# Patient Record
Sex: MALE | Race: WHITE | NOT HISPANIC OR LATINO | URBAN - METROPOLITAN AREA
[De-identification: names, ages, dates, MRNs, and addresses within clinical notes are randomized per-mention and may not be internally consistent; named-entity substitution may affect disease eponyms.]

---

## 2019-07-07 ENCOUNTER — EMERGENCY (EMERGENCY)
Facility: HOSPITAL | Age: 84
LOS: 0 days | Discharge: HOME | End: 2019-07-07
Attending: EMERGENCY MEDICINE | Admitting: EMERGENCY MEDICINE
Payer: MEDICARE

## 2019-07-07 VITALS
HEART RATE: 93 BPM | HEIGHT: 72 IN | WEIGHT: 203.93 LBS | OXYGEN SATURATION: 98 % | TEMPERATURE: 98 F | SYSTOLIC BLOOD PRESSURE: 168 MMHG | DIASTOLIC BLOOD PRESSURE: 86 MMHG | RESPIRATION RATE: 18 BRPM

## 2019-07-07 DIAGNOSIS — R33.8 OTHER RETENTION OF URINE: ICD-10-CM

## 2019-07-07 DIAGNOSIS — R33.9 RETENTION OF URINE, UNSPECIFIED: ICD-10-CM

## 2019-07-07 DIAGNOSIS — I10 ESSENTIAL (PRIMARY) HYPERTENSION: ICD-10-CM

## 2019-07-07 DIAGNOSIS — N40.1 BENIGN PROSTATIC HYPERPLASIA WITH LOWER URINARY TRACT SYMPTOMS: ICD-10-CM

## 2019-07-07 LAB
APPEARANCE UR: CLEAR — SIGNIFICANT CHANGE UP
BACTERIA # UR AUTO: ABNORMAL
BILIRUB UR-MCNC: NEGATIVE — SIGNIFICANT CHANGE UP
COLOR SPEC: YELLOW — SIGNIFICANT CHANGE UP
DIFF PNL FLD: ABNORMAL
EPI CELLS # UR: SIGNIFICANT CHANGE UP
GLUCOSE UR QL: NEGATIVE MG/DL — SIGNIFICANT CHANGE UP
KETONES UR-MCNC: NEGATIVE — SIGNIFICANT CHANGE UP
LEUKOCYTE ESTERASE UR-ACNC: NEGATIVE — SIGNIFICANT CHANGE UP
NITRITE UR-MCNC: NEGATIVE — SIGNIFICANT CHANGE UP
PH UR: 6.5 — SIGNIFICANT CHANGE UP (ref 5–8)
PROT UR-MCNC: NEGATIVE MG/DL — SIGNIFICANT CHANGE UP
RBC CASTS # UR COMP ASSIST: >50 /HPF (ref 0–4)
SP GR SPEC: 1 — LOW (ref 1.01–1.02)
UROBILINOGEN FLD QL: NEGATIVE MG/DL — SIGNIFICANT CHANGE UP
WBC UR QL: SIGNIFICANT CHANGE UP

## 2019-07-07 PROCEDURE — 99283 EMERGENCY DEPT VISIT LOW MDM: CPT

## 2019-07-07 NOTE — ED PROVIDER NOTE - CLINICAL SUMMARY MEDICAL DECISION MAKING FREE TEXT BOX
Pt with hx of urinary retention, enlarged prostate with urinary retention. Plan: Brewer. Pt with hx of urinary retention, enlarged prostate with urinary retention. Plan: Brewer to be placed by nursing.

## 2019-07-07 NOTE — ED PROVIDER NOTE - CONSTITUTIONAL, MLM
normal... Well appearing, well nourished, awake, alert, oriented to person, place, time/situation +in mild distress due to pain

## 2019-07-07 NOTE — ED PROVIDER NOTE - PROGRESS NOTE DETAILS
Nathan CARDENAS for ED attending, Dr. Collins: pt with about 1000 ml urine output. pt feeling better. Discussed with pt about removing Brewer but pt opts to leave Brewer in place and f/u with urology outpatient.

## 2019-07-07 NOTE — ED ADULT NURSE NOTE - NSIMPLEMENTINTERV_GEN_ALL_ED
Implemented All Universal Safety Interventions:  [place] to call system. Call bell, personal items and telephone within reach. Instruct patient to call for assistance. Room bathroom lighting operational. Non-slip footwear when patient is off stretcher. Physically safe environment: no spills, clutter or unnecessary equipment. Stretcher in lowest position, wheels locked, appropriate side rails in place.

## 2019-07-07 NOTE — ED PROVIDER NOTE - OBJECTIVE STATEMENT
84 y/o male with PMHx of HTN, enlarged prostate presents to the ED c/o urinary retention. Had small amount of urination this morning. Pt had a similar episode last year, had catheter placed and removed the next day, and followed up with urologist Dr. Mariano. No recent surgeries. Non smoker, occasional EtOH use. On Flomax. NKDA. Urology: García. PMD: Linker.

## 2019-07-08 LAB
CULTURE RESULTS: NO GROWTH — SIGNIFICANT CHANGE UP
SPECIMEN SOURCE: SIGNIFICANT CHANGE UP

## 2019-09-04 PROBLEM — I10 ESSENTIAL (PRIMARY) HYPERTENSION: Chronic | Status: ACTIVE | Noted: 2019-07-07

## 2019-09-04 PROBLEM — N40.0 BENIGN PROSTATIC HYPERPLASIA WITHOUT LOWER URINARY TRACT SYMPTOMS: Chronic | Status: ACTIVE | Noted: 2019-07-07

## 2019-09-16 PROBLEM — Z00.00 ENCOUNTER FOR PREVENTIVE HEALTH EXAMINATION: Status: ACTIVE | Noted: 2019-09-16

## 2019-09-17 ENCOUNTER — APPOINTMENT (OUTPATIENT)
Age: 84
End: 2019-09-17
Payer: MEDICARE

## 2019-09-17 ENCOUNTER — RESULT CHARGE (OUTPATIENT)
Age: 84
End: 2019-09-17

## 2019-09-17 VITALS
WEIGHT: 204 LBS | RESPIRATION RATE: 16 BRPM | TEMPERATURE: 98.1 F | SYSTOLIC BLOOD PRESSURE: 171 MMHG | DIASTOLIC BLOOD PRESSURE: 75 MMHG | OXYGEN SATURATION: 95 % | HEART RATE: 60 BPM | HEIGHT: 72 IN | BODY MASS INDEX: 27.63 KG/M2

## 2019-09-17 DIAGNOSIS — R35.0 FREQUENCY OF MICTURITION: ICD-10-CM

## 2019-09-17 DIAGNOSIS — Z86.79 PERSONAL HISTORY OF OTHER DISEASES OF THE CIRCULATORY SYSTEM: ICD-10-CM

## 2019-09-17 DIAGNOSIS — Z78.9 OTHER SPECIFIED HEALTH STATUS: ICD-10-CM

## 2019-09-17 LAB
BILIRUB UR QL STRIP: NORMAL
CLARITY UR: CLEAR
COLLECTION METHOD: NORMAL
GLUCOSE UR-MCNC: NORMAL
HCG UR QL: 0.2 EU/DL
HGB UR QL STRIP.AUTO: NORMAL
KETONES UR-MCNC: NORMAL
LEUKOCYTE ESTERASE UR QL STRIP: NORMAL
NITRITE UR QL STRIP: NORMAL
PH UR STRIP: 6.5
PROT UR STRIP-MCNC: NORMAL
SP GR UR STRIP: 1.01

## 2019-09-17 PROCEDURE — 99204 OFFICE O/P NEW MOD 45 MIN: CPT | Mod: 25

## 2019-09-17 PROCEDURE — 51798 US URINE CAPACITY MEASURE: CPT

## 2019-09-18 PROBLEM — Z78.9 NON-SMOKER: Status: ACTIVE | Noted: 2019-09-18

## 2019-09-18 PROBLEM — Z86.79 HISTORY OF HYPERTENSION: Status: RESOLVED | Noted: 2019-09-18 | Resolved: 2019-09-18

## 2019-09-18 NOTE — HISTORY OF PRESENT ILLNESS
[FreeTextEntry1] : 83 year old man seen 09/17/2019 with complaint of urinary frequency. This began few years ago. \par It is moderate in severity. Nothing makes the symptoms better, nothing makes sx worse. \par It is associated with nothing. Patient has hx of BPH and is currently taking Flomax 0.8 mg QD. Patient states his frequency is somewhat bothersome, reports he voids every 2-3 hrs and has nocturia x 2. Patient reports he has hx of going into acute urinary retention few times in the past requiring chowdary placement in ER. He denies any current feeling of incomplete bladder emptying and reports he has a good stream.  No hematuria, no dysuria, no urgency, no straining. No incontinence. No fevers, no chills, no nausea, no vomiting, no flank pain. No family history contributory to BPH with LUTS.\par \par

## 2019-09-18 NOTE — ASSESSMENT
[FreeTextEntry1] : 83 year old man seen 09/17/2019 with complaint of urinary frequency, most likely secondary to BPH with LUTS. PVR of 356 cc.\par \par The anatomy of the lower genitourinary tract was explained to the patient, with the urine passing through the bladder neck and prostatic urethra as it exits the body. Prostatic enlargement can constrict the lumen and the bladder outlet, causing incomplete bladder emptying and irritative symptoms such and frequency and urgency. Alpha blockers can be used to relax the bladder neck and facilitate passage of urine from the bladder. 5-alpha reductase inhibitors can be employed to shrink the prostate and thereby allow for passage of urine more easily. Anticholinergics to decrease bladder irritative symptoms were also discussed, but it was stressed that they can increase post void residual and risk urinary retention.\par \par Will obtain UA/Urine Cx to R/O UTI. Patient recommended to start Finasteride 5 mg QD. Patient wants to try the conservative approach and would like to try Finasteride. Discussed with patient if he has another episode of Acute urinary retention, he could benefit from TURP.\par \par The risks, benefits, and alternatives of Transurethral Resection of Prostate were discussed with the patient. The patient was told that a specialized cystoscope is advanced into the prostatic urethra and obstructive prostatic tissue bulging into the lumen of the urethra is resected. Bleeding is controlled with cautery. A [employer] evacuator is used irrigate the bladder and evacuate clot and TURP chips from the bladder. These prostate chips are then sent for pathologic evaluation. A chowdary catheter is left in place at the end of the procedure, and continuous bladder irrigation is performed overnight. The following morning, CBI is held and the patient is discharged with the chowdary catheter to gravity. The catheter is then removed the following week. Risks include bladder injury, urethral injury, incontinence, urinary retention, persistent bleeding which may require prolonged CBI or being taken back to the OR for cautery. All questions and concerns were answered and addressed. Pt declines work up for TURP at this time.

## 2019-09-18 NOTE — PHYSICAL EXAM
[General Appearance - Well Developed] : well developed [General Appearance - Well Nourished] : well nourished [Normal Appearance] : normal appearance [Well Groomed] : well groomed [Edema] : no peripheral edema [General Appearance - In No Acute Distress] : no acute distress [Respiration, Rhythm And Depth] : normal respiratory rhythm and effort [Exaggerated Use Of Accessory Muscles For Inspiration] : no accessory muscle use [Abdomen Soft] : soft [Abdomen Tenderness] : non-tender [Costovertebral Angle Tenderness] : no ~M costovertebral angle tenderness [Urethral Meatus] : meatus normal [Urinary Bladder Findings] : the bladder was normal on palpation [Scrotum] : the scrotum was normal [Testes Mass (___cm)] : there were no testicular masses [No Prostate Nodules] : no prostate nodules [Prostate Enlarged] : was enlarged [Prostate Size___ (Scale 0-4)] : prostate size was [unfilled] on a scale of 0-4 [Normal Station and Gait] : the gait and station were normal for the patient's age [] : no rash [No Focal Deficits] : no focal deficits [Oriented To Time, Place, And Person] : oriented to person, place, and time [Affect] : the affect was normal [Mood] : the mood was normal [Not Anxious] : not anxious [No Palpable Adenopathy] : no palpable adenopathy [Prostate Size ___ gm] : prostate size [unfilled] gm [Prostate Nodule] : did not have a nodule [Prostate Tenderness] : was not tender [Prostate Fluctuant] : was not fluctuant

## 2019-09-18 NOTE — REVIEW OF SYSTEMS
[see HPI] : see HPI [Nocturia] : nocturia [Negative] : Endocrine [Dysuria] : no dysuria [Incontinence] : no incontinence [Genital Lesion] : no genital lesions [Testicular Pain] : no testicular pain

## 2019-09-19 ENCOUNTER — OTHER (OUTPATIENT)
Age: 84
End: 2019-09-19

## 2019-09-19 LAB
APPEARANCE: CLEAR
BACTERIA: NEGATIVE
BILIRUBIN URINE: NEGATIVE
BLOOD URINE: NEGATIVE
COLOR: NORMAL
GLUCOSE QUALITATIVE U: NEGATIVE
HYALINE CASTS: 0 /LPF
KETONES URINE: NEGATIVE
LEUKOCYTE ESTERASE URINE: ABNORMAL
MICROSCOPIC-UA: NORMAL
NITRITE URINE: NEGATIVE
PH URINE: 6.5
PROTEIN URINE: NEGATIVE
RED BLOOD CELLS URINE: 0 /HPF
SPECIFIC GRAVITY URINE: 1.01
SQUAMOUS EPITHELIAL CELLS: 1 /HPF
UROBILINOGEN URINE: NORMAL
WHITE BLOOD CELLS URINE: 7 /HPF

## 2019-09-20 ENCOUNTER — RX RENEWAL (OUTPATIENT)
Age: 84
End: 2019-09-20

## 2019-09-20 DIAGNOSIS — A49.9 URINARY TRACT INFECTION, SITE NOT SPECIFIED: ICD-10-CM

## 2019-09-20 DIAGNOSIS — N39.0 URINARY TRACT INFECTION, SITE NOT SPECIFIED: ICD-10-CM

## 2019-09-20 LAB — BACTERIA UR CULT: ABNORMAL

## 2019-10-10 ENCOUNTER — INPATIENT (INPATIENT)
Facility: HOSPITAL | Age: 84
LOS: 3 days | Discharge: NURSING, REHAB OR LONG-TERM CARE | DRG: 493 | End: 2019-10-14
Attending: INTERNAL MEDICINE | Admitting: INTERNAL MEDICINE
Payer: MEDICARE

## 2019-10-10 VITALS
WEIGHT: 205.03 LBS | RESPIRATION RATE: 17 BRPM | DIASTOLIC BLOOD PRESSURE: 63 MMHG | SYSTOLIC BLOOD PRESSURE: 144 MMHG | HEIGHT: 76 IN | OXYGEN SATURATION: 98 % | HEART RATE: 80 BPM | TEMPERATURE: 99 F

## 2019-10-10 DIAGNOSIS — S82.899A OTHER FRACTURE OF UNSPECIFIED LOWER LEG, INITIAL ENCOUNTER FOR CLOSED FRACTURE: ICD-10-CM

## 2019-10-10 LAB
ANION GAP SERPL CALC-SCNC: 9 MMOL/L — SIGNIFICANT CHANGE UP (ref 5–17)
APTT BLD: 29.4 SEC — SIGNIFICANT CHANGE UP (ref 27.5–36.3)
BUN SERPL-MCNC: 25 MG/DL — HIGH (ref 7–23)
CALCIUM SERPL-MCNC: 9.1 MG/DL — SIGNIFICANT CHANGE UP (ref 8.5–10.1)
CHLORIDE SERPL-SCNC: 108 MMOL/L — SIGNIFICANT CHANGE UP (ref 96–108)
CO2 SERPL-SCNC: 28 MMOL/L — SIGNIFICANT CHANGE UP (ref 22–31)
CREAT SERPL-MCNC: 0.8 MG/DL — SIGNIFICANT CHANGE UP (ref 0.5–1.3)
GLUCOSE SERPL-MCNC: 85 MG/DL — SIGNIFICANT CHANGE UP (ref 70–99)
HCT VFR BLD CALC: 40.4 % — SIGNIFICANT CHANGE UP (ref 39–50)
HGB BLD-MCNC: 13.7 G/DL — SIGNIFICANT CHANGE UP (ref 13–17)
INR BLD: 1.04 RATIO — SIGNIFICANT CHANGE UP (ref 0.88–1.16)
MCHC RBC-ENTMCNC: 31.9 PG — SIGNIFICANT CHANGE UP (ref 27–34)
MCHC RBC-ENTMCNC: 33.9 GM/DL — SIGNIFICANT CHANGE UP (ref 32–36)
MCV RBC AUTO: 94.2 FL — SIGNIFICANT CHANGE UP (ref 80–100)
PLATELET # BLD AUTO: 242 K/UL — SIGNIFICANT CHANGE UP (ref 150–400)
POTASSIUM SERPL-MCNC: 3.3 MMOL/L — LOW (ref 3.5–5.3)
POTASSIUM SERPL-SCNC: 3.3 MMOL/L — LOW (ref 3.5–5.3)
PROTHROM AB SERPL-ACNC: 11.6 SEC — SIGNIFICANT CHANGE UP (ref 10–12.9)
RBC # BLD: 4.29 M/UL — SIGNIFICANT CHANGE UP (ref 4.2–5.8)
RBC # FLD: 12.3 % — SIGNIFICANT CHANGE UP (ref 10.3–14.5)
SODIUM SERPL-SCNC: 145 MMOL/L — SIGNIFICANT CHANGE UP (ref 135–145)
WBC # BLD: 8.68 K/UL — SIGNIFICANT CHANGE UP (ref 3.8–10.5)
WBC # FLD AUTO: 8.68 K/UL — SIGNIFICANT CHANGE UP (ref 3.8–10.5)

## 2019-10-10 PROCEDURE — 73590 X-RAY EXAM OF LOWER LEG: CPT | Mod: 26,RT

## 2019-10-10 PROCEDURE — 97530 THERAPEUTIC ACTIVITIES: CPT | Mod: GP

## 2019-10-10 PROCEDURE — 97162 PT EVAL MOD COMPLEX 30 MIN: CPT | Mod: GP

## 2019-10-10 PROCEDURE — 99221 1ST HOSP IP/OBS SF/LOW 40: CPT | Mod: 57

## 2019-10-10 PROCEDURE — 73610 X-RAY EXAM OF ANKLE: CPT | Mod: 26,RT

## 2019-10-10 PROCEDURE — 36415 COLL VENOUS BLD VENIPUNCTURE: CPT

## 2019-10-10 PROCEDURE — 93010 ELECTROCARDIOGRAM REPORT: CPT

## 2019-10-10 PROCEDURE — 71045 X-RAY EXAM CHEST 1 VIEW: CPT | Mod: 26

## 2019-10-10 PROCEDURE — 80048 BASIC METABOLIC PNL TOTAL CA: CPT

## 2019-10-10 PROCEDURE — C1713: CPT

## 2019-10-10 PROCEDURE — 85610 PROTHROMBIN TIME: CPT

## 2019-10-10 PROCEDURE — 97116 GAIT TRAINING THERAPY: CPT | Mod: GP

## 2019-10-10 PROCEDURE — 76000 FLUOROSCOPY <1 HR PHYS/QHP: CPT

## 2019-10-10 PROCEDURE — 71045 X-RAY EXAM CHEST 1 VIEW: CPT

## 2019-10-10 PROCEDURE — 85027 COMPLETE CBC AUTOMATED: CPT

## 2019-10-10 PROCEDURE — 73610 X-RAY EXAM OF ANKLE: CPT | Mod: 26,77,RT

## 2019-10-10 PROCEDURE — 70450 CT HEAD/BRAIN W/O DYE: CPT | Mod: 26

## 2019-10-10 PROCEDURE — 85730 THROMBOPLASTIN TIME PARTIAL: CPT

## 2019-10-10 RX ORDER — POTASSIUM CHLORIDE 20 MEQ
40 PACKET (EA) ORAL ONCE
Refills: 0 | Status: DISCONTINUED | OUTPATIENT
Start: 2019-10-10 | End: 2019-10-10

## 2019-10-10 RX ORDER — ONDANSETRON 8 MG/1
4 TABLET, FILM COATED ORAL EVERY 6 HOURS
Refills: 0 | Status: DISCONTINUED | OUTPATIENT
Start: 2019-10-10 | End: 2019-10-12

## 2019-10-10 RX ORDER — POTASSIUM CHLORIDE 20 MEQ
40 PACKET (EA) ORAL ONCE
Refills: 0 | Status: COMPLETED | OUTPATIENT
Start: 2019-10-10 | End: 2019-10-10

## 2019-10-10 RX ORDER — TAMSULOSIN HYDROCHLORIDE 0.4 MG/1
0.4 CAPSULE ORAL AT BEDTIME
Refills: 0 | Status: DISCONTINUED | OUTPATIENT
Start: 2019-10-10 | End: 2019-10-12

## 2019-10-10 RX ORDER — SODIUM CHLORIDE 9 MG/ML
1000 INJECTION, SOLUTION INTRAVENOUS
Refills: 0 | Status: DISCONTINUED | OUTPATIENT
Start: 2019-10-10 | End: 2019-10-12

## 2019-10-10 RX ORDER — TETANUS TOXOID, REDUCED DIPHTHERIA TOXOID AND ACELLULAR PERTUSSIS VACCINE, ADSORBED 5; 2.5; 8; 8; 2.5 [IU]/.5ML; [IU]/.5ML; UG/.5ML; UG/.5ML; UG/.5ML
0.5 SUSPENSION INTRAMUSCULAR ONCE
Refills: 0 | Status: COMPLETED | OUTPATIENT
Start: 2019-10-10 | End: 2019-10-10

## 2019-10-10 RX ORDER — OXYCODONE AND ACETAMINOPHEN 5; 325 MG/1; MG/1
1 TABLET ORAL ONCE
Refills: 0 | Status: DISCONTINUED | OUTPATIENT
Start: 2019-10-10 | End: 2019-10-10

## 2019-10-10 RX ORDER — ACETAMINOPHEN 500 MG
650 TABLET ORAL EVERY 4 HOURS
Refills: 0 | Status: DISCONTINUED | OUTPATIENT
Start: 2019-10-10 | End: 2019-10-12

## 2019-10-10 RX ORDER — FINASTERIDE 5 MG/1
5 TABLET, FILM COATED ORAL DAILY
Refills: 0 | Status: DISCONTINUED | OUTPATIENT
Start: 2019-10-10 | End: 2019-10-12

## 2019-10-10 RX ORDER — MORPHINE SULFATE 50 MG/1
2 CAPSULE, EXTENDED RELEASE ORAL EVERY 4 HOURS
Refills: 0 | Status: DISCONTINUED | OUTPATIENT
Start: 2019-10-10 | End: 2019-10-12

## 2019-10-10 RX ORDER — LOSARTAN POTASSIUM 100 MG/1
100 TABLET, FILM COATED ORAL DAILY
Refills: 0 | Status: DISCONTINUED | OUTPATIENT
Start: 2019-10-10 | End: 2019-10-12

## 2019-10-10 RX ORDER — DOCUSATE SODIUM 100 MG
100 CAPSULE ORAL THREE TIMES A DAY
Refills: 0 | Status: DISCONTINUED | OUTPATIENT
Start: 2019-10-10 | End: 2019-10-12

## 2019-10-10 RX ORDER — AMLODIPINE BESYLATE 2.5 MG/1
10 TABLET ORAL DAILY
Refills: 0 | Status: DISCONTINUED | OUTPATIENT
Start: 2019-10-10 | End: 2019-10-12

## 2019-10-10 RX ORDER — ATORVASTATIN CALCIUM 80 MG/1
10 TABLET, FILM COATED ORAL AT BEDTIME
Refills: 0 | Status: DISCONTINUED | OUTPATIENT
Start: 2019-10-10 | End: 2019-10-12

## 2019-10-10 RX ADMIN — TETANUS TOXOID, REDUCED DIPHTHERIA TOXOID AND ACELLULAR PERTUSSIS VACCINE, ADSORBED 0.5 MILLILITER(S): 5; 2.5; 8; 8; 2.5 SUSPENSION INTRAMUSCULAR at 17:29

## 2019-10-10 RX ADMIN — TAMSULOSIN HYDROCHLORIDE 0.4 MILLIGRAM(S): 0.4 CAPSULE ORAL at 22:21

## 2019-10-10 RX ADMIN — ATORVASTATIN CALCIUM 10 MILLIGRAM(S): 80 TABLET, FILM COATED ORAL at 22:21

## 2019-10-10 RX ADMIN — Medication 40 MILLIEQUIVALENT(S): at 18:37

## 2019-10-10 RX ADMIN — Medication 100 MILLIGRAM(S): at 22:21

## 2019-10-10 NOTE — H&P ADULT - NSHPPHYSICALEXAM_GEN_ALL_CORE
Vital Signs Last 24 Hrs  T(C): 37 (10 Oct 2019 14:53), Max: 37 (10 Oct 2019 14:53)  T(F): 98.6 (10 Oct 2019 14:53), Max: 98.6 (10 Oct 2019 14:53)  HR: 80 (10 Oct 2019 14:53) (80 - 80)  BP: 144/63 (10 Oct 2019 14:53) (144/63 - 144/63)  BP(mean): --  RR: 17 (10 Oct 2019 14:53) (17 - 17)  SpO2: 98% (10 Oct 2019 14:53) (98% - 98%)

## 2019-10-10 NOTE — ED ADULT NURSE NOTE - OBJECTIVE STATEMENT
Pt presents to ED with Right ankle swelling, lac on back of head, bilateral elbow abrasions.  Bleeding controlled on back of head.  Pt denies any pain at this time.  Pt tripped and fell in driveway.  Pt denies LOC, pt not taking any blood thinners at this time.  Attending at bedside.

## 2019-10-10 NOTE — ED PROVIDER NOTE - MUSCULOSKELETAL, MLM
Spine appears normal, range of motion is not limited, +swelling to right ankle TTP of medial malleolus, intact distally

## 2019-10-10 NOTE — ED ADULT NURSE NOTE - CHPI ED NUR SYMPTOMS NEG
no confusion/no nausea/no seizure/no syncope/no change in level of consciousness/no blurred vision/no weakness/no dizziness/no loss of consciousness/no vomiting

## 2019-10-10 NOTE — ED PROVIDER NOTE - CARE PLAN
Principal Discharge DX:	Broken ankle Principal Discharge DX:	Trimalleolar fracture of right ankle  Secondary Diagnosis:	Scalp laceration

## 2019-10-10 NOTE — H&P ADULT - NSHPLABSRESULTS_GEN_ALL_CORE
13.7   8.68  )-----------( 242      ( 10 Oct 2019 15:26 )             40.4     10 Oct 2019 15:26    145    |  108    |  25     ----------------------------<  85     3.3     |  28     |  0.80     Ca    9.1        10 Oct 2019 15:26    PT/INR - ( 10 Oct 2019 15:26 )   PT: 11.6 sec;   INR: 1.04 ratio       PTT - ( 10 Oct 2019 15:26 )  PTT:29.4 sec

## 2019-10-10 NOTE — ED PROVIDER NOTE - SKIN, MLM
Skin normal color for race, warm, +abrasion to right and left elbows +1 cm linear laceration scalp minimal active bleeding

## 2019-10-10 NOTE — H&P ADULT - HISTORY OF PRESENT ILLNESS
82yo/M with PMH HTN, hyperlipidemia, BPH presented s/p mechanical fall with RT ankle fracture. Patient tripped and fell, +hit his head, no LOC. Not on blood thinners. Last ate around noon. NO prior cardiac history, per wife had stress test couple of months ago which was negative

## 2019-10-10 NOTE — H&P ADULT - ASSESSMENT
#S/p mechanical fall with RT ankle fracture  Ortho eval appreciated  NWB to RLE  NPO for OR  Pain meds prn  EKG-  Labs-  Patient is medically optimized for OR    #HTN/hyperlipidemia  Stable  Continue home meds    #BPH- stable    #Dispo- inpatient admit, pt is medically optimized for OR. D/w pt and wife at bedside, ortho team #S/p mechanical fall with RT ankle fracture  Ortho eval appreciated  NWB to RLE  NPO for OR  Pain meds prn  EKG- sinus, non-specific changes  Labs- reviewed  Patient is medically optimized for OR    #HTN/hyperlipidemia  Stable  Continue home meds    #Hypokalemia- replace prior to OR    #BPH- stable    #Dispo- inpatient admit, pt is medically optimized for OR. D/w pt and wife at bedside, ortho team

## 2019-10-10 NOTE — CONSULT NOTE ADULT - SUBJECTIVE AND OBJECTIVE BOX
82 y/o male with a PMHx of enlarged prostate, HTN, HLD presents to the ED s/p mechanical trip and fall backwards off a curb and rolled his ankle and hit his head. denies LOC, Pt is c/o pain to his right ankle. unable to ambulate after injury. denies N/T RLE, no other extremity complaints. x rays right ankle show a trimal fracture/dislocation     NKDA  PMH: HTN    PE right ankle   + deformity  limited ROM due to pain   DP intact   + ROM toes   TTP   skin intact   SILT     Procedure: under adequate pain control, right ankle hematoma block administered under sterile technique approx 10cc 1% lidocaine. close reduction attempted, trilam splint applied  pt tolerated proceedure well, remained NVI 84 y/o male with a PMHx of enlarged prostate, HTN, HLD presents to the ED s/p mechanical trip and fall backwards off a curb and rolled his ankle and hit his head. denies LOC, Pt is c/o pain to his right ankle. unable to ambulate after injury. denies N/T RLE, no other extremity complaints. x rays right ankle show a trimal equivalent fracture/dislocation. last ate/drank at 12:00 today     NKDA  PMH: HTN    PE right ankle   + deformity  limited ROM due to pain   DP intact   + ROM toes   TTP   skin intact   SILT     Procedure: under adequate pain control, right ankle hematoma block administered under sterile technique approx 10cc 1% lidocaine. close reduction attempted, trilam splint applied  pt tolerated proceedure well, remained NVI

## 2019-10-10 NOTE — ED PROVIDER NOTE - OBJECTIVE STATEMENT
82 y/o male with a PMHx of enlarged prostate, HTN, HLD presents to the ED s/p mechanical trip and fall backwards off a curb and rolled his ankle and hit his head. No LOC, HA, dizziness. Denies feeling woozy. Pt is c/o pain to his right ankle on pressure. No pain elsewhere. Pt was told he had carotid narrowing but isn't on medication. Pt is not on blood thinners. Drinks two drinks a day. Nonsmoker. Last tdap unknown. PCP: Dr. Mtz

## 2019-10-10 NOTE — CONSULT NOTE ADULT - ASSESSMENT
A: 83M s/p right ankle trimal Fx/dislocation     P:  NWB LLE   elevation   maintain splint   post reduction x rays   options risks, benefits complications regarding surgical vs non surgical intervention discussed with patient. pt understands and agrees to recommended procedure ORIF right ankle   will discuss with attending on call DR Bustillos regarding admission vs discharge and follow as outpatient A: 83M s/p right ankle trimal equivalent Fx/dislocation     P:  NWB LLE   elevation   maintain splint   post reduction x rays   NPO   options risks, benefits complications regarding surgical vs non surgical intervention discussed with patient. pt understands and agrees to recommended procedure ORIF right ankle   will discuss with attending on call DR Bustillos regarding admission vs discharge and follow as outpatient   after discussion with attending will plan for medical admission and clearance for planned surgery this evening vs tomorrow   cbc, bmp EKG PT PTT T&S UA CXR   IVF  hold chemical anticoagulation   will obtain consent

## 2019-10-10 NOTE — ED ADULT NURSE NOTE - NSIMPLEMENTINTERV_GEN_ALL_ED
Implemented All Fall Risk Interventions:  [place] to call system. Call bell, personal items and telephone within reach. Instruct patient to call for assistance. Room bathroom lighting operational. Non-slip footwear when patient is off stretcher. Physically safe environment: no spills, clutter or unnecessary equipment. Stretcher in lowest position, wheels locked, appropriate side rails in place. Provide visual cue, wrist band, yellow gown, etc. Monitor gait and stability. Monitor for mental status changes and reorient to person, place, and time. Review medications for side effects contributing to fall risk. Reinforce activity limits and safety measures with patient and family.

## 2019-10-10 NOTE — ED PROVIDER NOTE - NS_ ATTENDINGSCRIBEDETAILS _ED_A_ED_FT
I, Travis Garzon MD,  performed the initial face to face bedside interview with this patient regarding history of present illness, review of symptoms and relevant past medical, social and family history.  I completed an independent physical examination.    The history, relevant review of systems, past medical and surgical history, medical decision making, and physical examination was documented by the scribe in my presence and I attest to the accuracy of the documentation.

## 2019-10-10 NOTE — ED ADULT TRIAGE NOTE - CHIEF COMPLAINT QUOTE
Pt with mechanical fall c/o right ankle pain, splinted by EMS.  Pt also with lac to back of head.  Pt denies LOC, Denies blood thinners.

## 2019-10-10 NOTE — ED PROCEDURE NOTE - CPROC ED INFORMED CONSENT1
Benefits, risks, and possible complications of procedure explained to patient/caregiver who verbalized understanding and gave verbal consent.
Ana

## 2019-10-10 NOTE — CONSULT NOTE ADULT - ATTENDING COMMENTS
Pt seen and examined. Chart reviewed. H&P reviewed.  Right ankle bimalleolar equivalent.  Surgical indications met  All RBA discussed at length.  All questions answered  Plan to proceed with ORIF  Postop recovery discussed.

## 2019-10-11 LAB
ANION GAP SERPL CALC-SCNC: 4 MMOL/L — LOW (ref 5–17)
BUN SERPL-MCNC: 23 MG/DL — SIGNIFICANT CHANGE UP (ref 7–23)
CALCIUM SERPL-MCNC: 8.5 MG/DL — SIGNIFICANT CHANGE UP (ref 8.5–10.1)
CHLORIDE SERPL-SCNC: 110 MMOL/L — HIGH (ref 96–108)
CO2 SERPL-SCNC: 29 MMOL/L — SIGNIFICANT CHANGE UP (ref 22–31)
CREAT SERPL-MCNC: 0.68 MG/DL — SIGNIFICANT CHANGE UP (ref 0.5–1.3)
GLUCOSE SERPL-MCNC: 95 MG/DL — SIGNIFICANT CHANGE UP (ref 70–99)
HCT VFR BLD CALC: 36.7 % — LOW (ref 39–50)
HGB BLD-MCNC: 12.4 G/DL — LOW (ref 13–17)
MCHC RBC-ENTMCNC: 31.6 PG — SIGNIFICANT CHANGE UP (ref 27–34)
MCHC RBC-ENTMCNC: 33.8 GM/DL — SIGNIFICANT CHANGE UP (ref 32–36)
MCV RBC AUTO: 93.6 FL — SIGNIFICANT CHANGE UP (ref 80–100)
PLATELET # BLD AUTO: 210 K/UL — SIGNIFICANT CHANGE UP (ref 150–400)
POTASSIUM SERPL-MCNC: 3.5 MMOL/L — SIGNIFICANT CHANGE UP (ref 3.5–5.3)
POTASSIUM SERPL-SCNC: 3.5 MMOL/L — SIGNIFICANT CHANGE UP (ref 3.5–5.3)
RBC # BLD: 3.92 M/UL — LOW (ref 4.2–5.8)
RBC # FLD: 12.5 % — SIGNIFICANT CHANGE UP (ref 10.3–14.5)
SODIUM SERPL-SCNC: 143 MMOL/L — SIGNIFICANT CHANGE UP (ref 135–145)
WBC # BLD: 6.33 K/UL — SIGNIFICANT CHANGE UP (ref 3.8–10.5)
WBC # FLD AUTO: 6.33 K/UL — SIGNIFICANT CHANGE UP (ref 3.8–10.5)

## 2019-10-11 RX ADMIN — AMLODIPINE BESYLATE 10 MILLIGRAM(S): 2.5 TABLET ORAL at 05:28

## 2019-10-11 RX ADMIN — Medication 100 MILLIGRAM(S): at 20:01

## 2019-10-11 RX ADMIN — SODIUM CHLORIDE 75 MILLILITER(S): 9 INJECTION, SOLUTION INTRAVENOUS at 05:28

## 2019-10-11 RX ADMIN — SODIUM CHLORIDE 75 MILLILITER(S): 9 INJECTION, SOLUTION INTRAVENOUS at 23:50

## 2019-10-11 RX ADMIN — TAMSULOSIN HYDROCHLORIDE 0.4 MILLIGRAM(S): 0.4 CAPSULE ORAL at 20:01

## 2019-10-11 RX ADMIN — Medication 100 MILLIGRAM(S): at 05:28

## 2019-10-11 RX ADMIN — FINASTERIDE 5 MILLIGRAM(S): 5 TABLET, FILM COATED ORAL at 20:01

## 2019-10-11 RX ADMIN — LOSARTAN POTASSIUM 100 MILLIGRAM(S): 100 TABLET, FILM COATED ORAL at 05:28

## 2019-10-11 NOTE — PROGRESS NOTE ADULT - SUBJECTIVE AND OBJECTIVE BOX
Patient seen and examined. Pain controlled. No acute events overnight. Leg well elevated overnight    Vital Signs Last 24 Hrs  T(C): 36.7 (10-11-19 @ 05:25), Max: 37 (10-10-19 @ 14:53)  T(F): 98 (10-11-19 @ 05:25), Max: 98.6 (10-10-19 @ 14:53)  HR: 66 (10-11-19 @ 05:25) (66 - 80)  BP: 141/52 (10-11-19 @ 05:25) (141/52 - 144/63)  BP(mean): --  RR: 16 (10-11-19 @ 05:25) (16 - 17)  SpO2: 96% (10-11-19 @ 05:25) (96% - 98%)    Physical Exam  Gen: NAD  RLE:   Splint c/d/i- windowed anterior for skin check. Able to wrinkle skin.  Wiggles toes  SILT toes and 1st webspace  cap refill brisk  Compartments soft    A/P: 83y Male w/ R ankle fx for ORIF today.    Analgesia  Hold AC  NWB RLE  Strict elevation  NPO  IVF while NPO     SBIRT screen positive- will discuss possible CIWA w/ med team

## 2019-10-11 NOTE — PROGRESS NOTE ADULT - SUBJECTIVE AND OBJECTIVE BOX
CC- s/p mechanical fall (11 Oct 2019 05:42)    HPI:  82yo/M with PMH HTN, hyperlipidemia, BPH presented s/p mechanical fall with RT ankle fracture. Patient tripped and fell, +hit his head, no LOC. Not on blood thinners. Last ate around noon. NO prior cardiac history, per wife had stress test couple of months ago which was negative (10 Oct 2019 17:43)    10/11/19- NPO for OR today    Review of system- All 10 systems reviewed and is as per HPI otherwise negative.     T(C): 37.2 (10-11-19 @ 11:44), Max: 37.2 (10-11-19 @ 11:44)  HR: 93 (10-11-19 @ 11:44) (65 - 93)  BP: 109/68 (10-11-19 @ 11:44) (109/68 - 144/63)  RR: 16 (10-11-19 @ 11:44) (16 - 17)  SpO2: 95% (10-11-19 @ 11:44) (95% - 98%)  Wt(kg): --    LABS:                        12.4   6.33  )-----------( 210      ( 11 Oct 2019 06:31 )             36.7     143  |  110<H>  |  23  ----------------------------<  95  3.5   |  29  |  0.68    Ca    8.5      11 Oct 2019 06:31    PT/INR - ( 10 Oct 2019 15:26 )   PT: 11.6 sec;   INR: 1.04 ratio      PTT - ( 10 Oct 2019 15:26 )  PTT:29.4 sec        RADIOLOGY & ADDITIONAL TESTS:      PHYSICAL EXAM:    GENERAL: NAD, well-groomed, well-developed  HEAD:  Atraumatic, Normocephalic  EYES: EOMI, PERRLA, conjunctiva and sclera clear  HEENT: Moist mucous membranes  NECK: Supple, No JVD  NERVOUS SYSTEM:  Alert & Oriented X3, Motor Strength 5/5 B/L upper and lower extremities; DTRs 2+ intact and symmetric  CHEST/LUNG: Clear to auscultation bilaterally; No rales, rhonchi, wheezing, or rubs  HEART: Regular rate and rhythm; No murmurs, rubs, or gallops  ABDOMEN: Soft, Nontender, Nondistended; Bowel sounds present  GENITOURINARY- Voiding, no palpable bladder  EXTREMITIES:  2+ Peripheral Pulses, No clubbing, cyanosis, or edema  MUSCULOSKELTAL- No muscle tenderness, Muscle tone normal, No joint tenderness, no Joint swelling, Joint range of motion-normal  SKIN-no rash, no lesion  CNS- alert, oriented X3, non focal       Daily Height in cm: 193.04 (10 Oct 2019 14:53)    Daily       acetaminophen   Tablet .. 650 milliGRAM(s) Oral every 4 hours PRN  aluminum hydroxide/magnesium hydroxide/simethicone Suspension 30 milliLiter(s) Oral every 4 hours PRN  amLODIPine   Tablet 10 milliGRAM(s) Oral daily  atorvastatin 10 milliGRAM(s) Oral at bedtime  docusate sodium 100 milliGRAM(s) Oral three times a day  finasteride 5 milliGRAM(s) Oral daily  lactated ringers. 1000 milliLiter(s) IV Continuous <Continuous>  losartan 100 milliGRAM(s) Oral daily  morphine  - Injectable 2 milliGRAM(s) IV Push every 4 hours PRN  multivitamin 1 Tablet(s) Oral daily  ondansetron Injectable 4 milliGRAM(s) IV Push every 6 hours PRN  tamsulosin 0.4 milliGRAM(s) Oral at bedtime        Assessment    Plan CC- s/p mechanical fall (11 Oct 2019 05:42)    HPI:  82yo/M with PMH HTN, hyperlipidemia, BPH presented s/p mechanical fall with RT ankle fracture. Patient tripped and fell, +hit his head, no LOC. Not on blood thinners. Last ate around noon. NO prior cardiac history, per wife had stress test couple of months ago which was negative (10 Oct 2019 17:43)    10/11/19- NPO for OR today    Review of system- All 10 systems reviewed and is as per HPI otherwise negative.     T(C): 37.2 (10-11-19 @ 11:44), Max: 37.2 (10-11-19 @ 11:44)  HR: 93 (10-11-19 @ 11:44) (65 - 93)  BP: 109/68 (10-11-19 @ 11:44) (109/68 - 144/63)  RR: 16 (10-11-19 @ 11:44) (16 - 17)  SpO2: 95% (10-11-19 @ 11:44) (95% - 98%)  Wt(kg): --    LABS:                        12.4   6.33  )-----------( 210      ( 11 Oct 2019 06:31 )             36.7     143  |  110<H>  |  23  ----------------------------<  95  3.5   |  29  |  0.68    Ca    8.5      11 Oct 2019 06:31    PT/INR - ( 10 Oct 2019 15:26 )   PT: 11.6 sec;   INR: 1.04 ratio      PTT - ( 10 Oct 2019 15:26 )  PTT:29.4 sec    RADIOLOGY & ADDITIONAL TESTS:  EXAM:  CT BRAIN                        PROCEDURE DATE:  10/10/2019    INTERPRETATION:      CT head without IV contrast        CLINICAL INFORMATION:  Fall   Intracranial hemorrhage.    TECHNIQUE: Contiguous axial 5 mm sections were obtained through the head.   Sagittal and coronal 2-D reformatted images were also obtained.   This   scan was performed using automatic exposure control (radiation dose   reduction software) to obtain a diagnostic image quality scan with   patient dose as low as reasonably achievable.     FINDINGS:   No previous examinations are available for review.    The brain demonstrates minimal anterior periventricular white matter   ischemia.   No acute cerebral cortical infarct is seen.  No intracranial   hemorrhage is found.  No mass effect is found in the brain.      The ventricles, sulci and basal cisterns show mild atrophy.    The orbits are unremarkable.  The paranasal sinuses are clear.  The nasal   cavity appears intact.  The nasopharynx is symmetric. The central skull   base, petrous temporal bones and calvarium remain intact.      IMPRESSION:   minimal anterior periventricular white matter ischemia.   Mild atrophy.    EXAM:  XR CHEST 1 VIEW                        EXAM:  XR ANKLE POST RED 2 VIEWS RT                        PROCEDURE DATE:  10/10/2019    INTERPRETATION:  Clinical information: Postreduction. Preop.    AP chest radiograph  AP, lateral, oblique views of the right ankle    COMPARISON: Same day radiographs of the ankle performed at 1524 hours,   chest x-ray March 02, 2010    FINDINGS: Chest: The lungs are clear. The cardiac and mediastinal   contours are unremarkable. There is no acute bony abnormality.    Right ankle: There has been reduction of the previously seen tibiotalar   dislocation with anatomic alignment of the distal tibia and talus. There   is markedly improved alignment of the distal fibular fracture.    IMPRESSION:     Chest: Clear lungs  Right ankle: Successful reduction of tibiotalar dislocation and markedly   improved alignment of distal fibular fracture.    PHYSICAL EXAM:  GENERAL: NAD, well-groomed, well-developed  HEAD:  Atraumatic, Normocephalic  EYES: EOMI, PERRLA, conjunctiva and sclera clear  HEENT: Moist mucous membranes  NECK: Supple, No JVD  NERVOUS SYSTEM:  Alert & Oriented X3, Motor Strength 5/5 B/L upper and lower extremities; DTRs 2+ intact and symmetric  CHEST/LUNG: Clear to auscultation bilaterally; No rales, rhonchi, wheezing, or rubs  HEART: Regular rate and rhythm; No murmurs, rubs, or gallops  ABDOMEN: Soft, Nontender, Nondistended; Bowel sounds present  GENITOURINARY- Voiding, no palpable bladder  EXTREMITIES:  2+ Peripheral Pulses, No clubbing, cyanosis, or edema  MUSCULOSKELTAL- RT ankle in splint  SKIN-no rash, no lesion  CNS- alert, oriented X3, non focal     Daily Height in cm: 193.04 (10 Oct 2019 14:53)      MEDICATIONS  (STANDING):  amLODIPine   Tablet 10 milliGRAM(s) Oral daily  atorvastatin 10 milliGRAM(s) Oral at bedtime  docusate sodium 100 milliGRAM(s) Oral three times a day  finasteride 5 milliGRAM(s) Oral daily  lactated ringers. 1000 milliLiter(s) (75 mL/Hr) IV Continuous <Continuous>  losartan 100 milliGRAM(s) Oral daily  multivitamin 1 Tablet(s) Oral daily  tamsulosin 0.4 milliGRAM(s) Oral at bedtime    MEDICATIONS  (PRN):  acetaminophen   Tablet .. 650 milliGRAM(s) Oral every 4 hours PRN Mild Pain (1 - 3)  aluminum hydroxide/magnesium hydroxide/simethicone Suspension 30 milliLiter(s) Oral every 4 hours PRN Dyspepsia  morphine  - Injectable 2 milliGRAM(s) IV Push every 4 hours PRN Severe Pain (7 - 10)  ondansetron Injectable 4 milliGRAM(s) IV Push every 6 hours PRN Nausea    Assessment/Plan  #S/p mechanical fall with RT ankle fracture  Ortho eval appreciated  NWB to RLE  NPO for OR  Pain meds prn  EKG- sinus, non-specific changes  Patient is medically optimized for OR    #HTN/hyperlipidemia  Stable  Continue home meds    #Hypokalemia- replaced    #BPH- stable    #Dispo- for OR today, pt is medically optimized for OR. D/w pt and ortho team

## 2019-10-11 NOTE — PROGRESS NOTE ADULT - ASSESSMENT
#S/p mechanical fall with RT ankle fracture  Ortho eval appreciated  NWB to RLE  NPO for OR  Pain meds prn  EKG- sinus, non-specific changes  Labs- reviewed  Patient is medically optimized for OR    #HTN/hyperlipidemia  Stable  Continue home meds    #Hypokalemia- replace prior to OR    #BPH- stable    #Dispo- inpatient admit, pt is medically optimized for OR. D/w pt and wife at bedside, ortho team

## 2019-10-11 NOTE — CHART NOTE - NSCHARTNOTEFT_GEN_A_CORE
Dx: R Ankle Trimal Fx     Sx: ORIF    Surgeons:  Dr. Bustillos    Med Cleared: yes    H&P : Done     ALL: Allergies    No Known Allergies    Intolerances      Vital Signs Last 24 Hrs  T(C): 37.1 (11 Oct 2019 23:10), Max: 37.2 (11 Oct 2019 11:44)  T(F): 98.7 (11 Oct 2019 23:10), Max: 99 (11 Oct 2019 11:44)  HR: 68 (11 Oct 2019 23:10) (65 - 93)  BP: 140/54 (11 Oct 2019 23:10) (109/68 - 141/52)  BP(mean): --  RR: 16 (11 Oct 2019 23:10) (16 - 16)  SpO2: 96% (11 Oct 2019 23:10) (95% - 97%)    LABS:                        12.4   6.33  )-----------( 210      ( 11 Oct 2019 06:31 )             36.7     11 Oct 2019 06:31    143    |  110    |  23     ----------------------------<  95     3.5     |  29     |  0.68     Ca    8.5        11 Oct 2019 06:31      PT/INR - ( 10 Oct 2019 15:26 )   PT: 11.6 sec;   INR: 1.04 ratio         PTT - ( 10 Oct 2019 15:26 )  PTT:29.4 sec    Type and Screen:     pending    UA:  done    EKG: in chart     CXR: no Pacemaker      83y M s/p MF w/ R ankle Fx, To OR tomorrow  for R Ankle ORIF w/ Dr. Bustillos    - Pain Control  - (NWB RLE in splint, ICE and ELEVATE  - DVT PPx: hold all chemical PPx at midnight  - NPO except for meds  - IVF while NPO  - Medical Clearance per Dr. De La Paz   - Plan for OR 10/12    Pt discussed w/ Dr. Bustillos who agrees with the plan.

## 2019-10-12 LAB
ANION GAP SERPL CALC-SCNC: 7 MMOL/L — SIGNIFICANT CHANGE UP (ref 5–17)
ANION GAP SERPL CALC-SCNC: 8 MMOL/L — SIGNIFICANT CHANGE UP (ref 5–17)
APTT BLD: 30.4 SEC — SIGNIFICANT CHANGE UP (ref 27.5–36.3)
BUN SERPL-MCNC: 23 MG/DL — SIGNIFICANT CHANGE UP (ref 7–23)
BUN SERPL-MCNC: 25 MG/DL — HIGH (ref 7–23)
CALCIUM SERPL-MCNC: 8.2 MG/DL — LOW (ref 8.5–10.1)
CALCIUM SERPL-MCNC: 8.7 MG/DL — SIGNIFICANT CHANGE UP (ref 8.5–10.1)
CHLORIDE SERPL-SCNC: 111 MMOL/L — HIGH (ref 96–108)
CHLORIDE SERPL-SCNC: 111 MMOL/L — HIGH (ref 96–108)
CO2 SERPL-SCNC: 26 MMOL/L — SIGNIFICANT CHANGE UP (ref 22–31)
CO2 SERPL-SCNC: 27 MMOL/L — SIGNIFICANT CHANGE UP (ref 22–31)
CREAT SERPL-MCNC: 0.68 MG/DL — SIGNIFICANT CHANGE UP (ref 0.5–1.3)
CREAT SERPL-MCNC: 0.76 MG/DL — SIGNIFICANT CHANGE UP (ref 0.5–1.3)
GLUCOSE SERPL-MCNC: 104 MG/DL — HIGH (ref 70–99)
GLUCOSE SERPL-MCNC: 130 MG/DL — HIGH (ref 70–99)
HCT VFR BLD CALC: 36.1 % — LOW (ref 39–50)
HCT VFR BLD CALC: 38.9 % — LOW (ref 39–50)
HGB BLD-MCNC: 12 G/DL — LOW (ref 13–17)
HGB BLD-MCNC: 12.9 G/DL — LOW (ref 13–17)
INR BLD: 1.14 RATIO — SIGNIFICANT CHANGE UP (ref 0.88–1.16)
MCHC RBC-ENTMCNC: 31.3 PG — SIGNIFICANT CHANGE UP (ref 27–34)
MCHC RBC-ENTMCNC: 31.7 PG — SIGNIFICANT CHANGE UP (ref 27–34)
MCHC RBC-ENTMCNC: 33.2 GM/DL — SIGNIFICANT CHANGE UP (ref 32–36)
MCHC RBC-ENTMCNC: 33.2 GM/DL — SIGNIFICANT CHANGE UP (ref 32–36)
MCV RBC AUTO: 94.4 FL — SIGNIFICANT CHANGE UP (ref 80–100)
MCV RBC AUTO: 95.3 FL — SIGNIFICANT CHANGE UP (ref 80–100)
PLATELET # BLD AUTO: 171 K/UL — SIGNIFICANT CHANGE UP (ref 150–400)
PLATELET # BLD AUTO: 183 K/UL — SIGNIFICANT CHANGE UP (ref 150–400)
POTASSIUM SERPL-MCNC: 3.3 MMOL/L — LOW (ref 3.5–5.3)
POTASSIUM SERPL-MCNC: 3.5 MMOL/L — SIGNIFICANT CHANGE UP (ref 3.5–5.3)
POTASSIUM SERPL-SCNC: 3.3 MMOL/L — LOW (ref 3.5–5.3)
POTASSIUM SERPL-SCNC: 3.5 MMOL/L — SIGNIFICANT CHANGE UP (ref 3.5–5.3)
PROTHROM AB SERPL-ACNC: 12.7 SEC — SIGNIFICANT CHANGE UP (ref 10–12.9)
RBC # BLD: 3.79 M/UL — LOW (ref 4.2–5.8)
RBC # BLD: 4.12 M/UL — LOW (ref 4.2–5.8)
RBC # FLD: 12.4 % — SIGNIFICANT CHANGE UP (ref 10.3–14.5)
RBC # FLD: 12.4 % — SIGNIFICANT CHANGE UP (ref 10.3–14.5)
SODIUM SERPL-SCNC: 144 MMOL/L — SIGNIFICANT CHANGE UP (ref 135–145)
SODIUM SERPL-SCNC: 146 MMOL/L — HIGH (ref 135–145)
WBC # BLD: 6.96 K/UL — SIGNIFICANT CHANGE UP (ref 3.8–10.5)
WBC # BLD: 7.71 K/UL — SIGNIFICANT CHANGE UP (ref 3.8–10.5)
WBC # FLD AUTO: 6.96 K/UL — SIGNIFICANT CHANGE UP (ref 3.8–10.5)
WBC # FLD AUTO: 7.71 K/UL — SIGNIFICANT CHANGE UP (ref 3.8–10.5)

## 2019-10-12 PROCEDURE — 76000 FLUOROSCOPY <1 HR PHYS/QHP: CPT | Mod: 26

## 2019-10-12 PROCEDURE — 27792 TREATMENT OF ANKLE FRACTURE: CPT | Mod: RT

## 2019-10-12 PROCEDURE — 27829 TREAT LOWER LEG JOINT: CPT | Mod: RT

## 2019-10-12 PROCEDURE — 99223 1ST HOSP IP/OBS HIGH 75: CPT

## 2019-10-12 RX ORDER — ACETAMINOPHEN 500 MG
650 TABLET ORAL EVERY 8 HOURS
Refills: 0 | Status: DISCONTINUED | OUTPATIENT
Start: 2019-10-12 | End: 2019-10-14

## 2019-10-12 RX ORDER — FINASTERIDE 5 MG/1
5 TABLET, FILM COATED ORAL DAILY
Refills: 0 | Status: DISCONTINUED | OUTPATIENT
Start: 2019-10-12 | End: 2019-10-14

## 2019-10-12 RX ORDER — AMLODIPINE BESYLATE 2.5 MG/1
10 TABLET ORAL DAILY
Refills: 0 | Status: DISCONTINUED | OUTPATIENT
Start: 2019-10-12 | End: 2019-10-14

## 2019-10-12 RX ORDER — LANOLIN ALCOHOL/MO/W.PET/CERES
3 CREAM (GRAM) TOPICAL AT BEDTIME
Refills: 0 | Status: DISCONTINUED | OUTPATIENT
Start: 2019-10-12 | End: 2019-10-14

## 2019-10-12 RX ORDER — TAMSULOSIN HYDROCHLORIDE 0.4 MG/1
0.4 CAPSULE ORAL AT BEDTIME
Refills: 0 | Status: DISCONTINUED | OUTPATIENT
Start: 2019-10-12 | End: 2019-10-14

## 2019-10-12 RX ORDER — OXYCODONE HYDROCHLORIDE 5 MG/1
5 TABLET ORAL EVERY 4 HOURS
Refills: 0 | Status: DISCONTINUED | OUTPATIENT
Start: 2019-10-12 | End: 2019-10-14

## 2019-10-12 RX ORDER — LOSARTAN POTASSIUM 100 MG/1
100 TABLET, FILM COATED ORAL DAILY
Refills: 0 | Status: DISCONTINUED | OUTPATIENT
Start: 2019-10-12 | End: 2019-10-14

## 2019-10-12 RX ORDER — ONDANSETRON 8 MG/1
4 TABLET, FILM COATED ORAL EVERY 6 HOURS
Refills: 0 | Status: DISCONTINUED | OUTPATIENT
Start: 2019-10-12 | End: 2019-10-14

## 2019-10-12 RX ORDER — OXYCODONE HYDROCHLORIDE 5 MG/1
10 TABLET ORAL EVERY 4 HOURS
Refills: 0 | Status: DISCONTINUED | OUTPATIENT
Start: 2019-10-12 | End: 2019-10-14

## 2019-10-12 RX ORDER — ATORVASTATIN CALCIUM 80 MG/1
10 TABLET, FILM COATED ORAL AT BEDTIME
Refills: 0 | Status: DISCONTINUED | OUTPATIENT
Start: 2019-10-12 | End: 2019-10-12

## 2019-10-12 RX ORDER — ENOXAPARIN SODIUM 100 MG/ML
40 INJECTION SUBCUTANEOUS DAILY
Refills: 0 | Status: DISCONTINUED | OUTPATIENT
Start: 2019-10-12 | End: 2019-10-14

## 2019-10-12 RX ORDER — FENTANYL CITRATE 50 UG/ML
50 INJECTION INTRAVENOUS
Refills: 0 | Status: DISCONTINUED | OUTPATIENT
Start: 2019-10-12 | End: 2019-10-12

## 2019-10-12 RX ORDER — SENNA PLUS 8.6 MG/1
2 TABLET ORAL AT BEDTIME
Refills: 0 | Status: DISCONTINUED | OUTPATIENT
Start: 2019-10-12 | End: 2019-10-14

## 2019-10-12 RX ORDER — POTASSIUM CHLORIDE 20 MEQ
40 PACKET (EA) ORAL EVERY 4 HOURS
Refills: 0 | Status: DISCONTINUED | OUTPATIENT
Start: 2019-10-12 | End: 2019-10-12

## 2019-10-12 RX ORDER — CEFAZOLIN SODIUM 1 G
2000 VIAL (EA) INJECTION EVERY 8 HOURS
Refills: 0 | Status: COMPLETED | OUTPATIENT
Start: 2019-10-12 | End: 2019-10-12

## 2019-10-12 RX ORDER — SODIUM CHLORIDE 9 MG/ML
1000 INJECTION INTRAMUSCULAR; INTRAVENOUS; SUBCUTANEOUS
Refills: 0 | Status: DISCONTINUED | OUTPATIENT
Start: 2019-10-12 | End: 2019-10-14

## 2019-10-12 RX ORDER — DOCUSATE SODIUM 100 MG
100 CAPSULE ORAL THREE TIMES A DAY
Refills: 0 | Status: DISCONTINUED | OUTPATIENT
Start: 2019-10-12 | End: 2019-10-14

## 2019-10-12 RX ORDER — SODIUM CHLORIDE 9 MG/ML
1000 INJECTION, SOLUTION INTRAVENOUS
Refills: 0 | Status: DISCONTINUED | OUTPATIENT
Start: 2019-10-12 | End: 2019-10-12

## 2019-10-12 RX ORDER — TRAMADOL HYDROCHLORIDE 50 MG/1
50 TABLET ORAL EVERY 6 HOURS
Refills: 0 | Status: DISCONTINUED | OUTPATIENT
Start: 2019-10-12 | End: 2019-10-14

## 2019-10-12 RX ORDER — ONDANSETRON 8 MG/1
4 TABLET, FILM COATED ORAL ONCE
Refills: 0 | Status: DISCONTINUED | OUTPATIENT
Start: 2019-10-12 | End: 2019-10-12

## 2019-10-12 RX ADMIN — Medication 100 MILLIGRAM(S): at 15:50

## 2019-10-12 RX ADMIN — FINASTERIDE 5 MILLIGRAM(S): 5 TABLET, FILM COATED ORAL at 13:05

## 2019-10-12 RX ADMIN — Medication 100 MILLIGRAM(S): at 21:11

## 2019-10-12 RX ADMIN — Medication 100 MILLIGRAM(S): at 13:06

## 2019-10-12 RX ADMIN — Medication 650 MILLIGRAM(S): at 13:05

## 2019-10-12 RX ADMIN — Medication 650 MILLIGRAM(S): at 21:13

## 2019-10-12 RX ADMIN — Medication 40 MILLIEQUIVALENT(S): at 07:31

## 2019-10-12 RX ADMIN — TAMSULOSIN HYDROCHLORIDE 0.4 MILLIGRAM(S): 0.4 CAPSULE ORAL at 21:11

## 2019-10-12 RX ADMIN — AMLODIPINE BESYLATE 10 MILLIGRAM(S): 2.5 TABLET ORAL at 06:32

## 2019-10-12 RX ADMIN — ENOXAPARIN SODIUM 40 MILLIGRAM(S): 100 INJECTION SUBCUTANEOUS at 21:12

## 2019-10-12 RX ADMIN — LOSARTAN POTASSIUM 100 MILLIGRAM(S): 100 TABLET, FILM COATED ORAL at 06:32

## 2019-10-12 RX ADMIN — SENNA PLUS 2 TABLET(S): 8.6 TABLET ORAL at 21:12

## 2019-10-12 RX ADMIN — SODIUM CHLORIDE 75 MILLILITER(S): 9 INJECTION, SOLUTION INTRAVENOUS at 10:43

## 2019-10-12 RX ADMIN — Medication 100 MILLIGRAM(S): at 23:42

## 2019-10-12 RX ADMIN — SODIUM CHLORIDE 75 MILLILITER(S): 9 INJECTION INTRAMUSCULAR; INTRAVENOUS; SUBCUTANEOUS at 15:53

## 2019-10-12 NOTE — CONSULT NOTE ADULT - ASSESSMENT
A 83 years old man with PMH HTN/HLD, BPH presented s/p mechanical fall with RT ankle fracture. We discussed VTE ppx with lovenox. Wife at side and both agree for therapy since he is non weight bearing. He is with VTE risk factors     PLAN:  -Start tonight lovenox 40 mg SQ daily for VTE ppx until weight bearing  -Monitor CBC/BMP  -Encourage early mobilization and maintain b/l venarthur    Thank you for consult, will continue to follow.

## 2019-10-12 NOTE — PROGRESS NOTE ADULT - SUBJECTIVE AND OBJECTIVE BOX
Patient seen and examined at bedside this am. Pain controlled. No acute events overnight. Leg well elevated overnight and with ICE. Denies numbness/tingling of the RLE. No other complaints at this time.         Vital Signs Last 24 Hrs  T(C): 36.7 (12 Oct 2019 05:51), Max: 37.2 (11 Oct 2019 11:44)  T(F): 98.1 (12 Oct 2019 05:51), Max: 99 (11 Oct 2019 11:44)  HR: 83 (12 Oct 2019 05:51) (65 - 93)  BP: 158/60 (12 Oct 2019 05:51) (109/68 - 158/60)  BP(mean): --  RR: 16 (12 Oct 2019 05:51) (16 - 16)  SpO2: 96% (12 Oct 2019 05:51) (95% - 98%)    Physical Exam  Gen: NAD  RLE:   Splint c/d/i- windowed anterior for skin check. Able to wrinkle skin anteriorly and anterolaterally   Wiggles toes  SILT toes and 1st webspace  cap refill brisk to all toes  No pain with passive stretch of all toes   Compartments soft and compressible    A/P: 83y Male w/ R ankle fx for ORIF 10/12 today    Analgesia  Hold AC  NWB RLE  Strict elevation  NPO except medications  IVF while NPO   SBIRT screen positive  Medically cleared  Will d/w attending and advise if plan changes

## 2019-10-12 NOTE — PHYSICAL THERAPY INITIAL EVALUATION ADULT - PATIENT/FAMILY/SIGNIFICANT OTHER GOALS STATEMENT, PT EVAL
The pt and pt's wife hope for a safe discharge to [facility] once medically stable.

## 2019-10-12 NOTE — PROGRESS NOTE ADULT - SUBJECTIVE AND OBJECTIVE BOX
CC- s/p mechanical fall (11 Oct 2019 05:42)    HPI:  82yo/M with PMH HTN, hyperlipidemia, BPH presented s/p mechanical fall with RT ankle fracture. Patient tripped and fell, +hit his head, no LOC. Not on blood thinners. Last ate around noon. NO prior cardiac history, per wife had stress test couple of months ago which was negative (10 Oct 2019 17:43)    10/11/19- NPO for OR today  10/12/19- seen postop    Review of system- All 10 systems reviewed and is as per HPI otherwise negative.     Vital Signs Last 24 Hrs  T(C): 36.4 (12 Oct 2019 11:18), Max: 37.2 (11 Oct 2019 16:40)  T(F): 97.5 (12 Oct 2019 11:18), Max: 98.9 (11 Oct 2019 16:40)  HR: 70 (12 Oct 2019 11:18) (67 - 83)  BP: 136/51 (12 Oct 2019 11:18) (107/52 - 158/60)  BP(mean): 70 (12 Oct 2019 11:18) (70 - 70)  RR: 17 (12 Oct 2019 11:18) (16 - 22)  SpO2: 96% (12 Oct 2019 11:18) (93% - 98%)    LABS:                        12.0   6.96  )-----------( 171      ( 12 Oct 2019 10:32 )             36.1     12 Oct 2019 10:32    144    |  111    |  23     ----------------------------<  130    3.5     |  26     |  0.76     Ca    8.2        12 Oct 2019 10:32    PT/INR - ( 12 Oct 2019 06:11 )   PT: 12.7 sec;   INR: 1.14 ratio       PTT - ( 12 Oct 2019 06:11 )  PTT:30.4 sec    RADIOLOGY & ADDITIONAL TESTS:  EXAM:  CT BRAIN                        PROCEDURE DATE:  10/10/2019    INTERPRETATION:      CT head without IV contrast        CLINICAL INFORMATION:  Fall   Intracranial hemorrhage.    TECHNIQUE: Contiguous axial 5 mm sections were obtained through the head.   Sagittal and coronal 2-D reformatted images were also obtained.   This   scan was performed using automatic exposure control (radiation dose   reduction software) to obtain a diagnostic image quality scan with   patient dose as low as reasonably achievable.     FINDINGS:   No previous examinations are available for review.    The brain demonstrates minimal anterior periventricular white matter   ischemia.   No acute cerebral cortical infarct is seen.  No intracranial   hemorrhage is found.  No mass effect is found in the brain.      The ventricles, sulci and basal cisterns show mild atrophy.    The orbits are unremarkable.  The paranasal sinuses are clear.  The nasal   cavity appears intact.  The nasopharynx is symmetric. The central skull   base, petrous temporal bones and calvarium remain intact.      IMPRESSION:   minimal anterior periventricular white matter ischemia.   Mild atrophy.    EXAM:  XR CHEST 1 VIEW                        EXAM:  XR ANKLE POST RED 2 VIEWS RT                        PROCEDURE DATE:  10/10/2019    INTERPRETATION:  Clinical information: Postreduction. Preop.    AP chest radiograph  AP, lateral, oblique views of the right ankle    COMPARISON: Same day radiographs of the ankle performed at 1524 hours,   chest x-ray March 02, 2010    FINDINGS: Chest: The lungs are clear. The cardiac and mediastinal   contours are unremarkable. There is no acute bony abnormality.    Right ankle: There has been reduction of the previously seen tibiotalar   dislocation with anatomic alignment of the distal tibia and talus. There   is markedly improved alignment of the distal fibular fracture.    IMPRESSION:     Chest: Clear lungs  Right ankle: Successful reduction of tibiotalar dislocation and markedly   improved alignment of distal fibular fracture.    PHYSICAL EXAM:  GENERAL: NAD, well-groomed, well-developed  HEAD:  Atraumatic, Normocephalic  EYES: EOMI, PERRLA, conjunctiva and sclera clear  HEENT: Moist mucous membranes  NECK: Supple, No JVD  NERVOUS SYSTEM:  Alert & Oriented X3, Motor Strength 5/5 B/L upper and lower extremities; DTRs 2+ intact and symmetric  CHEST/LUNG: Clear to auscultation bilaterally; No rales, rhonchi, wheezing, or rubs  HEART: Regular rate and rhythm; No murmurs, rubs, or gallops  ABDOMEN: Soft, Nontender, Nondistended; Bowel sounds present  GENITOURINARY- Voiding, no palpable bladder  EXTREMITIES:  2+ Peripheral Pulses, No clubbing, cyanosis, or edema  MUSCULOSKELTAL- RT ankle in splint  SKIN-no rash, no lesion  CNS- alert, oriented X3, non focal     Daily Height in cm: 193.04 (10 Oct 2019 14:53)      MEDICATIONS  (STANDING):  acetaminophen   Tablet .. 650 milliGRAM(s) Oral every 8 hours  amLODIPine   Tablet 10 milliGRAM(s) Oral daily  atorvastatin 10 milliGRAM(s) Oral at bedtime  ceFAZolin   IVPB 2000 milliGRAM(s) IV Intermittent every 8 hours  docusate sodium 100 milliGRAM(s) Oral three times a day  enoxaparin Injectable 40 milliGRAM(s) SubCutaneous daily  finasteride 5 milliGRAM(s) Oral daily  losartan 100 milliGRAM(s) Oral daily  senna 2 Tablet(s) Oral at bedtime  sodium chloride 0.9%. 1000 milliLiter(s) (75 mL/Hr) IV Continuous <Continuous>  tamsulosin 0.4 milliGRAM(s) Oral at bedtime    MEDICATIONS  (PRN):  melatonin 3 milliGRAM(s) Oral at bedtime PRN Insomnia  ondansetron Injectable 4 milliGRAM(s) IV Push every 6 hours PRN Nausea and/or Vomiting  oxyCODONE    IR 5 milliGRAM(s) Oral every 4 hours PRN Moderate Pain (4 - 6)  oxyCODONE    IR 10 milliGRAM(s) Oral every 4 hours PRN Severe Pain (7 - 10)  traMADol 50 milliGRAM(s) Oral every 6 hours PRN Mild Pain (1 - 3)    Assessment/Plan  #S/p mechanical fall with RT ankle fracture s/p ORIF  Ortho eval appreciated  NWB to RLE  PT eval  Pain meds prn  Keep RLE elevated, ice packs  AC by Lovenox  Incentive spirometry  Bowel regimen    #HTN/hyperlipidemia  Stable  Continue home meds    #Hypokalemia- replaced    #BPH- stable    #Dispo- PT eval/ NWB to RLE. D/w pt and ortho team

## 2019-10-12 NOTE — PHYSICAL THERAPY INITIAL EVALUATION ADULT - GENERAL OBSERVATIONS, REHAB EVAL
The pt was pleasant and cooperative, received on 2N with spouse present bedside, the bed was placed in a piked position with foot of bed elevated, right LE splinted and elevated on multiple pillows.

## 2019-10-12 NOTE — PHYSICAL THERAPY INITIAL EVALUATION ADULT - MODALITIES TREATMENT COMMENTS
The pt demonstrated good overall activity tolerance, responding well to therex review, transfer and ambulation tx. The pt was returned to supine and adjusted for comfort in bed at end of tx, Right LE elevated, RN aware. CB, tray and phone in place. The pt was in NAD at end of tx.

## 2019-10-12 NOTE — PHYSICAL THERAPY INITIAL EVALUATION ADULT - PERTINENT HX OF CURRENT PROBLEM, REHAB EVAL
82yo/M with PMH HTN, hyperlipidemia, BPH presented s/p mechanical fall with RT ankle fracture. Patient tripped and fell, +hit his head, no LOC. Not on blood thinners.

## 2019-10-12 NOTE — PHYSICAL THERAPY INITIAL EVALUATION ADULT - PLANNED THERAPY INTERVENTIONS, PT EVAL
gait training/bed mobility training/neuromuscular re-education/postural re-education/transfer training

## 2019-10-12 NOTE — CONSULT NOTE ADULT - SUBJECTIVE AND OBJECTIVE BOX
HPI: This is a 83 years old man with PMH HTN/HLD, BPH presented s/p mechanical fall with RT ankle fracture. Patient tripped and fell, +hit his head, no LOC. Not on blood thinners. Last ate around noon. NO prior cardiac history, per wife had stress test couple of months ago which was negative (10 Oct 2019 17:43)    10/12/19: Pt found to have right ankle fx underwent ORIF today with Dr. Bustillos. He denies prior hx of VTE, no family hx of blood clotting issues. We discussed VTE ppx with lovenox. Wife at side and both agree for therapy since he is non weight bearing.     Patient is a 83y old  Male who presents with a chief complaint of s/p mechanical fall (12 Oct 2019 12:57)    Consulted by Dr. Bustillos for VTE prophylaxis, risk stratification, and anticoagulation management.    PAST MEDICAL & SURGICAL HISTORY:  HLD (hyperlipidemia)  Enlarged prostate  HTN (hypertension)  No significant past surgical history    BMI: 25    CrCL: 112.07    CAPRINI SCORE  AGE RELATED RISK FACTORS                                                       MOBILITY RELATED FACTORS  [ ] Age 41-60 years                                            (1 Point)                  [ ] Bed rest                                                        (1 Point)  [ ] Age: 61-74 years                                           (2 Points)                [ ] Plaster cast                                                   (2 Points)  [X ] Age= 75 years                                              (3 Points)                 [ ] Bed bound for more than 72 hours                   (2 Points)    DISEASE RELATED RISK FACTORS                                               GENDER SPECIFIC FACTORS  [ ] Edema in the lower extremities                       (1 Point)           [ ] Pregnancy                                                            (1 Point)  [ ] Varicose veins                                               (1 Point)                  [ ] Post-partum < 6 weeks                                      (1 Point)             [ ] BMI > 25 Kg/m2                                            (1 Point)                  [ ] Hormonal therapy or oral contraception       (1 Point)                 [ ] Sepsis (in the previous month)                        (1 Point)             [ ] History of pregnancy complications                (1Point)  [ ] Pneumonia or serious lung disease                                             [ ] Unexplained or recurrent  (=3), premature                                 (In the previous month)                               (1 Point)                birth with toxemia or growth-restricted infant (1 Point)  [ ] Abnormal pulmonary function test            (1 Point)                                   SURGERY RELATED RISK FACTORS  [ ] Acute myocardial infarction                       (1 Point)                  [ ]  Section                                         (1 Point)  [ ] Congestive heart failure (in the previous month) (1 Point)   [ ] Minor surgery   lasting <45 minutes       (1 Point)   [ ] Inflammatory bowel disease                             (1 Point)          [ ] Arthroscopic surgery                                  (2 Points)  [ ] Central venous access                                    (2 Points)            [ ] General surgery lasting >45 minutes      (2 Points)       [ ] Stroke (in the previous month)                  (5 Points)            [ ] Elective major lower extremity arthroplasty (5 Points)                                   [  ] Malignancy (present or past include skin melanoma                                          but exclude  basal skin cell)    (2 points)                                      TRAUMA RELATED RISK FACTORS                HEMATOLOGY RELATED FACTORS                                  [ X] Fracture of the hip, pelvis, or leg                       (5 Points)  [ ] Prior episodes of VTE                                     (3 Points)          [ ] Acute spinal cord injury (in the previous month)  (5 Points)  [ ] Positive family history for VTE                         (3 Points)       [ ] Paralysis (less than 1 month)                          (5 Points)  [ ] Prothrombin 43669 A                                      (3 Points)         [ ] Multiple Trauma (within 1month)                 (5Points)                                                                                                                                                                [ ] Factor V Leiden                                          (3 Points)                                OTHER RISK FACTORS                          [ ] Lupus anticoagulants                                     (3 Points)                       [ ] BMI > 40                          (1 Point)                                                         [ ] Anticardiolipin antibodies                                (3 Points)                   [ ] Smoking                              (1Point)                                                [ ] High homocysteine in the blood                      (3 Points)                [  ] Diabetes requiring insulin (1point)                         [ ] Other congenital or acquired thrombophilia       (3 Points)          [  ] Chemotherapy                   (1 Point)  [ ] Heparin induced thrombocytopenia                  (3 Points)             [  ] Blood Transfusion                (1 point)                                                                                                         Total Score [8   ]                                                                                                                   IMPROVE Bleeding Risk Score: 2.5    Falls Risk:   High (X  )  Mod (  )  Low (  )    EBL: 25 ml      FAMILY HISTORY:  No pertinent family history in first degree relatives  Denies any personal or familial history of clotting or bleeding disorders.    Allergies  Intolerances    REVIEW OF SYSTEMS    (  )Fever	     (  )Constipation	(  )SOB			(  )Headache	(  )Dysuria  (  )Chills	     (  )Melena	(  )Dyspnea present on exertion    (  )Dizziness                    (  )Polyuria  (  )Nausea	     (  )Hematochezia	(  )Cough		                    (  )Syncope   	(  )Hematuria  (  )Vomiting    (  )Chest Pain	(  )Wheezing		(  )Weakness  (  )Diarrhea     (  )Palpitations	(  )Anorexia		( X )Myalgia    All other review of systems negative: Yes    PHYSICAL EXAM:    Vital Signs Last 24 Hrs  T(C): 36.4 (12 Oct 2019 11:18), Max: 37.2 (11 Oct 2019 16:40)  T(F): 97.5 (12 Oct 2019 11:18), Max: 98.9 (11 Oct 2019 16:40)  HR: 70 (12 Oct 2019 11:18) (67 - 83)  BP: 136/51 (12 Oct 2019 11:18) (107/52 - 158/60)  BP(mean): 70 (12 Oct 2019 11:18) (70 - 70)  RR: 17 (12 Oct 2019 11:18) (16 - 22)  SpO2: 96% (12 Oct 2019 11:18) (93% - 98%)    Constitutional: Appears Well    Neurological: A& O x 3    Skin: Warm    Respiratory and Thorax: normal effort; Breath sounds: normal; No rales/wheezing/rhonchi  	  Cardiovascular: S1, S2, regular, NMBR	    Gastrointestinal: BS + x 4Q, nontender	    Genitourinary:  Bladder nondistended, nontender    Musculoskeletal:   General Right:   no muscle/joint tenderness,   normal tone, no joint swelling,   ROM: limited	    General Left:   no muscle/joint tenderness,   normal tone, no joint swelling,   ROM: full    Right ankle: foot warm, wiggles toes, toe flexion-extension strong.     Lower extrems:   Right: no calf tenderness              negative lavon's sign               + pedal pulses    Left:   no calf tenderness              negative lavon's sign               + pedal pulses    MEDICATIONS  (STANDING):  acetaminophen   Tablet .. 650 milliGRAM(s) Oral every 8 hours  amLODIPine   Tablet 10 milliGRAM(s) Oral daily  ceFAZolin   IVPB 2000 milliGRAM(s) IV Intermittent every 8 hours  docusate sodium 100 milliGRAM(s) Oral three times a day  enoxaparin Injectable 40 milliGRAM(s) SubCutaneous daily  finasteride 5 milliGRAM(s) Oral daily  losartan 100 milliGRAM(s) Oral daily  senna 2 Tablet(s) Oral at bedtime  sodium chloride 0.9%. 1000 milliLiter(s) IV Continuous <Continuous>  tamsulosin 0.4 milliGRAM(s) Oral at bedtime    LAB:                        12.0   6.96  )-----------( 171      ( 12 Oct 2019 10:32 )             36.1     10-12    144  |  111<H>  |  23  ----------------------------<  130<H>  3.5   |  26  |  0.76    Ca    8.2<L>      12 Oct 2019 10:32    PT/INR - ( 12 Oct 2019 06:11 )   PT: 12.7 sec;   INR: 1.14 ratio    PTT - ( 12 Oct 2019 06:11 )  PTT:30.4 sec      RADIOLOGY, EKG & ADDITIONAL TESTS: Reviewed.   QRS axis to [] ° and NSR at a rate of [] BPM. There was no atrial enlargement. There was no ventricular hypertrophy. There were no ST-T changes and all intervals were normal.    EXAM:  XR TIB FIB AP LAT 2 VIEWS RT                        EXAM:  XR ANKLE COMP MIN 3 VIEWS RT                          PROCEDURE DATE:  10/10/2019      IMPRESSION: fracture dislocation at the ankle with lateral subluxation of the talus with respect to the tibial plafond. There is an oblique fracture of the lateral malleolus with impaction and lateral angulation. There is a minimally displaced vertical fracture of the posterior tibial tubercle      DVT Prophylaxis:  LMWH                   ( X )  Heparin SQ           (  )  Coumadin             (  )  Xarelto                  (  )  Eliquis                   (  )  Venodynes           (X  )  Ambulation          ( X )  UFH                       (  )  Contraindicated  (  )  ASA                       (   ) HPI: This is a 83 years old man with PMH HTN/HLD, BPH presented s/p mechanical fall with RT ankle fracture. Patient tripped and fell, +hit his head, no LOC. Not on blood thinners. Last ate around noon. NO prior cardiac history, per wife had stress test couple of months ago which was negative (10 Oct 2019 17:43)    10/12/19: Pt found to have right ankle fx underwent ORIF today with Dr. Bustillos. He denies prior hx of VTE, no family hx of blood clotting issues. We discussed VTE ppx with lovenox. Wife at side and both agree for therapy since he is non weight bearing.     Patient is a 83y old  Male who presents with a chief complaint of s/p mechanical fall (12 Oct 2019 12:57)    Consulted by Dr. Bustillos for VTE prophylaxis, risk stratification, and anticoagulation management.    PAST MEDICAL & SURGICAL HISTORY:  HLD (hyperlipidemia)  Enlarged prostate  HTN (hypertension)  No significant past surgical history    BMI: 25    CrCL: 112.07    CAPRINI SCORE  AGE RELATED RISK FACTORS                                                       MOBILITY RELATED FACTORS  [ ] Age 41-60 years                                            (1 Point)                  [ ] Bed rest                                                        (1 Point)  [ ] Age: 61-74 years                                           (2 Points)                [ ] Plaster cast                                                   (2 Points)  [X ] Age= 75 years                                              (3 Points)                 [ ] Bed bound for more than 72 hours                   (2 Points)    DISEASE RELATED RISK FACTORS                                               GENDER SPECIFIC FACTORS  [ ] Edema in the lower extremities                       (1 Point)           [ ] Pregnancy                                                            (1 Point)  [ ] Varicose veins                                               (1 Point)                  [ ] Post-partum < 6 weeks                                      (1 Point)             [ ] BMI > 25 Kg/m2                                            (1 Point)                  [ ] Hormonal therapy or oral contraception       (1 Point)                 [ ] Sepsis (in the previous month)                        (1 Point)             [ ] History of pregnancy complications                (1Point)  [ ] Pneumonia or serious lung disease                                             [ ] Unexplained or recurrent  (=3), premature                                 (In the previous month)                               (1 Point)                birth with toxemia or growth-restricted infant (1 Point)  [ ] Abnormal pulmonary function test            (1 Point)                                   SURGERY RELATED RISK FACTORS  [ ] Acute myocardial infarction                       (1 Point)                  [ ]  Section                                         (1 Point)  [ ] Congestive heart failure (in the previous month) (1 Point)   [ ] Minor surgery   lasting <45 minutes       (1 Point)   [ ] Inflammatory bowel disease                             (1 Point)          [ ] Arthroscopic surgery                                  (2 Points)  [ ] Central venous access                                    (2 Points)            [ ] General surgery lasting >45 minutes      (2 Points)       [ ] Stroke (in the previous month)                  (5 Points)            [ ] Elective major lower extremity arthroplasty (5 Points)                                   [  ] Malignancy (present or past include skin melanoma                                          but exclude  basal skin cell)    (2 points)                                      TRAUMA RELATED RISK FACTORS                HEMATOLOGY RELATED FACTORS                                  [ X] Fracture of the hip, pelvis, or leg                       (5 Points)  [ ] Prior episodes of VTE                                     (3 Points)          [ ] Acute spinal cord injury (in the previous month)  (5 Points)  [ ] Positive family history for VTE                         (3 Points)       [ ] Paralysis (less than 1 month)                          (5 Points)  [ ] Prothrombin 65823 A                                      (3 Points)         [ ] Multiple Trauma (within 1month)                 (5Points)                                                                                                                                                                [ ] Factor V Leiden                                          (3 Points)                                OTHER RISK FACTORS                          [ ] Lupus anticoagulants                                     (3 Points)                       [ ] BMI > 40                          (1 Point)                                                         [ ] Anticardiolipin antibodies                                (3 Points)                   [ ] Smoking                              (1Point)                                                [ ] High homocysteine in the blood                      (3 Points)                [  ] Diabetes requiring insulin (1point)                         [ ] Other congenital or acquired thrombophilia       (3 Points)          [  ] Chemotherapy                   (1 Point)  [ ] Heparin induced thrombocytopenia                  (3 Points)             [  ] Blood Transfusion                (1 point)                                                                                                         Total Score [8   ]                                                                                                                   IMPROVE Bleeding Risk Score: 2.5    Falls Risk:   High (X  )  Mod (  )  Low (  )    EBL: 25 ml    Induction Time: 8:20  Procedure Start: 8:42  Procedure End: 10:01      FAMILY HISTORY:  No pertinent family history in first degree relatives  Denies any personal or familial history of clotting or bleeding disorders.    Allergies  Intolerances    REVIEW OF SYSTEMS    (  )Fever	     (  )Constipation	(  )SOB			(  )Headache	(  )Dysuria  (  )Chills	     (  )Melena	(  )Dyspnea present on exertion    (  )Dizziness                    (  )Polyuria  (  )Nausea	     (  )Hematochezia	(  )Cough		                    (  )Syncope   	(  )Hematuria  (  )Vomiting    (  )Chest Pain	(  )Wheezing		(  )Weakness  (  )Diarrhea     (  )Palpitations	(  )Anorexia		( X )Myalgia    All other review of systems negative: Yes    PHYSICAL EXAM:    Vital Signs Last 24 Hrs  T(C): 36.4 (12 Oct 2019 11:18), Max: 37.2 (11 Oct 2019 16:40)  T(F): 97.5 (12 Oct 2019 11:18), Max: 98.9 (11 Oct 2019 16:40)  HR: 70 (12 Oct 2019 11:18) (67 - 83)  BP: 136/51 (12 Oct 2019 11:18) (107/52 - 158/60)  BP(mean): 70 (12 Oct 2019 11:18) (70 - 70)  RR: 17 (12 Oct 2019 11:18) (16 - 22)  SpO2: 96% (12 Oct 2019 11:18) (93% - 98%)    Constitutional: Appears Well    Neurological: A& O x 3    Skin: Warm    Respiratory and Thorax: normal effort; Breath sounds: normal; No rales/wheezing/rhonchi  	  Cardiovascular: S1, S2, regular, NMBR	    Gastrointestinal: BS + x 4Q, nontender	    Genitourinary:  Bladder nondistended, nontender    Musculoskeletal:   General Right:   no muscle/joint tenderness,   normal tone, no joint swelling,   ROM: limited	    General Left:   no muscle/joint tenderness,   normal tone, no joint swelling,   ROM: full    Right ankle: foot warm, wiggles toes, toe flexion-extension strong.     Lower extrems:   Right: no calf tenderness              negative lavon's sign               + pedal pulses    Left:   no calf tenderness              negative lavon's sign               + pedal pulses    MEDICATIONS  (STANDING):  acetaminophen   Tablet .. 650 milliGRAM(s) Oral every 8 hours  amLODIPine   Tablet 10 milliGRAM(s) Oral daily  ceFAZolin   IVPB 2000 milliGRAM(s) IV Intermittent every 8 hours  docusate sodium 100 milliGRAM(s) Oral three times a day  enoxaparin Injectable 40 milliGRAM(s) SubCutaneous daily  finasteride 5 milliGRAM(s) Oral daily  losartan 100 milliGRAM(s) Oral daily  senna 2 Tablet(s) Oral at bedtime  sodium chloride 0.9%. 1000 milliLiter(s) IV Continuous <Continuous>  tamsulosin 0.4 milliGRAM(s) Oral at bedtime    LAB:                        12.0   6.96  )-----------( 171      ( 12 Oct 2019 10:32 )             36.1     10-12    144  |  111<H>  |  23  ----------------------------<  130<H>  3.5   |  26  |  0.76    Ca    8.2<L>      12 Oct 2019 10:32    PT/INR - ( 12 Oct 2019 06:11 )   PT: 12.7 sec;   INR: 1.14 ratio    PTT - ( 12 Oct 2019 06:11 )  PTT:30.4 sec      RADIOLOGY, EKG & ADDITIONAL TESTS: Reviewed.   QRS axis to [] ° and NSR at a rate of [] BPM. There was no atrial enlargement. There was no ventricular hypertrophy. There were no ST-T changes and all intervals were normal.    EXAM:  XR TIB FIB AP LAT 2 VIEWS RT                        EXAM:  XR ANKLE COMP MIN 3 VIEWS RT                          PROCEDURE DATE:  10/10/2019      IMPRESSION: fracture dislocation at the ankle with lateral subluxation of the talus with respect to the tibial plafond. There is an oblique fracture of the lateral malleolus with impaction and lateral angulation. There is a minimally displaced vertical fracture of the posterior tibial tubercle      DVT Prophylaxis:  LMWH                   ( X )  Heparin SQ           (  )  Coumadin             (  )  Xarelto                  (  )  Eliquis                   (  )  Venodynes           (X  )  Ambulation          ( X )  UFH                       (  )  Contraindicated  (  )  ASA                       (   )

## 2019-10-12 NOTE — PHYSICAL THERAPY INITIAL EVALUATION ADULT - ADDITIONAL COMMENTS
The pt reports that he lives at home with a single step to enter from the back entrance as well as 1 step between the kitchen and living room. The pt has basement steps but does not need to negotiate them at home.

## 2019-10-12 NOTE — PHYSICAL THERAPY INITIAL EVALUATION ADULT - MANUAL MUSCLE TESTING RESULTS, REHAB EVAL
Right LE grossly 3-/5, left LE grossly 3/5, left UE grossly 3+/5, right UE grossly 3+/5/grossly assessed due to

## 2019-10-13 ENCOUNTER — TRANSCRIPTION ENCOUNTER (OUTPATIENT)
Age: 84
End: 2019-10-13

## 2019-10-13 LAB
ANION GAP SERPL CALC-SCNC: 4 MMOL/L — LOW (ref 5–17)
BUN SERPL-MCNC: 22 MG/DL — SIGNIFICANT CHANGE UP (ref 7–23)
CALCIUM SERPL-MCNC: 8.2 MG/DL — LOW (ref 8.5–10.1)
CHLORIDE SERPL-SCNC: 112 MMOL/L — HIGH (ref 96–108)
CO2 SERPL-SCNC: 28 MMOL/L — SIGNIFICANT CHANGE UP (ref 22–31)
CREAT SERPL-MCNC: 0.66 MG/DL — SIGNIFICANT CHANGE UP (ref 0.5–1.3)
GLUCOSE SERPL-MCNC: 113 MG/DL — HIGH (ref 70–99)
HCT VFR BLD CALC: 32.7 % — LOW (ref 39–50)
HGB BLD-MCNC: 10.7 G/DL — LOW (ref 13–17)
MCHC RBC-ENTMCNC: 31.6 PG — SIGNIFICANT CHANGE UP (ref 27–34)
MCHC RBC-ENTMCNC: 32.7 GM/DL — SIGNIFICANT CHANGE UP (ref 32–36)
MCV RBC AUTO: 96.5 FL — SIGNIFICANT CHANGE UP (ref 80–100)
PLATELET # BLD AUTO: 167 K/UL — SIGNIFICANT CHANGE UP (ref 150–400)
POTASSIUM SERPL-MCNC: 3.7 MMOL/L — SIGNIFICANT CHANGE UP (ref 3.5–5.3)
POTASSIUM SERPL-SCNC: 3.7 MMOL/L — SIGNIFICANT CHANGE UP (ref 3.5–5.3)
RBC # BLD: 3.39 M/UL — LOW (ref 4.2–5.8)
RBC # FLD: 12.7 % — SIGNIFICANT CHANGE UP (ref 10.3–14.5)
SODIUM SERPL-SCNC: 144 MMOL/L — SIGNIFICANT CHANGE UP (ref 135–145)
WBC # BLD: 8.23 K/UL — SIGNIFICANT CHANGE UP (ref 3.8–10.5)
WBC # FLD AUTO: 8.23 K/UL — SIGNIFICANT CHANGE UP (ref 3.8–10.5)

## 2019-10-13 PROCEDURE — 99233 SBSQ HOSP IP/OBS HIGH 50: CPT

## 2019-10-13 RX ADMIN — Medication 650 MILLIGRAM(S): at 14:21

## 2019-10-13 RX ADMIN — Medication 650 MILLIGRAM(S): at 05:17

## 2019-10-13 RX ADMIN — AMLODIPINE BESYLATE 10 MILLIGRAM(S): 2.5 TABLET ORAL at 05:17

## 2019-10-13 RX ADMIN — SENNA PLUS 2 TABLET(S): 8.6 TABLET ORAL at 21:11

## 2019-10-13 RX ADMIN — TAMSULOSIN HYDROCHLORIDE 0.4 MILLIGRAM(S): 0.4 CAPSULE ORAL at 21:10

## 2019-10-13 RX ADMIN — Medication 650 MILLIGRAM(S): at 21:11

## 2019-10-13 RX ADMIN — ENOXAPARIN SODIUM 40 MILLIGRAM(S): 100 INJECTION SUBCUTANEOUS at 11:31

## 2019-10-13 RX ADMIN — SODIUM CHLORIDE 75 MILLILITER(S): 9 INJECTION INTRAMUSCULAR; INTRAVENOUS; SUBCUTANEOUS at 05:56

## 2019-10-13 RX ADMIN — Medication 100 MILLIGRAM(S): at 05:16

## 2019-10-13 RX ADMIN — FINASTERIDE 5 MILLIGRAM(S): 5 TABLET, FILM COATED ORAL at 11:31

## 2019-10-13 RX ADMIN — LOSARTAN POTASSIUM 100 MILLIGRAM(S): 100 TABLET, FILM COATED ORAL at 05:16

## 2019-10-13 RX ADMIN — Medication 100 MILLIGRAM(S): at 21:10

## 2019-10-13 RX ADMIN — Medication 100 MILLIGRAM(S): at 14:21

## 2019-10-13 NOTE — DISCHARGE NOTE PROVIDER - NSDCCPCAREPLAN_GEN_ALL_CORE_FT
PRINCIPAL DISCHARGE DIAGNOSIS  Diagnosis: Trimalleolar fracture of right ankle  Assessment and Plan of Treatment: PRINCIPAL DISCHARGE DIAGNOSIS  Diagnosis: Trimalleolar fracture of right ankle  Assessment and Plan of Treatment: to make appointment for DR Bustillos for next Monday 10/21/19

## 2019-10-13 NOTE — PROGRESS NOTE ADULT - ASSESSMENT
84 yo M s/p Right Ankle ORIF POD 1    - Analgesia, Pain currently well controlled. Elevation and intermittent ice will help with swelling if needed  - DVT ppx, Appreciate AC management, Lovenox  - NWB RLE in Trilam Splint, walker/crutches   - PT  - DC planning, Likely [facility]

## 2019-10-13 NOTE — PROGRESS NOTE ADULT - SUBJECTIVE AND OBJECTIVE BOX
HPI: This is a 83 years old man with PMH HTN/HLD, BPH presented s/p mechanical fall with RT ankle fracture. Patient tripped and fell, +hit his head, no LOC. Not on blood thinners. Last ate around noon. NO prior cardiac history, per wife had stress test couple of months ago which was negative (10 Oct 2019 17:43)    10/12/19: Pt found to have right ankle fx underwent ORIF today with Dr. Bustillos. He denies prior hx of VTE, no family hx of blood clotting issues. We discussed VTE ppx with lovenox. Wife at side and both agree for therapy since he is non weight bearing.   10/13/19: Pt seen at bedside, no complaints with lovenox.     Patient is a 83y old  Male who presents with a chief complaint of s/p mechanical fall (12 Oct 2019 12:57)    Consulted by Dr. Bustillos for VTE prophylaxis, risk stratification, and anticoagulation management.    PAST MEDICAL & SURGICAL HISTORY:  HLD (hyperlipidemia)  Enlarged prostate  HTN (hypertension)  No significant past surgical history    BMI: 25    CrCL: 112.07    CAPRINI SCORE  AGE RELATED RISK FACTORS                                                       MOBILITY RELATED FACTORS  [ ] Age 41-60 years                                            (1 Point)                  [ ] Bed rest                                                        (1 Point)  [ ] Age: 61-74 years                                           (2 Points)                [ ] Plaster cast                                                   (2 Points)  [X ] Age= 75 years                                              (3 Points)                 [ ] Bed bound for more than 72 hours                   (2 Points)    DISEASE RELATED RISK FACTORS                                               GENDER SPECIFIC FACTORS  [ ] Edema in the lower extremities                       (1 Point)           [ ] Pregnancy                                                            (1 Point)  [ ] Varicose veins                                               (1 Point)                  [ ] Post-partum < 6 weeks                                      (1 Point)             [ ] BMI > 25 Kg/m2                                            (1 Point)                  [ ] Hormonal therapy or oral contraception       (1 Point)                 [ ] Sepsis (in the previous month)                        (1 Point)             [ ] History of pregnancy complications                (1Point)  [ ] Pneumonia or serious lung disease                                             [ ] Unexplained or recurrent  (=3), premature                                 (In the previous month)                               (1 Point)                birth with toxemia or growth-restricted infant (1 Point)  [ ] Abnormal pulmonary function test            (1 Point)                                   SURGERY RELATED RISK FACTORS  [ ] Acute myocardial infarction                       (1 Point)                  [ ]  Section                                         (1 Point)  [ ] Congestive heart failure (in the previous month) (1 Point)   [ ] Minor surgery   lasting <45 minutes       (1 Point)   [ ] Inflammatory bowel disease                             (1 Point)          [ ] Arthroscopic surgery                                  (2 Points)  [ ] Central venous access                                    (2 Points)            [ ] General surgery lasting >45 minutes      (2 Points)       [ ] Stroke (in the previous month)                  (5 Points)            [ ] Elective major lower extremity arthroplasty (5 Points)                                   [  ] Malignancy (present or past include skin melanoma                                          but exclude  basal skin cell)    (2 points)                                      TRAUMA RELATED RISK FACTORS                HEMATOLOGY RELATED FACTORS                                  [ X] Fracture of the hip, pelvis, or leg                       (5 Points)  [ ] Prior episodes of VTE                                     (3 Points)          [ ] Acute spinal cord injury (in the previous month)  (5 Points)  [ ] Positive family history for VTE                         (3 Points)       [ ] Paralysis (less than 1 month)                          (5 Points)  [ ] Prothrombin 88996 A                                      (3 Points)         [ ] Multiple Trauma (within 1month)                 (5Points)                                                                                                                                                                [ ] Factor V Leiden                                          (3 Points)                                OTHER RISK FACTORS                          [ ] Lupus anticoagulants                                     (3 Points)                       [ ] BMI > 40                          (1 Point)                                                         [ ] Anticardiolipin antibodies                                (3 Points)                   [ ] Smoking                              (1Point)                                                [ ] High homocysteine in the blood                      (3 Points)                [  ] Diabetes requiring insulin (1point)                         [ ] Other congenital or acquired thrombophilia       (3 Points)          [  ] Chemotherapy                   (1 Point)  [ ] Heparin induced thrombocytopenia                  (3 Points)             [  ] Blood Transfusion                (1 point)                                                                                                         Total Score [8   ]                                                                                                                   IMPROVE Bleeding Risk Score: 2.5    Falls Risk:   High (X  )  Mod (  )  Low (  )    EBL: 25 ml    Induction Time: 8:20  Procedure Start: 8:42  Procedure End: 10:01      FAMILY HISTORY:  No pertinent family history in first degree relatives  Denies any personal or familial history of clotting or bleeding disorders.    Allergies  Intolerances    REVIEW OF SYSTEMS    (  )Fever	     (  )Constipation	(  )SOB			(  )Headache	(  )Dysuria  (  )Chills	     (  )Melena	(  )Dyspnea present on exertion    (  )Dizziness                    (  )Polyuria  (  )Nausea	     (  )Hematochezia	(  )Cough		                    (  )Syncope   	(  )Hematuria  (  )Vomiting    (  )Chest Pain	(  )Wheezing		(  )Weakness  (  )Diarrhea     (  )Palpitations	(  )Anorexia		( X )Myalgia    All other review of systems negative: Yes    PHYSICAL EXAM:    Vital Signs Last 24 Hrs  T(C): 36.7 (10-13-19 @ 05:31), Max: 37.1 (10-12-19 @ 10:17)  T(F): 98 (10-13-19 @ 05:31), Max: 98.7 (10-12-19 @ 10:17)  HR: 68 (10-13-19 @ 05:31) (62 - 82)  BP: 139/46 (10-13-19 @ 05:31) (107/52 - 144/58)  BP(mean): 70 (10-12-19 @ 11:18) (70 - 70)  RR: 17 (10-13-19 @ 05:31) (16 - 22)  SpO2: 93% (10-13-19 @ 05:31) (93% - 97%)    Constitutional: Appears Well    Neurological: A& O x 3    Skin: Warm    Respiratory and Thorax: normal effort; Breath sounds: normal; No rales/wheezing/rhonchi  	  Cardiovascular: S1, S2, regular, NMBR	    Gastrointestinal: BS + x 4Q, nontender	    Genitourinary:  Bladder nondistended, nontender    Musculoskeletal:   General Right:   no muscle/joint tenderness,   normal tone, no joint swelling,   ROM: limited	    General Left:   no muscle/joint tenderness,   normal tone, no joint swelling,   ROM: full    Right ankle: foot warm, wiggles toes, toe flexion-extension strong.     Lower extrems:   Right: no calf tenderness              negative lavon's sign               + pedal pulses    Left:   no calf tenderness              negative lavon's sign               + pedal pulses    MEDICATIONS  (STANDING):  acetaminophen   Tablet .. 650 milliGRAM(s) Oral every 8 hours  amLODIPine   Tablet 10 milliGRAM(s) Oral daily  docusate sodium 100 milliGRAM(s) Oral three times a day  enoxaparin Injectable 40 milliGRAM(s) SubCutaneous daily  finasteride 5 milliGRAM(s) Oral daily  losartan 100 milliGRAM(s) Oral daily  senna 2 Tablet(s) Oral at bedtime  sodium chloride 0.9%. 1000 milliLiter(s) (75 mL/Hr) IV Continuous <Continuous>  tamsulosin 0.4 milliGRAM(s) Oral at bedtime    LAB:                          10.7   8.23  )-----------( 167      ( 13 Oct 2019 06:37 )             32.7       10-13    144  |  112<H>  |  22  ----------------------------<  113<H>  3.7   |  28  |  0.66    Ca    8.2<L>      13 Oct 2019 06:37                        12.0   6.96  )-----------( 171      ( 12 Oct 2019 10:32 )             36.1     10-12    144  |  111<H>  |  23  ----------------------------<  130<H>  3.5   |  26  |  0.76    Ca    8.2<L>      12 Oct 2019 10:32    PT/INR - ( 12 Oct 2019 06:11 )   PT: 12.7 sec;   INR: 1.14 ratio    PTT - ( 12 Oct 2019 06:11 )  PTT:30.4 sec      RADIOLOGY, EKG & ADDITIONAL TESTS: Reviewed.   QRS axis to [] ° and NSR at a rate of [] BPM. There was no atrial enlargement. There was no ventricular hypertrophy. There were no ST-T changes and all intervals were normal.    EXAM:  XR TIB FIB AP LAT 2 VIEWS RT                        EXAM:  XR ANKLE COMP MIN 3 VIEWS RT                          PROCEDURE DATE:  10/10/2019      IMPRESSION: fracture dislocation at the ankle with lateral subluxation of the talus with respect to the tibial plafond. There is an oblique fracture of the lateral malleolus with impaction and lateral angulation. There is a minimally displaced vertical fracture of the posterior tibial tubercle      DVT Prophylaxis:  LMWH                   ( X )  Heparin SQ           (  )  Coumadin             (  )  Xarelto                  (  )  Eliquis                   (  )  Venodynes           (X  )  Ambulation          ( X )  UFH                       (  )  Contraindicated  (  )  ASA                       (   )

## 2019-10-13 NOTE — DISCHARGE NOTE PROVIDER - NSDCFUADDINST_GEN_ALL_CORE_FT
Ankle Fracture DC Instructions:    1.	Analgesia PRN pain  2.	Non-Weight Bearing Affected Lower Extremity, with assistive device/crutches vs. rolling walker  3.	DVT/PE Prophylaxis per AC team, follow their recommendations  4.     Out of Bed Daily, try not to sit around.  5.	Follow up with Orthopedic Surgeon Dr. Bustillos this week or after discharge from rehab. Call Office For Appointment. Repeat x-rays in office.  6.	Elevate the extremity as much as possible  7.	Keep Splint Clean and dry. Do not get it wet. Do not walk or put any body weight on splint because it will break.

## 2019-10-13 NOTE — PROGRESS NOTE ADULT - SUBJECTIVE AND OBJECTIVE BOX
CC- s/p mechanical fall (11 Oct 2019 05:42)    HPI:  82yo/M with PMH HTN, hyperlipidemia, BPH presented s/p mechanical fall with RT ankle fracture. Patient tripped and fell, +hit his head, no LOC. Not on blood thinners. Last ate around noon. NO prior cardiac history, per wife had stress test couple of months ago which was negative (10 Oct 2019 17:43)    10/11/19- NPO for OR today  10/12/19- seen postop  10/13/19- no acute complaints    Review of system- All 10 systems reviewed and is as per HPI otherwise negative.     Vital Signs Last 24 Hrs  T(C): 36.5 (13 Oct 2019 10:50), Max: 37 (12 Oct 2019 17:06)  T(F): 97.7 (13 Oct 2019 10:50), Max: 98.6 (12 Oct 2019 17:06)  HR: 71 (13 Oct 2019 10:50) (62 - 71)  BP: 115/38 (13 Oct 2019 10:50) (115/38 - 139/46)  BP(mean): --  RR: 16 (13 Oct 2019 10:50) (16 - 17)  SpO2: 95% (13 Oct 2019 10:50) (93% - 96%)    LABS:                                10.7   8.23  )-----------( 167      ( 13 Oct 2019 06:37 )             32.7     13 Oct 2019 06:37    144    |  112    |  22     ----------------------------<  113    3.7     |  28     |  0.66     Ca    8.2        13 Oct 2019 06:37    PT/INR - ( 12 Oct 2019 06:11 )   PT: 12.7 sec;   INR: 1.14 ratio      PTT - ( 12 Oct 2019 06:11 )  PTT:30.4 sec    RADIOLOGY & ADDITIONAL TESTS:  EXAM:  CT BRAIN                        PROCEDURE DATE:  10/10/2019    INTERPRETATION:      CT head without IV contrast        CLINICAL INFORMATION:  Fall   Intracranial hemorrhage.    TECHNIQUE: Contiguous axial 5 mm sections were obtained through the head.   Sagittal and coronal 2-D reformatted images were also obtained.   This   scan was performed using automatic exposure control (radiation dose   reduction software) to obtain a diagnostic image quality scan with   patient dose as low as reasonably achievable.     FINDINGS:   No previous examinations are available for review.    The brain demonstrates minimal anterior periventricular white matter   ischemia.   No acute cerebral cortical infarct is seen.  No intracranial   hemorrhage is found.  No mass effect is found in the brain.      The ventricles, sulci and basal cisterns show mild atrophy.    The orbits are unremarkable.  The paranasal sinuses are clear.  The nasal   cavity appears intact.  The nasopharynx is symmetric. The central skull   base, petrous temporal bones and calvarium remain intact.      IMPRESSION:   minimal anterior periventricular white matter ischemia.   Mild atrophy.    EXAM:  XR CHEST 1 VIEW                        EXAM:  XR ANKLE POST RED 2 VIEWS RT                        PROCEDURE DATE:  10/10/2019    INTERPRETATION:  Clinical information: Postreduction. Preop.    AP chest radiograph  AP, lateral, oblique views of the right ankle    COMPARISON: Same day radiographs of the ankle performed at 1524 hours,   chest x-ray March 02, 2010    FINDINGS: Chest: The lungs are clear. The cardiac and mediastinal   contours are unremarkable. There is no acute bony abnormality.    Right ankle: There has been reduction of the previously seen tibiotalar   dislocation with anatomic alignment of the distal tibia and talus. There   is markedly improved alignment of the distal fibular fracture.    IMPRESSION:     Chest: Clear lungs  Right ankle: Successful reduction of tibiotalar dislocation and markedly   improved alignment of distal fibular fracture.    PHYSICAL EXAM:  GENERAL: NAD, well-groomed, well-developed  HEAD:  Atraumatic, Normocephalic  EYES: EOMI, PERRLA, conjunctiva and sclera clear  HEENT: Moist mucous membranes  NECK: Supple, No JVD  NERVOUS SYSTEM:  Alert & Oriented X3, Motor Strength 5/5 B/L upper and lower extremities; DTRs 2+ intact and symmetric  CHEST/LUNG: Clear to auscultation bilaterally; No rales, rhonchi, wheezing, or rubs  HEART: Regular rate and rhythm; No murmurs, rubs, or gallops  ABDOMEN: Soft, Nontender, Nondistended; Bowel sounds present  GENITOURINARY- Voiding, no palpable bladder  EXTREMITIES:  2+ Peripheral Pulses, No clubbing, cyanosis, or edema  MUSCULOSKELTAL- RT ankle in splint  SKIN-no rash, no lesion  CNS- alert, oriented X3, non focal     Daily Height in cm: 193.04 (10 Oct 2019 14:53)      MEDICATIONS  (STANDING):  acetaminophen   Tablet .. 650 milliGRAM(s) Oral every 8 hours  amLODIPine   Tablet 10 milliGRAM(s) Oral daily  atorvastatin 10 milliGRAM(s) Oral at bedtime  ceFAZolin   IVPB 2000 milliGRAM(s) IV Intermittent every 8 hours  docusate sodium 100 milliGRAM(s) Oral three times a day  enoxaparin Injectable 40 milliGRAM(s) SubCutaneous daily  finasteride 5 milliGRAM(s) Oral daily  losartan 100 milliGRAM(s) Oral daily  senna 2 Tablet(s) Oral at bedtime  sodium chloride 0.9%. 1000 milliLiter(s) (75 mL/Hr) IV Continuous <Continuous>  tamsulosin 0.4 milliGRAM(s) Oral at bedtime    MEDICATIONS  (PRN):  melatonin 3 milliGRAM(s) Oral at bedtime PRN Insomnia  ondansetron Injectable 4 milliGRAM(s) IV Push every 6 hours PRN Nausea and/or Vomiting  oxyCODONE    IR 5 milliGRAM(s) Oral every 4 hours PRN Moderate Pain (4 - 6)  oxyCODONE    IR 10 milliGRAM(s) Oral every 4 hours PRN Severe Pain (7 - 10)  traMADol 50 milliGRAM(s) Oral every 6 hours PRN Mild Pain (1 - 3)    Assessment/Plan  #S/p mechanical fall with RT ankle fracture s/p ORIF  Ortho eval appreciated  NWB to RLE  PT eval  Pain meds prn  Keep RLE elevated, ice packs  AC by Lovenox  Incentive spirometry  Bowel regimen    #HTN/hyperlipidemia  Stable  Continue home meds    #Hypokalemia- replaced    #BPH- stable    #Dispo- [facility] vs home tomorrow. D/w pt and ortho team

## 2019-10-13 NOTE — PROGRESS NOTE ADULT - ASSESSMENT
A 83 years old man with PMH HTN/HLD, BPH presented s/p mechanical fall with RT ankle fracture. We discussed VTE ppx with lovenox. Wife at side and both agree for therapy since he is non weight bearing. He is with VTE risk factors     PLAN:  -C/w lovenox 40 mg SQ daily for VTE ppx until weight bearing  -Monitor CBC/BMP  -Encourage early mobilization and maintain b/l venodynes    Will continue to follow.

## 2019-10-13 NOTE — DISCHARGE NOTE PROVIDER - HOSPITAL COURSE
Patient presented s/p mechanical fall with RT ankle fracture. Underwent RT ankle ORIF. Medically stable for [facility] today

## 2019-10-13 NOTE — PROGRESS NOTE ADULT - SUBJECTIVE AND OBJECTIVE BOX
Pt S/E at bedside, no acute events overnight, pain controlled.  Patient had surgery yesterday and is doing well  Right leg is elevated on pillows    Vital Signs Last 24 Hrs  T(C): 36.7 (13 Oct 2019 05:31), Max: 37.1 (12 Oct 2019 10:17)  T(F): 98 (13 Oct 2019 05:31), Max: 98.7 (12 Oct 2019 10:17)  HR: 68 (13 Oct 2019 05:31) (62 - 82)  BP: 139/46 (13 Oct 2019 05:31) (107/52 - 144/58)  BP(mean): 70 (12 Oct 2019 11:18) (70 - 70)  RR: 17 (13 Oct 2019 05:31) (16 - 22)  SpO2: 93% (13 Oct 2019 05:31) (93% - 97%)    Gen: NAD, AAOx3  RLE:  Trilam Splint clean dry intact  +EHL/FHL  Brisk Capillary refill distally in toes  SILT Distally

## 2019-10-13 NOTE — DISCHARGE NOTE PROVIDER - CARE PROVIDER_API CALL
Daniel Bustillos (DO)  Orthopaedic Surgery  [address] [place] [address]  Phone: [phone]  Fax: [phone]  Follow Up Time:

## 2019-10-14 ENCOUNTER — TRANSCRIPTION ENCOUNTER (OUTPATIENT)
Age: 84
End: 2019-10-14

## 2019-10-14 VITALS
SYSTOLIC BLOOD PRESSURE: 163 MMHG | HEART RATE: 62 BPM | RESPIRATION RATE: 16 BRPM | OXYGEN SATURATION: 95 % | TEMPERATURE: 98 F | DIASTOLIC BLOOD PRESSURE: 54 MMHG

## 2019-10-14 PROBLEM — E78.5 HYPERLIPIDEMIA, UNSPECIFIED: Chronic | Status: ACTIVE | Noted: 2019-10-10

## 2019-10-14 PROCEDURE — 99232 SBSQ HOSP IP/OBS MODERATE 35: CPT

## 2019-10-14 RX ORDER — ACETAMINOPHEN 500 MG
2 TABLET ORAL
Qty: 0 | Refills: 0 | DISCHARGE
Start: 2019-10-14

## 2019-10-14 RX ORDER — SENNA PLUS 8.6 MG/1
2 TABLET ORAL
Qty: 0 | Refills: 0 | DISCHARGE
Start: 2019-10-14

## 2019-10-14 RX ORDER — TRAMADOL HYDROCHLORIDE 50 MG/1
1 TABLET ORAL
Qty: 0 | Refills: 0 | DISCHARGE
Start: 2019-10-14

## 2019-10-14 RX ORDER — LANOLIN ALCOHOL/MO/W.PET/CERES
1 CREAM (GRAM) TOPICAL
Qty: 0 | Refills: 0 | DISCHARGE
Start: 2019-10-14

## 2019-10-14 RX ORDER — DOCUSATE SODIUM 100 MG
1 CAPSULE ORAL
Qty: 0 | Refills: 0 | DISCHARGE
Start: 2019-10-14

## 2019-10-14 RX ADMIN — Medication 650 MILLIGRAM(S): at 15:45

## 2019-10-14 RX ADMIN — ENOXAPARIN SODIUM 40 MILLIGRAM(S): 100 INJECTION SUBCUTANEOUS at 12:05

## 2019-10-14 RX ADMIN — Medication 100 MILLIGRAM(S): at 05:47

## 2019-10-14 RX ADMIN — AMLODIPINE BESYLATE 10 MILLIGRAM(S): 2.5 TABLET ORAL at 05:47

## 2019-10-14 RX ADMIN — LOSARTAN POTASSIUM 100 MILLIGRAM(S): 100 TABLET, FILM COATED ORAL at 05:47

## 2019-10-14 RX ADMIN — Medication 100 MILLIGRAM(S): at 15:44

## 2019-10-14 RX ADMIN — FINASTERIDE 5 MILLIGRAM(S): 5 TABLET, FILM COATED ORAL at 12:05

## 2019-10-14 RX ADMIN — Medication 650 MILLIGRAM(S): at 05:47

## 2019-10-14 NOTE — PROGRESS NOTE ADULT - SUBJECTIVE AND OBJECTIVE BOX
HPI: This is a 83 years old man with PMH HTN/HLD, BPH presented s/p mechanical fall with RT ankle fracture. Patient tripped and fell, +hit his head, no LOC. Not on blood thinners. Last ate around noon. NO prior cardiac history, per wife had stress test couple of months ago which was negative (10 Oct 2019 17:43)    10/12/19: Pt found to have right ankle fx underwent ORIF today with Dr. Bustillos. He denies prior hx of VTE, no family hx of blood clotting issues. We discussed VTE ppx with lovenox. Wife at side and both agree for therapy since he is non weight bearing.   10/13/19: Pt seen at bedside, no complaints with lovenox.   10/14: seen OOB to chair, discussed lovenox, no concerns, awaiting rehab    Patient is a 83y old  Male who presents with a chief complaint of s/p mechanical fall (12 Oct 2019 12:57)    Consulted by Dr. Bustillos for VTE prophylaxis, risk stratification, and anticoagulation management.    PAST MEDICAL & SURGICAL HISTORY:  HLD (hyperlipidemia)  Enlarged prostate  HTN (hypertension)  No significant past surgical history    BMI: 25    CrCL: 112.07    CAPRINI SCORE  AGE RELATED RISK FACTORS                                                       MOBILITY RELATED FACTORS  [ ] Age 41-60 years                                            (1 Point)                  [ ] Bed rest                                                        (1 Point)  [ ] Age: 61-74 years                                           (2 Points)                [ ] Plaster cast                                                   (2 Points)  [X ] Age= 75 years                                              (3 Points)                 [ ] Bed bound for more than 72 hours                   (2 Points)    DISEASE RELATED RISK FACTORS                                               GENDER SPECIFIC FACTORS  [ ] Edema in the lower extremities                       (1 Point)           [ ] Pregnancy                                                            (1 Point)  [ ] Varicose veins                                               (1 Point)                  [ ] Post-partum < 6 weeks                                      (1 Point)             [ ] BMI > 25 Kg/m2                                            (1 Point)                  [ ] Hormonal therapy or oral contraception       (1 Point)                 [ ] Sepsis (in the previous month)                        (1 Point)             [ ] History of pregnancy complications                (1Point)  [ ] Pneumonia or serious lung disease                                             [ ] Unexplained or recurrent  (=3), premature                                 (In the previous month)                               (1 Point)                birth with toxemia or growth-restricted infant (1 Point)  [ ] Abnormal pulmonary function test            (1 Point)                                   SURGERY RELATED RISK FACTORS  [ ] Acute myocardial infarction                       (1 Point)                  [ ]  Section                                         (1 Point)  [ ] Congestive heart failure (in the previous month) (1 Point)   [ ] Minor surgery   lasting <45 minutes       (1 Point)   [ ] Inflammatory bowel disease                             (1 Point)          [ ] Arthroscopic surgery                                  (2 Points)  [ ] Central venous access                                    (2 Points)            [ ] General surgery lasting >45 minutes      (2 Points)       [ ] Stroke (in the previous month)                  (5 Points)            [ ] Elective major lower extremity arthroplasty (5 Points)                                   [  ] Malignancy (present or past include skin melanoma                                          but exclude  basal skin cell)    (2 points)                                      TRAUMA RELATED RISK FACTORS                HEMATOLOGY RELATED FACTORS                                  [ X] Fracture of the hip, pelvis, or leg                       (5 Points)  [ ] Prior episodes of VTE                                     (3 Points)          [ ] Acute spinal cord injury (in the previous month)  (5 Points)  [ ] Positive family history for VTE                         (3 Points)       [ ] Paralysis (less than 1 month)                          (5 Points)  [ ] Prothrombin 76323 A                                      (3 Points)         [ ] Multiple Trauma (within 1month)                 (5Points)                                                                                                                                                                [ ] Factor V Leiden                                          (3 Points)                                OTHER RISK FACTORS                          [ ] Lupus anticoagulants                                     (3 Points)                       [ ] BMI > 40                          (1 Point)                                                         [ ] Anticardiolipin antibodies                                (3 Points)                   [ ] Smoking                              (1Point)                                                [ ] High homocysteine in the blood                      (3 Points)                [  ] Diabetes requiring insulin (1point)                         [ ] Other congenital or acquired thrombophilia       (3 Points)          [  ] Chemotherapy                   (1 Point)  [ ] Heparin induced thrombocytopenia                  (3 Points)             [  ] Blood Transfusion                (1 point)                                                                                                         Total Score [8   ]                                                                                                                   IMPROVE Bleeding Risk Score: 2.5    Falls Risk:   High (X  )  Mod (  )  Low (  )    EBL: 25 ml    Induction Time: 8:20  Procedure Start: 8:42  Procedure End: 10:01      FAMILY HISTORY:  No pertinent family history in first degree relatives  Denies any personal or familial history of clotting or bleeding disorders.    Allergies  Intolerances    REVIEW OF SYSTEMS    (  )Fever	     (  )Constipation	(  )SOB			(  )Headache	(  )Dysuria  (  )Chills	     (  )Melena	(  )Dyspnea present on exertion    (  )Dizziness                    (  )Polyuria  (  )Nausea	     (  )Hematochezia	(  )Cough		                    (  )Syncope   	(  )Hematuria  (  )Vomiting    (  )Chest Pain	(  )Wheezing		(  )Weakness  (  )Diarrhea     (  )Palpitations	(  )Anorexia		( X )Myalgia    All other review of systems negative: Yes    PHYSICAL EXAM:      Vital Signs Last 24 Hrs  T(C): 36.8 (10-14-19 @ 11:40), Max: 36.8 (10-13-19 @ 17:04)  T(F): 98.3 (10-14-19 @ 11:40), Max: 98.3 (10-13-19 @ 17:04)  HR: 73 (10-14-19 @ 11:40) (58 - 73)  BP: 103/59 (10-14-19 @ 11:40) (103/59 - 155/62)  BP(mean): 69 (10-14-19 @ 11:40) (69 - 69)  RR: 18 (10-14-19 @ 11:40) (16 - 18)  SpO2: 96% (10-14-19 @ 11:40) (94% - 97%)          Constitutional: Appears Well    Neurological: A& O x 3    Skin: Warm    Respiratory and Thorax: normal effort; Breath sounds: normal; No rales/wheezing/rhonchi  	  Cardiovascular: S1, S2, regular, NMBR	    Gastrointestinal: BS + x 4Q, nontender	    Genitourinary:  Bladder nondistended, nontender    Musculoskeletal:   General Right:   no muscle/joint tenderness,   normal tone, no joint swelling,   ROM: limited	    General Left:   no muscle/joint tenderness,   normal tone, no joint swelling,   ROM: full    Right ankle: foot warm, wiggles toes, toe flexion-extension strong.     Lower extrems:   Right: no calf tenderness              negative lavon's sign               + pedal pulses    Left:   no calf tenderness              negative lavon's sign               + pedal pulses  LAB:                              10.7   8.23  )-----------( 167      ( 13 Oct 2019 06:37 )             32.7       10-13    144  |  112<H>  |  22  ----------------------------<  113<H>  3.7   |  28  |  0.66    Ca    8.2<L>      13 Oct 2019 06:37                                        10.7   8.23  )-----------( 167      ( 13 Oct 2019 06:37 )             32.7       10-13    144  |  112<H>  |  22  ----------------------------<  113<H>  3.7   |  28  |  0.66    Ca    8.2<L>      13 Oct 2019 06:37                        12.0   6.96  )-----------( 171      ( 12 Oct 2019 10:32 )             36.1     1012    144  |  111<H>  |  23  ----------------------------<  130<H>  3.5   |  26  |  0.76    Ca    8.2<L>      12 Oct 2019 10:32    PT/INR - ( 12 Oct 2019 06:11 )   PT: 12.7 sec;   INR: 1.14 ratio    PTT - ( 12 Oct 2019 06:11 )  PTT:30.4 sec      RADIOLOGY, EKG & ADDITIONAL TESTS: Reviewed.   QRS axis to [] ° and NSR at a rate of [] BPM. There was no atrial enlargement. There was no ventricular hypertrophy. There were no ST-T changes and all intervals were normal.    EXAM:  XR TIB FIB AP LAT 2 VIEWS RT                        EXAM:  XR ANKLE COMP MIN 3 VIEWS RT                          PROCEDURE DATE:  10/10/2019      IMPRESSION: fracture dislocation at the ankle with lateral subluxation of the talus with respect to the tibial plafond. There is an oblique fracture of the lateral malleolus with impaction and lateral angulation. There is a minimally displaced vertical fracture of the posterior tibial tubercle      DVT Prophylaxis:  LMWH                   ( X )  Heparin SQ           (  )  Coumadin             (  )  Xarelto                  (  )  Eliquis                   (  )  Venodynes           (X  )  Ambulation          ( X )  UFH                       (  )  Contraindicated  (  )  ASA                       (   )

## 2019-10-14 NOTE — PROGRESS NOTE ADULT - SUBJECTIVE AND OBJECTIVE BOX
Pt S/E at bedside, no acute events overnight, pain controlled. Right leg is elevated on pillows. No acute complaints.    Vital Signs Last 24 Hrs  T(C): 36.7 (14 Oct 2019 05:00), Max: 36.8 (13 Oct 2019 17:04)  T(F): 98.1 (14 Oct 2019 05:00), Max: 98.3 (13 Oct 2019 17:04)  HR: 63 (14 Oct 2019 05:00) (58 - 71)  BP: 155/62 (14 Oct 2019 05:00) (115/38 - 155/62)  BP(mean): --  RR: 16 (14 Oct 2019 05:00) (16 - 16)  SpO2: 97% (14 Oct 2019 05:00) (94% - 97%)    Gen: NAD, AAOx3  RLE:  Trilam Splint clean dry intact  +EHL/FHL  Brisk Capillary refill distally in toes  SILT Distally

## 2019-10-14 NOTE — PROGRESS NOTE ADULT - SUBJECTIVE AND OBJECTIVE BOX
CC- s/p mechanical fall (11 Oct 2019 05:42)    HPI:  84yo/M with PMH HTN, hyperlipidemia, BPH presented s/p mechanical fall with RT ankle fracture. Patient tripped and fell, +hit his head, no LOC. Not on blood thinners. Last ate around noon. NO prior cardiac history, per wife had stress test couple of months ago which was negative (10 Oct 2019 17:43)    10/11/19- NPO for OR today  10/12/19- seen postop  10/13/19- no acute complaints  10/14/19- doing good    Review of system- All 10 systems reviewed and is as per HPI otherwise negative.     Vital Signs Last 24 Hrs  T(C): 36.8 (14 Oct 2019 11:40), Max: 36.8 (13 Oct 2019 17:04)  T(F): 98.3 (14 Oct 2019 11:40), Max: 98.3 (13 Oct 2019 17:04)  HR: 73 (14 Oct 2019 11:40) (58 - 73)  BP: 103/59 (14 Oct 2019 11:40) (103/59 - 155/62)  BP(mean): 69 (14 Oct 2019 11:40) (69 - 69)  RR: 18 (14 Oct 2019 11:40) (16 - 18)  SpO2: 96% (14 Oct 2019 11:40) (94% - 97%)    LABS:                                        10.7   8.23  )-----------( 167      ( 13 Oct 2019 06:37 )             32.7     13 Oct 2019 06:37    144    |  112    |  22     ----------------------------<  113    3.7     |  28     |  0.66     Ca    8.2        13 Oct 2019 06:37    RADIOLOGY & ADDITIONAL TESTS:  EXAM:  CT BRAIN                        PROCEDURE DATE:  10/10/2019    INTERPRETATION:      CT head without IV contrast        CLINICAL INFORMATION:  Fall   Intracranial hemorrhage.    TECHNIQUE: Contiguous axial 5 mm sections were obtained through the head.   Sagittal and coronal 2-D reformatted images were also obtained.   This   scan was performed using automatic exposure control (radiation dose   reduction software) to obtain a diagnostic image quality scan with   patient dose as low as reasonably achievable.     FINDINGS:   No previous examinations are available for review.    The brain demonstrates minimal anterior periventricular white matter   ischemia.   No acute cerebral cortical infarct is seen.  No intracranial   hemorrhage is found.  No mass effect is found in the brain.      The ventricles, sulci and basal cisterns show mild atrophy.    The orbits are unremarkable.  The paranasal sinuses are clear.  The nasal   cavity appears intact.  The nasopharynx is symmetric. The central skull   base, petrous temporal bones and calvarium remain intact.      IMPRESSION:   minimal anterior periventricular white matter ischemia.   Mild atrophy.    EXAM:  XR CHEST 1 VIEW                        EXAM:  XR ANKLE POST RED 2 VIEWS RT                        PROCEDURE DATE:  10/10/2019    INTERPRETATION:  Clinical information: Postreduction. Preop.    AP chest radiograph  AP, lateral, oblique views of the right ankle    COMPARISON: Same day radiographs of the ankle performed at 1524 hours,   chest x-ray March 02, 2010    FINDINGS: Chest: The lungs are clear. The cardiac and mediastinal   contours are unremarkable. There is no acute bony abnormality.    Right ankle: There has been reduction of the previously seen tibiotalar   dislocation with anatomic alignment of the distal tibia and talus. There   is markedly improved alignment of the distal fibular fracture.    IMPRESSION:     Chest: Clear lungs  Right ankle: Successful reduction of tibiotalar dislocation and markedly   improved alignment of distal fibular fracture.    PHYSICAL EXAM:  GENERAL: NAD, well-groomed, well-developed  HEAD:  Atraumatic, Normocephalic  EYES: EOMI, PERRLA, conjunctiva and sclera clear  HEENT: Moist mucous membranes  NECK: Supple, No JVD  NERVOUS SYSTEM:  Alert & Oriented X3, Motor Strength 5/5 B/L upper and lower extremities; DTRs 2+ intact and symmetric  CHEST/LUNG: Clear to auscultation bilaterally; No rales, rhonchi, wheezing, or rubs  HEART: Regular rate and rhythm; No murmurs, rubs, or gallops  ABDOMEN: Soft, Nontender, Nondistended; Bowel sounds present  GENITOURINARY- Voiding, no palpable bladder  EXTREMITIES:  2+ Peripheral Pulses, No clubbing, cyanosis, or edema  MUSCULOSKELTAL- RT ankle in splint  SKIN-no rash, no lesion  CNS- alert, oriented X3, non focal     Daily Height in cm: 193.04 (10 Oct 2019 14:53)      MEDICATIONS  (STANDING):  acetaminophen   Tablet .. 650 milliGRAM(s) Oral every 8 hours  amLODIPine   Tablet 10 milliGRAM(s) Oral daily  atorvastatin 10 milliGRAM(s) Oral at bedtime  ceFAZolin   IVPB 2000 milliGRAM(s) IV Intermittent every 8 hours  docusate sodium 100 milliGRAM(s) Oral three times a day  enoxaparin Injectable 40 milliGRAM(s) SubCutaneous daily  finasteride 5 milliGRAM(s) Oral daily  losartan 100 milliGRAM(s) Oral daily  senna 2 Tablet(s) Oral at bedtime  sodium chloride 0.9%. 1000 milliLiter(s) (75 mL/Hr) IV Continuous <Continuous>  tamsulosin 0.4 milliGRAM(s) Oral at bedtime    MEDICATIONS  (PRN):  melatonin 3 milliGRAM(s) Oral at bedtime PRN Insomnia  ondansetron Injectable 4 milliGRAM(s) IV Push every 6 hours PRN Nausea and/or Vomiting  oxyCODONE    IR 5 milliGRAM(s) Oral every 4 hours PRN Moderate Pain (4 - 6)  oxyCODONE    IR 10 milliGRAM(s) Oral every 4 hours PRN Severe Pain (7 - 10)  traMADol 50 milliGRAM(s) Oral every 6 hours PRN Mild Pain (1 - 3)    Assessment/Plan  #S/p mechanical fall with RT ankle fracture s/p ORIF  #Mild anemia acute blood loss from fracture  Ortho eval appreciated  NWB to RLE  PT eval- to [facility]  Pain meds prn  Keep RLE elevated, ice packs  AC by Lovenox  Incentive spirometry  Bowel regimen    #HTN/hyperlipidemia  Stable  Continue home meds    #Hypokalemia- replaced    #BPH- stable    #Dispo- [facility] today. DC time 40 mins. D/w pt, wife at bedside and ortho team

## 2019-10-14 NOTE — DISCHARGE NOTE NURSING/CASE MANAGEMENT/SOCIAL WORK - PATIENT PORTAL LINK FT
You can access the FollowMyHealth Patient Portal offered by [facility] by registering at the following website: [email or web address]. By joining [facility]’s FollowMyHealth portal, you will also be able to view your health information using other applications (apps) compatible with our system.

## 2019-10-14 NOTE — PROGRESS NOTE ADULT - ASSESSMENT
84 yo M s/p Right Ankle ORIF POD 3:    - Analgesia, Pain currently well controlled. Elevation and intermittent ice will help with swelling if needed  - DVT ppx, Appreciate AC management, Lovenox  - NWB RLE in Trilam Splint, walker/crutches   - PT  - DC planning, Likely [facility] today 10/14

## 2019-10-17 ENCOUNTER — INBOUND DOCUMENT (OUTPATIENT)
Age: 84
End: 2019-10-17

## 2019-10-21 ENCOUNTER — APPOINTMENT (OUTPATIENT)
Dept: ORTHOPEDIC SURGERY | Facility: CLINIC | Age: 84
End: 2019-10-21
Payer: MEDICARE

## 2019-10-21 DIAGNOSIS — S93.439S: ICD-10-CM

## 2019-10-21 DIAGNOSIS — E87.6 HYPOKALEMIA: ICD-10-CM

## 2019-10-21 DIAGNOSIS — S01.01XA LACERATION WITHOUT FOREIGN BODY OF SCALP, INITIAL ENCOUNTER: ICD-10-CM

## 2019-10-21 DIAGNOSIS — N40.0 BENIGN PROSTATIC HYPERPLASIA WITHOUT LOWER URINARY TRACT SYMPTOMS: ICD-10-CM

## 2019-10-21 DIAGNOSIS — I10 ESSENTIAL (PRIMARY) HYPERTENSION: ICD-10-CM

## 2019-10-21 DIAGNOSIS — E78.5 HYPERLIPIDEMIA, UNSPECIFIED: ICD-10-CM
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
## 2019-10-21 DIAGNOSIS — D62 ACUTE POSTHEMORRHAGIC ANEMIA: ICD-10-CM

## 2019-10-21 DIAGNOSIS — S82.851A DISPLACED TRIMALLEOLAR FRACTURE OF RIGHT LOWER LEG, INITIAL ENCOUNTER FOR CLOSED FRACTURE: ICD-10-CM
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
## 2019-10-21 PROCEDURE — 99024 POSTOP FOLLOW-UP VISIT: CPT

## 2019-10-21 PROCEDURE — 73610 X-RAY EXAM OF ANKLE: CPT | Mod: RT

## 2019-10-21 PROCEDURE — 97760 ORTHOTIC MGMT&TRAING 1ST ENC: CPT | Mod: 58

## 2019-10-21 NOTE — ADDENDUM
[FreeTextEntry1] : I, Brad Alex, acted solely as a scribe for Dr. Daniel Bustillos on this date 10/21/2019 .\par All medical record entries made by the Scribe were at my, Dr. Daniel Bustillos, direction and personally dictated by me on 10/21/2019 . I have reviewed the chart and agree that the record accurately reflects my personal performance of the history, physical exam, assessment and plan. I have also personally directed, reviewed, and agreed with the chart.\par \par \par

## 2019-10-21 NOTE — HISTORY OF PRESENT ILLNESS
[___ Days Post Op] : post op day #[unfilled] [Clean/Dry/Intact] : clean, dry and intact [Healed] : healed [Erythema] : erythematous [Neuro Intact] : an unremarkable neurological exam [Vascular Intact] : ~T peripheral vascular exam normal [Doing Well] : is doing well [Negative Chase's] : maneuvers demonstrated a negative Chase's sign [No Sign of Infection] : is showing no signs of infection [Excellent Pain Control] : has excellent pain control [Chills] : no chills [Nausea] : no nausea [Fever] : no fever [Vomiting] : no vomiting [Swelling] : not swollen [Discharge] : absent of discharge [Dehiscence] : not dehisced [de-identified] : General: Alert and oriented x3. In no acute distress. Pleasant in nature with a normal affect. No apparent respiratory distress.\par The wound is intact. no evidence of any diastases or dehiscence. No surrounding erythema noted. No fluctuance. The area is warm and well perfused. The patient is able to wiggle their toes. Neurovascularly intact. [de-identified] : 83 year year old male present in the office 9 days post op from ORIF, right distal fibula, ORIF, right distal tibia-fibula syndesmosis. The patient's pain is noted to be a 0/10. The patient presents ambulating in a wheelchair. The patient has been attending physical therapy for this issue. He is currently taking NSAIDs and Tylenol. He is also on Lovenox. No other complaints at this time.  [de-identified] : s/p ORIF, right distal fibula, ORIF, right distal tibia-fibula syndesmosis.\par DOS 10/12/19 [de-identified] : Patient is a 83 year year old male present in the office today 9 days s/p ORIF, right distal fibula, ORIF, right distal tibia-fibula syndesmosis. I advised the patient to keep the ankle elevated above the level of the heart. I recommend that the patient utilize a CAM boot. The patient was provided with a CAM boot in the office today. The patient should remain non weight bearing. I also recommend that the patient utilize Keflex. A prescription was provided today. I would like to see the patient back in the office in 10-14 days to reassess their condition. I have addressed all the patient's concerns surrounding the pathology of their condition. The patient understood and verbally agreed to the treatment plan. All of their questions were answered and they were satisfied with the visit. The patient should call the office if they have any questions or experience worsening symptoms. [de-identified] : 3V of the right ankle were ordered obtained and reviewed by me today, 10/21/2019 , revealed: hardware in good position\par

## 2019-10-31 ENCOUNTER — APPOINTMENT (OUTPATIENT)
Dept: ORTHOPEDIC SURGERY | Facility: CLINIC | Age: 84
End: 2019-10-31

## 2019-11-04 ENCOUNTER — APPOINTMENT (OUTPATIENT)
Dept: ORTHOPEDIC SURGERY | Facility: CLINIC | Age: 84
End: 2019-11-04
Payer: MEDICARE

## 2019-11-04 PROCEDURE — 73610 X-RAY EXAM OF ANKLE: CPT | Mod: RT

## 2019-11-04 PROCEDURE — 99024 POSTOP FOLLOW-UP VISIT: CPT

## 2019-11-04 NOTE — HISTORY OF PRESENT ILLNESS
[___ Weeks Post Op] : [unfilled] weeks post op [Clean/Dry/Intact] : clean, dry and intact [Healed] : healed [Neuro Intact] : an unremarkable neurological exam [Vascular Intact] : ~T peripheral vascular exam normal [Negative Chase's] : maneuvers demonstrated a negative Chase's sign [Doing Well] : is doing well [Excellent Pain Control] : has excellent pain control [No Sign of Infection] : is showing no signs of infection [Sutures Removed] : sutures were removed [Chills] : no chills [Fever] : no fever [Nausea] : no nausea [Vomiting] : no vomiting [Erythema] : not erythematous [Discharge] : absent of discharge [Swelling] : not swollen [Dehiscence] : not dehisced [de-identified] : s/p ORIF, right distal fibula, ORIF, right distal tibia-fibula syndesmosis.\par DOS 10/12/19.  [de-identified] : 83 year year old male present in the office 3 weeks post op from ORIF, right distal fibula, ORIF, right distal tibia-fibula syndesmosis. The patient's pain is noted to be a 0/10. The pain is noted to be the same, and the swelling is noted to be improved compared to the previous visit. The patient presents ambulating in a wheelchair.  He is not currently taking any pain medication. No other complaints at this time.  [de-identified] : General: Alert and oriented x3. In no acute distress. Pleasant in nature with a normal affect. No apparent respiratory distress.\par The wound is intact. no evidence of any diastases or dehiscence. No surrounding erythema noted. No fluctuance. The area is warm and well perfused. The patient is able to wiggle their toes. Neurovascularly intact. [de-identified] : 3V of the right ankle were ordered obtained and reviewed by me today, 11/04/2019 , revealed: hardware in good position [de-identified] : Patient is a 83 year year old male present in the office today 3 weeks s/p ORIF, right distal fibula, ORIF, right distal tibia-fibula syndesmosis. The wound was cleaned in the office today, and the patient was placed in a CAM boot. I recommend DVT prophylaxis. I would like to see the patient back in the office in 3 weeks to reassess their condition. I have addressed all the patient's concerns surrounding the pathology of their condition. The patient understood and verbally agreed to the treatment plan. All of their questions were answered and they were satisfied with the visit. The patient should call the office if they have any questions or experience worsening symptoms.

## 2019-11-04 NOTE — ADDENDUM
[FreeTextEntry1] : I, Brad Alex, acted solely as a scribe for Dr. Daniel Bustillos on this date 11/04/2019 .\par All medical record entries made by the Scribe were at my, Dr. Daniel Bustillos, direction and personally dictated by me on 11/04/2019 . I have reviewed the chart and agree that the record accurately reflects my personal performance of the history, physical exam, assessment and plan. I have also personally directed, reviewed, and agreed with the chart.\par \par \par

## 2019-11-26 ENCOUNTER — APPOINTMENT (OUTPATIENT)
Dept: ORTHOPEDIC SURGERY | Facility: CLINIC | Age: 84
End: 2019-11-26
Payer: MEDICARE

## 2019-11-26 VITALS
HEART RATE: 77 BPM | WEIGHT: 204 LBS | DIASTOLIC BLOOD PRESSURE: 67 MMHG | BODY MASS INDEX: 27.63 KG/M2 | SYSTOLIC BLOOD PRESSURE: 157 MMHG | HEIGHT: 72 IN

## 2019-11-26 PROCEDURE — 99024 POSTOP FOLLOW-UP VISIT: CPT

## 2019-11-26 PROCEDURE — 73610 X-RAY EXAM OF ANKLE: CPT | Mod: RT

## 2019-11-26 NOTE — HISTORY OF PRESENT ILLNESS
[___ Weeks Post Op] : [unfilled] weeks post op [Clean/Dry/Intact] : clean, dry and intact [Healed] : healed [Neuro Intact] : an unremarkable neurological exam [Vascular Intact] : ~T peripheral vascular exam normal [Negative Chase's] : maneuvers demonstrated a negative Chase's sign [Doing Well] : is doing well [Excellent Pain Control] : has excellent pain control [No Sign of Infection] : is showing no signs of infection [Sutures Removed] : sutures were removed [Fever] : no fever [Chills] : no chills [Nausea] : no nausea [Vomiting] : no vomiting [Discharge] : absent of discharge [Swelling] : not swollen [Erythema] : not erythematous [Dehiscence] : not dehisced [de-identified] : s/p ORIF, right distal fibula, ORIF, right distal tibia-fibula syndesmosis. DOS: 10/12/19\par DOS 10/12/19.  [de-identified] : 83 year year old male present in the office 6 weeks post op from ORIF, right distal fibula, ORIF, right distal tibia-fibula syndesmosis. The patient's pain is noted to be a 0/10. The pain is noted to be the same, and the swelling is noted to be improved compared to the previous visit. The patient presents ambulating in a wheelchair. Pt experienced epistaxis in the office today. Pt has seen his PCP for this issue and notes that he should continue on Lovenox.  He is not currently taking any pain medication. No other complaints at this time.  [de-identified] : 3V of the right ankle were ordered obtained and reviewed by me today, 11/26/2019 , revealed: hardware in good position [de-identified] : General: Alert and oriented x3. In no acute distress. Pleasant in nature with a normal affect. No apparent respiratory distress.\par The wound is intact. no evidence of any diastases or dehiscence. No surrounding erythema noted. No fluctuance. The area is warm and well perfused. The patient is able to wiggle their toes. Neurovascularly intact. [de-identified] : Patient is a 83 year year old male present in the office today 3 weeks s/p ORIF, right distal fibula, ORIF, right distal tibia-fibula syndesmosis. I recommend the patient undergo a course of physical therapy for the right ankle 2-3 times a week for a total of 6-8 weeks. A prescription was given for the physical therapy today. Begin to ween out of CAM boot into ASO brace. He may follow up in 2 months for repeat clinical evaluation. I have addressed all the patient's concerns surrounding the pathology of their condition. The patient understood and verbally agreed to the treatment plan. All of their questions were answered and they were satisfied with the visit. The patient should call the office if they have any questions or experience worsening symptoms.

## 2019-11-26 NOTE — ADDENDUM
[FreeTextEntry1] : I, Farrah Conroy, acted solely as a scribe for Dr. Daniel Bustillos on this date 11/26/2019 .\par All medical record entries made by the Scribe were at my, Dr. Daniel Bustillos, direction and personally dictated by me on 11/26/2019 . I have reviewed the chart and agree that the record accurately reflects my personal performance of the history, physical exam, assessment and plan. I have also personally directed, reviewed, and agreed with the chart.\par \par

## 2019-12-03 ENCOUNTER — APPOINTMENT (OUTPATIENT)
Dept: DERMATOLOGY | Facility: CLINIC | Age: 84
End: 2019-12-03
Payer: MEDICARE

## 2019-12-03 DIAGNOSIS — D48.9 NEOPLASM OF UNCERTAIN BEHAVIOR, UNSPECIFIED: ICD-10-CM

## 2019-12-03 DIAGNOSIS — R21 RASH AND OTHER NONSPECIFIC SKIN ERUPTION: ICD-10-CM

## 2019-12-03 PROCEDURE — 99203 OFFICE O/P NEW LOW 30 MIN: CPT | Mod: 25

## 2019-12-03 PROCEDURE — 11102 TANGNTL BX SKIN SINGLE LES: CPT

## 2019-12-03 NOTE — HISTORY OF PRESENT ILLNESS
[de-identified] : 83 year old male with rash on scalp. worsened over last 3 weeks. started as small scab but enlarged. has been using T-gel and OTC HC with limited benefit.  [FreeTextEntry1] : rash on scalp

## 2019-12-03 NOTE — ASSESSMENT
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 SCC\par  \par Shave biopsy performed today over above location, risks and benefits discussed including incomplete removal, not enough tissue for diagnosis scarring and infection, informed consent obtained, pictures taken,  cleaned with alcohol and anesthetized with 1%lido+epi, 0.3 cc total, hemostasis obtained with monsels, vaseline and bandaid placed, tolerated well, wound care reviewed, specimen sent to pathology.\par \par

## 2019-12-03 NOTE — PHYSICAL EXAM
[FreeTextEntry3] : AAOx3, pleasant, NAD, no visual lymphadenopathy\par hair, scalp, face, nose, eyelids, ears, lips, oropharynx, neck, chest, abdomen, back, right arm, left arm, nails, and hands examined with all normal findings,\par pertinent findings include:\par \par large yellow crusted plaque across mid scalp

## 2019-12-09 LAB — CORE LAB BIOPSY: NORMAL

## 2020-01-01 ENCOUNTER — EMERGENCY (EMERGENCY)
Facility: HOSPITAL | Age: 85
LOS: 0 days | Discharge: HOME | End: 2020-01-01
Attending: EMERGENCY MEDICINE
Payer: MEDICARE

## 2020-01-01 VITALS — WEIGHT: 195.99 LBS | HEIGHT: 72 IN

## 2020-01-01 VITALS
RESPIRATION RATE: 16 BRPM | SYSTOLIC BLOOD PRESSURE: 127 MMHG | HEART RATE: 68 BPM | OXYGEN SATURATION: 100 % | DIASTOLIC BLOOD PRESSURE: 61 MMHG | TEMPERATURE: 98 F
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
## 2020-01-01 DIAGNOSIS — Z79.899 OTHER LONG TERM (CURRENT) DRUG THERAPY: ICD-10-CM

## 2020-01-01 DIAGNOSIS — N40.0 BENIGN PROSTATIC HYPERPLASIA WITHOUT LOWER URINARY TRACT SYMPTOMS: ICD-10-CM

## 2020-01-01 DIAGNOSIS — M25.571 PAIN IN RIGHT ANKLE AND JOINTS OF RIGHT FOOT: ICD-10-CM

## 2020-01-01 DIAGNOSIS — L03.115 CELLULITIS OF RIGHT LOWER LIMB: ICD-10-CM

## 2020-01-01 DIAGNOSIS — E78.5 HYPERLIPIDEMIA, UNSPECIFIED: ICD-10-CM

## 2020-01-01 DIAGNOSIS — S82.891S OTHER FRACTURE OF RIGHT LOWER LEG, SEQUELA: ICD-10-CM

## 2020-01-01 DIAGNOSIS — I10 ESSENTIAL (PRIMARY) HYPERTENSION: ICD-10-CM

## 2020-01-01 DIAGNOSIS — M79.89 OTHER SPECIFIED SOFT TISSUE DISORDERS: ICD-10-CM

## 2020-01-01 LAB
ADD ON TEST-SPECIMEN IN LAB: SIGNIFICANT CHANGE UP
ALBUMIN SERPL ELPH-MCNC: 2.6 G/DL — LOW (ref 3.3–5)
ALP SERPL-CCNC: 208 U/L — HIGH (ref 40–120)
ALT FLD-CCNC: 91 U/L — HIGH (ref 12–78)
ANION GAP SERPL CALC-SCNC: 9 MMOL/L — SIGNIFICANT CHANGE UP (ref 5–17)
APTT BLD: 29.3 SEC — SIGNIFICANT CHANGE UP (ref 27.5–36.3)
AST SERPL-CCNC: 37 U/L — SIGNIFICANT CHANGE UP (ref 15–37)
BASOPHILS # BLD AUTO: 0.03 K/UL — SIGNIFICANT CHANGE UP (ref 0–0.2)
BASOPHILS NFR BLD AUTO: 0.4 % — SIGNIFICANT CHANGE UP (ref 0–2)
BILIRUB SERPL-MCNC: 0.5 MG/DL — SIGNIFICANT CHANGE UP (ref 0.2–1.2)
BUN SERPL-MCNC: 29 MG/DL — HIGH (ref 7–23)
CALCIUM SERPL-MCNC: 8.7 MG/DL — SIGNIFICANT CHANGE UP (ref 8.5–10.1)
CHLORIDE SERPL-SCNC: 109 MMOL/L — HIGH (ref 96–108)
CO2 SERPL-SCNC: 24 MMOL/L — SIGNIFICANT CHANGE UP (ref 22–31)
CREAT SERPL-MCNC: 0.82 MG/DL — SIGNIFICANT CHANGE UP (ref 0.5–1.3)
EOSINOPHIL # BLD AUTO: 0.23 K/UL — SIGNIFICANT CHANGE UP (ref 0–0.5)
EOSINOPHIL NFR BLD AUTO: 2.9 % — SIGNIFICANT CHANGE UP (ref 0–6)
GLUCOSE SERPL-MCNC: 144 MG/DL — HIGH (ref 70–99)
HCT VFR BLD CALC: 34.1 % — LOW (ref 39–50)
HGB BLD-MCNC: 11.5 G/DL — LOW (ref 13–17)
IMM GRANULOCYTES NFR BLD AUTO: 0.6 % — SIGNIFICANT CHANGE UP (ref 0–1.5)
INR BLD: 1.14 RATIO — SIGNIFICANT CHANGE UP (ref 0.88–1.16)
LACTATE SERPL-SCNC: 1.4 MMOL/L — SIGNIFICANT CHANGE UP (ref 0.7–2)
LYMPHOCYTES # BLD AUTO: 1.12 K/UL — SIGNIFICANT CHANGE UP (ref 1–3.3)
LYMPHOCYTES # BLD AUTO: 14.2 % — SIGNIFICANT CHANGE UP (ref 13–44)
MCHC RBC-ENTMCNC: 31.6 PG — SIGNIFICANT CHANGE UP (ref 27–34)
MCHC RBC-ENTMCNC: 33.7 GM/DL — SIGNIFICANT CHANGE UP (ref 32–36)
MCV RBC AUTO: 93.7 FL — SIGNIFICANT CHANGE UP (ref 80–100)
MONOCYTES # BLD AUTO: 0.84 K/UL — SIGNIFICANT CHANGE UP (ref 0–0.9)
MONOCYTES NFR BLD AUTO: 10.6 % — SIGNIFICANT CHANGE UP (ref 2–14)
NEUTROPHILS # BLD AUTO: 5.64 K/UL — SIGNIFICANT CHANGE UP (ref 1.8–7.4)
NEUTROPHILS NFR BLD AUTO: 71.3 % — SIGNIFICANT CHANGE UP (ref 43–77)
PLATELET # BLD AUTO: 229 K/UL — SIGNIFICANT CHANGE UP (ref 150–400)
POTASSIUM SERPL-MCNC: 3.4 MMOL/L — LOW (ref 3.5–5.3)
POTASSIUM SERPL-SCNC: 3.4 MMOL/L — LOW (ref 3.5–5.3)
PROT SERPL-MCNC: 6.6 GM/DL — SIGNIFICANT CHANGE UP (ref 6–8.3)
PROTHROM AB SERPL-ACNC: 12.7 SEC — SIGNIFICANT CHANGE UP (ref 10–12.9)
RBC # BLD: 3.64 M/UL — LOW (ref 4.2–5.8)
RBC # FLD: 12.8 % — SIGNIFICANT CHANGE UP (ref 10.3–14.5)
SODIUM SERPL-SCNC: 142 MMOL/L — SIGNIFICANT CHANGE UP (ref 135–145)
WBC # BLD: 7.91 K/UL — SIGNIFICANT CHANGE UP (ref 3.8–10.5)
WBC # FLD AUTO: 7.91 K/UL — SIGNIFICANT CHANGE UP (ref 3.8–10.5)

## 2020-01-01 PROCEDURE — 80053 COMPREHEN METABOLIC PANEL: CPT

## 2020-01-01 PROCEDURE — 85652 RBC SED RATE AUTOMATED: CPT

## 2020-01-01 PROCEDURE — 99282 EMERGENCY DEPT VISIT SF MDM: CPT

## 2020-01-01 PROCEDURE — 93971 EXTREMITY STUDY: CPT | Mod: 26,RT

## 2020-01-01 PROCEDURE — 99284 EMERGENCY DEPT VISIT MOD MDM: CPT

## 2020-01-01 PROCEDURE — 86140 C-REACTIVE PROTEIN: CPT

## 2020-01-01 PROCEDURE — 93971 EXTREMITY STUDY: CPT | Mod: RT

## 2020-01-01 PROCEDURE — 73610 X-RAY EXAM OF ANKLE: CPT | Mod: 26,RT

## 2020-01-01 PROCEDURE — 73610 X-RAY EXAM OF ANKLE: CPT | Mod: RT

## 2020-01-01 PROCEDURE — 85610 PROTHROMBIN TIME: CPT

## 2020-01-01 PROCEDURE — 85730 THROMBOPLASTIN TIME PARTIAL: CPT

## 2020-01-01 PROCEDURE — 87040 BLOOD CULTURE FOR BACTERIA: CPT

## 2020-01-01 PROCEDURE — 83605 ASSAY OF LACTIC ACID: CPT

## 2020-01-01 PROCEDURE — 85025 COMPLETE CBC W/AUTO DIFF WBC: CPT

## 2020-01-01 PROCEDURE — 99284 EMERGENCY DEPT VISIT MOD MDM: CPT | Mod: 25

## 2020-01-01 PROCEDURE — 36415 COLL VENOUS BLD VENIPUNCTURE: CPT

## 2020-01-01 RX ORDER — CEPHALEXIN 500 MG
1 CAPSULE ORAL
Qty: 21 | Refills: 0
Start: 2020-01-01 | End: 2020-01-15

## 2020-01-01 RX ORDER — AZTREONAM 2 G
1 VIAL (EA) INJECTION
Qty: 14 | Refills: 0
Start: 2020-01-01 | End: 2020-01-07

## 2020-01-01 RX ORDER — CEPHALEXIN 500 MG
500 CAPSULE ORAL ONCE
Refills: 0 | Status: COMPLETED | OUTPATIENT
Start: 2020-01-01 | End: 2020-01-01

## 2020-01-01 RX ADMIN — Medication 500 MILLIGRAM(S): at 18:54

## 2020-01-01 RX ADMIN — Medication 1 TABLET(S): at 18:54

## 2020-01-01 NOTE — ED ADULT NURSE NOTE - NSIMPLEMENTINTERV_GEN_ALL_ED
Implemented All Fall Risk Interventions:  [place] to call system. Call bell, personal items and telephone within reach. Instruct patient to call for assistance. Room bathroom lighting operational. Non-slip footwear when patient is off stretcher. Physically safe environment: no spills, clutter or unnecessary equipment. Stretcher in lowest position, wheels locked, appropriate side rails in place. Provide visual cue, wrist band, yellow gown, etc. Monitor gait and stability. Monitor for mental status changes and reorient to person, place, and time. Review medications for side effects contributing to fall risk. Reinforce activity limits and safety measures with patient and family.

## 2020-01-01 NOTE — CONSULT NOTE ADULT - ASSESSMENT
!!!!!! Incomplete Consult Note, More Information to Follow  !!!!!! 84 M with RLE cellulitis s/p R ankle ORIF on  October 2019    - ESR/CRP ordered, FU  - WBAT RLE   - Antibiotic therapy  - Analgesia as needed  - Patient should follow up with Dr. Bustillos in 1 week, call office to  make an appointment  - No further orthopedic intervention at this time  - Patient ortho stable for DC  - Discussed with Dr. Bustillos who agrees with above

## 2020-01-01 NOTE — ED ADULT NURSE NOTE - OBJECTIVE STATEMENT
Pt is a 84y male, A &o x 4, VSS, presents to ED w/ right ankle swelling, redness since Sunday,  pt states he had a fractured right ankle in October which required surgery. Pt able to ambulate, denies fevers, chills, SOB or chest pain. + pulse, motor & sensation. No recent trauma., no bleeding or deformity.

## 2020-01-01 NOTE — ED STATDOCS - PATIENT PORTAL LINK FT
You can access the FollowMyHealth Patient Portal offered by [facility] by registering at the following website: [email or web address]. By joining [employer]’s FollowMyHealth portal, you will also be able to view your health information using other applications (apps) compatible with our system.

## 2020-01-01 NOTE — CONSULT NOTE ADULT - SUBJECTIVE AND OBJECTIVE BOX
Patient of Dr. Bustillos, with R ankle fracture s/p R ankle ORIF in October 2019. Patient had episode of RLE cellulitis in rehab after discharge requiring oral antigrhas been doing well since surgery in October able to ambulate without issue. Patient of Dr. Bustillos, with R ankle fracture s/p R ankle ORIF in October 2019. Patient had episode of RLE cellulitis in rehab after discharge requiring oral antibiotics, completing course. He has otherwise been doing well since surgery in October and able to ambulate without issue. Patient started to develop noticeable swelling and redness in his RLE on Sunday. Patient describes some uncomfortable pain in his ankle due to swelling. Denies any numbness or tingling. Patient denies any fevers. He is currently not on any DVT prophylaxis.    PAST MEDICAL & SURGICAL HISTORY:  HLD (hyperlipidemia)  Enlarged prostate  HTN (hypertension)  No significant past surgical history    Vital Signs Last 24 Hrs  T(C): 36.4 (01 Jan 2020 16:45), Max: 36.4 (01 Jan 2020 16:45)  T(F): 97.6 (01 Jan 2020 16:45), Max: 97.6 (01 Jan 2020 16:45)  HR: 70 (01 Jan 2020 16:45) (70 - 70)  BP: 126/54 (01 Jan 2020 16:45) (126/54 - 126/54)  BP(mean): 74 (01 Jan 2020 16:45) (74 - 74)  RR: 18 (01 Jan 2020 16:45) (18 - 18)  SpO2: 98% (01 Jan 2020 16:45) (98% - 98%)\    LABS:                        11.5   7.91  )-----------( 229      ( 01 Jan 2020 17:33 )             34.1     01 Jan 2020 17:33    142    |  109    |  29     ----------------------------<  144    3.4     |  24     |  0.82     Ca    8.7        01 Jan 2020 17:33    TPro  6.6    /  Alb  2.6    /  TBili  0.5    /  DBili  x      /  AST  37     /  ALT  91     /  AlkPhos  208    01 Jan 2020 17:33    PT/INR - ( 01 Jan 2020 17:33 )   PT: 12.7 sec;   INR: 1.14 ratio           PTT - ( 01 Jan 2020 17:33 )  PTT:29.3 sec      Doppler Right Lower Extremity: No acute DVT    XR R Ankle:   No acute fractures /dislocations   Hardware intact, alignment maintained with signs of interval fracture history    Physical Exam:  Gen: NAD  RLE:  Skin: cellulitic appearing erythema involving ankle and distal tibia with small 1cm granulated wound, non expressible  Compartments soft compressible  3+ Pitting Edema to proximal tib  NTTP over tibia, medial/lateral mal  Nonpainful AROM/PROM  + EHL/FHL/TA/GSC  DP 2+  SILT L2-S1

## 2020-01-01 NOTE — ED STATDOCS - PROGRESS NOTE DETAILS
85 yo male with a PMH of htn, hld, recent R ankle surgery with Dr. Bustillos on 10/10/19 presents with redness, swelling x since Sunday. Pt had previous episodes and was placed on abx which seemed to resolve. Denies fever Ortho came to see pt. Ok to be d/c home with oral abx and will f/u with Dr. Bustillos next week. -Al Patel PA-C

## 2020-01-01 NOTE — ED ADULT TRIAGE NOTE - CHIEF COMPLAINT QUOTE
Pt reports that he had ankle fx repair with Dr. Bustillos in October.  Pt reports fairly good healing with increasing edema over past few days.  MD told pt to go to ED to r/o infection vs. DVT.

## 2020-01-01 NOTE — ED STATDOCS - CLINICAL SUMMARY MEDICAL DECISION MAKING FREE TEXT BOX
83 yo male presents with cellulitis of the RLE after reent surgery with Dr. Bustillos on 10/10/10. Pt afebrile. Will do sono to r/o dvt, XR to eval surgical site, labs,  and ortho consult. -Al Patel PA-C

## 2020-01-01 NOTE — ED STATDOCS - SKIN, MLM
+RLE edema pitting 1+, erythema, warmth, lateral aspect small superficial ulcer, nondraining. +very superficial skin breakdown.

## 2020-01-01 NOTE — ED STATDOCS - NSFOLLOWUPINSTRUCTIONS_ED_ALL_ED_FT
Cellulitis    WHAT YOU NEED TO KNOW:    Cellulitis is a skin infection caused by bacteria. Cellulitis may go away on its own or you may need treatment. Your healthcare provider may draw a [place] around the outside edges of your cellulitis. If your cellulitis spreads, your healthcare provider will see it outside of the [place]. Cellulitis          DISCHARGE INSTRUCTIONS:    Call 911 if:     You have sudden trouble breathing or chest pain.        Return to the emergency department if:     Your wound gets larger and more painful.       You feel a crackling under your skin when you touch it.      You have purple dots or bumps on your skin, or you see bleeding under your skin.      You have new swelling and pain in your legs.      The red, warm, swollen area gets larger.      You see red streaks coming from the infected area.    Contact your healthcare provider if:     You have a fever.      Your fever or pain does not go away or gets worse.      The area does not get smaller after 2 days of antibiotics.      Your skin is flaking or peeling off.      You have questions or concerns about your condition or care.    Medicines:     Antibiotics help treat the bacterial infection.       NSAIDs, such as ibuprofen, help decrease swelling, pain, and fever. NSAIDs can cause stomach bleeding or kidney problems in certain people. If you take blood thinner medicine, always ask if NSAIDs are safe for you. Always read the medicine label and follow directions. Do not give these medicines to children under 6 months of age without direction from your child's healthcare provider.      Acetaminophen decreases pain and fever. It is available without a doctor's order. Ask how much to take and how often to take it. Follow directions. Read the labels of all other medicines you are using to see if they also contain acetaminophen, or ask your doctor or pharmacist. Acetaminophen can cause liver damage if not taken correctly. Do not use more than 4 grams (4,000 milligrams) total of acetaminophen in one day.       Take your medicine as directed. Contact your healthcare provider if you think your medicine is not helping or if you have side effects. Tell him or her if you are allergic to any medicine. Keep a list of the medicines, vitamins, and herbs you take. Include the amounts, and when and why you take them. Bring the list or the pill bottles to follow-up visits. Carry your medicine list with you in case of an emergency.    Self-care:     Elevate the area above the level of your heart as often as you can. This will help decrease swelling and pain. Prop the area on pillows or blankets to keep it elevated comfortably.       Clean the area daily until the wound scabs over. Gently wash the area with soap and water. Pat dry. Use dressings as directed.       Place cool or warm, wet cloths on the area as directed. Use clean cloths and clean water. Leave it on the area until the cloth is room temperature. Pat the area dry with a clean, dry cloth. The cloths may help decrease pain.     Prevent cellulitis:     Do not scratch bug bites or areas of injury. You increase your risk for cellulitis by scratching these areas.       Do not share personal items, such as towels, clothing, and razors.       Clean exercise equipment with germ-killing  before and after you use it.      Wash your hands often. Use soap and water. Wash your hands after you use the bathroom, change a child's diapers, or sneeze. Wash your hands before you prepare or eat food. Use lotion to prevent dry, cracked skin. Handwashing           Wear pressure stockings as directed. You may be told to wear the stockings if you have peripheral edema. The stockings improve blood flow and decrease swelling.      Treat athlete’s foot. This can help prevent the spread of a bacterial skin infection.    Follow up with your healthcare provider within 3 days, or as directed: Your healthcare provider will check if your cellulitis is getting better. You may need different medicine. Write down your questions so you remember to ask them during your visits.

## 2020-01-01 NOTE — ED STATDOCS - OBJECTIVE STATEMENT
85 y/o male with a PMHx of HTN, HLD presents to the ED c/o right lower leg pain, swelling and redness x3 days. Denies drainage, fever, chills. Pt had surgery to right ankle in October by Dr. Bustillos. Denies recent trauma, lacerations, abrasions to leg. Not currently on anticoagulants, was on Lovenox until early December. Wife notes that pt sometimes wears a right ankle brace. Was on abx for suspected cellulitis in late October.

## 2020-01-02 NOTE — ED POST DISCHARGE NOTE - REASON FOR FOLLOW-UP
Other Elevated CRP. Pt was seen by ortho, dx with cellulitis, and has good f/u with ortho. Dr. Bustillos to f/u with pt. -Al Patel PA-C

## 2020-01-06 LAB
CULTURE RESULTS: SIGNIFICANT CHANGE UP
CULTURE RESULTS: SIGNIFICANT CHANGE UP
SPECIMEN SOURCE: SIGNIFICANT CHANGE UP
SPECIMEN SOURCE: SIGNIFICANT CHANGE UP

## 2020-01-07 ENCOUNTER — EMERGENCY (EMERGENCY)
Facility: HOSPITAL | Age: 85
LOS: 0 days | Discharge: HOME | End: 2020-01-07
Attending: EMERGENCY MEDICINE
Payer: MEDICARE

## 2020-01-07 VITALS
TEMPERATURE: 98 F | RESPIRATION RATE: 16 BRPM | HEART RATE: 75 BPM | SYSTOLIC BLOOD PRESSURE: 135 MMHG | OXYGEN SATURATION: 95 % | DIASTOLIC BLOOD PRESSURE: 56 MMHG

## 2020-01-07 VITALS
OXYGEN SATURATION: 95 % | HEIGHT: 72 IN | HEART RATE: 101 BPM | WEIGHT: 195.11 LBS | DIASTOLIC BLOOD PRESSURE: 51 MMHG | RESPIRATION RATE: 18 BRPM | TEMPERATURE: 98 F | SYSTOLIC BLOOD PRESSURE: 143 MMHG

## 2020-01-07 DIAGNOSIS — Z79.01 LONG TERM (CURRENT) USE OF ANTICOAGULANTS: ICD-10-CM

## 2020-01-07 DIAGNOSIS — N40.1 BENIGN PROSTATIC HYPERPLASIA WITH LOWER URINARY TRACT SYMPTOMS: ICD-10-CM

## 2020-01-07 DIAGNOSIS — I10 ESSENTIAL (PRIMARY) HYPERTENSION: ICD-10-CM

## 2020-01-07 DIAGNOSIS — E78.5 HYPERLIPIDEMIA, UNSPECIFIED: ICD-10-CM

## 2020-01-07 DIAGNOSIS — R33.8 OTHER RETENTION OF URINE: ICD-10-CM

## 2020-01-07 DIAGNOSIS — Z46.6 ENCOUNTER FOR FITTING AND ADJUSTMENT OF URINARY DEVICE: ICD-10-CM

## 2020-01-07 DIAGNOSIS — Z86.19 PERSONAL HISTORY OF OTHER INFECTIOUS AND PARASITIC DISEASES: ICD-10-CM

## 2020-01-07 DIAGNOSIS — Z79.2 LONG TERM (CURRENT) USE OF ANTIBIOTICS: ICD-10-CM

## 2020-01-07 DIAGNOSIS — R10.30 LOWER ABDOMINAL PAIN, UNSPECIFIED: ICD-10-CM

## 2020-01-07 LAB
APPEARANCE UR: CLEAR — SIGNIFICANT CHANGE UP
BILIRUB UR-MCNC: NEGATIVE — SIGNIFICANT CHANGE UP
COLOR SPEC: YELLOW — SIGNIFICANT CHANGE UP
DIFF PNL FLD: ABNORMAL
GLUCOSE UR QL: NEGATIVE MG/DL — SIGNIFICANT CHANGE UP
KETONES UR-MCNC: NEGATIVE — SIGNIFICANT CHANGE UP
LEUKOCYTE ESTERASE UR-ACNC: ABNORMAL
NITRITE UR-MCNC: NEGATIVE — SIGNIFICANT CHANGE UP
PH UR: 5 — SIGNIFICANT CHANGE UP (ref 5–8)
PROT UR-MCNC: 30 MG/DL
SP GR SPEC: 1.01 — SIGNIFICANT CHANGE UP (ref 1.01–1.02)
UROBILINOGEN FLD QL: NEGATIVE MG/DL — SIGNIFICANT CHANGE UP

## 2020-01-07 PROCEDURE — 81001 URINALYSIS AUTO W/SCOPE: CPT

## 2020-01-07 PROCEDURE — 99283 EMERGENCY DEPT VISIT LOW MDM: CPT

## 2020-01-07 PROCEDURE — 87086 URINE CULTURE/COLONY COUNT: CPT

## 2020-01-07 PROCEDURE — 51702 INSERT TEMP BLADDER CATH: CPT

## 2020-01-07 PROCEDURE — 99284 EMERGENCY DEPT VISIT MOD MDM: CPT

## 2020-01-07 NOTE — ED ADULT NURSE NOTE - CHIEF COMPLAINT QUOTE
urinary retention, history of enlarged prostate. complains of lower abd pain worsening throughout night, only able to go small amounts at a time.

## 2020-01-07 NOTE — ED PROVIDER NOTE - PROGRESS NOTE DETAILS
Attending Collins, chowdary placed by nurse and ~1100cc of urine out. Attending Collins, pt is feeling better, currently on abx for cellulitis.  pt requesting to be d.c

## 2020-01-07 NOTE — ED ADULT NURSE NOTE - OBJECTIVE STATEMENT
c/o urinary retention for past 2 days, pt states he has history of enlarged prostate, has had urinary retention before.

## 2020-01-07 NOTE — ED PROVIDER NOTE - NSFOLLOWUPINSTRUCTIONS_ED_ALL_ED_FT
please call and follow up with Dr. Walter in 1-3 days    Urinary Retention in Men    WHAT YOU NEED TO KNOW:    What is urinary retention? Urinary retention is a condition that develops when your bladder does not empty completely when you urinate.         What causes urinary retention?     An enlarged prostate      Blockages, such as a stone, growth, or narrowing of your urethra      A weak bladder muscle      Nerve damage from diabetes, stroke, or spinal cord injury      Bladder diverticula, which are pockets of urine that form in your bladder and do not empty      Certain medicines, such as narcotics, antihistamines, or antidepressants    What are the signs and symptoms of urinary retention?     Frequent urination, or the urge to urinate right after you finish      An urge to urinate, but your urine does not come out or dribbles out slowly and weakly      Frequent urine leaks that happen during the day or while you sleep      Pain or pressure when you urinate      Pain or stiffness in your abdomen, lower back, hips, or upper thighs      Blood in your urine    How is urinary retention diagnosed? Your healthcare provider will ask about your health history and the medicines you take. He will press or tap on your lower abdomen. You may need any of the following tests:     A digital rectal exam is when healthcare providers carefully feel the size of your prostate.      A post void residual test will show how much urine is left in your bladder after you urinate. You will be asked to urinate and then healthcare providers will use a small ultrasound machine to check how much urine is left in your bladder.      Blood or urine tests may show infection or prostate specific antigen (PSA) levels. PSA may be elevated in prostate cancer.      An ultrasound uses sound waves to show pictures on a monitor. An ultrasound may be done to show bladder stones, infection, or other problems.      A CT scan, or CAT scan, is a type of x-ray that is taken of your prostate, kidneys, and bladder. The pictures may show what is causing your urinary retention. You may be given a dye before the pictures are taken to help healthcare providers see the pictures better. Tell the healthcare provider if you have ever had an allergic reaction to contrast dye.    How is urinary retention treated?     A Brewer catheter is a tube put into your bladder to drain urine into a bag. Keep the bag below your waist. This will prevent urine from flowing back into your bladder and causing an infection or other problems. Also, keep the tube free of kinks so the urine will drain properly. Do not pull on the catheter. This can cause pain and bleeding, and may cause the catheter to come out.       Medicines can help decrease the size of your prostate, fight infection, and help you urinate more easily.      Surgery may be needed to treat the condition that is causing your urinary retention.     When should I contact my healthcare provider?     You have a fever.      You have pain when you urinate.      You have blood in your urine.      You have problems with your catheter.      You have questions or concerns about your condition or care.    When should I seek immediate care or call 911?     You have severe abdominal pain.      You are breathing faster than usual.      Your heartbeat is faster than usual.      Your face, hands, feet, or ankles are swollen.     CARE AGREEMENT:    You have the right to help plan your care. Learn about your health condition and how it may be treated. Discuss treatment options with your healthcare providers to decide what care you want to receive. You always have the right to refuse treatment.

## 2020-01-07 NOTE — ED PROVIDER NOTE - PATIENT PORTAL LINK FT
You can access the FollowMyHealth Patient Portal offered by [facility] by registering at the following website: [email or web address]. By joining [employer]’s FollowMyHealth portal, you will also be able to view your health information using other applications (apps) compatible with our system.

## 2020-01-07 NOTE — ED PROVIDER NOTE - CARE PROVIDER_API CALL
Brandon Walter)  Urology  [address], [address] [place] [address]  Phone: [phone]  Fax: [phone]  Follow Up Time:

## 2020-01-07 NOTE — ED PROVIDER NOTE - OBJECTIVE STATEMENT
85 yo pt presents to ED for difficulty urinating.  pt here with wife.  pt has been only able to dribble small amount of urine over the last 2 days.  tonight pt with more lower abd pain.  + urgency, but not able to go.  No fall, no fever, no travel, nothing makes better or worse.  PETE = Dr. tran.

## 2020-01-07 NOTE — ED ADULT TRIAGE NOTE - CHIEF COMPLAINT QUOTE
urinary retention, history of enlarged prostate. lower abd pain urinary retention, history of enlarged prostate. complains of lower abd pain worsening throughout night, only able to go small amounts at a time.

## 2020-01-08 LAB
CULTURE RESULTS: NO GROWTH — SIGNIFICANT CHANGE UP
SPECIMEN SOURCE: SIGNIFICANT CHANGE UP

## 2020-01-09 ENCOUNTER — APPOINTMENT (OUTPATIENT)
Dept: ORTHOPEDIC SURGERY | Facility: CLINIC | Age: 85
End: 2020-01-09
Payer: MEDICARE

## 2020-01-09 PROCEDURE — 73610 X-RAY EXAM OF ANKLE: CPT | Mod: RT

## 2020-01-09 PROCEDURE — 99024 POSTOP FOLLOW-UP VISIT: CPT

## 2020-01-09 NOTE — HISTORY OF PRESENT ILLNESS
[___ Weeks Post Op] : [unfilled] weeks post op [Clean/Dry/Intact] : clean, dry and intact [Healed] : healed [Neuro Intact] : an unremarkable neurological exam [Vascular Intact] : ~T peripheral vascular exam normal [Negative Chase's] : maneuvers demonstrated a negative Chase's sign [Excellent Pain Control] : has excellent pain control [Doing Well] : is doing well [No Sign of Infection] : is showing no signs of infection [Erythema] : erythematous [Swelling] : swollen [Chills] : no chills [Fever] : no fever [Nausea] : no nausea [Vomiting] : no vomiting [Discharge] : absent of discharge [Dehiscence] : not dehisced [de-identified] : s/p ORIF, right distal fibula, ORIF, right distal tibia-fibula syndesmosis. DOS: 10/12/19 [de-identified] : General: Alert and oriented x3. In no acute distress. Pleasant in nature with a normal affect. No apparent respiratory distress.\par The wound is intact. no evidence of any diastases or dehiscence. +surrounding blanching erythema noted around small wound. No fluctuance. The area is warm and well perfused. The patient is able to wiggle their toes. Neurovascularly intact.  [de-identified] : 84 year old male present in the office 12 weeks post op from ORIF, right distal fibula, ORIF, right distal tibia-fibula syndesmosis. The patient's pain is noted to be a 0/10. The pain and swelling are noted to be the same compared to the previous visit. He reports that he finished his last dose of antibiotics. The patient presents ambulating with a cane. He is not currently taking any pain medication. No other complaints at this time.  [de-identified] : 3V of the right ankle were ordered obtained and reviewed by me today, 01/09/2020 , revealed: Healing noted, hardware in good position [de-identified] : Patient is a 84 year year old male present in the office today 12 weeks s/p ORIF, right distal fibula, ORIF, right distal tibia-fibula syndesmosis. I recommend that the patient take Keflex antibiotics 3x per day as instructed. A prescription was provided in the office today. The wound was cleaned in the office today and was provided with betadine sticks. I would like to see the patient back in the office in 10-14 days to reassess their condition. I have addressed all the patient's concerns surrounding the pathology of their condition. The patient understood and verbally agreed to the treatment plan. All of their questions were answered and they were satisfied with the visit. The patient should call the office if they have any questions or experience worsening symptoms.

## 2020-01-09 NOTE — ADDENDUM
[FreeTextEntry1] : I, Brad Johnson, acted solely as a scribe for Dr. Daniel Bustillos on this date 01/09/2020 .\par All medical record entries made by the Scribe were at my, Dr. Daniel Bustillos, direction and personally dictated by me on 01/09/2020 . I have reviewed the chart and agree that the record accurately reflects my personal performance of the history, physical exam, assessment and plan. I have also personally directed, reviewed, and agreed with the chart.

## 2020-01-10 ENCOUNTER — INPATIENT (INPATIENT)
Facility: HOSPITAL | Age: 85
LOS: 5 days | Discharge: HOME HEALTH | DRG: 560 | End: 2020-01-16
Attending: INTERNAL MEDICINE | Admitting: INTERNAL MEDICINE
Payer: MEDICARE

## 2020-01-10 VITALS — WEIGHT: 188.05 LBS | HEIGHT: 72 IN
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
## 2020-01-10 DIAGNOSIS — L03.115 CELLULITIS OF RIGHT LOWER LIMB: ICD-10-CM

## 2020-01-10 LAB
ALBUMIN SERPL ELPH-MCNC: 3.2 G/DL — LOW (ref 3.3–5)
ALP SERPL-CCNC: 127 U/L — HIGH (ref 40–120)
ALT FLD-CCNC: 38 U/L — SIGNIFICANT CHANGE UP (ref 12–78)
ANION GAP SERPL CALC-SCNC: 5 MMOL/L — SIGNIFICANT CHANGE UP (ref 5–17)
APTT BLD: 33.1 SEC — SIGNIFICANT CHANGE UP (ref 27.5–36.3)
AST SERPL-CCNC: 23 U/L — SIGNIFICANT CHANGE UP (ref 15–37)
BASOPHILS # BLD AUTO: 0.07 K/UL — SIGNIFICANT CHANGE UP (ref 0–0.2)
BASOPHILS NFR BLD AUTO: 0.9 % — SIGNIFICANT CHANGE UP (ref 0–2)
BILIRUB SERPL-MCNC: 0.3 MG/DL — SIGNIFICANT CHANGE UP (ref 0.2–1.2)
BUN SERPL-MCNC: 28 MG/DL — HIGH (ref 7–23)
CALCIUM SERPL-MCNC: 9 MG/DL — SIGNIFICANT CHANGE UP (ref 8.5–10.1)
CHLORIDE SERPL-SCNC: 107 MMOL/L — SIGNIFICANT CHANGE UP (ref 96–108)
CO2 SERPL-SCNC: 28 MMOL/L — SIGNIFICANT CHANGE UP (ref 22–31)
CREAT SERPL-MCNC: 0.95 MG/DL — SIGNIFICANT CHANGE UP (ref 0.5–1.3)
EOSINOPHIL # BLD AUTO: 0.39 K/UL — SIGNIFICANT CHANGE UP (ref 0–0.5)
EOSINOPHIL NFR BLD AUTO: 5 % — SIGNIFICANT CHANGE UP (ref 0–6)
ERYTHROCYTE [SEDIMENTATION RATE] IN BLOOD: 66 MM/HR — HIGH (ref 0–20)
GLUCOSE SERPL-MCNC: 112 MG/DL — HIGH (ref 70–99)
HCT VFR BLD CALC: 36 % — LOW (ref 39–50)
HGB BLD-MCNC: 11.8 G/DL — LOW (ref 13–17)
IMM GRANULOCYTES NFR BLD AUTO: 1.7 % — HIGH (ref 0–1.5)
INR BLD: 1.15 RATIO — SIGNIFICANT CHANGE UP (ref 0.88–1.16)
LACTATE SERPL-SCNC: 1.4 MMOL/L — SIGNIFICANT CHANGE UP (ref 0.7–2)
LYMPHOCYTES # BLD AUTO: 1.83 K/UL — SIGNIFICANT CHANGE UP (ref 1–3.3)
LYMPHOCYTES # BLD AUTO: 23.3 % — SIGNIFICANT CHANGE UP (ref 13–44)
MCHC RBC-ENTMCNC: 30.9 PG — SIGNIFICANT CHANGE UP (ref 27–34)
MCHC RBC-ENTMCNC: 32.8 GM/DL — SIGNIFICANT CHANGE UP (ref 32–36)
MCV RBC AUTO: 94.2 FL — SIGNIFICANT CHANGE UP (ref 80–100)
MONOCYTES # BLD AUTO: 0.68 K/UL — SIGNIFICANT CHANGE UP (ref 0–0.9)
MONOCYTES NFR BLD AUTO: 8.7 % — SIGNIFICANT CHANGE UP (ref 2–14)
NEUTROPHILS # BLD AUTO: 4.74 K/UL — SIGNIFICANT CHANGE UP (ref 1.8–7.4)
NEUTROPHILS NFR BLD AUTO: 60.4 % — SIGNIFICANT CHANGE UP (ref 43–77)
PLATELET # BLD AUTO: 433 K/UL — HIGH (ref 150–400)
POTASSIUM SERPL-MCNC: 4 MMOL/L — SIGNIFICANT CHANGE UP (ref 3.5–5.3)
POTASSIUM SERPL-SCNC: 4 MMOL/L — SIGNIFICANT CHANGE UP (ref 3.5–5.3)
PROT SERPL-MCNC: 7.2 GM/DL — SIGNIFICANT CHANGE UP (ref 6–8.3)
PROTHROM AB SERPL-ACNC: 12.8 SEC — SIGNIFICANT CHANGE UP (ref 10–12.9)
RBC # BLD: 3.82 M/UL — LOW (ref 4.2–5.8)
RBC # FLD: 12.4 % — SIGNIFICANT CHANGE UP (ref 10.3–14.5)
SODIUM SERPL-SCNC: 140 MMOL/L — SIGNIFICANT CHANGE UP (ref 135–145)
WBC # BLD: 7.84 K/UL — SIGNIFICANT CHANGE UP (ref 3.8–10.5)
WBC # FLD AUTO: 7.84 K/UL — SIGNIFICANT CHANGE UP (ref 3.8–10.5)

## 2020-01-10 PROCEDURE — 93005 ELECTROCARDIOGRAM TRACING: CPT

## 2020-01-10 PROCEDURE — 73600 X-RAY EXAM OF ANKLE: CPT | Mod: 26,RT

## 2020-01-10 PROCEDURE — 93971 EXTREMITY STUDY: CPT | Mod: 26,RT

## 2020-01-10 PROCEDURE — 80048 BASIC METABOLIC PNL TOTAL CA: CPT

## 2020-01-10 PROCEDURE — 86900 BLOOD TYPING SEROLOGIC ABO: CPT

## 2020-01-10 PROCEDURE — 85730 THROMBOPLASTIN TIME PARTIAL: CPT

## 2020-01-10 PROCEDURE — 36415 COLL VENOUS BLD VENIPUNCTURE: CPT

## 2020-01-10 PROCEDURE — 84100 ASSAY OF PHOSPHORUS: CPT

## 2020-01-10 PROCEDURE — 86850 RBC ANTIBODY SCREEN: CPT

## 2020-01-10 PROCEDURE — 85610 PROTHROMBIN TIME: CPT

## 2020-01-10 PROCEDURE — 86901 BLOOD TYPING SEROLOGIC RH(D): CPT

## 2020-01-10 PROCEDURE — 93971 EXTREMITY STUDY: CPT | Mod: RT

## 2020-01-10 PROCEDURE — 80202 ASSAY OF VANCOMYCIN: CPT

## 2020-01-10 PROCEDURE — 99024 POSTOP FOLLOW-UP VISIT: CPT

## 2020-01-10 PROCEDURE — 83735 ASSAY OF MAGNESIUM: CPT

## 2020-01-10 PROCEDURE — 73700 CT LOWER EXTREMITY W/O DYE: CPT | Mod: 26,RT

## 2020-01-10 PROCEDURE — 76376 3D RENDER W/INTRP POSTPROCES: CPT | Mod: 26

## 2020-01-10 PROCEDURE — 85025 COMPLETE CBC W/AUTO DIFF WBC: CPT

## 2020-01-10 PROCEDURE — 71045 X-RAY EXAM CHEST 1 VIEW: CPT

## 2020-01-10 RX ORDER — CEFTRIAXONE 500 MG/1
1000 INJECTION, POWDER, FOR SOLUTION INTRAMUSCULAR; INTRAVENOUS ONCE
Refills: 0 | Status: COMPLETED | OUTPATIENT
Start: 2020-01-10 | End: 2020-01-10

## 2020-01-10 RX ORDER — ENOXAPARIN SODIUM 100 MG/ML
40 INJECTION SUBCUTANEOUS
Qty: 0 | Refills: 0 | DISCHARGE

## 2020-01-10 RX ORDER — VANCOMYCIN HCL 1 G
1250 VIAL (EA) INTRAVENOUS ONCE
Refills: 0 | Status: COMPLETED | OUTPATIENT
Start: 2020-01-10 | End: 2020-01-10

## 2020-01-10 RX ORDER — TAMSULOSIN HYDROCHLORIDE 0.4 MG/1
1 CAPSULE ORAL
Qty: 0 | Refills: 0 | DISCHARGE

## 2020-01-10 RX ORDER — ACETAMINOPHEN 500 MG
650 TABLET ORAL EVERY 6 HOURS
Refills: 0 | Status: DISCONTINUED | OUTPATIENT
Start: 2020-01-10 | End: 2020-01-16

## 2020-01-10 RX ORDER — VANCOMYCIN HCL 1 G
1000 VIAL (EA) INTRAVENOUS ONCE
Refills: 0 | Status: DISCONTINUED | OUTPATIENT
Start: 2020-01-10 | End: 2020-01-10

## 2020-01-10 RX ADMIN — Medication 166.67 MILLIGRAM(S): at 22:54

## 2020-01-10 RX ADMIN — CEFTRIAXONE 1000 MILLIGRAM(S): 500 INJECTION, POWDER, FOR SOLUTION INTRAMUSCULAR; INTRAVENOUS at 22:50

## 2020-01-10 NOTE — H&P ADULT - NSHPSOCIALHISTORY_GEN_ALL_CORE
Former smoker with occasional smoking history during time in the .  Drinks a beer 2-3 times per week. No drug use.  Lives with his wife at home.

## 2020-01-10 NOTE — ED ADULT TRIAGE NOTE - CHIEF COMPLAINT QUOTE
Sent in by MD Bustillos, recently had right ankle surgery. Now complaining od swelling redness and pain to right ankle.

## 2020-01-10 NOTE — CONSULT NOTE ADULT - ASSESSMENT
83 yo male with RLE cellulitis/Wound complications s/p Ankle ORIF 10/19    - Admit to medicine for IV abx  - ID consult  - Will need to monitor wound to confirm plate will not be exposed  - FU ESR/CRP  - FU Blood cultures  - WBAT RLE  - Pain control  - Wound care c/s, possible plastic consult, will d/w Dr Bustillos in AM  - Will d/w Dr Bustillos regarding possible surgical EMILY, I&D, no planned orthopedic sx at this time  - Will keep NPO after midnight in case OR is recommended for Tomorrow.   - Please document medical clearance for possibility of OR

## 2020-01-10 NOTE — H&P ADULT - NSHPREVIEWOFSYSTEMS_GEN_ALL_CORE
Gen: negative for fevers, chills, weight loss, weight gain, malaise, fatigue  Eyes: no blurred vision or lacrimation  ENT: no tinnitus, vertigo, or decreased hearing  Resp: no wheezing, dyspnea, pleuritic chest pain, hemoptysis, or orthopnea  CV: no chest pain, dyspnea on exertion, or palpitations  GI: no nausea, vomiting, abdominal pain, diarrhea, constipation, melena, or hematochezia  : + urinary retention. No dysuria, hematuria, discharge, or incontinence  MSK: + arthralgias, joint swelling  Neuro: no focal deficits, confusion, weakness, dizziness, tremors, or seizures  Skin: + rash, lesions, edema

## 2020-01-10 NOTE — CONSULT NOTE ADULT - ATTENDING COMMENTS
Pt seen and examined. Wife at bedside.  RBA of continued antibiotics discussed.  Possible metal allergy?  CT reviewed showing signs of union. May need hardware removed if wound deteriorates.  Patient and wife understood.  Abx via ID appreciated.

## 2020-01-10 NOTE — H&P ADULT - NSHPLABSRESULTS_GEN_ALL_CORE
Labs personally reviewed and interpreted. Notable for no leukocytosis (WBC 7.84) without left shift. Hb stable at 11.8, plts 433. INR 1.15. ESR elevated at 66 but decreased from 90 one week prior. Electrolytes wnl. HCO3 28. BUN/creatinine elevated at 28/0.95 (baseline creatinine 0.65). Alk phos slightly elevated at 127 but improved from 200 one week prior. AST and ALT normal. Lactate 1.4.    Right ankle XR personally reviewed and interpreted. Notable for no obvious osseous abnormalities, no obvious soft tissue swelling.  CT right ankle pending formal review.    EKG pending. Labs personally reviewed and interpreted. Notable for no leukocytosis (WBC 7.84) without left shift. Hb stable at 11.8, plts 433. INR 1.15. ESR elevated at 66 but decreased from 90 one week prior. Electrolytes wnl. HCO3 28. BUN/creatinine elevated at 28/0.95 (baseline creatinine 0.65). Alk phos slightly elevated at 127 but improved from 200 one week prior. AST and ALT normal. Lactate 1.4.    Right ankle XR personally reviewed and interpreted. Notable for no obvious osseous abnormalities, no obvious soft tissue swelling.  CT right ankle pending formal review.    EKG pending for AM. Labs personally reviewed and interpreted. Notable for no leukocytosis (WBC 7.84) without left shift. Hb stable at 11.8, plts 433. INR 1.15. ESR elevated at 66 but decreased from 90 one week prior. Electrolytes wnl. HCO3 28. BUN/creatinine elevated at 28/0.95 (baseline creatinine 0.65). Alk phos slightly elevated at 127 but improved from 200 one week prior. AST and ALT normal. Lactate 1.4.    Right ankle XR personally reviewed and interpreted. Notable for no obvious osseous abnormalities, no obvious soft tissue swelling.  CT right ankle pending formal review.    EKG personally reviewed and interpreted. Sinus arrythmia with normal axis. Rate 55, , QTc 436. Q waves in lead III (unchanged from previous) and slight Q waves in lead aVF. 1/2 mm intermittent ST depressions in lead II and III that are unchanged from previous. No ST elevations.

## 2020-01-10 NOTE — ED STATDOCS - ENMT, MLM
Nasal mucosa clear.  Mouth with normal mucosa  Throat has no vesicles, no oropharyngeal exudates and uvula is midline.
Coronary artery disease involving native coronary artery of native heart, angina presence unspecified    HTN (hypertension)  non compliant with meds  RHETT treated with BiPAP    Pure hypercholesterolemia    Uncomplicated asthma, unspecified asthma severity

## 2020-01-10 NOTE — H&P ADULT - HISTORY OF PRESENT ILLNESS
83 yo M with a PMH HTN, HLD, and BPH who presents with RLE pain and swelling. He was seen in the ED on 1/1/20 for RLE cellulitis. He received 7 days of Bactrim and has taken 10/15 days of Keflex, without improvement.    Of note, he had a right leg ORIF on 10/12/19.    In the ED, he was given Vancomycin 1250 mg IV x1 and Ceftriaxone 1g IV x1. 85 yo M with a PMH HTN, HLD, and BPH who presents with RLE pain and swelling. He was seen in the ED on 1/1/20 for RLE cellulitis. He thinks the cellulitis started a couple of weeks ago, when he noted pain, swelling, and some flakiness. He received 7 days of Bactrim and has taken 10 out of 15 days of Keflex. He thinks it improved initially before during the last 3-4 days, his swelling and pain have worsened. He is able to walk, although much more gingerly. He has had skin flakiness peeling off the front side, and he noted it was more red. He spoke to his orthopedic surgeon, who recommended he come for IV antibiotics. He denies fevers, chills, nausea, vomiting, CP, SOB, or diarrhea. He has a catheter (was in the ED for urinary retention several days ago).  Of note, he had a right leg ORIF on 10/12/19.    In the ED, he was given Vancomycin 1250 mg IV x1 and Ceftriaxone 1g IV x1. 83 yo M with a PMH HTN, HLD, and BPH who presents with RLE pain and swelling. He was seen in the ED on 1/1/20 for RLE cellulitis. He thinks the cellulitis started a couple of weeks ago, when he noted pain, swelling, and some flakiness. He received 7 days of Bactrim and has taken 10 out of 15 days of Keflex. He thinks it improved initially before during the last 3-4 days, his swelling and pain have worsened. He is able to walk, although much more gingerly. He has had skin flakiness peeling off the front side, and he noted it was more red. He spoke to his orthopedic surgeon, who recommended he come for IV antibiotics. He denies fevers, chills, nausea, vomiting, CP, SOB, or diarrhea. He has a catheter (was in the ED for urinary retention several days ago).  Of note, he had a right leg ORIF on 10/12/19.    In the ED, he was given Vancomycin 1250 mg IV x1 and Ceftriaxone 1g IV x1. He was seen by orthopedic surgery.

## 2020-01-10 NOTE — ED ADULT NURSE NOTE - OBJECTIVE STATEMENT
· HPI Objective Statement: 83 y/o male with a PMHx of HTN, HLD presents to the ED c/o right lower leg pain, swelling and redness. +fatigue. Denies  fever, chills. Pt was seen in 1/1/2020 diagnosed with cellulitis, was taking abx but states it is not improving. Sent in by Dr. Bustillos for admission. Pt had surgery to right ankle in October by Dr. Bustillos. Denies recent trauma, lacerations, abrasions to leg. Not currently on anticoagulants, was on Lovenox until early December.

## 2020-01-10 NOTE — ED STATDOCS - PROGRESS NOTE DETAILS
83 y/o male with a PMHx of HTN, HLD presents to the ED c/o right lower leg pain, swelling and redness. +fatigue. Denies  fever, chills. Pt was seen in 1/1/2020 diagnosed with cellulitis, was taking abx but states it is not improving. Sent in by Dr. Bustillos for admission. Pt had surgery to right ankle in October by Dr. Bustillos. Denies recent trauma, lacerations, abrasions to leg. Not currently on anticoagulants, was on Lovenox until early December. PE: R lower Ext: +RLE edema and erythema. superficial skin breakdown lateral. No discharge. (+) dorsal pedis pulse plan: consult ortho, ivfs, labs, and reeval poss admission. -Fabiana Chun PA-C spoke to hospitalist will admit for antibotics and further workup. -Fabiana Chun PA-C

## 2020-01-10 NOTE — CONSULT NOTE ADULT - SUBJECTIVE AND OBJECTIVE BOX
83 yo male s/p Right ankle ORIF with Dr Bustillos on 10/19/19 presents to  ED c/o right ankle pain/swelling and cellulitis.  Patient has had persistent cellulitis and clearance since the surgery.  Pt was seen in office by Dr Bustillos 2 days ago where pt was c/o LE edema, redness and pain along the ankle, but wound was improving from previous visits, told to come to ER if worsened.  Pt states today that when he stood his leg was red up to knee, had pain in heel/ankle area so decided to come to ER.  Pt has been ambulating, states pain is acutely worsening when he walks, pain is located in heel/ankle area, denies any fevers/chills, denies any SOB, denies any recent trauma or falls.     PAST MEDICAL & SURGICAL HISTORY:  HLD (hyperlipidemia)  Enlarged prostate  HTN (hypertension)      Vital Signs Last 24 Hrs  T(C): 36.8 (10 Jerry 2020 21:04), Max: 36.8 (10 Jerry 2020 21:04)  T(F): 98.3 (10 Jerry 2020 21:04), Max: 98.3 (10 Jerry 2020 21:04)  HR: 73 (10 Jerry 2020 21:04) (73 - 73)  BP: 148/57 (10 Jerry 2020 21:04) (148/57 - 148/57)  BP(mean): --  RR: 17 (10 Jerry 2020 21:04) (17 - 17)  SpO2: 97% (10 Jerry 2020 21:04) (97% - 97%)    PE: Gen: NAD, resting comfortably in chair  RLE: Dry/scabbed skin, to small 5mm areas of granulation tissue, no expressible fluid.  minimal to no erythema around LE, some if any erythema around ankle, pitting edema up to calf and down to foot  TTP around distal lateral fibula and medial mal, mainly bony tenderness  no palpable fluid collection appreciated  no pain with ROM of ankle  +EHL/FHL/TA/GSC  SILT globally  no calf TTP  compartments soft  +2 DP pulse with brisk cap refill

## 2020-01-10 NOTE — ED STATDOCS - OBJECTIVE STATEMENT
83 y/o male with a PMHx of HTN, HLD presents to the ED c/o right lower leg pain, swelling and redness. +fatigue. Denies  fever, chills. Pt was seen in 1/1/2020 diagnosed with cellulitis, was taking abx but states it is not improving. Sent in by Dr. Bustillos for admission. Pt had surgery to right ankle in October by Dr. Bustillos. Denies recent trauma, lacerations, abrasions to leg. Not currently on anticoagulants, was on Lovenox until early December.

## 2020-01-10 NOTE — H&P ADULT - ASSESSMENT
83 yo M with a PMH HTN, HLD, and BPH who presents with RLE pain and swelling after recent right ankle surgery, found to have a RLE cellulitis. 83 yo M with a PMH HTN, HLD, and BPH who presents with RLE pain and swelling after recent right ankle surgery, found to have a RLE cellulitis.    1) Cellulitis of right lower extremity  - Post-surgical  - Failed outpatient PO antibiotics  - No obvious evidence of purulence  - Given Vanc/Cefepime in ED, would continue with both  -   - F/u blood cultures    2) Acute Kidney Injury  - Likely prerenal due to dehydration with infection  - Giving IVFs overnight  - Recheck creatinine in AM  - Patient not urinating, has urinary catheter, would keep for now in light of possible OR tomorrow but will eventually need trial of void 83 yo M with a PMH HTN, HLD, and BPH who presents with RLE pain and swelling after recent right ankle surgery, found to have a RLE cellulitis.    1) Cellulitis of right lower extremity  - Post-surgical  - Failed outpatient PO antibiotics  - No obvious evidence of purulence  - Given Vanc/Cefepime in ED, would continue with both  - Patient's ESR is elevated at 66, but decreased from 90, and in addition does not meet sepsis criteria, is able to walk, and no obvious abnormalities on XR, so less concern for osteomyelitis. However, patient is post-surgical, and will f/u CT (performed without contrast) first to determine further course of action  - ID consult  - F/u blood cultures  - F/u ortho to determine if need to repair surgical hardware, NPO after midnight    2) Acute Kidney Injury  - Likely prerenal due to dehydration with infection  - Giving IVFs overnight  - Recheck creatinine in AM  - Patient not urinating, has urinary catheter, would keep for now in light of possible OR tomorrow but will eventually need trial of void    3) BPH  - C/w Finasteride and Flomax    4) HTN  - C/w Amlodipine 10 mg QD  - Can continue with Losartan 100 mg QD, as expecting NIVIA/renal function to improve with IVFs  - Avoid diuretics while giving IVFs    5) HLD  - Pravastatin not on formulary and patient has intolerance to Atorvastatin, will have to get home Pravastatin if in the hospital for an extended period    6) Prophylactic measure  - DVT PPX: IMPROVE score of 1, low risk, but will give IPCs  - Diet: NPO in case of possible procedure  - Dispo: pending ortho and infectious workup

## 2020-01-10 NOTE — H&P ADULT - NSHPPHYSICALEXAM_GEN_ALL_CORE
Vital Signs Last 24 Hrs  T(C): 36.8 (10 Jerry 2020 21:54), Max: 36.8 (10 Jerry 2020 21:04)  T(F): 98.2 (10 Jerry 2020 21:54), Max: 98.3 (10 Jerry 2020 21:04)  HR: 68 (10 Jerry 2020 21:54) (68 - 73)  BP: 139/69 (10 Jerry 2020 21:54) (139/69 - 148/57)  BP(mean): --  RR: 17 (10 Jerry 2020 21:54) (17 - 17)  SpO2: 96% (10 Jerry 2020 21:54) (96% - 97%)    GENERAL: No acute distress  HEENT: PERRL, EOMI, MM dry, no oropharyngeal lesions  NECK: supple, no stiffness, no JVD, no thyromegaly  PULM: respirations non-labored, clear to auscultation bilaterally, no rales, rhonchi, or wheezes  CV: regular rate and rhythm, II/VI LUSB systolic murmur. No gallops or rubs  GI: abdomen soft, nontender, nondistended, no masses felt, normal bowel sounds  MSK: strength 5/5 bilateral upper/lower extremities. Full ROM of ankles. Swelling and erythema with mild warmth at right ankle and about 1/3 up the leg. Minimal anterolateral TTP.  LYMPH: no anterior cervical, posterior cervical, supraclavicular, or inguinal lymphadenopathy  NEURO: A&Ox3, no tremors, sensation intact  SKIN: Large desquamating lesion in the right anterolateral leg just above the ankle, with 1+ pitting edema and mild erythema 1/3 up the leg

## 2020-01-11 DIAGNOSIS — Z98.890 OTHER SPECIFIED POSTPROCEDURAL STATES: Chronic | ICD-10-CM

## 2020-01-11 LAB
ANION GAP SERPL CALC-SCNC: 5 MMOL/L — SIGNIFICANT CHANGE UP (ref 5–17)
APTT BLD: 32 SEC — SIGNIFICANT CHANGE UP (ref 27.5–36.3)
BASOPHILS # BLD AUTO: 0.08 K/UL — SIGNIFICANT CHANGE UP (ref 0–0.2)
BASOPHILS NFR BLD AUTO: 1.2 % — SIGNIFICANT CHANGE UP (ref 0–2)
BUN SERPL-MCNC: 23 MG/DL — SIGNIFICANT CHANGE UP (ref 7–23)
CALCIUM SERPL-MCNC: 8.6 MG/DL — SIGNIFICANT CHANGE UP (ref 8.5–10.1)
CHLORIDE SERPL-SCNC: 109 MMOL/L — HIGH (ref 96–108)
CO2 SERPL-SCNC: 27 MMOL/L — SIGNIFICANT CHANGE UP (ref 22–31)
CREAT SERPL-MCNC: 0.68 MG/DL — SIGNIFICANT CHANGE UP (ref 0.5–1.3)
CRP SERPL-MCNC: 2.51 MG/DL — HIGH (ref 0–0.4)
EOSINOPHIL # BLD AUTO: 0.33 K/UL — SIGNIFICANT CHANGE UP (ref 0–0.5)
EOSINOPHIL NFR BLD AUTO: 5 % — SIGNIFICANT CHANGE UP (ref 0–6)
GLUCOSE SERPL-MCNC: 104 MG/DL — HIGH (ref 70–99)
HCT VFR BLD CALC: 31.9 % — LOW (ref 39–50)
HGB BLD-MCNC: 10.7 G/DL — LOW (ref 13–17)
IMM GRANULOCYTES NFR BLD AUTO: 1.4 % — SIGNIFICANT CHANGE UP (ref 0–1.5)
INR BLD: 1.22 RATIO — HIGH (ref 0.88–1.16)
LYMPHOCYTES # BLD AUTO: 1.7 K/UL — SIGNIFICANT CHANGE UP (ref 1–3.3)
LYMPHOCYTES # BLD AUTO: 25.6 % — SIGNIFICANT CHANGE UP (ref 13–44)
MAGNESIUM SERPL-MCNC: 1.8 MG/DL — SIGNIFICANT CHANGE UP (ref 1.6–2.6)
MCHC RBC-ENTMCNC: 31.3 PG — SIGNIFICANT CHANGE UP (ref 27–34)
MCHC RBC-ENTMCNC: 33.5 GM/DL — SIGNIFICANT CHANGE UP (ref 32–36)
MCV RBC AUTO: 93.3 FL — SIGNIFICANT CHANGE UP (ref 80–100)
MONOCYTES # BLD AUTO: 0.69 K/UL — SIGNIFICANT CHANGE UP (ref 0–0.9)
MONOCYTES NFR BLD AUTO: 10.4 % — SIGNIFICANT CHANGE UP (ref 2–14)
NEUTROPHILS # BLD AUTO: 3.76 K/UL — SIGNIFICANT CHANGE UP (ref 1.8–7.4)
NEUTROPHILS NFR BLD AUTO: 56.4 % — SIGNIFICANT CHANGE UP (ref 43–77)
PHOSPHATE SERPL-MCNC: 2.8 MG/DL — SIGNIFICANT CHANGE UP (ref 2.5–4.5)
PLATELET # BLD AUTO: 375 K/UL — SIGNIFICANT CHANGE UP (ref 150–400)
POTASSIUM SERPL-MCNC: 3.4 MMOL/L — LOW (ref 3.5–5.3)
POTASSIUM SERPL-SCNC: 3.4 MMOL/L — LOW (ref 3.5–5.3)
PROTHROM AB SERPL-ACNC: 13.6 SEC — HIGH (ref 10–12.9)
RBC # BLD: 3.42 M/UL — LOW (ref 4.2–5.8)
RBC # FLD: 12.4 % — SIGNIFICANT CHANGE UP (ref 10.3–14.5)
SODIUM SERPL-SCNC: 141 MMOL/L — SIGNIFICANT CHANGE UP (ref 135–145)
WBC # BLD: 6.65 K/UL — SIGNIFICANT CHANGE UP (ref 3.8–10.5)
WBC # FLD AUTO: 6.65 K/UL — SIGNIFICANT CHANGE UP (ref 3.8–10.5)

## 2020-01-11 PROCEDURE — 93010 ELECTROCARDIOGRAM REPORT: CPT

## 2020-01-11 RX ORDER — LOSARTAN POTASSIUM 100 MG/1
100 TABLET, FILM COATED ORAL DAILY
Refills: 0 | Status: DISCONTINUED | OUTPATIENT
Start: 2020-01-11 | End: 2020-01-16

## 2020-01-11 RX ORDER — POTASSIUM CHLORIDE 20 MEQ
40 PACKET (EA) ORAL ONCE
Refills: 0 | Status: COMPLETED | OUTPATIENT
Start: 2020-01-11 | End: 2020-01-11

## 2020-01-11 RX ORDER — TAMSULOSIN HYDROCHLORIDE 0.4 MG/1
0.4 CAPSULE ORAL
Refills: 0 | Status: DISCONTINUED | OUTPATIENT
Start: 2020-01-11 | End: 2020-01-16

## 2020-01-11 RX ORDER — AMLODIPINE BESYLATE 2.5 MG/1
10 TABLET ORAL DAILY
Refills: 0 | Status: DISCONTINUED | OUTPATIENT
Start: 2020-01-11 | End: 2020-01-16

## 2020-01-11 RX ORDER — VANCOMYCIN HCL 1 G
750 VIAL (EA) INTRAVENOUS EVERY 12 HOURS
Refills: 0 | Status: DISCONTINUED | OUTPATIENT
Start: 2020-01-11 | End: 2020-01-16

## 2020-01-11 RX ORDER — VANCOMYCIN HCL 1 G
1000 VIAL (EA) INTRAVENOUS EVERY 12 HOURS
Refills: 0 | Status: DISCONTINUED | OUTPATIENT
Start: 2020-01-11 | End: 2020-01-11

## 2020-01-11 RX ORDER — CEFTRIAXONE 500 MG/1
2000 INJECTION, POWDER, FOR SOLUTION INTRAMUSCULAR; INTRAVENOUS EVERY 24 HOURS
Refills: 0 | Status: DISCONTINUED | OUTPATIENT
Start: 2020-01-11 | End: 2020-01-16

## 2020-01-11 RX ORDER — FINASTERIDE 5 MG/1
5 TABLET, FILM COATED ORAL DAILY
Refills: 0 | Status: DISCONTINUED | OUTPATIENT
Start: 2020-01-11 | End: 2020-01-16

## 2020-01-11 RX ORDER — CEFTRIAXONE 500 MG/1
1000 INJECTION, POWDER, FOR SOLUTION INTRAMUSCULAR; INTRAVENOUS EVERY 24 HOURS
Refills: 0 | Status: DISCONTINUED | OUTPATIENT
Start: 2020-01-11 | End: 2020-01-11

## 2020-01-11 RX ORDER — SODIUM CHLORIDE 9 MG/ML
1000 INJECTION, SOLUTION INTRAVENOUS
Refills: 0 | Status: DISCONTINUED | OUTPATIENT
Start: 2020-01-11 | End: 2020-01-16

## 2020-01-11 RX ORDER — CHOLECALCIFEROL (VITAMIN D3) 125 MCG
2000 CAPSULE ORAL DAILY
Refills: 0 | Status: DISCONTINUED | OUTPATIENT
Start: 2020-01-11 | End: 2020-01-16

## 2020-01-11 RX ADMIN — TAMSULOSIN HYDROCHLORIDE 0.4 MILLIGRAM(S): 0.4 CAPSULE ORAL at 17:06

## 2020-01-11 RX ADMIN — TAMSULOSIN HYDROCHLORIDE 0.4 MILLIGRAM(S): 0.4 CAPSULE ORAL at 06:26

## 2020-01-11 RX ADMIN — SODIUM CHLORIDE 75 MILLILITER(S): 9 INJECTION, SOLUTION INTRAVENOUS at 17:01

## 2020-01-11 RX ADMIN — CEFTRIAXONE 2000 MILLIGRAM(S): 500 INJECTION, POWDER, FOR SOLUTION INTRAMUSCULAR; INTRAVENOUS at 17:51

## 2020-01-11 RX ADMIN — AMLODIPINE BESYLATE 10 MILLIGRAM(S): 2.5 TABLET ORAL at 06:26

## 2020-01-11 RX ADMIN — FINASTERIDE 5 MILLIGRAM(S): 5 TABLET, FILM COATED ORAL at 11:11

## 2020-01-11 RX ADMIN — LOSARTAN POTASSIUM 100 MILLIGRAM(S): 100 TABLET, FILM COATED ORAL at 06:26

## 2020-01-11 RX ADMIN — Medication 2000 UNIT(S): at 11:11

## 2020-01-11 RX ADMIN — Medication 250 MILLIGRAM(S): at 23:17

## 2020-01-11 RX ADMIN — Medication 40 MILLIEQUIVALENT(S): at 11:11

## 2020-01-11 RX ADMIN — Medication 250 MILLIGRAM(S): at 11:11

## 2020-01-11 RX ADMIN — SODIUM CHLORIDE 75 MILLILITER(S): 9 INJECTION, SOLUTION INTRAVENOUS at 02:46

## 2020-01-11 NOTE — PROGRESS NOTE ADULT - SUBJECTIVE AND OBJECTIVE BOX
History of Present Illness: 	  85 yo M with a PMH HTN, HLD, and BPH who presents with RLE pain and swelling. He was seen in the ED on 1/1/20 for RLE cellulitis. He thinks the cellulitis started a couple of weeks ago, when he noted pain, swelling, and some flakiness. He received 7 days of Bactrim and has taken 10 out of 15 days of Keflex. He thinks it improved initially before during the last 3-4 days, his swelling and pain have worsened. He is able to walk, although much more gingerly. He has had skin flakiness peeling off the front side, and he noted it was more red. He spoke to his orthopedic surgeon, who recommended he come for IV antibiotics. He denies fevers, chills, nausea, vomiting, CP, SOB, or diarrhea. He has a catheter (was in the ED for urinary retention several days ago).  Of note, he had a right leg ORIF on 10/12/19.    In the ED, he was given Vancomycin 1250 mg IV x1 and Ceftriaxone 1g IV x1. He was seen by orthopedic surgery.            REVIEW OF SYSTEMS:    CONSTITUTIONAL: No weakness, No fevers or chills  ENT: No ear ache, No sorethroat  NECK: No pain, No stiffness  RESPIRATORY: No cough, No wheezing, No hemoptysis; No dyspnea  CARDIOVASCULAR: No chest pain, No palpitations  GASTROINTESTINAL: No abd pain, No nausea, No vomiting, No hematemesis, No diarrhea or constipation. No melena, No hematochezia.  GENITOURINARY: No dysuria, No  hematuria  NEUROLOGICAL: No diplopia, No paresthesia, No motor dysfunction  MUSCULOSKELETAL: No arthralgia, No myalgia  SKIN: No rashes, or lesions   PSYCH: no anxiety, no suicidal ideation    All other review of systems is negative unless indicated above    Vital Signs Last 24 Hrs  T(C): 36.4 (11 Jan 2020 11:33), Max: 36.9 (11 Jan 2020 02:21)  T(F): 97.6 (11 Jan 2020 11:33), Max: 98.4 (11 Jan 2020 02:21)  HR: 59 (11 Jan 2020 11:33) (59 - 73)  BP: 129/53 (11 Jan 2020 11:33) (128/50 - 148/57)  BP(mean): --  RR: 17 (11 Jan 2020 11:33) (17 - 17)  SpO2: 100% (11 Jan 2020 11:33) (96% - 100%)    PHYSICAL EXAM:    GENERAL: NAD, Well nourished  HEENT:  NC/AT, EOMI, PERRLA, No scleral icterus, Moist mucous membranes  NECK: Supple, No JVD  CNS:  Alert & Oriented X3, Motor Strength 5/5 B/L upper and lower extremities; DTRs 2+ intact   LUNG: Normal Breath sounds, Clear to auscultation bilaterally, No rales, No rhonchi, No wheezing  HEART: RRR; No murmurs, No rubs  ABDOMEN: +BS, ST/ND/NT  GENITOURINARY: Voiding, Bladder not distended  EXTREMITIES:  2+ Peripheral Pulses, No clubbing, No cyanosis, No tibial edema  MUSCULOSKELTAL: Joints normal ROM, No TTP, No effusion  VAGINAL: deferred  SKIN: Rt leg erythema present, mild edema, flaky skin  RECTAL: deferred, not indicated  BREAST: deferred                          10.7   6.65  )-----------( 375      ( 11 Jan 2020 06:21 )             31.9     01-11    141  |  109<H>  |  23  ----------------------------<  104<H>  3.4<L>   |  27  |  0.68    Ca    8.6      11 Jan 2020 06:21  Phos  2.8     01-11  Mg     1.8     01-11    TPro  7.2  /  Alb  3.2<L>  /  TBili  0.3  /  DBili  x   /  AST  23  /  ALT  38  /  AlkPhos  127<H>  01-10    Vancomycin levels:   Cultures:     MEDICATIONS  (STANDING):  amLODIPine   Tablet 10 milliGRAM(s) Oral daily  cefTRIAXone Injectable. 2000 milliGRAM(s) IV Push every 24 hours  cholecalciferol 2000 Unit(s) Oral daily  finasteride 5 milliGRAM(s) Oral daily  lactated ringers. 1000 milliLiter(s) (75 mL/Hr) IV Continuous <Continuous>  losartan 100 milliGRAM(s) Oral daily  tamsulosin 0.4 milliGRAM(s) Oral two times a day  vancomycin  IVPB 750 milliGRAM(s) IV Intermittent every 12 hours    MEDICATIONS  (PRN):  acetaminophen   Tablet .. 650 milliGRAM(s) Oral every 6 hours PRN Mild Pain (1 - 3)      all labs reviewed  all imaging reviewed    1. RLE cellulitis:  failed oral abx  s/p ankle ORIF  Ortho eval underway  Ceftriaxone/vancomycin IV    2. Urinary retention:  cw Brewer

## 2020-01-11 NOTE — CONSULT NOTE ADULT - ASSESSMENT
85 y/o M with h/o HTN, HLD, and BPH was admitted on 1/10 for RLE pain and swelling. He thinks the cellulitis started a couple of weeks ago, when he noted pain, swelling, and some flakiness. He received 7 days of Bactrim and has taken 10 out of 15 days of Keflex. He thinks it improved initially before during the last 3-4 days, then his swelling and pain have worsened. He is able to walk, although much more gingerly. He has had skin flakiness peeling off the front side, and he noted it was more red. He spoke to his orthopedic surgeon, who recommended he come for IV antibiotics. He denies fevers, chills. In the ED, he was given Vancomycin 1250 mg IV x1 and Ceftriaxone 1g IV x1.    1. RLE cellulitis.  -obtain BC x 2  -start vancomycin 760 mg IV q12h and ceftriaxone 1 gm IV qd  -reason for abx use and side effects reviewed with patient; monitor BMP and vancomycin trough levels   -elevate leg  -old chart reviewed to assess prior cultures  -monitor temps  -f/u CBC  -supportive care  2. Other issues:   -care per medicine 85 y/o M with h/o HTN, HLD, and BPH, recent right ankle fracture (Oct 10, 2019) s/p ORIF was admitted on 1/10 for RLE pain and swelling. He thinks the cellulitis started a couple of weeks ago, when he noted pain, swelling, and some flakiness. He received 7 days of Bactrim and has taken 10 out of 15 days of Keflex. He thinks it improved initially before during the last 3-4 days, then his swelling and pain have worsened. He is able to walk, although much more gingerly. He has had skin flakiness peeling off the front side, and he noted it was more red. He spoke to his orthopedic surgeon, who recommended he come for IV antibiotics. He denies fevers, chills. In the ED, he was given Vancomycin 1250 mg IV x1 and Ceftriaxone 1g IV x1.    1. RLE cellulitis. Concerned about possible underlying acute OM and/or ankle hardware infection. s/p reecnt ankle fracture s/p ORIF. BPH with chronic chowdary.  -obtain BC x 2  -start vancomycin 760 mg IV q12h and ceftriaxone 1 gm IV qd  -reason for abx use and side effects reviewed with patient; monitor BMP and vancomycin trough levels   -elevate leg  -old chart reviewed to assess prior cultures  -monitor temps  -f/u CBC  -supportive care  2. Other issues:   -care per medicine

## 2020-01-11 NOTE — CONSULT NOTE ADULT - SUBJECTIVE AND OBJECTIVE BOX
Patient is a 84y old  Male who presents with a chief complaint of RLE cellulitis    HPI:  83 y/o M with h/o HTN, HLD, and BPH was admitted on 1/10 for RLE pain and swelling. He thinks the cellulitis started a couple of weeks ago, when he noted pain, swelling, and some flakiness. He received 7 days of Bactrim and has taken 10 out of 15 days of Keflex. He thinks it improved initially before during the last 3-4 days, then his swelling and pain have worsened. He is able to walk, although much more gingerly. He has had skin flakiness peeling off the front side, and he noted it was more red. He spoke to his orthopedic surgeon, who recommended he come for IV antibiotics. He denies fevers, chills. In the ED, he was given Vancomycin 1250 mg IV x1 and Ceftriaxone 1g IV x1.    PMH: as above  PSH: as above  Meds: per reconciliation sheet, noted below  MEDICATIONS  (STANDING):  amLODIPine   Tablet 10 milliGRAM(s) Oral daily  cefTRIAXone Injectable. 1000 milliGRAM(s) IV Push every 24 hours  cholecalciferol 2000 Unit(s) Oral daily  finasteride 5 milliGRAM(s) Oral daily  lactated ringers. 1000 milliLiter(s) (75 mL/Hr) IV Continuous <Continuous>  losartan 100 milliGRAM(s) Oral daily  tamsulosin 0.4 milliGRAM(s) Oral two times a day  vancomycin  IVPB 1000 milliGRAM(s) IV Intermittent every 12 hours    MEDICATIONS  (PRN):  acetaminophen   Tablet .. 650 milliGRAM(s) Oral every 6 hours PRN Mild Pain (1 - 3)    Allergies    No Known Allergies    Intolerances    Lipitor (Joint Pain)    Social: no smoking, no alcohol, no illegal drugs; no recent travel, no exposure to TB  FAMILY HISTORY:  No pertinent family history in first degree relatives    no history of premature cardiovascular disease in first degree relatives    ROS: the patient denies fever, no chills, no HA, no seizures, no dizziness, no sore throat, no nasal congestion, no blurry vision, no CP, no palpitations, no SOB, no cough, no abdominal pain, no diarrhea, no N/V, no dysuria, has right leg pain, no claudication, has right lower leg rash, no joint aches, no rectal pain or bleeding, no night sweats  All other systems reviewed and are negative    Vital Signs Last 24 Hrs  T(C): 36.4 (2020 11:33), Max: 36.9 (2020 02:21)  T(F): 97.6 (:), Max: 98.4 (2020 02:21)  HR: 59 (:) (59 - 73)  BP: 129/53 (:) (128/50 - 148/57)  BP(mean): --  RR: 17 (:) (17 - 17)  SpO2: 100% (:) (96% - 100%)  Daily Height in cm: 182.88 (10 Jerry 2020 20:11)    Daily Weight in k.4 (2020 02:21)    PE:    Constitutional: frail looking  HEENT: NC/AT, EOMI, PERRLA, conjunctivae clear; ears and nose atraumatic; pharynx benign  Neck: supple; thyroid not palpable  Back: no tenderness  Respiratory: respiratory effort normal; clear to auscultation  Cardiovascular: S1S2 regular, no murmurs  Abdomen: soft, not tender, not distended, positive BS; no liver or spleen organomegaly  Genitourinary: no suprapubic tenderness  Lymphatic: no LN palpable  Musculoskeletal: no muscle tenderness, no joint swelling or tenderness  Extremities: no pedal edema  RLE erythema, edema, warmth and tenderness  Neurological/ Psychiatric: AxOx3, judgement and insight normal; moving all extremities  Skin: no rashes; no palpable lesions    Labs: all available labs reviewed                        10.7   6.65  )-----------( 375      ( 2020 06:21 )             31.9     01-11    141  |  109<H>  |  23  ----------------------------<  104<H>  3.4<L>   |  27  |  0.68    Ca    8.6      2020 06:21  Phos  2.8     -11  Mg     1.8     -11    TPro  7.2  /  Alb  3.2<L>  /  TBili  0.3  /  DBili  x   /  AST  23  /  ALT  38  /  AlkPhos  127<H>  01-10     LIVER FUNCTIONS - ( 10 Jerry 2020 21:47 )  Alb: 3.2 g/dL / Pro: 7.2 gm/dL / ALK PHOS: 127 U/L / ALT: 38 U/L / AST: 23 U/L / GGT: x             Radiology: all available radiological tests reviewed    < from: CT Ankle No Cont, Right (01.10.20 @ 23:42) >  Surgical hardware with healing distal fibular fracture.  Diffuse subcutaneous edema without focal fluid collection.    < end of copied text >      Advanced directives addressed: full resuscitation Patient is a 84y old  Male who presents with a chief complaint of RLE cellulitis    HPI:  83 y/o M with h/o HTN, HLD, and BPH, recent right ankle fracture (Oct 10, 2019) s/p ORIF was admitted on 1/10 for RLE pain and swelling. He thinks the cellulitis started a couple of weeks ago, when he noted pain, swelling, and some flakiness. He received 7 days of Bactrim and has taken 10 out of 15 days of Keflex. He thinks it improved initially before during the last 3-4 days, then his swelling and pain have worsened. He is able to walk, although much more gingerly. He has had skin flakiness peeling off the front side, and he noted it was more red. He spoke to his orthopedic surgeon, who recommended he come for IV antibiotics. He denies fevers, chills. In the ED, he was given Vancomycin 1250 mg IV x1 and Ceftriaxone 1g IV x1.    PMH: as above  PSH: as above  Meds: per reconciliation sheet, noted below  MEDICATIONS  (STANDING):  amLODIPine   Tablet 10 milliGRAM(s) Oral daily  cefTRIAXone Injectable. 1000 milliGRAM(s) IV Push every 24 hours  cholecalciferol 2000 Unit(s) Oral daily  finasteride 5 milliGRAM(s) Oral daily  lactated ringers. 1000 milliLiter(s) (75 mL/Hr) IV Continuous <Continuous>  losartan 100 milliGRAM(s) Oral daily  tamsulosin 0.4 milliGRAM(s) Oral two times a day  vancomycin  IVPB 1000 milliGRAM(s) IV Intermittent every 12 hours    MEDICATIONS  (PRN):  acetaminophen   Tablet .. 650 milliGRAM(s) Oral every 6 hours PRN Mild Pain (1 - 3)    Allergies    No Known Allergies    Intolerances    Lipitor (Joint Pain)    Social: no smoking, no alcohol, no illegal drugs; no recent travel, no exposure to TB  FAMILY HISTORY:  No pertinent family history in first degree relatives    no history of premature cardiovascular disease in first degree relatives    ROS: the patient denies fever, no chills, no HA, no seizures, no dizziness, no sore throat, no nasal congestion, no blurry vision, no CP, no palpitations, no SOB, no cough, no abdominal pain, no diarrhea, no N/V, no dysuria, has right leg pain, no claudication, has right lower leg rash, no joint aches, no rectal pain or bleeding, no night sweats  All other systems reviewed and are negative    Vital Signs Last 24 Hrs  T(C): 36.4 (2020 11:33), Max: 36.9 (2020 02:21)  T(F): 97.6 (:33), Max: 98.4 (2020 02:21)  HR: 59 (:) (59 - 73)  BP: 129/53 (:) (128/50 - 148/57)  BP(mean): --  RR: 17 (:) (17 - 17)  SpO2: 100% () (96% - 100%)  Daily Height in cm: 182.88 (10 Jerry 2020 20:11)    Daily Weight in k.4 (2020 02:21)    PE:    Constitutional: frail looking  HEENT: NC/AT, EOMI, PERRLA, conjunctivae clear; ears and nose atraumatic; pharynx benign  Neck: supple; thyroid not palpable  Back: no tenderness  Respiratory: respiratory effort normal; clear to auscultation  Cardiovascular: S1S2 regular, no murmurs  Abdomen: soft, not tender, not distended, positive BS; no liver or spleen organomegaly  Genitourinary: no suprapubic tenderness  Lymphatic: no LN palpable  Musculoskeletal: no muscle tenderness, no joint swelling or tenderness  Extremities: no pedal edema  RLE erythema, edema, warmth and tenderness (right ankle and lower calf; no drainage  Neurological/ Psychiatric: AxOx3, judgement and insight normal; moving all extremities  Skin: no rashes; no palpable lesions    Labs: all available labs reviewed                        10.7   6.65  )-----------( 375      ( 2020 06:21 )             31.9     -11    141  |  109<H>  |  23  ----------------------------<  104<H>  3.4<L>   |  27  |  0.68    Ca    8.6      2020 06:21  Phos  2.8       Mg     1.8         TPro  7.2  /  Alb  3.2<L>  /  TBili  0.3  /  DBili  x   /  AST  23  /  ALT  38  /  AlkPhos  127<H>  01-10     LIVER FUNCTIONS - ( 10 Jerry 2020 21:47 )  Alb: 3.2 g/dL / Pro: 7.2 gm/dL / ALK PHOS: 127 U/L / ALT: 38 U/L / AST: 23 U/L / GGT: x             Radiology: all available radiological tests reviewed    < from: CT Ankle No Cont, Right (01.10.20 @ 23:42) >  Surgical hardware with healing distal fibular fracture.  Diffuse subcutaneous edema without focal fluid collection.    < end of copied text >      Advanced directives addressed: full resuscitation

## 2020-01-12 RX ORDER — SOD,AMMONIUM,POTASSIUM LACTATE
1 CREAM (GRAM) TOPICAL
Refills: 0 | Status: DISCONTINUED | OUTPATIENT
Start: 2020-01-12 | End: 2020-01-16

## 2020-01-12 RX ADMIN — SODIUM CHLORIDE 75 MILLILITER(S): 9 INJECTION, SOLUTION INTRAVENOUS at 06:21

## 2020-01-12 RX ADMIN — Medication 250 MILLIGRAM(S): at 13:08

## 2020-01-12 RX ADMIN — TAMSULOSIN HYDROCHLORIDE 0.4 MILLIGRAM(S): 0.4 CAPSULE ORAL at 17:39

## 2020-01-12 RX ADMIN — Medication 2000 UNIT(S): at 13:10

## 2020-01-12 RX ADMIN — TAMSULOSIN HYDROCHLORIDE 0.4 MILLIGRAM(S): 0.4 CAPSULE ORAL at 06:21

## 2020-01-12 RX ADMIN — AMLODIPINE BESYLATE 10 MILLIGRAM(S): 2.5 TABLET ORAL at 06:21

## 2020-01-12 RX ADMIN — FINASTERIDE 5 MILLIGRAM(S): 5 TABLET, FILM COATED ORAL at 13:09

## 2020-01-12 RX ADMIN — LOSARTAN POTASSIUM 100 MILLIGRAM(S): 100 TABLET, FILM COATED ORAL at 06:21

## 2020-01-12 RX ADMIN — CEFTRIAXONE 2000 MILLIGRAM(S): 500 INJECTION, POWDER, FOR SOLUTION INTRAMUSCULAR; INTRAVENOUS at 17:39

## 2020-01-12 RX ADMIN — Medication 1 APPLICATION(S): at 20:32

## 2020-01-12 RX ADMIN — Medication 250 MILLIGRAM(S): at 23:37

## 2020-01-12 NOTE — PROGRESS NOTE ADULT - SUBJECTIVE AND OBJECTIVE BOX
History of Present Illness: 	  85 yo M with a PMH HTN, HLD, and BPH who presents with RLE pain and swelling. He was seen in the ED on 1/1/20 for RLE cellulitis. He thinks the cellulitis started a couple of weeks ago, when he noted pain, swelling, and some flakiness. He received 7 days of Bactrim and has taken 10 out of 15 days of Keflex. He thinks it improved initially before during the last 3-4 days, his swelling and pain have worsened. He is able to walk, although much more gingerly. He has had skin flakiness peeling off the front side, and he noted it was more red. He spoke to his orthopedic surgeon, who recommended he come for IV antibiotics. He denies fevers, chills, nausea, vomiting, CP, SOB, or diarrhea. He has a catheter (was in the ED for urinary retention several days ago).  Of note, he had a right leg ORIF on 10/12/19.    In the ED, he was given Vancomycin 1250 mg IV x1 and Ceftriaxone 1g IV x1. He was seen by orthopedic surgery.    1.12: no distress      REVIEW OF SYSTEMS:    CONSTITUTIONAL: No weakness, No fevers or chills  ENT: No ear ache, No sorethroat  NECK: No pain, No stiffness  RESPIRATORY: No cough, No wheezing, No hemoptysis; No dyspnea  CARDIOVASCULAR: No chest pain, No palpitations  GASTROINTESTINAL: No abd pain, No nausea, No vomiting, No hematemesis, No diarrhea or constipation. No melena, No hematochezia.  GENITOURINARY: No dysuria, No  hematuria  NEUROLOGICAL: No diplopia, No paresthesia, No motor dysfunction  MUSCULOSKELETAL: No arthralgia, No myalgia  SKIN: No rashes, or lesions   PSYCH: no anxiety, no suicidal ideation    All other review of systems is negative unless indicated above    Vital Signs Last 24 Hrs  T(C): 36.5 (12 Jan 2020 11:29), Max: 36.8 (12 Jan 2020 06:09)  T(F): 97.7 (12 Jan 2020 11:29), Max: 98.2 (12 Jan 2020 06:09)  HR: 63 (12 Jan 2020 11:29) (63 - 72)  BP: 122/59 (12 Jan 2020 11:29) (120/50 - 146/66)  BP(mean): --  RR: 17 (12 Jan 2020 11:29) (17 - 18)  SpO2: 99% (12 Jan 2020 11:29) (98% - 99%)    PHYSICAL EXAM:    GENERAL: NAD, Well nourished  HEENT:  NC/AT, EOMI, PERRLA, No scleral icterus, Moist mucous membranes  NECK: Supple, No JVD  CNS:  Alert & Oriented X3, Motor Strength 5/5 B/L upper and lower extremities; DTRs 2+ intact   LUNG: Normal Breath sounds, Clear to auscultation bilaterally, No rales, No rhonchi, No wheezing  HEART: RRR; No murmurs, No rubs  ABDOMEN: +BS, ST/ND/NT  GENITOURINARY: Voiding, Bladder not distended  EXTREMITIES:  2+ Peripheral Pulses, No clubbing, No cyanosis, No tibial edema  MUSCULOSKELTAL: Joints normal ROM, No TTP, No effusion  VAGINAL: deferred  SKIN: Rt leg erythema present, mild edema, flaky skin  RECTAL: deferred, not indicated  BREAST: deferred                          10.7   6.65  )-----------( 375      ( 11 Jan 2020 06:21 )             31.9     01-11    141  |  109<H>  |  23  ----------------------------<  104<H>  3.4<L>   |  27  |  0.68    Ca    8.6      11 Jan 2020 06:21  Phos  2.8     01-11  Mg     1.8     01-11    TPro  7.2  /  Alb  3.2<L>  /  TBili  0.3  /  DBili  x   /  AST  23  /  ALT  38  /  AlkPhos  127<H>  01-10    Vancomycin levels:   Cultures:     MEDICATIONS  (STANDING):  amLODIPine   Tablet 10 milliGRAM(s) Oral daily  cefTRIAXone Injectable. 2000 milliGRAM(s) IV Push every 24 hours  cholecalciferol 2000 Unit(s) Oral daily  finasteride 5 milliGRAM(s) Oral daily  lactated ringers. 1000 milliLiter(s) (75 mL/Hr) IV Continuous <Continuous>  losartan 100 milliGRAM(s) Oral daily  tamsulosin 0.4 milliGRAM(s) Oral two times a day  vancomycin  IVPB 750 milliGRAM(s) IV Intermittent every 12 hours    MEDICATIONS  (PRN):  acetaminophen   Tablet .. 650 milliGRAM(s) Oral every 6 hours PRN Mild Pain (1 - 3)      all labs reviewed  all imaging reviewed    1. RLE cellulitis:  failed oral abx  s/p ankle ORIF  Ortho eval underway  Ceftriaxone/vancomycin IV day#3  elevate leg  moisturise leg    2. Urinary retention:  cw Brewer

## 2020-01-12 NOTE — PROGRESS NOTE ADULT - SUBJECTIVE AND OBJECTIVE BOX
Pt S/E at bedside, no acute events overnight, pain controlled, RLE pain improving    Vital Signs Last 24 Hrs  T(C): 36.8 (12 Jan 2020 06:09), Max: 36.8 (12 Jan 2020 06:09)  T(F): 98.2 (12 Jan 2020 06:09), Max: 98.2 (12 Jan 2020 06:09)  HR: 72 (12 Jan 2020 06:09) (59 - 72)  BP: 146/66 (12 Jan 2020 06:09) (120/50 - 146/66)  BP(mean): --  RR: 18 (12 Jan 2020 06:09) (17 - 18)  SpO2: 98% (12 Jan 2020 06:09) (98% - 100%)    Gen: NAD    Right Lower Extremity:  Skin intact, erythema improving, punctate healing granulation tissue  +EHL/FHL/TA/GS  SILT L3-S1  +DP/PT Pulses  Compartments soft  No calf TTP B/L

## 2020-01-12 NOTE — PROGRESS NOTE ADULT - SUBJECTIVE AND OBJECTIVE BOX
Date of service: 01-12-20 @ 13:46    Lying in bed in NAD  Weak looking  Right lower leg redness and pain imroving    ROS: no fever or chills; denies dizziness, no HA, no SOB or cough, no abdominal pain, no diarrhea or constipation; no dysuria    MEDICATIONS  (STANDING):  amLODIPine   Tablet 10 milliGRAM(s) Oral daily  cefTRIAXone Injectable. 2000 milliGRAM(s) IV Push every 24 hours  cholecalciferol 2000 Unit(s) Oral daily  finasteride 5 milliGRAM(s) Oral daily  lactated ringers. 1000 milliLiter(s) (75 mL/Hr) IV Continuous <Continuous>  losartan 100 milliGRAM(s) Oral daily  tamsulosin 0.4 milliGRAM(s) Oral two times a day  vancomycin  IVPB 750 milliGRAM(s) IV Intermittent every 12 hours      Vital Signs Last 24 Hrs  T(C): 36.5 (12 Jan 2020 11:29), Max: 36.8 (12 Jan 2020 06:09)  T(F): 97.7 (12 Jan 2020 11:29), Max: 98.2 (12 Jan 2020 06:09)  HR: 63 (12 Jan 2020 11:29) (63 - 72)  BP: 122/59 (12 Jan 2020 11:29) (120/50 - 146/66)  BP(mean): --  RR: 17 (12 Jan 2020 11:29) (17 - 18)  SpO2: 99% (12 Jan 2020 11:29) (98% - 99%)    Physical Exam:      Constitutional: frail looking  HEENT: NC/AT, EOMI, PERRLA, conjunctivae clear  Neck: supple; thyroid not palpable  Back: no tenderness  Respiratory: respiratory effort normal; clear to auscultation  Cardiovascular: S1S2 regular, no murmurs  Abdomen: soft, not tender, not distended, positive BS  Genitourinary: no suprapubic tenderness  Lymphatic: no LN palpable  Musculoskeletal: no muscle tenderness, no joint swelling or tenderness  Extremities: no pedal edema  RLE erythema, edema, warmth and tenderness (right ankle and lower calf; no drainage  Neurological/ Psychiatric: AxOx3, moving all extremities  Skin: no rashes; no palpable lesions    Labs: all available labs reviewed                        10.7   6.65  )-----------( 375      ( 11 Jan 2020 06:21 )             31.9     01-11    141  |  109<H>  |  23  ----------------------------<  104<H>  3.4<L>   |  27  |  0.68    Ca    8.6      11 Jan 2020 06:21  Phos  2.8     01-11  Mg     1.8     01-11    TPro  7.2  /  Alb  3.2<L>  /  TBili  0.3  /  DBili  x   /  AST  23  /  ALT  38  /  AlkPhos  127<H>  01-10     LIVER FUNCTIONS - ( 10 Jerry 2020 21:47 )  Alb: 3.2 g/dL / Pro: 7.2 gm/dL / ALK PHOS: 127 U/L / ALT: 38 U/L / AST: 23 U/L / GGT: x             Radiology: all available radiological tests reviewed    < from: CT Ankle No Cont, Right (01.10.20 @ 23:42) >  Surgical hardware with healing distal fibular fracture.  Diffuse subcutaneous edema without focal fluid collection.    < end of copied text >      Advanced directives addressed: full resuscitation

## 2020-01-13 DIAGNOSIS — Z98.890 OTHER SPECIFIED POSTPROCEDURAL STATES: Chronic | ICD-10-CM

## 2020-01-13 LAB — VANCOMYCIN TROUGH SERPL-MCNC: 12.8 UG/ML — SIGNIFICANT CHANGE UP (ref 10–20)

## 2020-01-13 RX ADMIN — FINASTERIDE 5 MILLIGRAM(S): 5 TABLET, FILM COATED ORAL at 11:13

## 2020-01-13 RX ADMIN — LOSARTAN POTASSIUM 100 MILLIGRAM(S): 100 TABLET, FILM COATED ORAL at 05:07

## 2020-01-13 RX ADMIN — TAMSULOSIN HYDROCHLORIDE 0.4 MILLIGRAM(S): 0.4 CAPSULE ORAL at 17:16

## 2020-01-13 RX ADMIN — Medication 250 MILLIGRAM(S): at 23:37

## 2020-01-13 RX ADMIN — CEFTRIAXONE 2000 MILLIGRAM(S): 500 INJECTION, POWDER, FOR SOLUTION INTRAMUSCULAR; INTRAVENOUS at 17:17

## 2020-01-13 RX ADMIN — TAMSULOSIN HYDROCHLORIDE 0.4 MILLIGRAM(S): 0.4 CAPSULE ORAL at 05:07

## 2020-01-13 RX ADMIN — Medication 1 APPLICATION(S): at 05:09

## 2020-01-13 RX ADMIN — Medication 250 MILLIGRAM(S): at 11:14

## 2020-01-13 RX ADMIN — Medication 1 APPLICATION(S): at 17:16

## 2020-01-13 RX ADMIN — Medication 2000 UNIT(S): at 11:13

## 2020-01-13 RX ADMIN — AMLODIPINE BESYLATE 10 MILLIGRAM(S): 2.5 TABLET ORAL at 05:07

## 2020-01-13 NOTE — PROGRESS NOTE ADULT - SUBJECTIVE AND OBJECTIVE BOX
History of Present Illness: 	  83 yo M with a PMH HTN, HLD, and BPH who presents with RLE pain and swelling. He was seen in the ED on 1/1/20 for RLE cellulitis. He thinks the cellulitis started a couple of weeks ago, when he noted pain, swelling, and some flakiness. He received 7 days of Bactrim and has taken 10 out of 15 days of Keflex. He thinks it improved initially before during the last 3-4 days, his swelling and pain have worsened. He is able to walk, although much more gingerly. He has had skin flakiness peeling off the front side, and he noted it was more red. He spoke to his orthopedic surgeon, who recommended he come for IV antibiotics. He denies fevers, chills, nausea, vomiting, CP, SOB, or diarrhea. He has a catheter (was in the ED for urinary retention several days ago).  Of note, he had a right leg ORIF on 10/12/19.    In the ED, he was given Vancomycin 1250 mg IV x1 and Ceftriaxone 1g IV x1. He was seen by orthopedic surgery.    1.12: no distress  1.13: no cp, no sob, feels well      REVIEW OF SYSTEMS:    CONSTITUTIONAL: No weakness, No fevers or chills  ENT: No ear ache, No sorethroat  NECK: No pain, No stiffness  RESPIRATORY: No cough, No wheezing, No hemoptysis; No dyspnea  CARDIOVASCULAR: No chest pain, No palpitations  GASTROINTESTINAL: No abd pain, No nausea, No vomiting, No hematemesis, No diarrhea or constipation. No melena, No hematochezia.  GENITOURINARY: No dysuria, No  hematuria  NEUROLOGICAL: No diplopia, No paresthesia, No motor dysfunction  MUSCULOSKELETAL: No arthralgia, No myalgia  SKIN: No rashes, or lesions   PSYCH: no anxiety, no suicidal ideation    All other review of systems is negative unless indicated above    Vital Signs Last 24 Hrs  T(C): 36.5 (12 Jan 2020 11:29), Max: 36.8 (12 Jan 2020 06:09)  T(F): 97.7 (12 Jan 2020 11:29), Max: 98.2 (12 Jan 2020 06:09)  HR: 63 (12 Jan 2020 11:29) (63 - 72)  BP: 122/59 (12 Jan 2020 11:29) (120/50 - 146/66)  RR: 17 (12 Jan 2020 11:29) (17 - 18)  SpO2: 99% (12 Jan 2020 11:29) (98% - 99%)    PHYSICAL EXAM:    GENERAL: NAD, Well nourished  HEENT:  NC/AT, EOMI, PERRLA, No scleral icterus, Moist mucous membranes  NECK: Supple, No JVD  CNS:  Alert & Oriented X3, Motor Strength 5/5 B/L upper and lower extremities; DTRs 2+ intact   LUNG: Normal Breath sounds, Clear to auscultation bilaterally, No rales, No rhonchi, No wheezing  HEART: RRR; No murmurs, No rubs  ABDOMEN: +BS, ST/ND/NT  GENITOURINARY: Voiding, Bladder not distended  EXTREMITIES:  2+ Peripheral Pulses, No clubbing, No cyanosis, No tibial edema  MUSCULOSKELTAL: Joints normal ROM, No TTP, No effusion  VAGINAL: deferred  SKIN: Rt leg erythema present, mild edema, flaky skin  RECTAL: deferred, not indicated  BREAST: deferred                          10.7   6.65  )-----------( 375      ( 11 Jan 2020 06:21 )             31.9     01-11    141  |  109<H>  |  23  ----------------------------<  104<H>  3.4<L>   |  27  |  0.68    Ca    8.6      11 Jan 2020 06:21  Phos  2.8     01-11  Mg     1.8     01-11    TPro  7.2  /  Alb  3.2<L>  /  TBili  0.3  /  DBili  x   /  AST  23  /  ALT  38  /  AlkPhos  127<H>  01-10      MEDICATIONS  (STANDING):  amLODIPine   Tablet 10 milliGRAM(s) Oral daily  ammonium lactate 12% Lotion 1 Application(s) Topical two times a day  cefTRIAXone Injectable. 2000 milliGRAM(s) IV Push every 24 hours  cholecalciferol 2000 Unit(s) Oral daily  finasteride 5 milliGRAM(s) Oral daily  lactated ringers. 1000 milliLiter(s) (75 mL/Hr) IV Continuous <Continuous>  losartan 100 milliGRAM(s) Oral daily  tamsulosin 0.4 milliGRAM(s) Oral two times a day  vancomycin  IVPB 750 milliGRAM(s) IV Intermittent every 12 hours      all labs reviewed  all imaging reviewed    1. RLE cellulitis:  failed oral abx  s/p ankle ORIF  Ortho eval underway  Ceftriaxone/vancomycin IV day#4  elevate leg  moisturise leg    2. Urinary retention:  cw Brewer , Proscar, Flomax  will d/w urology voiding trial

## 2020-01-13 NOTE — PROGRESS NOTE ADULT - SUBJECTIVE AND OBJECTIVE BOX
Date of service: 01-13-20 @ 16:11    Lying in bed in NAD  Weak looking  Right LE redness is down  Lower leg is still swollen    ROS: no fever or chills; denies dizziness, no HA, no SOB or cough, no abdominal pain, no diarrhea or constipation; no dysuria    MEDICATIONS  (STANDING):  amLODIPine   Tablet 10 milliGRAM(s) Oral daily  ammonium lactate 12% Lotion 1 Application(s) Topical two times a day  cefTRIAXone Injectable. 2000 milliGRAM(s) IV Push every 24 hours  cholecalciferol 2000 Unit(s) Oral daily  finasteride 5 milliGRAM(s) Oral daily  lactated ringers. 1000 milliLiter(s) (75 mL/Hr) IV Continuous <Continuous>  losartan 100 milliGRAM(s) Oral daily  tamsulosin 0.4 milliGRAM(s) Oral two times a day  vancomycin  IVPB 750 milliGRAM(s) IV Intermittent every 12 hours      Vital Signs Last 24 Hrs  T(C): 36.5 (13 Jan 2020 11:29), Max: 37 (12 Jan 2020 21:52)  T(F): 97.7 (13 Jan 2020 11:29), Max: 98.6 (12 Jan 2020 21:52)  HR: 62 (13 Jan 2020 11:29) (62 - 67)  BP: 141/52 (13 Jan 2020 11:29) (124/45 - 141/52)  BP(mean): --  RR: 17 (13 Jan 2020 11:29) (17 - 17)  SpO2: 99% (13 Jan 2020 11:29) (97% - 99%)    Physical Exam:      Constitutional: frail looking  HEENT: NC/AT, EOMI, PERRLA, conjunctivae clear  Neck: supple; thyroid not palpable  Back: no tenderness  Respiratory: respiratory effort normal; clear to auscultation  Cardiovascular: S1S2 regular, no murmurs  Abdomen: soft, not tender, not distended, positive BS  Genitourinary: no suprapubic tenderness  Lymphatic: no LN palpable  Musculoskeletal: no muscle tenderness, no joint swelling or tenderness  Extremities: no pedal edema  RLE erythema, edema, warmth and tenderness on right ankle and lower calf; no drainage - improving  Neurological/ Psychiatric: AxOx3, moving all extremities  Skin: no rashes; no palpable lesions    Labs: reviewed    Vancomycin Level, Trough: 12.8 ug/mL (01-13 @ 05:46)                          10.7   6.65  )-----------( 375      ( 11 Jan 2020 06:21 )             31.9     01-11    141  |  109<H>  |  23  ----------------------------<  104<H>  3.4<L>   |  27  |  0.68    Ca    8.6      11 Jan 2020 06:21  Phos  2.8     01-11  Mg     1.8     01-11    TPro  7.2  /  Alb  3.2<L>  /  TBili  0.3  /  DBili  x   /  AST  23  /  ALT  38  /  AlkPhos  127<H>  01-10     LIVER FUNCTIONS - ( 10 Jerry 2020 21:47 )  Alb: 3.2 g/dL / Pro: 7.2 gm/dL / ALK PHOS: 127 U/L / ALT: 38 U/L / AST: 23 U/L / GGT: x             Culture - Blood (collected 10 Jerry 2020 21:47)  Source: .Blood Blood-Peripheral  Preliminary Report (12 Jan 2020 10:01):    No growth to date.    Culture - Blood (collected 10 Jerry 2020 21:47)  Source: .Blood Blood-Peripheral  Preliminary Report (12 Jan 2020 10:01):    No growth to date.    Culture - Urine (collected 07 Jan 2020 03:39)  Source: .Urine None  Final Report (08 Jan 2020 08:27):    No growth    Radiology: all available radiological tests reviewed    < from: CT Ankle No Cont, Right (01.10.20 @ 23:42) >  Surgical hardware with healing distal fibular fracture.  Diffuse subcutaneous edema without focal fluid collection.    < end of copied text >      Advanced directives addressed: full resuscitation

## 2020-01-14 ENCOUNTER — APPOINTMENT (OUTPATIENT)
Dept: UROLOGY | Facility: CLINIC | Age: 85
End: 2020-01-14

## 2020-01-14 RX ADMIN — TAMSULOSIN HYDROCHLORIDE 0.4 MILLIGRAM(S): 0.4 CAPSULE ORAL at 19:02

## 2020-01-14 RX ADMIN — FINASTERIDE 5 MILLIGRAM(S): 5 TABLET, FILM COATED ORAL at 13:04

## 2020-01-14 RX ADMIN — AMLODIPINE BESYLATE 10 MILLIGRAM(S): 2.5 TABLET ORAL at 06:45

## 2020-01-14 RX ADMIN — Medication 2000 UNIT(S): at 13:01

## 2020-01-14 RX ADMIN — LOSARTAN POTASSIUM 100 MILLIGRAM(S): 100 TABLET, FILM COATED ORAL at 06:45

## 2020-01-14 RX ADMIN — CEFTRIAXONE 2000 MILLIGRAM(S): 500 INJECTION, POWDER, FOR SOLUTION INTRAMUSCULAR; INTRAVENOUS at 19:02

## 2020-01-14 RX ADMIN — TAMSULOSIN HYDROCHLORIDE 0.4 MILLIGRAM(S): 0.4 CAPSULE ORAL at 06:45

## 2020-01-14 RX ADMIN — Medication 1 APPLICATION(S): at 06:45

## 2020-01-14 RX ADMIN — Medication 1 APPLICATION(S): at 19:01

## 2020-01-14 RX ADMIN — Medication 250 MILLIGRAM(S): at 21:53

## 2020-01-14 RX ADMIN — Medication 250 MILLIGRAM(S): at 11:09

## 2020-01-14 NOTE — PROGRESS NOTE ADULT - SUBJECTIVE AND OBJECTIVE BOX
Date of service: 01-14-20 @ 12:45    Lying in bed in NAD  Left leg pain and redness is improving    ROS: no fever or chills; denies dizziness, no HA, no SOB or cough, no abdominal pain, no diarrhea or constipation; no dysuria,    MEDICATIONS  (STANDING):  amLODIPine   Tablet 10 milliGRAM(s) Oral daily  ammonium lactate 12% Lotion 1 Application(s) Topical two times a day  cefTRIAXone Injectable. 2000 milliGRAM(s) IV Push every 24 hours  cholecalciferol 2000 Unit(s) Oral daily  finasteride 5 milliGRAM(s) Oral daily  lactated ringers. 1000 milliLiter(s) (75 mL/Hr) IV Continuous <Continuous>  losartan 100 milliGRAM(s) Oral daily  tamsulosin 0.4 milliGRAM(s) Oral two times a day  vancomycin  IVPB 750 milliGRAM(s) IV Intermittent every 12 hours      Vital Signs Last 24 Hrs  T(C): 36.8 (14 Jan 2020 11:37), Max: 36.9 (14 Jan 2020 06:43)  T(F): 98.3 (14 Jan 2020 11:37), Max: 98.4 (14 Jan 2020 06:43)  HR: 74 (14 Jan 2020 11:37) (72 - 79)  BP: 148/54 (14 Jan 2020 11:37) (132/57 - 148/54)  BP(mean): --  RR: 17 (14 Jan 2020 11:37) (17 - 17)  SpO2: 96% (14 Jan 2020 11:37) (96% - 98%)    Physical Exam:      Constitutional: frail looking  HEENT: NC/AT, EOMI, PERRLA, conjunctivae clear  Neck: supple; thyroid not palpable  Back: no tenderness  Respiratory: respiratory effort normal; clear to auscultation  Cardiovascular: S1S2 regular, no murmurs  Abdomen: soft, not tender, not distended, positive BS  Genitourinary: no suprapubic tenderness  Lymphatic: no LN palpable  Musculoskeletal: no muscle tenderness, no joint swelling or tenderness  Extremities: no pedal edema  RLE erythema, edema, warmth and tenderness on right ankle and lower calf; no drainage - improving slowly  Neurological/ Psychiatric: AxOx3, moving all extremities  Skin: no rashes; no palpable lesions    Labs: reviewed    Vancomycin Level, Trough: 12.8 ug/mL (01-13 @ 05:46)                          10.7   6.65  )-----------( 375      ( 11 Jan 2020 06:21 )             31.9     01-11    141  |  109<H>  |  23  ----------------------------<  104<H>  3.4<L>   |  27  |  0.68    Ca    8.6      11 Jan 2020 06:21  Phos  2.8     01-11  Mg     1.8     01-11    TPro  7.2  /  Alb  3.2<L>  /  TBili  0.3  /  DBili  x   /  AST  23  /  ALT  38  /  AlkPhos  127<H>  01-10     LIVER FUNCTIONS - ( 10 Jerry 2020 21:47 )  Alb: 3.2 g/dL / Pro: 7.2 gm/dL / ALK PHOS: 127 U/L / ALT: 38 U/L / AST: 23 U/L / GGT: x             Culture - Blood (collected 10 Jerry 2020 21:47)  Source: .Blood Blood-Peripheral  Preliminary Report (12 Jan 2020 10:01):    No growth to date.    Culture - Blood (collected 10 Jerry 2020 21:47)  Source: .Blood Blood-Peripheral  Preliminary Report (12 Jan 2020 10:01):    No growth to date.    Culture - Urine (collected 07 Jan 2020 03:39)  Source: .Urine None  Final Report (08 Jan 2020 08:27):    No growth    Radiology: all available radiological tests reviewed    < from: CT Ankle No Cont, Right (01.10.20 @ 23:42) >  Surgical hardware with healing distal fibular fracture.  Diffuse subcutaneous edema without focal fluid collection.    < end of copied text >      Advanced directives addressed: full resuscitation Date of service: 01-14-20 @ 12:45    Lying in bed in NAD  Left leg pain and redness is improving  Has left leg tenderness  Patient is concerned and anxious about his leg    ROS: no fever or chills; denies dizziness, no HA, no SOB or cough, no abdominal pain, no diarrhea or constipation; no dysuria,    MEDICATIONS  (STANDING):  amLODIPine   Tablet 10 milliGRAM(s) Oral daily  ammonium lactate 12% Lotion 1 Application(s) Topical two times a day  cefTRIAXone Injectable. 2000 milliGRAM(s) IV Push every 24 hours  cholecalciferol 2000 Unit(s) Oral daily  finasteride 5 milliGRAM(s) Oral daily  lactated ringers. 1000 milliLiter(s) (75 mL/Hr) IV Continuous <Continuous>  losartan 100 milliGRAM(s) Oral daily  tamsulosin 0.4 milliGRAM(s) Oral two times a day  vancomycin  IVPB 750 milliGRAM(s) IV Intermittent every 12 hours      Vital Signs Last 24 Hrs  T(C): 36.8 (14 Jan 2020 11:37), Max: 36.9 (14 Jan 2020 06:43)  T(F): 98.3 (14 Jan 2020 11:37), Max: 98.4 (14 Jan 2020 06:43)  HR: 74 (14 Jan 2020 11:37) (72 - 79)  BP: 148/54 (14 Jan 2020 11:37) (132/57 - 148/54)  BP(mean): --  RR: 17 (14 Jan 2020 11:37) (17 - 17)  SpO2: 96% (14 Jan 2020 11:37) (96% - 98%)    Physical Exam:      Constitutional: frail looking  HEENT: NC/AT, EOMI, PERRLA, conjunctivae clear  Neck: supple; thyroid not palpable  Back: no tenderness  Respiratory: respiratory effort normal; clear to auscultation  Cardiovascular: S1S2 regular, no murmurs  Abdomen: soft, not tender, not distended, positive BS  Genitourinary: no suprapubic tenderness  Lymphatic: no LN palpable  Musculoskeletal: no muscle tenderness, no joint swelling or tenderness  Extremities: no pedal edema  RLE erythema, edema, warmth and tenderness on right ankle and lower calf; no drainage - improving slowly  Neurological/ Psychiatric: AxOx3, moving all extremities  Skin: no rashes; no palpable lesions    Labs: reviewed    Vancomycin Level, Trough: 12.8 ug/mL (01-13 @ 05:46)                          10.7   6.65  )-----------( 375      ( 11 Jan 2020 06:21 )             31.9     01-11    141  |  109<H>  |  23  ----------------------------<  104<H>  3.4<L>   |  27  |  0.68    Ca    8.6      11 Jan 2020 06:21  Phos  2.8     01-11  Mg     1.8     01-11    TPro  7.2  /  Alb  3.2<L>  /  TBili  0.3  /  DBili  x   /  AST  23  /  ALT  38  /  AlkPhos  127<H>  01-10     LIVER FUNCTIONS - ( 10 Jerry 2020 21:47 )  Alb: 3.2 g/dL / Pro: 7.2 gm/dL / ALK PHOS: 127 U/L / ALT: 38 U/L / AST: 23 U/L / GGT: x             Culture - Blood (collected 10 Jerry 2020 21:47)  Source: .Blood Blood-Peripheral  Preliminary Report (12 Jan 2020 10:01):    No growth to date.    Culture - Blood (collected 10 Jerry 2020 21:47)  Source: .Blood Blood-Peripheral  Preliminary Report (12 Jan 2020 10:01):    No growth to date.    Culture - Urine (collected 07 Jan 2020 03:39)  Source: .Urine None  Final Report (08 Jan 2020 08:27):    No growth    Radiology: all available radiological tests reviewed    < from: CT Ankle No Cont, Right (01.10.20 @ 23:42) >  Surgical hardware with healing distal fibular fracture.  Diffuse subcutaneous edema without focal fluid collection.    < end of copied text >      Advanced directives addressed: full resuscitation

## 2020-01-14 NOTE — PROGRESS NOTE ADULT - SUBJECTIVE AND OBJECTIVE BOX
History of Present Illness: 	  83 yo M with a PMH HTN, HLD, and BPH who presents with RLE pain and swelling. He was seen in the ED on 1/1/20 for RLE cellulitis. He thinks the cellulitis started a couple of weeks ago, when he noted pain, swelling, and some flakiness. He received 7 days of Bactrim and has taken 10 out of 15 days of Keflex. He thinks it improved initially before during the last 3-4 days, his swelling and pain have worsened. He is able to walk, although much more gingerly. He has had skin flakiness peeling off the front side, and he noted it was more red. He spoke to his orthopedic surgeon, who recommended he come for IV antibiotics. He denies fevers, chills, nausea, vomiting, CP, SOB, or diarrhea. He has a catheter (was in the ED for urinary retention several days ago).  Of note, he had a right leg ORIF on 10/12/19.    In the ED, he was given Vancomycin 1250 mg IV x1 and Ceftriaxone 1g IV x1. He was seen by orthopedic surgery.    1.12: no distress  1.13: no cp, no sob, feels well  1.14: no distress, able to void after Brewer removed      REVIEW OF SYSTEMS:    CONSTITUTIONAL: No weakness, No fevers or chills  ENT: No ear ache, No sorethroat  NECK: No pain, No stiffness  RESPIRATORY: No cough, No wheezing, No hemoptysis; No dyspnea  CARDIOVASCULAR: No chest pain, No palpitations  GASTROINTESTINAL: No abd pain, No nausea, No vomiting, No hematemesis, No diarrhea or constipation. No melena, No hematochezia.  GENITOURINARY: No dysuria, No  hematuria  NEUROLOGICAL: No diplopia, No paresthesia, No motor dysfunction  MUSCULOSKELETAL: No arthralgia, No myalgia  SKIN: No rashes, or lesions   PSYCH: no anxiety, no suicidal ideation    All other review of systems is negative unless indicated above    Vital Signs Last 24 Hrs  T(C): 36.5 (12 Jan 2020 11:29), Max: 36.8 (12 Jan 2020 06:09)  T(F): 97.7 (12 Jan 2020 11:29), Max: 98.2 (12 Jan 2020 06:09)  HR: 63 (12 Jan 2020 11:29) (63 - 72)  BP: 122/59 (12 Jan 2020 11:29) (120/50 - 146/66)  RR: 17 (12 Jan 2020 11:29) (17 - 18)  SpO2: 99% (12 Jan 2020 11:29) (98% - 99%)    PHYSICAL EXAM:    GENERAL: NAD, Well nourished  HEENT:  NC/AT, EOMI, PERRLA, No scleral icterus, Moist mucous membranes  NECK: Supple, No JVD  CNS:  Alert & Oriented X3, Motor Strength 5/5 B/L upper and lower extremities; DTRs 2+ intact   LUNG: Normal Breath sounds, Clear to auscultation bilaterally, No rales, No rhonchi, No wheezing  HEART: RRR; No murmurs, No rubs  ABDOMEN: +BS, ST/ND/NT  GENITOURINARY: Voiding, Bladder not distended  EXTREMITIES:  2+ Peripheral Pulses, No clubbing, No cyanosis, No tibial edema  MUSCULOSKELTAL: Joints normal ROM, No TTP, No effusion  VAGINAL: deferred  SKIN: Rt leg erythema present, mild edema, flaky skin  RECTAL: deferred, not indicated  BREAST: deferred                          10.7   6.65  )-----------( 375      ( 11 Jan 2020 06:21 )             31.9     01-11    141  |  109<H>  |  23  ----------------------------<  104<H>  3.4<L>   |  27  |  0.68    Ca    8.6      11 Jan 2020 06:21  Phos  2.8     01-11  Mg     1.8     01-11    TPro  7.2  /  Alb  3.2<L>  /  TBili  0.3  /  DBili  x   /  AST  23  /  ALT  38  /  AlkPhos  127<H>  01-10      MEDICATIONS  (STANDING):  amLODIPine   Tablet 10 milliGRAM(s) Oral daily  ammonium lactate 12% Lotion 1 Application(s) Topical two times a day  cefTRIAXone Injectable. 2000 milliGRAM(s) IV Push every 24 hours  cholecalciferol 2000 Unit(s) Oral daily  finasteride 5 milliGRAM(s) Oral daily  lactated ringers. 1000 milliLiter(s) (75 mL/Hr) IV Continuous <Continuous>  losartan 100 milliGRAM(s) Oral daily  tamsulosin 0.4 milliGRAM(s) Oral two times a day  vancomycin  IVPB 750 milliGRAM(s) IV Intermittent every 12 hours      all labs reviewed  all imaging reviewed    1. RLE cellulitis:  failed oral abx multiple courses   s/p ankle ORIF  Ortho eval noted  Ceftriaxone/vancomycin IV day#5  will place PICC and d/c home with another 5weeks of IV abx due to concern of hardware infection  moisturise leg    2. Urinary retention:  passed TOV  cw  Proscar, Flomax    Plan to dc home with IV Abx, PICC placement

## 2020-01-14 NOTE — CONSULT NOTE ADULT - SUBJECTIVE AND OBJECTIVE BOX
HPI:  85 yo M with a PMH HTN, HLD, and BPH who presents with RLE pain and swelling. He was seen in the ED on 1/1/20 for RLE cellulitis. He thinks the cellulitis started a couple of weeks ago, when he noted pain, swelling, and some flakiness. He received 7 days of Bactrim and has taken 10 out of 15 days of Keflex. He thinks it improved initially before during the last 3-4 days, his swelling and pain have worsened. He is able to walk, although much more gingerly. He has had skin flakiness peeling off the front side, and he noted it was more red. He spoke to his orthopedic surgeon, who recommended he come for IV antibiotics. He denies fevers, chills, nausea, vomiting, CP, SOB, or diarrhea. He has a catheter (was in the ED for urinary retention several days ago).  Of note, he had a right leg ORIF on 10/12/19.    In the ED, he was given Vancomycin 1250 mg IV x1 and Ceftriaxone 1g IV x1. He was seen by orthopedic surgery. (10 Jerry 2020 23:59)    Urology consulted for patient with high PVR/ urinary retention. Patient is currently getting a trial of void since this morning. Patient seen and examined at bedside. Patient states that he saw Dr. Brandon Walter once in the office and was found to have a baseline PVR of 326 mL. Patient states prior to the admission he was voiding without difficulties and has been taking flomax with improvement in his urinary stream. Patient has no current complaints, he denies any fevers, chills, dysuria, urinary frequency, urgency, abdominal/ flank pain.    PAST MEDICAL & SURGICAL HISTORY:  HLD (hyperlipidemia)  Enlarged prostate  HTN (hypertension)  History of ankle surgery  Status post ORIF of fracture of ankle: Right, 10/2019      REVIEW OF SYSTEMS    All other review of systems neg, except as noted in HPI	    MEDICATIONS  (STANDING):  amLODIPine   Tablet 10 milliGRAM(s) Oral daily  ammonium lactate 12% Lotion 1 Application(s) Topical two times a day  cefTRIAXone Injectable. 2000 milliGRAM(s) IV Push every 24 hours  cholecalciferol 2000 Unit(s) Oral daily  finasteride 5 milliGRAM(s) Oral daily  lactated ringers. 1000 milliLiter(s) (75 mL/Hr) IV Continuous <Continuous>  losartan 100 milliGRAM(s) Oral daily  tamsulosin 0.4 milliGRAM(s) Oral two times a day  vancomycin  IVPB 750 milliGRAM(s) IV Intermittent every 12 hours    MEDICATIONS  (PRN):  acetaminophen   Tablet .. 650 milliGRAM(s) Oral every 6 hours PRN Mild Pain (1 - 3)      Allergies    No Known Allergies    Intolerances    Lipitor (Joint Pain)      SOCIAL HISTORY:    FAMILY HISTORY:  No pertinent family history in first degree relatives      PHYSICAL EXAM:    General: No distress, No anxiety  VITALS  T(C): 36.8 (01-14-20 @ 11:37), Max: 36.9 (01-14-20 @ 06:43)  HR: 74 (01-14-20 @ 11:37) (72 - 79)  BP: 148/54 (01-14-20 @ 11:37) (132/57 - 148/54)  RR: 17 (01-14-20 @ 11:37) (17 - 17)  SpO2: 96% (01-14-20 @ 11:37) (96% - 98%)            Skin     : No jaundice  HEENT: No icterus , EOM full , No epistaxis  Lung    :  No resp distress  Abdo:   : Soft, Non tender, No guarding, No distension   Back    : No CVAT  Extremity: No calf tenderness, RLE with some erythema   Genitalia Male: No Brewer, currently attempting trial of void  Neuro   : A&Ox3      LABS:    Urine Microscopic-Add On (NC) (01.07.20 @ 03:39)    Red Blood Cell - Urine: >50 /HPF    White Blood Cell - Urine: 3-5    Bacteria: Moderate    Epithelial Cells: Occasional    Culture - Urine (01.07.20 @ 03:39)    Specimen Source: .Urine None    Culture Results:   No growth

## 2020-01-14 NOTE — CONSULT NOTE ADULT - ASSESSMENT
85 yo M with a PMH HTN, HLD, and BPH who presents with RLE pain and swelling admitted for RLE cellulitis. Urology consulted for pt who had acute urinary retention and is getting a trial of void today. Patient with high baseline PVR of 326 mL.   Recommend  - Continue Flomax and Finasteride  - Trial of void today, check PVR, if PVR >400 mL after voiding re insert the chowdary catheter and discharge with chowdary to leg bag  - Follow up outpatient with Dr. Walter    Case discussed with Dr. Walter

## 2020-01-15 ENCOUNTER — TRANSCRIPTION ENCOUNTER (OUTPATIENT)
Age: 85
End: 2020-01-15

## 2020-01-15 PROCEDURE — 93971 EXTREMITY STUDY: CPT | Mod: 26,RT

## 2020-01-15 PROCEDURE — 71045 X-RAY EXAM CHEST 1 VIEW: CPT | Mod: 26

## 2020-01-15 RX ORDER — ENOXAPARIN SODIUM 100 MG/ML
40 INJECTION SUBCUTANEOUS EVERY 24 HOURS
Refills: 0 | Status: DISCONTINUED | OUTPATIENT
Start: 2020-01-15 | End: 2020-01-16

## 2020-01-15 RX ADMIN — Medication 1 APPLICATION(S): at 18:22

## 2020-01-15 RX ADMIN — Medication 250 MILLIGRAM(S): at 12:46

## 2020-01-15 RX ADMIN — Medication 2000 UNIT(S): at 12:51

## 2020-01-15 RX ADMIN — CEFTRIAXONE 2000 MILLIGRAM(S): 500 INJECTION, POWDER, FOR SOLUTION INTRAMUSCULAR; INTRAVENOUS at 18:18

## 2020-01-15 RX ADMIN — Medication 1 APPLICATION(S): at 05:26

## 2020-01-15 RX ADMIN — TAMSULOSIN HYDROCHLORIDE 0.4 MILLIGRAM(S): 0.4 CAPSULE ORAL at 05:24

## 2020-01-15 RX ADMIN — ENOXAPARIN SODIUM 40 MILLIGRAM(S): 100 INJECTION SUBCUTANEOUS at 23:26

## 2020-01-15 RX ADMIN — AMLODIPINE BESYLATE 10 MILLIGRAM(S): 2.5 TABLET ORAL at 05:24

## 2020-01-15 RX ADMIN — FINASTERIDE 5 MILLIGRAM(S): 5 TABLET, FILM COATED ORAL at 12:47

## 2020-01-15 RX ADMIN — LOSARTAN POTASSIUM 100 MILLIGRAM(S): 100 TABLET, FILM COATED ORAL at 05:24

## 2020-01-15 RX ADMIN — TAMSULOSIN HYDROCHLORIDE 0.4 MILLIGRAM(S): 0.4 CAPSULE ORAL at 18:18

## 2020-01-15 RX ADMIN — Medication 250 MILLIGRAM(S): at 23:26

## 2020-01-15 NOTE — PROGRESS NOTE ADULT - SUBJECTIVE AND OBJECTIVE BOX
CC/reason for follow up: R ankle cellulitis/ hardware infection    S: c/o bump and redness on R forearm. R ankle redness is better    ROS: no chest pain, no dyspnea    T(C): 36.6 (01-15-20 @ 21:06), Max: 36.8 (01-14-20 @ 22:46)  HR: 63 (01-15-20 @ 21:06) (58 - 68)  BP: 137/56 (01-15-20 @ 21:06) (121/52 - 146/51)  RR: 17 (01-15-20 @ 21:06) (16 - 17)  SpO2: 97% (01-15-20 @ 21:06) (97% - 98%)    Gen - Pleasant, cooperative, in no acute distress  HEENT- PERRL, moist mucus membranes, OP clear  CV - RRR, No m/r/g, +pulses, mild LLE edema  Lungs - Good effort, Clear to auscultation bilaterally  Abdomen - Soft, nontender, nondistended, +BS, No rebound/rigidity/guarding  Ext - No cyanosis/clubbing.   Skin - erythema and induration w/ firm lump on R antecubital fossa, No jaundice  Psych- Alert & oriented x 3  Neuro- fluent speech, no facial droop, EOMI. moves all ext    MEDICATIONS  (STANDING):  amLODIPine   Tablet 10 milliGRAM(s) Oral daily  ammonium lactate 12% Lotion 1 Application(s) Topical two times a day  cefTRIAXone Injectable. 2000 milliGRAM(s) IV Push every 24 hours  cholecalciferol 2000 Unit(s) Oral daily  enoxaparin Injectable 40 milliGRAM(s) SubCutaneous every 24 hours  finasteride 5 milliGRAM(s) Oral daily  lactated ringers. 1000 milliLiter(s) (75 mL/Hr) IV Continuous <Continuous>  losartan 100 milliGRAM(s) Oral daily  tamsulosin 0.4 milliGRAM(s) Oral two times a day  vancomycin  IVPB 750 milliGRAM(s) IV Intermittent every 12 hours    MEDICATIONS  (PRN):  acetaminophen   Tablet .. 650 milliGRAM(s) Oral every 6 hours PRN Mild Pain (1 - 3)      Diagnostic studies: personally reviewed  LABS: All Labs Reviewed:      Blood Culture:   RADIOLOGY/EKG:    < from: CT Ankle No Cont, Right (01.10.20 @ 23:42) >  Impression:    Surgical hardware with healing distal fibular fracture.    Diffuse subcutaneous edema without focal fluid collection.    < end of copied text >

## 2020-01-15 NOTE — DISCHARGE NOTE NURSING/CASE MANAGEMENT/SOCIAL WORK - PATIENT PORTAL LINK FT
You can access the FollowMyHealth Patient Portal offered by [facility] by registering at the following website: [email or web address]. By joining "[employer]’s FollowMyHealth portal, you will also be able to view your health information using other applications (apps) compatible with our system.

## 2020-01-15 NOTE — DISCHARGE NOTE NURSING/CASE MANAGEMENT/SOCIAL WORK - NSSCNAMETXT_GEN_ALL_CORE
[facility] ([facility])  [phone] [facility] ([facility])  [phone]

## 2020-01-15 NOTE — PROGRESS NOTE ADULT - SUBJECTIVE AND OBJECTIVE BOX
Patient seen and examined at bedside, resting comfortably. Denies fevers/chills, headache/dizziness/lightheadedness, chest pain, dyspnea, numbness/tingling. Pain well controlled. No complaints at this time.      Vital Signs Last 24 Hrs  T(C): 36.6 (15 Jerry 2020 05:25), Max: 36.9 (14 Jan 2020 06:43)  T(F): 97.8 (15 Jerry 2020 05:25), Max: 98.4 (14 Jan 2020 06:43)  HR: 68 (15 Jerry 2020 05:25) (60 - 74)  BP: 134/69 (15 Jerry 2020 05:25) (134/69 - 148/54)  BP(mean): --  RR: 17 (15 Jerry 2020 05:25) (16 - 17)  SpO2: 98% (15 Jerry 2020 05:25) (96% - 98%)    PHYSICAL EXAM  Gen: NAD    Right Lower Extremity:  Skin intact, erythema improving, punctate healing granulation tissue  +EHL/FHL/TA/GS  SILT L3-S1  +DP/PT Pulses  Compartments soft  No calf TTP B/L

## 2020-01-15 NOTE — ADVANCED PRACTICE NURSE CONSULT - RECOMMEDATIONS
1) Encourage patient to elevate right lower extremity while in bed  2) Continue Lac-hydrin to RLE dry skin BID

## 2020-01-15 NOTE — PROGRESS NOTE ADULT - SUBJECTIVE AND OBJECTIVE BOX
Date of service: 01-15-20 @ 14:07    Lying in bed in NAD  Weak looking  Noted with right forearm mild erythema and edema ?phlebitis    ROS: no fever or chills; denies dizziness, no HA, no SOB or cough, no abdominal pain, no diarrhea or constipation; no dysuria, no legs pain, no rashes    MEDICATIONS  (STANDING):  amLODIPine   Tablet 10 milliGRAM(s) Oral daily  ammonium lactate 12% Lotion 1 Application(s) Topical two times a day  cefTRIAXone Injectable. 2000 milliGRAM(s) IV Push every 24 hours  cholecalciferol 2000 Unit(s) Oral daily  finasteride 5 milliGRAM(s) Oral daily  lactated ringers. 1000 milliLiter(s) (75 mL/Hr) IV Continuous <Continuous>  losartan 100 milliGRAM(s) Oral daily  tamsulosin 0.4 milliGRAM(s) Oral two times a day  vancomycin  IVPB 750 milliGRAM(s) IV Intermittent every 12 hours      Vital Signs Last 24 Hrs  T(C): 36.4 (15 Jerry 2020 12:03), Max: 36.8 (14 Jan 2020 22:46)  T(F): 97.6 (15 Jerry 2020 12:03), Max: 98.2 (14 Jan 2020 22:46)  HR: 58 (15 Jerry 2020 12:03) (58 - 68)  BP: 121/52 (15 Jerry 2020 12:03) (121/52 - 146/51)  BP(mean): --  RR: 17 (15 Jerry 2020 12:03) (16 - 17)  SpO2: 98% (15 Jerry 2020 12:03) (97% - 98%)    Physical Exam:      Constitutional: frail looking  HEENT: NC/AT, EOMI, PERRLA, conjunctivae clear  Neck: supple; thyroid not palpable  Back: no tenderness  Respiratory: respiratory effort normal; clear to auscultation  Cardiovascular: S1S2 regular, no murmurs  Abdomen: soft, not tender, not distended, positive BS  Genitourinary: no suprapubic tenderness  Lymphatic: no LN palpable  Musculoskeletal: no muscle tenderness, no joint swelling or tenderness  Extremities: no pedal edema  RLE erythema, edema, warmth and tenderness on right ankle and lower calf; no drainage - improving slowly  Neurological/ Psychiatric: AxOx3, moving all extremities  Skin: no rashes; no palpable lesions    Labs: reviewed    Vancomycin Level, Trough: 12.8 ug/mL (01-13 @ 05:46)                          10.7   6.65  )-----------( 375      ( 11 Jan 2020 06:21 )             31.9     01-11    141  |  109<H>  |  23  ----------------------------<  104<H>  3.4<L>   |  27  |  0.68    Ca    8.6      11 Jan 2020 06:21  Phos  2.8     01-11  Mg     1.8     01-11    TPro  7.2  /  Alb  3.2<L>  /  TBili  0.3  /  DBili  x   /  AST  23  /  ALT  38  /  AlkPhos  127<H>  01-10     LIVER FUNCTIONS - ( 10 Jerry 2020 21:47 )  Alb: 3.2 g/dL / Pro: 7.2 gm/dL / ALK PHOS: 127 U/L / ALT: 38 U/L / AST: 23 U/L / GGT: x             Culture - Blood (collected 10 Jerry 2020 21:47)  Source: .Blood Blood-Peripheral  Preliminary Report (12 Jan 2020 10:01):    No growth to date.    Culture - Blood (collected 10 Jerry 2020 21:47)  Source: .Blood Blood-Peripheral  Preliminary Report (12 Jan 2020 10:01):    No growth to date.    Culture - Urine (collected 07 Jan 2020 03:39)  Source: .Urine None  Final Report (08 Jan 2020 08:27):    No growth    Radiology: all available radiological tests reviewed    < from: CT Ankle No Cont, Right (01.10.20 @ 23:42) >  Surgical hardware with healing distal fibular fracture.  Diffuse subcutaneous edema without focal fluid collection.    < end of copied text >      Advanced directives addressed: full resuscitation

## 2020-01-16 ENCOUNTER — TRANSCRIPTION ENCOUNTER (OUTPATIENT)
Age: 85
End: 2020-01-16

## 2020-01-16 VITALS
OXYGEN SATURATION: 98 % | HEART RATE: 66 BPM | TEMPERATURE: 98 F | DIASTOLIC BLOOD PRESSURE: 77 MMHG | SYSTOLIC BLOOD PRESSURE: 142 MMHG | RESPIRATION RATE: 16 BRPM

## 2020-01-16 LAB
ANION GAP SERPL CALC-SCNC: 4 MMOL/L — LOW (ref 5–17)
BUN SERPL-MCNC: 18 MG/DL — SIGNIFICANT CHANGE UP (ref 7–23)
CALCIUM SERPL-MCNC: 8.7 MG/DL — SIGNIFICANT CHANGE UP (ref 8.5–10.1)
CHLORIDE SERPL-SCNC: 109 MMOL/L — HIGH (ref 96–108)
CO2 SERPL-SCNC: 26 MMOL/L — SIGNIFICANT CHANGE UP (ref 22–31)
CREAT SERPL-MCNC: 0.65 MG/DL — SIGNIFICANT CHANGE UP (ref 0.5–1.3)
CULTURE RESULTS: SIGNIFICANT CHANGE UP
CULTURE RESULTS: SIGNIFICANT CHANGE UP
GLUCOSE SERPL-MCNC: 96 MG/DL — SIGNIFICANT CHANGE UP (ref 70–99)
POTASSIUM SERPL-MCNC: 3.6 MMOL/L — SIGNIFICANT CHANGE UP (ref 3.5–5.3)
POTASSIUM SERPL-SCNC: 3.6 MMOL/L — SIGNIFICANT CHANGE UP (ref 3.5–5.3)
SODIUM SERPL-SCNC: 139 MMOL/L — SIGNIFICANT CHANGE UP (ref 135–145)
SPECIMEN SOURCE: SIGNIFICANT CHANGE UP
SPECIMEN SOURCE: SIGNIFICANT CHANGE UP

## 2020-01-16 PROCEDURE — 71045 X-RAY EXAM CHEST 1 VIEW: CPT | Mod: 26

## 2020-01-16 RX ORDER — VANCOMYCIN HCL 1 G
750 VIAL (EA) INTRAVENOUS
Qty: 0 | Refills: 0 | DISCHARGE
Start: 2020-01-16

## 2020-01-16 RX ADMIN — Medication 250 MILLIGRAM(S): at 11:11

## 2020-01-16 RX ADMIN — LOSARTAN POTASSIUM 100 MILLIGRAM(S): 100 TABLET, FILM COATED ORAL at 05:36

## 2020-01-16 RX ADMIN — TAMSULOSIN HYDROCHLORIDE 0.4 MILLIGRAM(S): 0.4 CAPSULE ORAL at 05:36

## 2020-01-16 RX ADMIN — Medication 1 APPLICATION(S): at 05:36

## 2020-01-16 RX ADMIN — AMLODIPINE BESYLATE 10 MILLIGRAM(S): 2.5 TABLET ORAL at 05:36

## 2020-01-16 RX ADMIN — Medication 2000 UNIT(S): at 15:30

## 2020-01-16 RX ADMIN — FINASTERIDE 5 MILLIGRAM(S): 5 TABLET, FILM COATED ORAL at 11:10

## 2020-01-16 RX ADMIN — CEFTRIAXONE 2000 MILLIGRAM(S): 500 INJECTION, POWDER, FOR SOLUTION INTRAMUSCULAR; INTRAVENOUS at 15:30

## 2020-01-16 NOTE — DISCHARGE NOTE PROVIDER - NSDCHHNEEDSERVICE_GEN_ALL_CORE
Rehabilitation services/Other, specify.../Medication teaching and assessment/Observation and assessment/Central venous access care/Teaching and training/Wound care and assessment

## 2020-01-16 NOTE — PROGRESS NOTE ADULT - REASON FOR ADMISSION
RLE cellulitis

## 2020-01-16 NOTE — DISCHARGE NOTE PROVIDER - CARE PROVIDERS DIRECT ADDRESSES
[email or web address]

## 2020-01-16 NOTE — DISCHARGE NOTE PROVIDER - NSDCFUADDINST_GEN_ALL_CORE_FT
PCP- follow up in 1 week. Bring these papers with you to that appointment so your PCP can request records from your hospital stay.    weekly blood test while on IV antibiotics and sent results for Dr. Mcqueen

## 2020-01-16 NOTE — DISCHARGE NOTE PROVIDER - NSDCFUSCHEDAPPT_GEN_ALL_CORE_FT
JOSE APONTE ; 01/28/2020 ; [facility] JOSE APONTE ; 01/28/2020 ; [facility] [address] [facility]

## 2020-01-16 NOTE — DISCHARGE NOTE PROVIDER - CARE PROVIDER_API CALL
Daniel Bustillos (DO)  Orthopaedic Surgery  [address] [place] [address]  Phone: [phone]  Fax: [phone]  Follow Up Time: 1 week    Mikey Mcqueen)  Infectious Disease; Internal Medicine  [address] [address]  Phone: [phone]  Fax: [phone]  Follow Up Time: 2 weeks    Ac Mtz)  Internal Medicine  [address] [facility] [address]  [place] [address]  Phone: [phone]  Fax: [phone]  Follow Up Time: 1 week Daniel Bustillos (DO)  Orthopaedic Surgery  [address] [place] [address]  Phone: [phone]  Fax: [phone]  Follow Up Time: 1 week    Mikey Mcqueen)  Infectious Disease; Internal Medicine  [address] [facility] [address]  Phone: [phone]  Fax: [phone]  Follow Up Time: 2 weeks    Ac Mtz)  Internal Medicine  [address] [facility], [address]  [place] [address]  Phone: [phone]  Fax: [phone]  Follow Up Time: 1 week    Brandon Walter)  Urology  [address] [facility] [address]nd [place] [address]  Phone: [phone]  Fax: [phone]  Follow Up Time:

## 2020-01-16 NOTE — DISCHARGE NOTE PROVIDER - PROVIDER TOKENS
PROVIDER:[TOKEN:[[id number]],FOLLOWUP:[1 week]],PROVIDER:[TOKEN:[[id number]],FOLLOWUP:[2 weeks]],PROVIDER:[TOKEN:[[id number]],FOLLOWUP:[1 week]] PROVIDER:[TOKEN:[[id number]],FOLLOWUP:[1 week]],PROVIDER:[TOKEN:[[id number]],FOLLOWUP:[2 weeks]],PROVIDER:[TOKEN:[[id number]],FOLLOWUP:[1 week]],PROVIDER:[TOKEN:[[id number]]]

## 2020-01-16 NOTE — DISCHARGE NOTE PROVIDER - NSDCCPCAREPLAN_GEN_ALL_CORE_FT
PRINCIPAL DISCHARGE DIAGNOSIS  Diagnosis: Cellulitis of right lower extremity  Assessment and Plan of Treatment: PRINCIPAL DISCHARGE DIAGNOSIS  Diagnosis: Cellulitis of right lower extremity  Assessment and Plan of Treatment: 6 weeks of antibiotic treatment. suspected osteomyelitis or hardware infection. please follow up with orthopedics and ID closely.      SECONDARY DISCHARGE DIAGNOSES  Diagnosis: Urinary retention  Assessment and Plan of Treatment: urology follow up    Diagnosis: Superficial vein thrombosis  Assessment and Plan of Treatment: warm compress

## 2020-01-16 NOTE — DISCHARGE NOTE PROVIDER - HOSPITAL COURSE
Assessment and Plan:    85 yo M with a PMH HTN, HLD, and BPH who presents with RLE pain and swelling        #RLE cellulitis, suspected osteomyelitis and Hardware infection    failed oral abx multiple courses     s/p ankle ORIF    Ortho eval noted, nonsurgical mgmt per ortho. cleared for d/c per ortho    Ceftriaxone/vancomycin IV. plan for 6 weeks of IV antibx as rec by ID    PICC was placed today. see below    moisturise leg        #RUE superficial thrombosis    warm compress, elevate RUE        #Urinary retention:    passed TOV    cw  Proscar, Flomax        T(C): 36.5 (01-16-20 @ 05:10), Max: 36.6 (01-15-20 @ 21:06)    HR: 62 (01-16-20 @ 05:10) (58 - 63)    BP: 149/81 (01-16-20 @ 05:10) (121/52 - 149/81)    RR: 16 (01-16-20 @ 05:10) (16 - 17)    SpO2: 96% (01-16-20 @ 05:10) (96% - 98%)        Gen - Pleasant, cooperative, in no acute distress    HEENT- PERRL, moist mucus membranes, OP clear    CV - RRR, No m/r/g, +pulses, mild LLE edema    Lungs - Good effort, Clear to auscultation bilaterally    Abdomen - Soft, nontender, nondistended, +BS, No rebound/rigidity/guarding    Ext - No cyanosis/clubbing.     Skin - erythema and induration w/ firm lump on R antecubital fossa, No jaundice    Psych- Alert & oriented x 3    Neuro- fluent speech, no facial droop, EOMI. moves all ext        Dispo: discharge to home w/ home care in stable condition        Final diagnosis, treatment plan, and follow-up recommendations were discussed and explained to the patient. The patient was given an opportunity to ask questions concerning the diagnosis and treatment plan. The patient acknowledged understanding of the diagnosis, treatment, and follow-up recommendations. The patient was advised to seek urgent care upon discharge if worsening symptoms develop prior to scheduled follow-up. Time spent on discharge included time with the patient, and also coordinating discharge care as outlined below.        Total time spent: 50 min

## 2020-01-16 NOTE — DISCHARGE NOTE PROVIDER - NSDCMRMEDTOKEN_GEN_ALL_CORE_FT
amLODIPine 10 mg oral tablet: 1 tab(s) orally once a day  Bactrim  mg-160 mg oral tablet: 1 tab(s) orally every 12 hours   ***Course Completed***  cholecalciferol 2000 intl units (50 mcg) oral tablet: 1 tab(s) orally once a day  CoQ10 300 mg oral capsule: 1 cap(s) orally once a day  finasteride 5 mg oral tablet: 1 tab(s) orally once a day  Fish Oil 1200 mg oral capsule: 1 cap(s) orally once a day  Flomax 0.4 mg oral capsule: 1 cap(s) orally 2 times a day  glucosamine 500 mg oral capsule: 1 cap(s) orally once a day  hydroCHLOROthiazide 25 mg oral tablet: 1 tab(s) orally once a day  Keflex 500 mg oral capsule: 1 cap(s) orally every 8 hours   ****Course Not Completed***  losartan 100 mg oral tablet: 1 tab(s) orally once a day  pravastatin 20 mg oral tablet: 1 tab(s) orally once a day amLODIPine 10 mg oral tablet: 1 tab(s) orally once a day  cefTRIAXone 2 g intravenous injection: 2 gram(s) intravenous every 24 hours for 6 weeks  cholecalciferol 2000 intl units (50 mcg) oral tablet: 1 tab(s) orally once a day  CoQ10 300 mg oral capsule: 1 cap(s) orally once a day  finasteride 5 mg oral tablet: 1 tab(s) orally once a day  Fish Oil 1200 mg oral capsule: 1 cap(s) orally once a day  Flomax 0.4 mg oral capsule: 1 cap(s) orally 2 times a day  glucosamine 500 mg oral capsule: 1 cap(s) orally once a day  losartan 100 mg oral tablet: 1 tab(s) orally once a day  pravastatin 20 mg oral tablet: 1 tab(s) orally once a day  vancomycin 750 mg intravenous injection: 750 milligram(s) intravenous every 12 hours for 6 weeks

## 2020-01-16 NOTE — ADVANCED PRACTICE NURSE CONSULT - ASSESSMENT
Patient is alert and oriented. PICC insertion along with risks, benefits, possible complications and infection prevention explained to pt who verbalized understanding. All questions addressed and written signed consent placed on chart. Time out along with hand washing performed by all care givers. Right arm cleansed with CHG. Patient draped with sterile sheet in usual fashion. Lidocaine 1%, 3ml given intradermally to right arm marked site. Using strict sterile technique, under ultra sound guidance, placed Navilyst Xcela PICC with PASV technology (lot # [id number]) single lumen 4F, cut to 47cm into right basilic vein. Brisk blood return and flushed with 30ml NS. Minimal blood loss and pt tolerated procedure well. All sharps accounted for. Dressing and end cap in place. Xray done and report given to district RN.
Alert and oriented, chart reviewed prior to insertion, consent on chart, spoke with attending regarding placing picc in the LUE, risk and benefits explained and p/s he knows because the team went over this yesterday, and agreed to the procedure, pt on stretcher in semi-fowlers position, Navilyst Picc with PASV placed in the LUE in the basilic vein with good blood return and flushes well with 20mL/NS, vein access using the sonosite US, A 4Fr SL cath with length 46cm placed, minimal blood loss, procedure done using MST w/sterile technique full barrier precautions. Pt. toerated well. Anticipating home today. Pt. resting quietly on stretcher with no complaints, no distress observed. [place] accounts for, LOT [id number]. Report given to district RN.
This is an 84 year old male that was admitted to the hospital on 1/10/2020 for right ankle cellulitis  PMH- HTN, HLD, and BPH, recent right ankle fracture (Oct 10, 2019) s/p ORIF was admitted on 1/10 for RLE pain and swelling. He thinks the cellulitis started a couple of weeks ago, when he noted pain, swelling, and some flakiness. He received 7 days of Bactrim and has taken 10 out of 15 days of Keflex. He thinks it improved initially before during the last 3-4 days, then his swelling and pain have worsened.     Requested to assess right ankle. Upon assessment RLE noted to be edematous with erythema. Positive pedal pulses. Dry flaky skin noted. No open wound or drainage noted. Patient has already been ordered lac-hydrin for his dry skin. Patient remains resting in bed.

## 2020-01-16 NOTE — PROGRESS NOTE ADULT - SUBJECTIVE AND OBJECTIVE BOX
Date of service: 01-16-20 @ 15:34    Lying in bed in NAD  Weak looking  No fever or chills  Events noted  Right arm PIC line removed due to superficial phlebitis  New left arm PICC in place    ROS: no fever or chills; denies dizziness, no HA, no SOB or cough, no abdominal pain, no diarrhea or constipation; no dysuria, no legs pain    MEDICATIONS  (STANDING):  amLODIPine   Tablet 10 milliGRAM(s) Oral daily  ammonium lactate 12% Lotion 1 Application(s) Topical two times a day  cefTRIAXone Injectable. 2000 milliGRAM(s) IV Push every 24 hours  cholecalciferol 2000 Unit(s) Oral daily  enoxaparin Injectable 40 milliGRAM(s) SubCutaneous every 24 hours  finasteride 5 milliGRAM(s) Oral daily  lactated ringers. 1000 milliLiter(s) (75 mL/Hr) IV Continuous <Continuous>  losartan 100 milliGRAM(s) Oral daily  tamsulosin 0.4 milliGRAM(s) Oral two times a day  vancomycin  IVPB 750 milliGRAM(s) IV Intermittent every 12 hours      Vital Signs Last 24 Hrs  T(C): 36.6 (16 Jan 2020 10:43), Max: 36.6 (15 Jerry 2020 21:06)  T(F): 97.9 (16 Jan 2020 10:43), Max: 97.9 (15 Jerry 2020 21:06)  HR: 66 (16 Jan 2020 10:43) (62 - 66)  BP: 142/77 (16 Jan 2020 10:43) (137/56 - 149/81)  BP(mean): --  RR: 16 (16 Jan 2020 10:43) (16 - 17)  SpO2: 98% (16 Jan 2020 10:43) (96% - 98%)    Physical Exam:      Constitutional: frail looking  HEENT: NC/AT, EOMI, PERRLA, conjunctivae clear  Neck: supple; thyroid not palpable  Back: no tenderness  Respiratory: respiratory effort normal; clear to auscultation  Cardiovascular: S1S2 regular, no murmurs  Abdomen: soft, not tender, not distended, positive BS  Genitourinary: no suprapubic tenderness  Lymphatic: no LN palpable  Musculoskeletal: no muscle tenderness, no joint swelling or tenderness  Extremities: no pedal edema  RLE erythema, edema, warmth and tenderness on right ankle and lower calf; no drainage - improving slowly  Left arm PICC in place  Right forearm mild phlebitis  Neurological/ Psychiatric: AxOx3, moving all extremities  Skin: no rashes; no palpable lesions    Labs: reviewed    Vancomycin Level, Trough: 12.8 ug/mL (01-13 @ 05:46)                          10.7   6.65  )-----------( 375      ( 11 Jan 2020 06:21 )             31.9     01-11    141  |  109<H>  |  23  ----------------------------<  104<H>  3.4<L>   |  27  |  0.68    Ca    8.6      11 Jan 2020 06:21  Phos  2.8     01-11  Mg     1.8     01-11    TPro  7.2  /  Alb  3.2<L>  /  TBili  0.3  /  DBili  x   /  AST  23  /  ALT  38  /  AlkPhos  127<H>  01-10     LIVER FUNCTIONS - ( 10 Jerry 2020 21:47 )  Alb: 3.2 g/dL / Pro: 7.2 gm/dL / ALK PHOS: 127 U/L / ALT: 38 U/L / AST: 23 U/L / GGT: x             Culture - Blood (collected 10 Jerry 2020 21:47)  Source: .Blood Blood-Peripheral  Preliminary Report (12 Jan 2020 10:01):    No growth to date.    Culture - Blood (collected 10 Jerry 2020 21:47)  Source: .Blood Blood-Peripheral  Preliminary Report (12 Jan 2020 10:01):    No growth to date.    Culture - Urine (collected 07 Jan 2020 03:39)  Source: .Urine None  Final Report (08 Jan 2020 08:27):    No growth    Radiology: all available radiological tests reviewed    < from: CT Ankle No Cont, Right (01.10.20 @ 23:42) >  Surgical hardware with healing distal fibular fracture.  Diffuse subcutaneous edema without focal fluid collection.    < end of copied text >      Advanced directives addressed: full resuscitation

## 2020-01-21 RX ORDER — CEFTRIAXONE 500 MG/1
2 INJECTION, POWDER, FOR SOLUTION INTRAMUSCULAR; INTRAVENOUS
Qty: 0 | Refills: 0 | DISCHARGE
Start: 2020-01-21

## 2020-01-23 DIAGNOSIS — E78.5 HYPERLIPIDEMIA, UNSPECIFIED: ICD-10-CM

## 2020-01-23 DIAGNOSIS — I80.8 PHLEBITIS AND THROMBOPHLEBITIS OF OTHER SITES: ICD-10-CM

## 2020-01-23 DIAGNOSIS — E86.0 DEHYDRATION: ICD-10-CM

## 2020-01-23 DIAGNOSIS — R33.8 OTHER RETENTION OF URINE: ICD-10-CM

## 2020-01-23 DIAGNOSIS — M86.171 OTHER ACUTE OSTEOMYELITIS, RIGHT ANKLE AND FOOT: ICD-10-CM

## 2020-01-23 DIAGNOSIS — T84.629A INFECTION AND INFLAMMATORY REACTION DUE TO INTERNAL FIXATION DEVICE OF UNSPECIFIED BONE OF LEG, INITIAL ENCOUNTER: ICD-10-CM

## 2020-01-23 DIAGNOSIS — N40.1 BENIGN PROSTATIC HYPERPLASIA WITH LOWER URINARY TRACT SYMPTOMS: ICD-10-CM

## 2020-01-23 DIAGNOSIS — L03.115 CELLULITIS OF RIGHT LOWER LIMB: ICD-10-CM

## 2020-01-23 DIAGNOSIS — I10 ESSENTIAL (PRIMARY) HYPERTENSION: ICD-10-CM

## 2020-01-29 ENCOUNTER — INPATIENT (INPATIENT)
Facility: HOSPITAL | Age: 85
LOS: 12 days | Discharge: NURSING, REHAB OR LONG-TERM CARE | DRG: 871 | End: 2020-02-11
Attending: INTERNAL MEDICINE | Admitting: INTERNAL MEDICINE
Payer: MEDICARE

## 2020-01-29 VITALS
HEIGHT: 71 IN | TEMPERATURE: 100 F | WEIGHT: 179.9 LBS | RESPIRATION RATE: 18 BRPM | HEART RATE: 95 BPM | OXYGEN SATURATION: 92 %

## 2020-01-29 DIAGNOSIS — R94.31 ABNORMAL ELECTROCARDIOGRAM [ECG] [EKG]: ICD-10-CM

## 2020-01-29 DIAGNOSIS — Z98.890 OTHER SPECIFIED POSTPROCEDURAL STATES: Chronic | ICD-10-CM

## 2020-01-29 LAB
ALBUMIN SERPL ELPH-MCNC: 3 G/DL — LOW (ref 3.3–5)
ALP SERPL-CCNC: 98 U/L — SIGNIFICANT CHANGE UP (ref 40–120)
ALT FLD-CCNC: 79 U/L — HIGH (ref 12–78)
ANION GAP SERPL CALC-SCNC: 7 MMOL/L — SIGNIFICANT CHANGE UP (ref 5–17)
APPEARANCE UR: ABNORMAL
APTT BLD: 29.1 SEC — SIGNIFICANT CHANGE UP (ref 27.5–36.3)
AST SERPL-CCNC: 103 U/L — HIGH (ref 15–37)
BASOPHILS # BLD AUTO: 0 K/UL — SIGNIFICANT CHANGE UP (ref 0–0.2)
BASOPHILS NFR BLD AUTO: 0 % — SIGNIFICANT CHANGE UP (ref 0–2)
BILIRUB SERPL-MCNC: 0.8 MG/DL — SIGNIFICANT CHANGE UP (ref 0.2–1.2)
BILIRUB UR-MCNC: NEGATIVE — SIGNIFICANT CHANGE UP
BUN SERPL-MCNC: 26 MG/DL — HIGH (ref 7–23)
CALCIUM SERPL-MCNC: 8.1 MG/DL — LOW (ref 8.5–10.1)
CHLORIDE SERPL-SCNC: 107 MMOL/L — SIGNIFICANT CHANGE UP (ref 96–108)
CO2 SERPL-SCNC: 23 MMOL/L — SIGNIFICANT CHANGE UP (ref 22–31)
COLOR SPEC: YELLOW — SIGNIFICANT CHANGE UP
CREAT SERPL-MCNC: 0.9 MG/DL — SIGNIFICANT CHANGE UP (ref 0.5–1.3)
DIFF PNL FLD: ABNORMAL
EOSINOPHIL # BLD AUTO: 0.29 K/UL — SIGNIFICANT CHANGE UP (ref 0–0.5)
EOSINOPHIL NFR BLD AUTO: 5 % — SIGNIFICANT CHANGE UP (ref 0–6)
GLUCOSE SERPL-MCNC: 111 MG/DL — HIGH (ref 70–99)
GLUCOSE UR QL: NEGATIVE MG/DL — SIGNIFICANT CHANGE UP
HCT VFR BLD CALC: 32 % — LOW (ref 39–50)
HGB BLD-MCNC: 10.8 G/DL — LOW (ref 13–17)
INR BLD: 1.33 RATIO — HIGH (ref 0.88–1.16)
KETONES UR-MCNC: ABNORMAL
LACTATE SERPL-SCNC: 1.3 MMOL/L — SIGNIFICANT CHANGE UP (ref 0.7–2)
LEUKOCYTE ESTERASE UR-ACNC: ABNORMAL
LYMPHOCYTES # BLD AUTO: 0.29 K/UL — LOW (ref 1–3.3)
LYMPHOCYTES # BLD AUTO: 5 % — LOW (ref 13–44)
MCHC RBC-ENTMCNC: 30.5 PG — SIGNIFICANT CHANGE UP (ref 27–34)
MCHC RBC-ENTMCNC: 33.8 GM/DL — SIGNIFICANT CHANGE UP (ref 32–36)
MCV RBC AUTO: 90.4 FL — SIGNIFICANT CHANGE UP (ref 80–100)
MONOCYTES # BLD AUTO: 0.41 K/UL — SIGNIFICANT CHANGE UP (ref 0–0.9)
MONOCYTES NFR BLD AUTO: 7 % — SIGNIFICANT CHANGE UP (ref 2–14)
NEUTROPHILS # BLD AUTO: 4.52 K/UL — SIGNIFICANT CHANGE UP (ref 1.8–7.4)
NEUTROPHILS NFR BLD AUTO: 78 % — HIGH (ref 43–77)
NITRITE UR-MCNC: NEGATIVE — SIGNIFICANT CHANGE UP
NRBC # BLD: SIGNIFICANT CHANGE UP /100 WBCS (ref 0–0)
PH UR: 5 — SIGNIFICANT CHANGE UP (ref 5–8)
PLATELET # BLD AUTO: 120 K/UL — LOW (ref 150–400)
POTASSIUM SERPL-MCNC: 3.1 MMOL/L — LOW (ref 3.5–5.3)
POTASSIUM SERPL-SCNC: 3.1 MMOL/L — LOW (ref 3.5–5.3)
PROT SERPL-MCNC: 6.2 GM/DL — SIGNIFICANT CHANGE UP (ref 6–8.3)
PROT UR-MCNC: 30 MG/DL
PROTHROM AB SERPL-ACNC: 14.9 SEC — HIGH (ref 10–12.9)
RBC # BLD: 3.54 M/UL — LOW (ref 4.2–5.8)
RBC # FLD: 13.2 % — SIGNIFICANT CHANGE UP (ref 10.3–14.5)
SODIUM SERPL-SCNC: 137 MMOL/L — SIGNIFICANT CHANGE UP (ref 135–145)
SP GR SPEC: 1.02 — SIGNIFICANT CHANGE UP (ref 1.01–1.02)
UROBILINOGEN FLD QL: NEGATIVE MG/DL — SIGNIFICANT CHANGE UP
WBC # BLD: 5.79 K/UL — SIGNIFICANT CHANGE UP (ref 3.8–10.5)
WBC # FLD AUTO: 5.79 K/UL — SIGNIFICANT CHANGE UP (ref 3.8–10.5)

## 2020-01-29 PROCEDURE — 93970 EXTREMITY STUDY: CPT

## 2020-01-29 PROCEDURE — 80074 ACUTE HEPATITIS PANEL: CPT

## 2020-01-29 PROCEDURE — C1889: CPT

## 2020-01-29 PROCEDURE — 83880 ASSAY OF NATRIURETIC PEPTIDE: CPT

## 2020-01-29 PROCEDURE — 80048 BASIC METABOLIC PNL TOTAL CA: CPT

## 2020-01-29 PROCEDURE — 71045 X-RAY EXAM CHEST 1 VIEW: CPT | Mod: 26

## 2020-01-29 PROCEDURE — 97110 THERAPEUTIC EXERCISES: CPT | Mod: GP

## 2020-01-29 PROCEDURE — 83735 ASSAY OF MAGNESIUM: CPT

## 2020-01-29 PROCEDURE — C1894: CPT

## 2020-01-29 PROCEDURE — 87086 URINE CULTURE/COLONY COUNT: CPT

## 2020-01-29 PROCEDURE — C1887: CPT

## 2020-01-29 PROCEDURE — 80202 ASSAY OF VANCOMYCIN: CPT

## 2020-01-29 PROCEDURE — 84484 ASSAY OF TROPONIN QUANT: CPT

## 2020-01-29 PROCEDURE — C1769: CPT

## 2020-01-29 PROCEDURE — 92526 ORAL FUNCTION THERAPY: CPT | Mod: GN

## 2020-01-29 PROCEDURE — 93005 ELECTROCARDIOGRAM TRACING: CPT

## 2020-01-29 PROCEDURE — 80053 COMPREHEN METABOLIC PANEL: CPT

## 2020-01-29 PROCEDURE — 84100 ASSAY OF PHOSPHORUS: CPT

## 2020-01-29 PROCEDURE — 93456 R HRT CORONARY ARTERY ANGIO: CPT

## 2020-01-29 PROCEDURE — 87486 CHLMYD PNEUM DNA AMP PROBE: CPT

## 2020-01-29 PROCEDURE — 71260 CT THORAX DX C+: CPT

## 2020-01-29 PROCEDURE — 93010 ELECTROCARDIOGRAM REPORT: CPT

## 2020-01-29 PROCEDURE — 97162 PT EVAL MOD COMPLEX 30 MIN: CPT | Mod: GP

## 2020-01-29 PROCEDURE — 76705 ECHO EXAM OF ABDOMEN: CPT

## 2020-01-29 PROCEDURE — 85027 COMPLETE CBC AUTOMATED: CPT

## 2020-01-29 PROCEDURE — 94660 CPAP INITIATION&MGMT: CPT

## 2020-01-29 PROCEDURE — 83605 ASSAY OF LACTIC ACID: CPT

## 2020-01-29 PROCEDURE — 97116 GAIT TRAINING THERAPY: CPT | Mod: GP

## 2020-01-29 PROCEDURE — 97530 THERAPEUTIC ACTIVITIES: CPT | Mod: GP

## 2020-01-29 PROCEDURE — 87581 M.PNEUMON DNA AMP PROBE: CPT

## 2020-01-29 PROCEDURE — 87040 BLOOD CULTURE FOR BACTERIA: CPT

## 2020-01-29 PROCEDURE — 36415 COLL VENOUS BLD VENIPUNCTURE: CPT

## 2020-01-29 PROCEDURE — 74177 CT ABD & PELVIS W/CONTRAST: CPT

## 2020-01-29 PROCEDURE — 36600 WITHDRAWAL OF ARTERIAL BLOOD: CPT

## 2020-01-29 PROCEDURE — 87633 RESP VIRUS 12-25 TARGETS: CPT

## 2020-01-29 PROCEDURE — 93306 TTE W/DOPPLER COMPLETE: CPT

## 2020-01-29 PROCEDURE — 84145 PROCALCITONIN (PCT): CPT

## 2020-01-29 PROCEDURE — 82553 CREATINE MB FRACTION: CPT

## 2020-01-29 PROCEDURE — 87798 DETECT AGENT NOS DNA AMP: CPT

## 2020-01-29 PROCEDURE — 92610 EVALUATE SWALLOWING FUNCTION: CPT | Mod: GN

## 2020-01-29 PROCEDURE — 82550 ASSAY OF CK (CPK): CPT

## 2020-01-29 PROCEDURE — 81001 URINALYSIS AUTO W/SCOPE: CPT

## 2020-01-29 PROCEDURE — 71045 X-RAY EXAM CHEST 1 VIEW: CPT

## 2020-01-29 PROCEDURE — 80076 HEPATIC FUNCTION PANEL: CPT

## 2020-01-29 PROCEDURE — 82803 BLOOD GASES ANY COMBINATION: CPT

## 2020-01-29 RX ORDER — CEFEPIME 1 G/1
1000 INJECTION, POWDER, FOR SOLUTION INTRAMUSCULAR; INTRAVENOUS EVERY 8 HOURS
Refills: 0 | Status: DISCONTINUED | OUTPATIENT
Start: 2020-01-29 | End: 2020-01-29

## 2020-01-29 RX ORDER — ASPIRIN/CALCIUM CARB/MAGNESIUM 324 MG
325 TABLET ORAL DAILY
Refills: 0 | Status: DISCONTINUED | OUTPATIENT
Start: 2020-01-29 | End: 2020-01-30

## 2020-01-29 RX ORDER — ACETAMINOPHEN 500 MG
650 TABLET ORAL ONCE
Refills: 0 | Status: DISCONTINUED | OUTPATIENT
Start: 2020-01-29 | End: 2020-01-29

## 2020-01-29 RX ORDER — CEFEPIME 1 G/1
1000 INJECTION, POWDER, FOR SOLUTION INTRAMUSCULAR; INTRAVENOUS ONCE
Refills: 0 | Status: COMPLETED | OUTPATIENT
Start: 2020-01-29 | End: 2020-01-29

## 2020-01-29 RX ORDER — VANCOMYCIN HCL 1 G
1000 VIAL (EA) INTRAVENOUS ONCE
Refills: 0 | Status: COMPLETED | OUTPATIENT
Start: 2020-01-29 | End: 2020-01-29

## 2020-01-29 RX ORDER — SODIUM CHLORIDE 9 MG/ML
2500 INJECTION, SOLUTION INTRAVENOUS ONCE
Refills: 0 | Status: COMPLETED | OUTPATIENT
Start: 2020-01-29 | End: 2020-01-29

## 2020-01-29 RX ORDER — IBUPROFEN 200 MG
600 TABLET ORAL ONCE
Refills: 0 | Status: COMPLETED | OUTPATIENT
Start: 2020-01-29 | End: 2020-01-29

## 2020-01-29 RX ADMIN — Medication 600 MILLIGRAM(S): at 20:07

## 2020-01-29 RX ADMIN — Medication 600 MILLIGRAM(S): at 21:07

## 2020-01-29 RX ADMIN — Medication 325 MILLIGRAM(S): at 21:17

## 2020-01-29 RX ADMIN — Medication 250 MILLIGRAM(S): at 20:56

## 2020-01-29 RX ADMIN — SODIUM CHLORIDE 2500 MILLILITER(S): 9 INJECTION, SOLUTION INTRAVENOUS at 20:08

## 2020-01-29 RX ADMIN — Medication 1000 MILLIGRAM(S): at 22:12

## 2020-01-29 RX ADMIN — CEFEPIME 1000 MILLIGRAM(S): 1 INJECTION, POWDER, FOR SOLUTION INTRAMUSCULAR; INTRAVENOUS at 22:21

## 2020-01-29 NOTE — H&P ADULT - HISTORY OF PRESENT ILLNESS
83 yo M with a PMH HTN, HLD, and BPH who presents with fever.    In the ED, he was given aspirin 325 mg PO x1, Vancomycin 1g IV x1, Cefepime 1g IV x1, Ibuprofen 600 mg PO x1, and LR 2.5L x1. 83 yo M with a PMH HTN, HLD, and BPH who presents with leg pains. He states for the past week, he has had sharp severe leg pains. They occur every day, often lasting for hours at a time, and are not related to exertion. He states that when the pains come it makes it difficult to walk, although when it is not there, he can walk without difficulty. The pains are from his ankle up to his upper thighs (he cannot tell from where it starts and stops). Of note, he was recently admitted (2 weeks ago) for cellulitis and had a PICC line placed, to be on antibiotics (Vanc/Ceftriaxone) for 6 weeks.  He denies nausea, vomiting, fevers, chills, cough, chest pain, SOB, abdominal pain, diarrhea, dysuria, hematuria, dizziness, lightheadedness, or rash. He states the swelling in his legs from his cellulitis has improved.    Of note, per ER documentation, he presented due to fevers, and reportedly had a Tmax of 104 at home. The patient denies this.    Attempted to call patient's wife multiple times overnight and left message without response.  Patient does not know who his cardiologist is.    In the ED, he was given aspirin 325 mg PO x1, Vancomycin 1g IV x1, Cefepime 1g IV x1, Ibuprofen 600 mg PO x1, and LR 2.5L x1. 83 yo M with a PMH HTN, HLD, and BPH who presents with leg pains. He states for the past week, he has had sharp severe leg pains. They occur every day, often lasting for hours at a time, and are not related to exertion. He states that when the pains come it makes it difficult to walk, although when it is not there, he can walk without difficulty. The pains are from his ankle up to his upper thighs (he cannot tell from where it starts and stops). Of note, he was recently admitted (2 weeks ago) for cellulitis with concern for OM, and had a PICC line placed, to be on antibiotics (Vanc/Ceftriaxone) for 6 weeks.  He denies nausea, vomiting, fevers, chills, cough, chest pain, SOB, abdominal pain, diarrhea, dysuria, hematuria, dizziness, lightheadedness, or rash. He states the swelling in his legs from his cellulitis has improved.    Of note, per ER documentation, he presented due to fevers, and reportedly had a Tmax of 104 at home. The patient denies this.    Attempted to call patient's wife multiple times overnight and left message without response.  Patient does not know who his cardiologist is.    In the ED, he was given aspirin 325 mg PO x1, Vancomycin 1g IV x1, Cefepime 1g IV x1, Ibuprofen 600 mg PO x1, and LR 2.5L x1.

## 2020-01-29 NOTE — ED ADULT NURSE NOTE - CHIEF COMPLAINT QUOTE
pt presents to ED with complaints of right leg cellulitis. pt states he has been on out pt ceftriaxone via PICC in [facility] for treatment. pt states cellulitis improved at first, but has not improved in a few weeks. pt had oral temp of 104.0 at home and took tylenol at approximately 1845. primary RN aware of inability to get BP in triage.

## 2020-01-29 NOTE — H&P ADULT - NSHPREVIEWOFSYSTEMS_GEN_ALL_CORE
Gen: negative for fevers, chills, weight loss, or weight gain  Eyes: no blurred vision or lacrimation  ENT: no tinnitus, vertigo, or decreased hearing  Resp: no wheezing, dyspnea, pleuritic chest pain, hemoptysis, or orthopnea  CV: no chest pain, dyspnea on exertion, or palpitations  GI: no nausea, vomiting, abdominal pain, diarrhea, constipation, melena, or hematochezia  : no dysuria, hematuria, discharge, or incontinence  MSK: + myalgias. No arthralgias or joint swelling  Neuro: + weakness. No focal deficits, confusion, dizziness, tremors, or seizures  Skin: no rash, lesions, or edema

## 2020-01-29 NOTE — PHARMACOTHERAPY INTERVENTION NOTE - COMMENTS
Med history completed, verified with patient and patient's wife at beside, and drfirst. Per pt, for his cellulitis, he previously took cephalexin and Bactrim but stopped due to previous hospitalization and did not finish.

## 2020-01-29 NOTE — H&P ADULT - ASSESSMENT
83 yo M with a PMH HTN, HLD, and BPH who presents with fever, meets SIRS criteria. 83 yo M with a PMH HTN, HLD, and BPH who presents with leg pains and reported fever, meets SIRS criteria.    1) Fever 83 yo M with a PMH HTN, HLD, and BPH who presents with leg pains and reported fever, meets SIRS criteria.    1) (Rule out) endocarditis  - Patient has a cardiac murmur, reported fevers, and elevated troponins after having a PICC line placed  - Patient does note has a history of cardiac valvular disease, but is not sure who his cardiologist is, and family (wife) not answering phone  - No complaints of cardiac or pulmonary symptoms  - Spoke to Dr. Walter (cardiology) who noted STAT TTE would not be performed until AM. Will therefore treat as endocarditis until proven otherwise  - Will need PICC line removed in AM  - Monitor on telemetry    2) Elevated troponins  - Troponin 0.293 on admission  - Unclear etiology, given patient has lack of cardiac or pulmonary symptoms. may be demand from sepsis but need to r/o ACS  - Of note, patient does have new EKG changes (biphasic V1-2), repeat EKG without changes, discussed with cardiology (Jose Angel), who noted to continue monitoring troponins as patient currently is symptom free  - Saturating 94% on room air    3) Sepsis  -     3) Leg pains  - Patient has a history of   - Check dopplers to r/o DVTs 85 yo M with a PMH HTN, HLD, and BPH who presents with leg pains and reported fever, meets SIRS criteria.    1) (Rule out) endocarditis  - Patient has a cardiac murmur, reported fevers, and elevated troponins after having a PICC line placed. Patient also is being treated for presumed osteomyelitis  - Patient does note has a history of cardiac valvular disease, but is not sure who his cardiologist is, and unable to obtain collateral from family at this time  - No complaints of cardiac or pulmonary symptoms but does have borderline hypoxia (94% on room air but per RN will decrease to 86-88% at night)  - Spoke to Dr. Walter (cardiology) who noted STAT TTE would not be performed until AM. Will therefore treat as endocarditis until proven otherwise  - Given Vancomycin/Cefepime in ED, will continue  - Will also give one dose IV Fluconazole loading dose 800 mg  - ID consult  - F/u blood cx  - Will likely need PICC line removed in AM  - Monitor on telemetry    2) Elevated troponins  - Troponin 0.293 on admission  - Unclear etiology, given patient has lack of cardiac or pulmonary symptoms. may be demand from sepsis but need to r/o ACS  - Of note, patient does have new EKG changes (biphasic V1-2), repeat EKG without changes, discussed with cardiology (Jose Angel), who noted to continue monitoring troponins as patient currently is symptom free  - Saturating 94% on room air, but can decrease to 86-88%  - Given aspirin 325 mg, continue with 81 mg QD  - Continue to trend troponins, monitor on telemetry, monitor for signs/sxs  - Cardiology consult    3) Sepsis  - Possibly due to line infection. Low concern for PNA, no evidence of UTI, no obvious concern for cellulitis. Ddx also includes recurrent osteomyelitis  - patient does meet sepsis criteria with reported Tmax of 104 PO at home, HR 95, and RR 21  - Given 2.5L IVFs  - Lactate normal  - F/u blood and urine cx  - C/w Cefepime/Vancomycin  - One dose Fluconazole 800 mg IV  - ID consult    3) Leg pains  - Patient has a history of RLE cellulitis, but the patient notes new bilateral leg pains  - No obvious cellulitis seen  - Check dopplers to r/o DVTs  - Check CKs and potentially CK-MBs  - Hold Pravastatin  - Pain control    4) Acute Kidney Injury  - Admission creatinine 0.90 (baseline 0.65), which meets > 30% increase criteria for NIVIA  - Likely prerenal from sepsis  - Patient urinating well  - S/p IVFs  - Repeat BMP in AM    5) Thrombocytopenia  - Patient's admission platelet count is 120, from 375 two weeks prior, a marked drop  - 4T score is 3, low risk for HIT, and no evidence of thrombosis  - May be antibiotic (?cephalosporin) induced, although difficult to say at this point  - Continue to monitor CBC daily for now, monitor for signs of thrombosis or bleeding    6) BPH  - C/w Flomax 0.4 mg BID and Finasteride 5 mg QD    6) HTN  - Hold all anti-hypertensives (Amlodipine and Losartan) in light of sepsis    7) HLD  - Hold pravastatin in light of myalgias    8) Prophylactic measure  - DVT PPX: IMPROVE score of 1, but will give SQH Q12H  - Diet: DASH  - Dispo: pending improvement in sxs, f/u cx 85 yo M with a PMH HTN, HLD, and BPH who presents with leg pains and reported fever, meets SIRS criteria.    1) (Rule out) endocarditis  - Patient has a cardiac murmur, reported fevers, and elevated troponins after having a PICC line placed. Patient also is being treated for presumed osteomyelitis  - Patient does note has a history of cardiac valvular disease, but is not sure who his cardiologist is, and unable to obtain collateral from family at this time  - No complaints of cardiac or pulmonary symptoms but does have borderline hypoxia (94% on room air but per RN will decrease to 86-88% at night)  - Spoke to Dr. Walter (cardiology) who noted STAT TTE would not be performed until AM. Will therefore treat as endocarditis until proven otherwise  - Given Vancomycin/Cefepime in ED, will continue  - Will also give one dose IV Fluconazole loading dose 800 mg  - ID consult  - F/u blood cx  - Will likely need PICC line removed in AM  - Monitor on telemetry    2) Elevated troponins  - Troponin 0.293 on admission  - Unclear etiology, given patient has lack of cardiac or pulmonary symptoms. May be demand from sepsis but need to r/o ACS  - Of note, patient does have new EKG changes (biphasic V1-2), repeat EKG without changes, discussed with cardiology (Jose Angel), who noted to continue monitoring troponins as patient currently is symptom free  - Saturating 94% on room air, but can decrease to 86-88%  - Given aspirin 325 mg, continue with 81 mg QD  - Continue to trend troponins, monitor on telemetry, monitor for signs/sxs  - Cardiology consult    3) Sepsis  - Possibly due to line infection. Low concern for PNA, no evidence of UTI, no obvious concern for cellulitis. Ddx also includes recurrent osteomyelitis  - patient does meet sepsis criteria with reported Tmax of 104 PO at home, HR 95, and RR 21  - Given 2.5L IVFs  - Lactate normal  - F/u blood and urine cx  - C/w Cefepime/Vancomycin  - One dose Fluconazole 800 mg IV  - ID consult    3) Leg pains  - Patient has a history of RLE cellulitis, but the patient notes new bilateral leg pains  - No obvious cellulitis seen  - Check dopplers to r/o DVTs  - Check CKs and potentially CK-MBs  - Hold Pravastatin  - Pain control    4) Acute Kidney Injury  - Admission creatinine 0.90 (baseline 0.65), which meets > 30% increase criteria for NIVIA  - Likely prerenal from sepsis  - Patient urinating well  - S/p IVFs  - Repeat BMP in AM    5) Thrombocytopenia  - Patient's admission platelet count is 120, from 375 two weeks prior, a marked drop  - 4T score is 3, low risk for HIT, and no evidence of thrombosis  - May be antibiotic (?cephalosporin) induced, although difficult to say at this point  - Continue to monitor CBC daily for now, monitor for signs of thrombosis or bleeding    6) BPH  - C/w Flomax 0.4 mg BID and Finasteride 5 mg QD    6) HTN  - Hold all anti-hypertensives (Amlodipine and Losartan) in light of sepsis    7) HLD  - Hold pravastatin in light of myalgias    8) Prophylactic measure  - DVT PPX: IMPROVE score of 1, but will give SQH Q12H  - Diet: DASH  - Dispo: pending improvement in sxs, f/u cx 83 yo M with a PMH HTN, HLD, and BPH who presents with leg pains and reported fever, meets SIRS criteria.    1) (Rule out) endocarditis  - Patient has a cardiac murmur, reported fevers, and elevated troponins after having a PICC line placed. Patient also is being treated for presumed osteomyelitis  - Patient does note has a history of cardiac valvular disease, but is not sure who his cardiologist is, and unable to obtain collateral from family at this time  - No complaints of cardiac or pulmonary symptoms but does have borderline hypoxia (94% on room air but per RN will decrease to 86-88% at night)  - Spoke to Dr. Walter (cardiology) who noted STAT TTE would not be performed until AM. Will therefore treat as endocarditis until proven otherwise  - Given Vancomycin/Cefepime in ED, will continue  - Will also give one dose IV Fluconazole loading dose 800 mg  - ID consult  - F/u blood cx  - Will likely need PICC line removed in AM  - Monitor on telemetry    2) Elevated troponins  - Troponin 0.293 on admission  - Unclear etiology, given patient has lack of cardiac or pulmonary symptoms. May be demand from sepsis but need to r/o ACS  - Of note, patient does have new EKG changes (biphasic V1-2), repeat EKG without changes, discussed with cardiology (Jose Angel), who noted to continue monitoring troponins as patient currently is symptom free  - Saturating 94% on room air, but can decrease to 86-88%  - Given aspirin 325 mg, continue with 81 mg QD  - Continue to trend troponins, monitor on telemetry, monitor for signs/sxs  - Cardiology consult    3) Sepsis  - Possibly due to line infection. Low concern for PNA, no evidence of UTI, no obvious concern for cellulitis. Ddx also includes recurrent osteomyelitis  - patient does meet sepsis criteria with reported Tmax of 104 PO at home, HR 95, and RR 21  - Given 2.5L IVFs  - Lactate normal  - F/u blood and urine cx  - C/w Cefepime/Vancomycin  - One dose Fluconazole 800 mg IV  - ID consult    3) Leg pains  - Patient has a history of RLE cellulitis, but the patient notes new bilateral leg pains  - No obvious cellulitis seen  - Check dopplers to r/o DVTs  - Check CKs and potentially CK-MBs  - Hold Pravastatin  - Pain control    4) Acute Kidney Injury  - Admission creatinine 0.90 (baseline 0.65), which meets > 30% increase criteria for NIVIA  - Likely prerenal from sepsis  - Patient urinating well  - S/p IVFs  - Repeat BMP in AM    5) Thrombocytopenia  - Patient's admission platelet count is 120, from 375 two weeks prior, a marked drop  - 4T score is 3, low risk for HIT, and no evidence of thrombosis  - May be antibiotic (?cephalosporin) induced, although difficult to say at this point  - Continue to monitor CBC daily for now, monitor for signs of thrombosis or bleeding    6) Hypokalemia  - K 3.1, Mg 1.8  - Replete K PO and check in AM  - Monitoring on telemetry    7) BPH  - C/w Flomax 0.4 mg BID and Finasteride 5 mg QD    8) HTN  - Hold all anti-hypertensives (Amlodipine and Losartan) in light of sepsis    9) HLD  - Hold pravastatin in light of myalgias    10) Prophylactic measure  - DVT PPX: IMPROVE score of 1, but will give SQH Q12H  - Diet: DASH  - Dispo: pending improvement in sxs, f/u cx 85 yo M with a PMH HTN, HLD, and BPH who presents with leg pains and reported fever, meets SIRS criteria.    1) (Rule out) endocarditis  - Patient has a cardiac murmur, reported fevers, and elevated troponins after having a PICC line placed. Patient also is being treated for presumed osteomyelitis  - Patient does note has a history of cardiac valvular disease, but is not sure who his cardiologist is, and unable to obtain collateral from family at this time  - No complaints of cardiac or pulmonary symptoms but does have borderline hypoxia (94% on room air but per RN will decrease to 86-88% at night)  - Spoke to Dr. Walter (cardiology) who noted STAT TTE would not be performed until AM. Will therefore treat as endocarditis until proven otherwise  - Given Vancomycin/Cefepime in ED, will continue  - Will also give one dose IV Fluconazole loading dose 800 mg  - ID consult  - F/u blood cx  - Will likely need PICC line removed in AM  - Monitor on telemetry    2) Elevated troponin  - Troponin 0.293 on admission  - Unclear etiology, given patient has lack of cardiac or pulmonary symptoms. May be demand from sepsis but need to r/o ACS  - Of note, patient does have new EKG changes (biphasic V1-2), repeat EKG without changes, discussed with cardiology (Jose Angel), who noted to continue monitoring troponins as patient currently is symptom free  - Saturating 94% on room air, but can decrease to 86-88%  - Given aspirin 325 mg, continue with 81 mg QD  - Continue to trend troponins, monitor on telemetry, monitor for signs/sxs  - Cardiology consult    3) Sepsis  - Possibly due to line infection. Low concern for PNA, no evidence of UTI, no obvious concern for cellulitis. Ddx also includes recurrent osteomyelitis  - patient does meet sepsis criteria with reported Tmax of 104 PO at home, HR 95, and RR 21  - Given 2.5L IVFs  - Lactate normal  - F/u blood and urine cx  - C/w Cefepime/Vancomycin  - One dose Fluconazole 800 mg IV  - ID consult    4) Leg pain, bilateral  - Patient has a history of RLE cellulitis, but the patient notes new bilateral leg pains  - No obvious cellulitis seen  - Check dopplers to r/o DVTs  - Check CKs and potentially CK-MBs  - Hold Pravastatin  - Pain control    5) Acute Kidney Injury  - Admission creatinine 0.90 (baseline 0.65), which meets > 30% increase criteria for NIVIA  - Likely prerenal from sepsis  - Patient urinating well  - S/p IVFs  - Repeat BMP in AM    6) Thrombocytopenia  - Patient's admission platelet count is 120, from 375 two weeks prior, a marked drop  - 4T score is 3, low risk for HIT, and no evidence of thrombosis  - May be antibiotic (?cephalosporin) induced, although difficult to say at this point  - Continue to monitor CBC daily for now, monitor for signs of thrombosis or bleeding    7) Hypokalemia  - K 3.1, Mg 1.8  - Replete K PO and check in AM  - Monitoring on telemetry    8) BPH  - C/w Flomax 0.4 mg BID and Finasteride 5 mg QD    9) HTN  - Hold all anti-hypertensives (Amlodipine and Losartan) in light of sepsis    10) HLD  - Hold pravastatin in light of myalgias    11) Prophylactic measure  - DVT PPX: IMPROVE score of 1, but will give SQH Q12H  - Diet: DASH  - Dispo: pending improvement in sxs, f/u cx

## 2020-01-29 NOTE — ED ADULT NURSE NOTE - NSIMPLEMENTINTERV_GEN_ALL_ED
Implemented All Fall Risk Interventions:  [place] to call system. Call bell, personal items and telephone within reach. Instruct patient to call for assistance. Room bathroom lighting operational. Non-slip footwear when patient is off stretcher. Physically safe environment: no spills, clutter or unnecessary equipment. Stretcher in lowest position, wheels locked, appropriate side rails in place. Provide visual cue, wrist band, yellow gown, etc. Monitor gait and stability. Monitor for mental status changes and reorient to person, place, and time. Review medications for side effects contributing to fall risk. Reinforce activity limits and safety measures with patient and family.

## 2020-01-29 NOTE — ED PROVIDER NOTE - PROGRESS NOTE DETAILS
spoke with Dr. Walter on call cardio for positive trop and ekg changes. States no acute intervention. Pt without chest pain.

## 2020-01-29 NOTE — H&P ADULT - NSHPPHYSICALEXAM_GEN_ALL_CORE
Vital Signs Last 24 Hrs  T(C): 36.8 (29 Jan 2020 21:15), Max: 37.8 (29 Jan 2020 19:27)  T(F): 98.2 (29 Jan 2020 21:15), Max: 100 (29 Jan 2020 19:27)  HR: 76 (29 Jan 2020 22:00) (72 - 95)  BP: 107/52 (29 Jan 2020 22:00) (107/52 - 116/51)  BP(mean): --  RR: 16 (29 Jan 2020 22:00) (16 - 21)  SpO2: 94% (29 Jan 2020 22:00) (92% - 96%) Vital Signs Last 24 Hrs  T(C): 36.8 (29 Jan 2020 21:15), Max: 37.8 (29 Jan 2020 19:27)  T(F): 98.2 (29 Jan 2020 21:15), Max: 100 (29 Jan 2020 19:27)  HR: 76 (29 Jan 2020 22:00) (72 - 95)  BP: 107/52 (29 Jan 2020 22:00) (107/52 - 116/51)  BP(mean): --  RR: 16 (29 Jan 2020 22:00) (16 - 21)  SpO2: 94% (29 Jan 2020 22:00) (92% - 96%)    GENERAL: No acute distress  HEENT: PERRL, EOMI, MMM, no oropharyngeal lesions  NECK: supple, no stiffness, no JVD, no thyromegaly  PULM: respiration non-labored, clear to auscultation bilaterally, no rales, rhonchi, or wheezes  CV: regular rate and rhythm, no murmurs, gallops, or rubs  GI: abdomen soft, nontender, nondistended, no masses felt, normal bowel sounds  MSK: strength 5/5 bilateral upper/lower extremities. No joint swelling, erythema, or warmth.  LYMPH: no anterior cervical, posterior cervical, supraclavicular, or inguinal lymphadenopathy  NEURO: A&Ox3, no tremors, sensation intact  SKIN: 1+ peripheral edema. No rashes or lesions Vital Signs Last 24 Hrs  T(C): 36.8 (29 Jan 2020 21:15), Max: 37.8 (29 Jan 2020 19:27)  T(F): 98.2 (29 Jan 2020 21:15), Max: 100 (29 Jan 2020 19:27)  HR: 76 (29 Jan 2020 22:00) (72 - 95)  BP: 107/52 (29 Jan 2020 22:00) (107/52 - 116/51)  BP(mean): --  RR: 16 (29 Jan 2020 22:00) (16 - 21)  SpO2: 94% (29 Jan 2020 22:00) (92% - 96%)    GENERAL: No acute distress  HEENT: PERRL, EOMI, MMM, no oropharyngeal lesions  NECK: supple, no stiffness, no JVD, no thyromegaly  PULM: respirations non-labored, clear to auscultation bilaterally, no rales, rhonchi, or wheezes  CV: regular rate and rhythm with premature beats, III/VI systolic murmur best heard at LUSB but also at RUSB and apex. No gallops or rubs  GI: abdomen soft, nontender, nondistended, no masses felt, normal bowel sounds  MSK: strength 5/5 bilateral upper/lower extremities. No joint swelling, erythema, or warmth.  LYMPH: no anterior cervical, posterior cervical, supraclavicular, or inguinal lymphadenopathy  NEURO: A&Ox3, no tremors, sensation intact  SKIN: 1+ peripheral edema. No rashes or lesions Vital Signs Last 24 Hrs  T(C): 36.8 (29 Jan 2020 21:15), Max: 37.8 (29 Jan 2020 19:27)  T(F): 98.2 (29 Jan 2020 21:15), Max: 100 (29 Jan 2020 19:27)  HR: 76 (29 Jan 2020 22:00) (72 - 95)  BP: 107/52 (29 Jan 2020 22:00) (107/52 - 116/51)  BP(mean): --  RR: 16 (29 Jan 2020 22:00) (16 - 21)  SpO2: 94% (29 Jan 2020 22:00) (92% - 96%)    GENERAL: No acute distress  HEENT: PERRL, EOMI, MMM, no oropharyngeal lesions  NECK: supple, no stiffness, no JVD, no thyromegaly  PULM: respirations non-labored, clear to auscultation bilaterally, no rales, rhonchi, or wheezes  CV: regular rate and rhythm with premature beats, III/VI systolic murmur best heard at LUSB but also at RUSB and apex. No gallops or rubs  GI: abdomen soft, nontender, nondistended, no masses felt, normal bowel sounds  MSK: strength 5/5 bilateral upper/lower extremities. No joint swelling, erythema, or warmth.  LYMPH: no anterior cervical, posterior cervical, supraclavicular, or inguinal lymphadenopathy  NEURO: A&Ox3, no tremors, sensation intact  SKIN: 1+ peripheral edema. No rashes or lesions. LUE PICC line in place, nontender, without drainage or erythema

## 2020-01-29 NOTE — ED ADULT NURSE NOTE - OBJECTIVE STATEMENT
pt BIBEMS, A&O x3. pt c/o R leg cellulitis. pt was hospitalized recently for cellulitis and discharge with home antibiotics (ceftriaxone, vancomycin) through LUE PICC line. pt states cellulitis has improved but yesterday after taking his antibiotics he felt shaky and had cramping in both legs. Today pt had temp 104 oral and took tylenol at 6:45 this evening. pt reports weakness. Denies dizziness, SOB, chest pain.

## 2020-01-29 NOTE — ED PROVIDER NOTE - OBJECTIVE STATEMENT
85 y/o male with PMHx of HLD, HTN, enlarged prostate, and s/p ORIF of right ankle in 10/2019 presents to the ED BIBEMS c/o +right leg cellulitis. Was put on ceftriaxone via PICC to LUE (d/c from  on 1/16) but states sx have not improved. Endorses associated +weakness, +chills, and +fever, Tmax 104 F today. Took Tylenol around 6:45 PM tonight. Denies chest pain or SOB. NKDA. PCP: Dr. Ac Mtz. Ortho Surgeon: Dr. Daniel Bustillos. ID: Dr. Mikey Mcqueen. Urologist: Dr. Brandon Walter.

## 2020-01-29 NOTE — H&P ADULT - NSHPLABSRESULTS_GEN_ALL_CORE
Labs personally reviewed and interpreted. Notable for no leukocytosis (WBC 5.79) with 78% neutrophils, Hb stable at 10.8, plts markedly dropped to 120 (from 375 two weeks prior). INR 1.33. Lactate 1.3. Electrolytes wnl. BUN/creatinine borderline at 26/0.90 (baseline creatinine 0.65). Alk phos normal at 98, but AST elevated at 103 and ALT elevated jase 79, both new. Tbili normal at 0.8. Albumin low at 3.0. Troponin elevated at 0.293 x1.  UA shows cloudy urine with trave LE, negative nitrites, large blood with >50 RBCs, 3-5 WBCs, many bacteria with few epithelial cells. SG 1.020, 30 protein.    CXR personally reviewed and interpreted. Notable for questionable right hilar development, although does not appear significantly changed from 1/16/20 CXR. No obvious focal consolidations, effusions, interstitial markings, pneumothorax, or obvious cardiomegaly. LLL obscured by heart border.    EKG personally reviewed. Rate ________ Labs personally reviewed and interpreted. Notable for no leukocytosis (WBC 5.79) with 78% neutrophils, Hb stable at 10.8, plts markedly dropped to 120 (from 375 two weeks prior). INR 1.33. Lactate 1.3. Electrolytes wnl. BUN/creatinine borderline at 26/0.90 (baseline creatinine 0.65). Alk phos normal at 98, but AST elevated at 103 and ALT elevated jase 79, both new. Tbili normal at 0.8. Albumin low at 3.0. Troponin elevated at 0.293 x1.  UA shows cloudy urine with trave LE, negative nitrites, large blood with >50 RBCs, 3-5 WBCs, many bacteria with few epithelial cells. SG 1.020, 30 protein.    CXR personally reviewed and interpreted. Notable for questionable right hilar development, although does not appear significantly changed from 1/16/20 CXR. No obvious focal consolidations, effusions, interstitial markings, pneumothorax, or obvious cardiomegaly. LLL obscured by heart border.    EKG personally reviewed. Normal sinus rhythm, normal axis. Unchanged Q wave in lead III. New biphasic T waves in V1-2. Rate 83, , QTc 467.

## 2020-01-29 NOTE — ED ADULT TRIAGE NOTE - CHIEF COMPLAINT QUOTE
pt presents to ED with complaints of right leg cellulitis. pt states he has been on out pt ceftriaxone via PICC in [facility] for treatment. pt states cellulitis improved at first, but has not improved in a few weeks. pt had oral temp of 104.0 at home and took tylenol at approximately 1845. pt presents to ED with complaints of right leg cellulitis. pt states he has been on out pt ceftriaxone via PICC in [facility] for treatment. pt states cellulitis improved at first, but has not improved in a few weeks. pt had oral temp of 104.0 at home and took tylenol at approximately 1845. primary RN aware of inability to get BP in triage.

## 2020-01-30 ENCOUNTER — APPOINTMENT (OUTPATIENT)
Dept: ORTHOPEDIC SURGERY | Facility: CLINIC | Age: 85
End: 2020-01-30

## 2020-01-30 LAB
ADD ON TEST-SPECIMEN IN LAB: SIGNIFICANT CHANGE UP
ALBUMIN SERPL ELPH-MCNC: 2.8 G/DL — LOW (ref 3.3–5)
ALBUMIN SERPL ELPH-MCNC: 2.9 G/DL — LOW (ref 3.3–5)
ALP SERPL-CCNC: 101 U/L — SIGNIFICANT CHANGE UP (ref 40–120)
ALP SERPL-CCNC: 85 U/L — SIGNIFICANT CHANGE UP (ref 40–120)
ALT FLD-CCNC: 103 U/L — HIGH (ref 12–78)
ALT FLD-CCNC: 79 U/L — HIGH (ref 12–78)
ANION GAP SERPL CALC-SCNC: 7 MMOL/L — SIGNIFICANT CHANGE UP (ref 5–17)
ANION GAP SERPL CALC-SCNC: 8 MMOL/L — SIGNIFICANT CHANGE UP (ref 5–17)
AST SERPL-CCNC: 101 U/L — HIGH (ref 15–37)
AST SERPL-CCNC: 138 U/L — HIGH (ref 15–37)
BASE EXCESS BLDA CALC-SCNC: -0.3 MMOL/L — SIGNIFICANT CHANGE UP (ref -2–2)
BILIRUB SERPL-MCNC: 0.9 MG/DL — SIGNIFICANT CHANGE UP (ref 0.2–1.2)
BILIRUB SERPL-MCNC: 0.9 MG/DL — SIGNIFICANT CHANGE UP (ref 0.2–1.2)
BLOOD GAS COMMENTS ARTERIAL: SIGNIFICANT CHANGE UP
BUN SERPL-MCNC: 21 MG/DL — SIGNIFICANT CHANGE UP (ref 7–23)
BUN SERPL-MCNC: 25 MG/DL — HIGH (ref 7–23)
CALCIUM SERPL-MCNC: 8.1 MG/DL — LOW (ref 8.5–10.1)
CALCIUM SERPL-MCNC: 8.2 MG/DL — LOW (ref 8.5–10.1)
CHLORIDE SERPL-SCNC: 107 MMOL/L — SIGNIFICANT CHANGE UP (ref 96–108)
CHLORIDE SERPL-SCNC: 110 MMOL/L — HIGH (ref 96–108)
CO2 SERPL-SCNC: 21 MMOL/L — LOW (ref 22–31)
CO2 SERPL-SCNC: 23 MMOL/L — SIGNIFICANT CHANGE UP (ref 22–31)
CREAT SERPL-MCNC: 0.77 MG/DL — SIGNIFICANT CHANGE UP (ref 0.5–1.3)
CREAT SERPL-MCNC: 0.94 MG/DL — SIGNIFICANT CHANGE UP (ref 0.5–1.3)
GAS PNL BLDA: SIGNIFICANT CHANGE UP
GLUCOSE SERPL-MCNC: 105 MG/DL — HIGH (ref 70–99)
GLUCOSE SERPL-MCNC: 127 MG/DL — HIGH (ref 70–99)
HCO3 BLDA-SCNC: 21 MMOL/L — SIGNIFICANT CHANGE UP (ref 21–29)
HCT VFR BLD CALC: 32 % — LOW (ref 39–50)
HCT VFR BLD CALC: 32.5 % — LOW (ref 39–50)
HGB BLD-MCNC: 10.6 G/DL — LOW (ref 13–17)
HGB BLD-MCNC: 10.8 G/DL — LOW (ref 13–17)
MAGNESIUM SERPL-MCNC: 2.3 MG/DL — SIGNIFICANT CHANGE UP (ref 1.6–2.6)
MCHC RBC-ENTMCNC: 30.4 PG — SIGNIFICANT CHANGE UP (ref 27–34)
MCHC RBC-ENTMCNC: 30.6 PG — SIGNIFICANT CHANGE UP (ref 27–34)
MCHC RBC-ENTMCNC: 33.1 GM/DL — SIGNIFICANT CHANGE UP (ref 32–36)
MCHC RBC-ENTMCNC: 33.2 GM/DL — SIGNIFICANT CHANGE UP (ref 32–36)
MCV RBC AUTO: 91.7 FL — SIGNIFICANT CHANGE UP (ref 80–100)
MCV RBC AUTO: 92.1 FL — SIGNIFICANT CHANGE UP (ref 80–100)
PCO2 BLDA: 22 MMHG — LOW (ref 32–46)
PH BLDA: 7.57 — HIGH (ref 7.35–7.45)
PHOSPHATE SERPL-MCNC: 2.6 MG/DL — SIGNIFICANT CHANGE UP (ref 2.5–4.5)
PLATELET # BLD AUTO: 109 K/UL — LOW (ref 150–400)
PLATELET # BLD AUTO: 122 K/UL — LOW (ref 150–400)
PO2 BLDA: 144 MMHG — HIGH (ref 74–108)
POTASSIUM SERPL-MCNC: 3.6 MMOL/L — SIGNIFICANT CHANGE UP (ref 3.5–5.3)
POTASSIUM SERPL-MCNC: 3.6 MMOL/L — SIGNIFICANT CHANGE UP (ref 3.5–5.3)
POTASSIUM SERPL-SCNC: 3.6 MMOL/L — SIGNIFICANT CHANGE UP (ref 3.5–5.3)
POTASSIUM SERPL-SCNC: 3.6 MMOL/L — SIGNIFICANT CHANGE UP (ref 3.5–5.3)
PROT SERPL-MCNC: 5.7 GM/DL — LOW (ref 6–8.3)
PROT SERPL-MCNC: 5.9 GM/DL — LOW (ref 6–8.3)
RBC # BLD: 3.49 M/UL — LOW (ref 4.2–5.8)
RBC # BLD: 3.53 M/UL — LOW (ref 4.2–5.8)
RBC # FLD: 13.4 % — SIGNIFICANT CHANGE UP (ref 10.3–14.5)
RBC # FLD: 13.7 % — SIGNIFICANT CHANGE UP (ref 10.3–14.5)
SAO2 % BLDA: 99 % — HIGH (ref 92–96)
SODIUM SERPL-SCNC: 137 MMOL/L — SIGNIFICANT CHANGE UP (ref 135–145)
SODIUM SERPL-SCNC: 139 MMOL/L — SIGNIFICANT CHANGE UP (ref 135–145)
TROPONIN I SERPL-MCNC: 0.23 NG/ML — HIGH (ref 0.01–0.04)
TROPONIN I SERPL-MCNC: 0.27 NG/ML — HIGH (ref 0.01–0.04)
TROPONIN I SERPL-MCNC: 0.74 NG/ML — HIGH (ref 0.01–0.04)
WBC # BLD: 5.58 K/UL — SIGNIFICANT CHANGE UP (ref 3.8–10.5)
WBC # BLD: 5.92 K/UL — SIGNIFICANT CHANGE UP (ref 3.8–10.5)
WBC # FLD AUTO: 5.58 K/UL — SIGNIFICANT CHANGE UP (ref 3.8–10.5)
WBC # FLD AUTO: 5.92 K/UL — SIGNIFICANT CHANGE UP (ref 3.8–10.5)

## 2020-01-30 PROCEDURE — 71045 X-RAY EXAM CHEST 1 VIEW: CPT | Mod: 26

## 2020-01-30 PROCEDURE — 93010 ELECTROCARDIOGRAM REPORT: CPT | Mod: 76

## 2020-01-30 PROCEDURE — 93970 EXTREMITY STUDY: CPT | Mod: 26

## 2020-01-30 PROCEDURE — 93306 TTE W/DOPPLER COMPLETE: CPT | Mod: 26

## 2020-01-30 PROCEDURE — 71045 X-RAY EXAM CHEST 1 VIEW: CPT | Mod: 26,77

## 2020-01-30 RX ORDER — CEFTRIAXONE 500 MG/1
2000 INJECTION, POWDER, FOR SOLUTION INTRAMUSCULAR; INTRAVENOUS EVERY 24 HOURS
Refills: 0 | Status: DISCONTINUED | OUTPATIENT
Start: 2020-01-30 | End: 2020-01-31

## 2020-01-30 RX ORDER — AMIODARONE HYDROCHLORIDE 400 MG/1
1 TABLET ORAL
Qty: 900 | Refills: 0 | Status: DISCONTINUED | OUTPATIENT
Start: 2020-01-30 | End: 2020-02-02

## 2020-01-30 RX ORDER — AMIODARONE HYDROCHLORIDE 400 MG/1
150 TABLET ORAL ONCE
Refills: 0 | Status: COMPLETED | OUTPATIENT
Start: 2020-01-30 | End: 2020-01-30

## 2020-01-30 RX ORDER — TAMSULOSIN HYDROCHLORIDE 0.4 MG/1
0.4 CAPSULE ORAL
Refills: 0 | Status: DISCONTINUED | OUTPATIENT
Start: 2020-01-30 | End: 2020-02-11

## 2020-01-30 RX ORDER — FLUCONAZOLE 150 MG/1
800 TABLET ORAL ONCE
Refills: 0 | Status: DISCONTINUED | OUTPATIENT
Start: 2020-01-30 | End: 2020-01-30

## 2020-01-30 RX ORDER — KETOROLAC TROMETHAMINE 30 MG/ML
15 SYRINGE (ML) INJECTION ONCE
Refills: 0 | Status: DISCONTINUED | OUTPATIENT
Start: 2020-01-30 | End: 2020-01-30

## 2020-01-30 RX ORDER — VANCOMYCIN HCL 1 G
750 VIAL (EA) INTRAVENOUS EVERY 12 HOURS
Refills: 0 | Status: DISCONTINUED | OUTPATIENT
Start: 2020-01-30 | End: 2020-01-31

## 2020-01-30 RX ORDER — CHOLECALCIFEROL (VITAMIN D3) 125 MCG
2000 CAPSULE ORAL DAILY
Refills: 0 | Status: DISCONTINUED | OUTPATIENT
Start: 2020-01-30 | End: 2020-02-11

## 2020-01-30 RX ORDER — FLUCONAZOLE 150 MG/1
400 TABLET ORAL ONCE
Refills: 0 | Status: COMPLETED | OUTPATIENT
Start: 2020-01-30 | End: 2020-01-30

## 2020-01-30 RX ORDER — FUROSEMIDE 40 MG
40 TABLET ORAL ONCE
Refills: 0 | Status: COMPLETED | OUTPATIENT
Start: 2020-01-30 | End: 2020-01-30

## 2020-01-30 RX ORDER — AMIODARONE HYDROCHLORIDE 400 MG/1
0.5 TABLET ORAL
Qty: 900 | Refills: 0 | Status: DISCONTINUED | OUTPATIENT
Start: 2020-01-30 | End: 2020-01-31

## 2020-01-30 RX ORDER — ACETAMINOPHEN 500 MG
650 TABLET ORAL EVERY 4 HOURS
Refills: 0 | Status: DISCONTINUED | OUTPATIENT
Start: 2020-01-30 | End: 2020-01-31

## 2020-01-30 RX ORDER — ASPIRIN/CALCIUM CARB/MAGNESIUM 324 MG
81 TABLET ORAL DAILY
Refills: 0 | Status: DISCONTINUED | OUTPATIENT
Start: 2020-01-30 | End: 2020-02-06

## 2020-01-30 RX ORDER — FINASTERIDE 5 MG/1
5 TABLET, FILM COATED ORAL DAILY
Refills: 0 | Status: DISCONTINUED | OUTPATIENT
Start: 2020-01-30 | End: 2020-02-11

## 2020-01-30 RX ORDER — POTASSIUM CHLORIDE 20 MEQ
10 PACKET (EA) ORAL
Refills: 0 | Status: COMPLETED | OUTPATIENT
Start: 2020-01-30 | End: 2020-01-30

## 2020-01-30 RX ORDER — METOPROLOL TARTRATE 50 MG
5 TABLET ORAL ONCE
Refills: 0 | Status: COMPLETED | OUTPATIENT
Start: 2020-01-30 | End: 2020-01-30

## 2020-01-30 RX ORDER — HEPARIN SODIUM 5000 [USP'U]/ML
5000 INJECTION INTRAVENOUS; SUBCUTANEOUS EVERY 12 HOURS
Refills: 0 | Status: DISCONTINUED | OUTPATIENT
Start: 2020-01-30 | End: 2020-02-11

## 2020-01-30 RX ORDER — ACETAMINOPHEN 500 MG
650 TABLET ORAL EVERY 6 HOURS
Refills: 0 | Status: DISCONTINUED | OUTPATIENT
Start: 2020-01-30 | End: 2020-02-01

## 2020-01-30 RX ORDER — POTASSIUM CHLORIDE 20 MEQ
40 PACKET (EA) ORAL EVERY 4 HOURS
Refills: 0 | Status: COMPLETED | OUTPATIENT
Start: 2020-01-30 | End: 2020-01-30

## 2020-01-30 RX ORDER — MAGNESIUM SULFATE 500 MG/ML
1 VIAL (ML) INJECTION ONCE
Refills: 0 | Status: COMPLETED | OUTPATIENT
Start: 2020-01-30 | End: 2020-01-30

## 2020-01-30 RX ADMIN — Medication 81 MILLIGRAM(S): at 13:14

## 2020-01-30 RX ADMIN — Medication 15 MILLIGRAM(S): at 04:27

## 2020-01-30 RX ADMIN — Medication 100 MILLIEQUIVALENT(S): at 21:43

## 2020-01-30 RX ADMIN — Medication 650 MILLIGRAM(S): at 15:10

## 2020-01-30 RX ADMIN — Medication 2000 UNIT(S): at 13:14

## 2020-01-30 RX ADMIN — Medication 650 MILLIGRAM(S): at 06:54

## 2020-01-30 RX ADMIN — Medication 650 MILLIGRAM(S): at 14:36

## 2020-01-30 RX ADMIN — AMIODARONE HYDROCHLORIDE 33.33 MG/MIN: 400 TABLET ORAL at 16:19

## 2020-01-30 RX ADMIN — Medication 650 MILLIGRAM(S): at 20:00

## 2020-01-30 RX ADMIN — Medication 5 MILLIGRAM(S): at 04:27

## 2020-01-30 RX ADMIN — Medication 650 MILLIGRAM(S): at 08:00

## 2020-01-30 RX ADMIN — Medication 250 MILLIGRAM(S): at 17:15

## 2020-01-30 RX ADMIN — Medication 650 MILLIGRAM(S): at 17:30

## 2020-01-30 RX ADMIN — Medication 15 MILLIGRAM(S): at 05:45

## 2020-01-30 RX ADMIN — Medication 40 MILLIEQUIVALENT(S): at 01:32

## 2020-01-30 RX ADMIN — Medication 100 MILLIEQUIVALENT(S): at 17:31

## 2020-01-30 RX ADMIN — HEPARIN SODIUM 5000 UNIT(S): 5000 INJECTION INTRAVENOUS; SUBCUTANEOUS at 06:53

## 2020-01-30 RX ADMIN — FLUCONAZOLE 200 MILLIGRAM(S): 150 TABLET ORAL at 06:53

## 2020-01-30 RX ADMIN — Medication 100 MILLIEQUIVALENT(S): at 20:01

## 2020-01-30 RX ADMIN — Medication 40 MILLIEQUIVALENT(S): at 05:04

## 2020-01-30 RX ADMIN — AMIODARONE HYDROCHLORIDE 618 MILLIGRAM(S): 400 TABLET ORAL at 15:39

## 2020-01-30 RX ADMIN — FINASTERIDE 5 MILLIGRAM(S): 5 TABLET, FILM COATED ORAL at 13:14

## 2020-01-30 RX ADMIN — HEPARIN SODIUM 5000 UNIT(S): 5000 INJECTION INTRAVENOUS; SUBCUTANEOUS at 17:15

## 2020-01-30 RX ADMIN — TAMSULOSIN HYDROCHLORIDE 0.4 MILLIGRAM(S): 0.4 CAPSULE ORAL at 06:53

## 2020-01-30 RX ADMIN — Medication 650 MILLIGRAM(S): at 21:28

## 2020-01-30 RX ADMIN — SODIUM CHLORIDE 2500 MILLILITER(S): 9 INJECTION, SOLUTION INTRAVENOUS at 00:45

## 2020-01-30 RX ADMIN — CEFTRIAXONE 2000 MILLIGRAM(S): 500 INJECTION, POWDER, FOR SOLUTION INTRAMUSCULAR; INTRAVENOUS at 13:14

## 2020-01-30 RX ADMIN — Medication 100 GRAM(S): at 04:27

## 2020-01-30 RX ADMIN — Medication 40 MILLIGRAM(S): at 16:20

## 2020-01-30 NOTE — CONSULT NOTE ADULT - ASSESSMENT
83 y/o M with h/o HTN, HLD, BPH with chronic chowdary, right ankle fracture (Oct 10, 2019) s/p ORIF, recently hospitalized (1/10/2020) for RLE cellulitis and possible underlying acute OM and/or ankle hardware infection on vancomycin IV and ceftriaxone (since 1/10 via PICC line) was admitted on 1/29 for lower leg pains. He states for the past week PTA, he has had sharp severe leg pains. They occur every day, often lasting for hours at a time, and are not related to exertion. He states that when the pains come it makes it difficult to walk, although when it is not there, he can walk without difficulty. The pains are from his ankle up to his upper thighs (he cannot tell from where it starts and stops). He reported a fever to 104F at home. In ER he was febrile to 102.7F and received vancomycin IV and cefepime.     1. RLE cellulitis. Concerned about possible underlying acute OM and/or ankle hardware infection. s/p reecnt ankle fracture s/p ORIF. BPH with chronic chowdary.  -f/u BC x 2  -on vancomycin 760 mg IV q12h and ceftriaxone 1 gm IV qd # 6  -tolerating abx well so far; no side effects noted  -vancomycin trough level is therapeutic  -plan for long term IV abx for 6 more weeks  -PICC line placed  -RUE doppler to evaluate for DVT  -elevate leg  -continue abx coverage  -home IV abx setup in progress; case reviewed with case management; orders reviewed and called in to pharmacist with infusion company; insurance approval in place  -weekly labs  -monitor temps  -f/u CBC  -supportive care  2. Other issues:   -care per medicine 85 y/o M with h/o HTN, HLD, BPH with chronic chowdary, right ankle fracture (Oct 10, 2019) s/p ORIF, recently hospitalized (1/10/2020) for RLE cellulitis and possible underlying acute OM and/or ankle hardware infection on vancomycin IV and ceftriaxone (since 1/10 via PICC line) was admitted on 1/29 for lower leg pains. He states for the past week PTA, he has had sharp severe leg pains. They occur every day, often lasting for hours at a time, and are not related to exertion. He states that when the pains come it makes it difficult to walk, although when it is not there, he can walk without difficulty. The pains are from his ankle up to his upper thighs (he cannot tell from where it starts and stops). He reported a fever to 104F at home. In ER he was febrile to 102.7F and received vancomycin IV and cefepime.     1. Febrile syndrome. Probable sepsis. Possible PICC Line infection. RLE cellulitis improving with possible underlying acute OM and/or ankle hardware infection. s/p recent ankle fracture s/p ORIF. BPH with chronic chowdary.  -possible drug fever  -obtain BC x 2  -continue vancomycin 760 mg IV q12h and ceftriaxone 1 gm IV qd (since 1/10/2020)  -tolerating abx well so far; no rashes; has fever ?PICC line ; less likely drug fever  -remove PICC line  -obtain vancomycin trough level  -elevate leg  -continue abx coverage  -monitor temps  -f/u CBC  -supportive care  2. Other issues:   -care per medicine

## 2020-01-30 NOTE — CONSULT NOTE ADULT - ASSESSMENT
85 yo M with a PMH HTN, HLD, and BPH, Moderate-Severe Aortic Stenosis, HFpEF admitted with Sepsis 2/2 to possible PICC line infection. EP consulted for runs of VTACH, and Bigeminy,    VTACH- On telemetry patient noted to have NSVT as well as episodes of Bigeminy. Likely precipitated by Sepsis. ECHO shows normal LVEF, with Moderate to Severe Aortic Stenosis, no mention of endocarditis on the ECHO. PICC line has been removed.  He is currently hemodynamically stable  -Start Amiodarone gtt 1mg/min for 6 hours and then 0.5mg/min for 18 hours.   -Monitor Electrolytes Maintain K>4 Mg>2   -Continuous Cardiac Monitoring.  -EP to follow. 83 yo M with a PMH HTN, HLD, and BPH, Moderate-Severe Aortic Stenosis,  admitted with Sepsis 2/2 to possible PICC line infection. EP consulted for runs of VTACH, and Bigeminy,    NSVT- On telemetry patient noted to have NSVT as well as episodes of Bigeminy. Likely precipitated by Sepsis. ECHO shows normal LVEF, with Moderate to Severe Aortic Stenosis, no mention of endocarditis on the ECHO. PICC line has been removed.  He is currently hemodynamically stable  -Start Amiodarone gtt 1mg/min for 6 hours and then 0.5mg/min for 18 hours.   -Monitor Electrolytes, Maintain K>4 Mg>2   -Continuous Cardiac Monitoring.  -EKG ordered  -EP to follow.   -Patient follows with Dr. Tubbs, wife states he has had a stress test unsure when.   -Continue management for Sepsis as per ID.

## 2020-01-30 NOTE — PROGRESS NOTE ADULT - SUBJECTIVE AND OBJECTIVE BOX
Called to a RR in the CICU    HPI:  83 yo M with a PMH HTN, HLD, and BPH who presents with leg pains. He states for the past week, he has had sharp severe leg pains. They occur every day, often lasting for hours at a time, and are not related to exertion. He states that when the pains come it makes it difficult to walk, although when it is not there, he can walk without difficulty. The pains are from his ankle up to his upper thighs (he cannot tell from where it starts and stops). Of note, he was recently admitted (2 weeks ago) for cellulitis with concern for OM, and had a PICC line placed, to be on antibiotics (Vanc/Ceftriaxone) for 6 weeks.    In the ED, he was given aspirin 325 mg PO x1, Vancomycin 1g IV x1, Cefepime 1g IV x1, Ibuprofen 600 mg PO x1, and LR 2.5L x1    A RR was called because of hypoxia and tachycardia.  Patient had gone into pulmonary edema and was having runs of NSVT.  Patient transferred to the ICU       PAST MEDICAL & SURGICAL HISTORY:  HLD (hyperlipidemia)  Enlarged prostate  HTN (hypertension)  Status post ORIF of fracture of ankle: Right, 10/2019      FAMILY HISTORY:  No pertinent family history in first degree relatives      Social Hx:    Allergies    No Known Allergies    Intolerances    Lipitor (Joint Pain)      Height (cm): 180.34 ( @ 19:27)  Weight (kg): 92.5 ( @ 15:10)  BMI (kg/m2): 28.4 ( @ 15:10)    ICU Vital Signs Last 24 Hrs  T(C): 40 (2020 17:00), Max: 40 (2020 17:00)  T(F): 104 (2020 17:00), Max: 104 (2020 17:00)  HR: 78 (2020 17:00) (67 - 130)  BP: 111/60 (2020 17:00) (86/54 - 156/95)  BP(mean): 77 (2020 17:00) (55 - 105)  ABP: --  ABP(mean): --  RR: 23 (2020 17:00) (14 - 34)  SpO2: 98% (2020 17:00) (89% - 98%)          I&O's Summary    2020 07:01  -  2020 07:00  --------------------------------------------------------  IN: 0 mL / OUT: 1000 mL / NET: -1000 mL    2020 07:01  -  2020 17:57  --------------------------------------------------------  IN: 350 mL / OUT: 650 mL / NET: -300 mL                              10.8   5.58  )-----------( 122      ( 2020 15:34 )             32.5       01-30    137  |  107  |  25<H>  ----------------------------<  127<H>  3.6   |  23  |  0.94    Ca    8.2<L>      2020 15:34  Phos  2.6     30  Mg     2.3     30    TPro  5.9<L>  /  Alb  2.9<L>  /  TBili  0.9  /  DBili  x   /  AST  138<H>  /  ALT  103<H>  /  AlkPhos  101  01-30      CARDIAC MARKERS ( 2020 15:34 )  0.741 ng/mL / x     / x     / x     / x      CARDIAC MARKERS ( 2020 07:27 )  0.589 ng/mL / x     / x     / x     / x      CARDIAC MARKERS ( 2020 04:58 )  0.273 ng/mL / x     / x     / x     / x      CARDIAC MARKERS ( 2020 00:55 )  0.235 ng/mL / x     / 1858 U/L / x     / 8.6 ng/mL  CARDIAC MARKERS ( 2020 19:57 )  0.293 ng/mL / x     / x     / x     / x            ABG - ( 2020 04:55 )  pH, Arterial: 7.57  pH, Blood: x     /  pCO2: 22    /  pO2: 144   / HCO3: 21    / Base Excess: -.3   /  SaO2: 99                  Urinalysis Basic - ( 2020 22:06 )    Color: Yellow / Appearance: very cloudy / S.020 / pH: x  Gluc: x / Ketone: Trace  / Bili: Negative / Urobili: Negative mg/dL   Blood: x / Protein: 30 mg/dL / Nitrite: Negative   Leuk Esterase: Trace / RBC: >50 /HPF / WBC 3-5   Sq Epi: x / Non Sq Epi: Few / Bacteria: Many        MEDICATIONS  (STANDING):  aMIOdarone Infusion 1 mG/Min (33.333 mL/Hr) IV Continuous <Continuous>  aMIOdarone Infusion 0.5 mG/Min (16.667 mL/Hr) IV Continuous <Continuous>  aspirin enteric coated 81 milliGRAM(s) Oral daily  cefTRIAXone Injectable. 2000 milliGRAM(s) IV Push every 24 hours  cholecalciferol 2000 Unit(s) Oral daily  finasteride 5 milliGRAM(s) Oral daily  heparin  Injectable 5000 Unit(s) SubCutaneous every 12 hours  potassium chloride  10 mEq/100 mL IVPB 10 milliEquivalent(s) IV Intermittent every 1 hour  tamsulosin 0.4 milliGRAM(s) Oral two times a day  vancomycin  IVPB 750 milliGRAM(s) IV Intermittent every 12 hours    MEDICATIONS  (PRN):  acetaminophen   Tablet .. 650 milliGRAM(s) Oral every 6 hours PRN Mild Pain (1 - 3), Moderate Pain (4 - 6)  acetaminophen  Suppository .. 650 milliGRAM(s) Rectal every 4 hours PRN Temp greater or equal to 38C (100.4F), Moderate Pain (4 - 6)      DVT Prophylaxis:    Advanced Directives:  Discussed with:    Visit Information:    ** Time is exclusive of billed procedures and/or teaching and/or routine family updates.

## 2020-01-30 NOTE — CONSULT NOTE ADULT - SUBJECTIVE AND OBJECTIVE BOX
HPI:  83 yo M with a PMH HTN, HLD, and BPH who presents with leg pains. He states for the past week, he has had sharp severe leg pains. They occur every day, often lasting for hours at a time, and are not related to exertion. He states that when the pains come it makes it difficult to walk, although when it is not there, he can walk without difficulty. The pains are from his ankle up to his upper thighs (he cannot tell from where it starts and stops). Of note, he was recently admitted (2 weeks ago) for cellulitis with concern for OM, and had a PICC line placed, to be on antibiotics (Vanc/Ceftriaxone) for 6 weeks. At the time of admission he denied nausea, vomiting, fevers, chills, cough, chest pain, SOB, abdominal pain, diarrhea, dysuria, hematuria, dizziness, lightheadedness, or rash. He states the swelling in his legs from his cellulitis has improved.    Of note, per ER documentation, he presented due to fevers, and reportedly had a Tmax of 104 at home. The patient denies this.    In the ED, he was given aspirin 325 mg PO x1, Vancomycin 1g IV x1, Cefepime 1g IV x1, Ibuprofen 600 mg PO x1, and LR 2.5L x1. (2020 23:42)    Patient was transferred from the cardiac step down to the ICU after being found to have VT.     MEDICATIONS  (STANDING):  aMIOdarone Infusion 1 mG/Min (33.333 mL/Hr) IV Continuous <Continuous>  aMIOdarone Infusion 0.5 mG/Min (16.667 mL/Hr) IV Continuous <Continuous>  aMIOdarone IVPB 150 milliGRAM(s) IV Intermittent once  aspirin enteric coated 81 milliGRAM(s) Oral daily  cefTRIAXone Injectable. 2000 milliGRAM(s) IV Push every 24 hours  cholecalciferol 2000 Unit(s) Oral daily  finasteride 5 milliGRAM(s) Oral daily  furosemide   Injectable 40 milliGRAM(s) IV Push once  heparin  Injectable 5000 Unit(s) SubCutaneous every 12 hours  tamsulosin 0.4 milliGRAM(s) Oral two times a day  vancomycin  IVPB 750 milliGRAM(s) IV Intermittent every 12 hours    MEDICATIONS  (PRN):  acetaminophen   Tablet .. 650 milliGRAM(s) Oral every 6 hours PRN Mild Pain (1 - 3), Moderate Pain (4 - 6)      Allergies: No Known Allergies    Intolerances:  Lipitor (Joint Pain)      PHYSICAL EXAMINATION:    Vital Signs Last 24 Hrs  T(C): 38.8 (2020 14:35), Max: 39.3 (2020 06:20)  T(F): 101.9 (2020 14:35), Max: 102.7 (2020 06:20)  HR: 94 (2020 14:20) (67 - 95)  BP: 103/51 (2020 14:00) (86/54 - 137/62)  BP(mean): 65 (2020 14:00) (55 - 98)  RR: 14 (2020 11:00) (14 - 28)  SpO2: 90% (2020 14:20) (89% - 97%)      GENERAL APPEARANCE:  Pt. is not currently dyspneic, in no distress. Pt. is alert, oriented, and pleasant.  HEENT:  Pupils are normal and react normally. No icterus. Mucous membranes well colored.  NECK:  Supple. No lymphadenopathy. Jugular venous pressure not elevated. Carotids equal.   HEART:   The cardiac impulse has a normal quality. There are no murmurs, rubs or gallops noted  CHEST:  Chest is clear to auscultation. Normal respiratory effort.  ABDOMEN:  Soft and nontender.   EXTREMITIES:  There is no edema.   SKIN:  No rash or significant lesions are noted.    LABS:                        10.6   5.92  )-----------( 109      ( 2020 07:27 )             32.0     01-30    139  |  110<H>  |  21  ----------------------------<  105<H>  3.6   |  21<L>  |  0.77    Ca    8.1<L>      2020 07:27  Phos  2.6     01-30  Mg     2.3     -30    TPro  5.7<L>  /  Alb  2.8<L>  /  TBili  0.9  /  DBili  x   /  AST  101<H>  /  ALT  79<H>  /  AlkPhos  85  01-30    PT/INR - ( 2020 19:57 )   PT: 14.9 sec;   INR: 1.33 ratio         PTT - ( 2020 19:57 )  PTT:29.1 sec  Urinalysis Basic - ( 2020 22:06 )    Color: Yellow / Appearance: very cloudy / S.020 / pH: x  Gluc: x / Ketone: Trace  / Bili: Negative / Urobili: Negative mg/dL   Blood: x / Protein: 30 mg/dL / Nitrite: Negative   Leuk Esterase: Trace / RBC: >50 /HPF / WBC 3-5   Sq Epi: x / Non Sq Epi: Few / Bacteria: Many      CARDIAC MARKERS ( 2020 07:27 )  0.589 ng/mL / x     / x     / x     / x      CARDIAC MARKERS ( 2020 04:58 )  0.273 ng/mL / x     / x     / x     / x      CARDIAC MARKERS ( 2020 00:55 )  0.235 ng/mL / x     / 1858 U/L / x     / 8.6 ng/mL  CARDIAC MARKERS ( 2020 19:57 )  0.293 ng/mL / x     / x     / x     / x          RADIOLOGY & ADDITIONAL TESTS:  U/S of the b/l Lower extremities: No evidence of deep venous thrombosis in either lower extremity.    CXR: LEFT PICC line catheter tip in SVC. Cardiomegaly. No evidence of active chest disease.    ECHO 2020:  Fibrocalcific changes noted to the mitral valve leaflets with preserved  leaflet excursion.   Mild to Moderate mitral regurgitation is present.   EA reversal of the mitral inflow consistent with reduced compliance of the   left ventricle.   Significant fibrocalcific changes noted to the aortic valve leaflets with   restriction in leaflet excursion. Peak and mean transaortic gradients are   58 and 36 mmHg respectively; this finding is consistent with moderate   aortic stenosis.   JERILYN by continuity is 1.04 cm2 and the dimensionless index is .28   suggesting moderate to severe stenosis.   Moderate (2+) aortic regurgitation is present.   The tricuspid valve leaflets are thin and pliable; valve motion is normal.   Mild (1+) tricuspid valve regurgitation is present.   Borderline moderate pulmonary hypertension.   Trace pulmonic valvular regurgitation is present.   The left atrium is mildly dilated.   The left ventricle is normal in size, wall thickness, wall motion and   contractility.   Estimated left ventricular ejection fraction is 55 %.   The right atrium appears mildly dilated.   Normal appearing right ventricle structure and function.   The IVC is dilated.   No evidence of pericardial effusion.   No evidence of pleural effusion. HPI:  83 yo M with a PMH HTN, HLD, and BPH who presents with leg pains. He states for the past week, he has had sharp severe leg pains. They occur every day, often lasting for hours at a time, and are not related to exertion. He states that when the pains come it makes it difficult to walk, although when it is not there, he can walk without difficulty. The pains are from his ankle up to his upper thighs (he cannot tell from where it starts and stops). Of note, he was recently admitted (2 weeks ago) for cellulitis with concern for OM, and had a PICC line placed, to be on antibiotics (Vanc/Ceftriaxone) for 6 weeks. At the time of admission he denied nausea, vomiting, fevers, chills, cough, chest pain, SOB, abdominal pain, diarrhea, dysuria, hematuria, dizziness, lightheadedness, or rash. He states the swelling in his legs from his cellulitis has improved.    Of note, per ER documentation, he presented due to fevers, and reportedly had a Tmax of 104 at home. The patient denies this.    In the ED, he was given aspirin 325 mg PO x1, Vancomycin 1g IV x1, Cefepime 1g IV x1, Ibuprofen 600 mg PO x1, and LR 2.5L x1. (2020 23:42)    Patient was transferred from the cardiac step down to the ICU after being found to have NSVT. Patient currently denies chest pain, shortness of breath, palpitations, orthopnea, n/v. He does admit to lower extremity edema.     MEDICATIONS  (STANDING):  aMIOdarone Infusion 1 mG/Min (33.333 mL/Hr) IV Continuous <Continuous>  aMIOdarone Infusion 0.5 mG/Min (16.667 mL/Hr) IV Continuous <Continuous>  aMIOdarone IVPB 150 milliGRAM(s) IV Intermittent once  aspirin enteric coated 81 milliGRAM(s) Oral daily  cefTRIAXone Injectable. 2000 milliGRAM(s) IV Push every 24 hours  cholecalciferol 2000 Unit(s) Oral daily  finasteride 5 milliGRAM(s) Oral daily  furosemide   Injectable 40 milliGRAM(s) IV Push once  heparin  Injectable 5000 Unit(s) SubCutaneous every 12 hours  tamsulosin 0.4 milliGRAM(s) Oral two times a day  vancomycin  IVPB 750 milliGRAM(s) IV Intermittent every 12 hours    MEDICATIONS  (PRN):  acetaminophen   Tablet .. 650 milliGRAM(s) Oral every 6 hours PRN Mild Pain (1 - 3), Moderate Pain (4 - 6)      Allergies: No Known Allergies    Intolerances:  Lipitor (Joint Pain)      PHYSICAL EXAMINATION:    Vital Signs Last 24 Hrs  T(C): 38.8 (2020 14:35), Max: 39.3 (2020 06:20)  T(F): 101.9 (2020 14:35), Max: 102.7 (2020 06:20)  HR: 94 (2020 14:20) (67 - 95)  BP: 103/51 (2020 14:00) (86/54 - 137/62)  BP(mean): 65 (2020 14:00) (55 - 98)  RR: 14 (2020 11:00) (14 - 28)  SpO2: 90% (2020 14:20) (89% - 97%)      GENERAL APPEARANCE:  Elderly male in bed in NAD on BiPAP, family at bedside  HEENT: No icterus.  NECK:  Jugular venous pressure not elevated.   HEART:   The cardiac impulse has a normal quality. HILL III/VI RUSB   CHEST:  Chest is clear to auscultation. On BiPAP  ABDOMEN:  Soft and nontender.   EXTREMITIES:  1+ pitting edema bilaterally        LABS:                        10.6   5.92  )-----------( 109      ( 2020 07:27 )             32.0     01-30    139  |  110<H>  |  21  ----------------------------<  105<H>  3.6   |  21<L>  |  0.77    Ca    8.1<L>      2020 07:27  Phos  2.6     01-30  Mg     2.3     01-30    TPro  5.7<L>  /  Alb  2.8<L>  /  TBili  0.9  /  DBili  x   /  AST  101<H>  /  ALT  79<H>  /  AlkPhos  85  01-30    PT/INR - ( 2020 19:57 )   PT: 14.9 sec;   INR: 1.33 ratio         PTT - ( 2020 19:57 )  PTT:29.1 sec  Urinalysis Basic - ( 2020 22:06 )    Color: Yellow / Appearance: very cloudy / S.020 / pH: x  Gluc: x / Ketone: Trace  / Bili: Negative / Urobili: Negative mg/dL   Blood: x / Protein: 30 mg/dL / Nitrite: Negative   Leuk Esterase: Trace / RBC: >50 /HPF / WBC 3-5   Sq Epi: x / Non Sq Epi: Few / Bacteria: Many      CARDIAC MARKERS ( 2020 07:27 )  0.589 ng/mL / x     / x     / x     / x      CARDIAC MARKERS ( 2020 04:58 )  0.273 ng/mL / x     / x     / x     / x      CARDIAC MARKERS ( 2020 00:55 )  0.235 ng/mL / x     / 1858 U/L / x     / 8.6 ng/mL  CARDIAC MARKERS ( 2020 19:57 )  0.293 ng/mL / x     / x     / x     / x          RADIOLOGY & ADDITIONAL TESTS:  U/S of the b/l Lower extremities: No evidence of deep venous thrombosis in either lower extremity.    CXR: LEFT PICC line catheter tip in SVC. Cardiomegaly. No evidence of active chest disease.    ECHO 2020:  Fibrocalcific changes noted to the mitral valve leaflets with preserved  leaflet excursion.   Mild to Moderate mitral regurgitation is present.   EA reversal of the mitral inflow consistent with reduced compliance of the   left ventricle.   Significant fibrocalcific changes noted to the aortic valve leaflets with   restriction in leaflet excursion. Peak and mean transaortic gradients are   58 and 36 mmHg respectively; this finding is consistent with moderate   aortic stenosis.   JERILYN by continuity is 1.04 cm2 and the dimensionless index is .28   suggesting moderate to severe stenosis.   Moderate (2+) aortic regurgitation is present.   The tricuspid valve leaflets are thin and pliable; valve motion is normal.   Mild (1+) tricuspid valve regurgitation is present.   Borderline moderate pulmonary hypertension.   Trace pulmonic valvular regurgitation is present.   The left atrium is mildly dilated.   The left ventricle is normal in size, wall thickness, wall motion and   contractility.   Estimated left ventricular ejection fraction is 55 %.   The right atrium appears mildly dilated.   Normal appearing right ventricle structure and function.   The IVC is dilated.   No evidence of pericardial effusion.   No evidence of pleural effusion.

## 2020-01-30 NOTE — CHART NOTE - NSCHARTNOTEFT_GEN_A_CORE
Chart Review: HPI: 83 yo M with a PMH HTN, HLD, and BPH who presents with leg pains. He states for the past week, he has had sharp severe leg pains. They occur every day, often lasting for hours at a time, and are not related to exertion. He states that when the pains come it makes it difficult to walk, although when it is not there, he can walk without difficulty. The pains are from his ankle up to his upper thighs (he cannot tell from where it starts and stops). Of note, he was recently admitted (2 weeks ago) for cellulitis with concern for OM, and had a PICC line placed, to be on antibiotics (Vanc/Ceftriaxone) for 6 weeks.    He states the swelling in his legs from his cellulitis has improved.      In the ED, he was given aspirin 325 mg PO x1, Vancomycin 1g IV x1, Cefepime 1g IV x1, Ibuprofen 600 mg PO x1, and LR 2.5L x1. (29 Jan 2020 23:42)      Subjective: Received a call from nurse regarding pt's . Per the nurse pt was found to be in no acute distress appearing vitally stable, requesting     Received a call from RN regarding pts, *. Patient was seen and examined at bedside. Found to be in no acute distress, c/o *. Pt appeared vitally stable. On ROS pt denies, Chest Pain, SOB, HA, dizziness, chest palpitations, N/V/D/C, heamturia, melena/hematochezia, cough/hematemisis. Remaining ROS unremarkable.     Vitals  T(C): 38.8 (01-30-20 @ 14:35), Max: 39.3 (01-30-20 @ 06:20)  HR: 94 (01-30-20 @ 14:20) (67 - 95)  BP: 103/51 (01-30-20 @ 14:00) (86/54 - 137/62)  RR: 14 (01-30-20 @ 11:00) (14 - 28)  SpO2: 90% (01-30-20 @ 14:20) (89% - 97%)    PHYSICAL EXAM:  Constitutional: NAD, awake and alert, well-developed  HEENT: PERR, EOMI, Normal Hearing, MMM  Neck: Soft and supple, No LAD, No JVD  Respiratory: Breath sounds are clear bilaterally, No wheezing, rales or rhonchi  Cardiovascular: S1 and S2, regular rate and rhythm, no Murmurs, gallops or rubs  Gastrointestinal: Bowel Sounds present, soft, nontender, nondistended, no guarding, no rebound  Extremities: No peripheral edema  Vascular: 2+ peripheral pulses  Neurological: A/O x 3, no focal deficits  Musculoskeletal: 5/5 strength b/l upper and lower extremities  Skin: No rashes    Assessment   M with a PMH of * admitted to the floor with * currently being evaluated for * likely 2/2 *.    Plan  -  -Continue to monitor for worsening Sx        Case discussed with

## 2020-01-30 NOTE — ED ADULT NURSE REASSESSMENT NOTE - NS ED NURSE REASSESS COMMENT FT1
Pt is A&Ox4, VSS on RA. Pt is resting comfortably in their bed at this time, denies any pain or discomfort at this time. Ambulated at bedside to use urinal, Pt denies any chest pain at this time. Safety and comfort measures in place. Hourly rounding will be done on my time. Will continue to monitor.

## 2020-01-30 NOTE — ED ADULT NURSE REASSESSMENT NOTE - NS ED NURSE REASSESS COMMENT FT1
Patient noted to be in bigeminy, transitioning into V-tach.  MD Macdonald notified and came to assess patient.  Placed on Zoll monitor with pads, repeat EKG performed.  Patient given 5mg Lopressor IV push with good outcome.  Patient desatting on room air to 85%.  5L NC applied O2 sat only up to 88%, patient currently on 40% venti mask.  Patient denies chest pain, only complaining of pain in the legs.

## 2020-01-30 NOTE — CONSULT NOTE ADULT - SUBJECTIVE AND OBJECTIVE BOX
Patient is a 84y old  Male who presents with a chief complaint of SIRS (2020 15:34)      HPI:  2020- Cardiology Consultation: Patient with HBP, HLD and known moderate aortic stenosis and regurgitation is S/P ORIF left ankle 2019 the admitted for right leg cellulitis 1/10/2020 and discharged with a PICC line for 6 weeks of antibiotics. He presented today with bilateral leg pain and was noted to be febrile to 104, breathless and having frequent VPCs. The patient denies chest pain. There is no history of CAD or heart failure. EP has started him on IV amiodarone.    83 yo M with a PMH HTN, HLD, and BPH who presents with leg pains. He states for the past week, he has had sharp severe leg pains. They occur every day, often lasting for hours at a time, and are not related to exertion. He states that when the pains come it makes it difficult to walk, although when it is not there, he can walk without difficulty. The pains are from his ankle up to his upper thighs (he cannot tell from where it starts and stops). Of note, he was recently admitted (2 weeks ago) for cellulitis with concern for OM, and had a PICC line placed, to be on antibiotics (Vanc/Ceftriaxone) for 6 weeks.  He denies nausea, vomiting, fevers, chills, cough, chest pain, SOB, abdominal pain, diarrhea, dysuria, hematuria, dizziness, lightheadedness, or rash. He states the swelling in his legs from his cellulitis has improved.    Of note, per ER documentation, he presented due to fevers, and reportedly had a Tmax of 104 at home. The patient denies this.    Attempted to call patient's wife multiple times overnight and left message without response.  Patient does not know who his cardiologist is.    In the ED, he was given aspirin 325 mg PO x1, Vancomycin 1g IV x1, Cefepime 1g IV x1, Ibuprofen 600 mg PO x1, and LR 2.5L x1. (2020 23:42)      PAST MEDICAL & SURGICAL HISTORY:  HLD (hyperlipidemia)  Enlarged prostate  HTN (hypertension)  Status post ORIF of fracture of ankle: Right, 10/2019      Allergies and Intolerances:        Allergies:  	No Known Allergies:        Intolerances:  	Lipitor: Drug, Joint Pain, Pt's wife states that he was seen by neurologist for joint pain with all tests negative, so Lipitor was determined to be the cause.  Pt's wife will check at home to see if pt is on Pravastatin at this time. (10/12/19)    Home Medications:   * Patient Currently Takes Medications as of 2020 21:53 documented in Structured Notes  · 	cefTRIAXone 2 g intravenous injection: Last Dose Taken:  , 2 gram(s) intravenous every 24 hours for 6 weeks  	***for cellulitis, course completed***  · 	vancomycin 750 mg intravenous injection: Last Dose Taken:  , 750 milligram(s) intravenous every 12 hours for 6 weeks  	***for cellulitis, course not completed***  · 	losartan 100 mg oral tablet: Last Dose Taken:  , 1 tab(s) orally once a day  · 	amLODIPine 10 mg oral tablet: Last Dose Taken:  , 1 tab(s) orally once a day  · 	finasteride 5 mg oral tablet: Last Dose Taken:  , 1 tab(s) orally once a day  · 	Flomax 0.4 mg oral capsule: Last Dose Taken:  , 1 cap(s) orally 2 times a day  · 	pravastatin 20 mg oral tablet: Last Dose Taken:  , 1 tab(s) orally once a day  	CoQ10 300 mg oral capsule: Last Dose Taken:  , 1 cap(s) orally once a day  · 	Fish Oil 1200 mg oral capsule: Last Dose Taken:  , 1 cap(s) orally once a day  · 	glucosamine 500 mg oral capsule: Last Dose Taken:  , 1 cap(s) orally once a day  · 	cholecalciferol 2000 intl units (50 mcg) oral tablet: Last Dose Taken:  , 1 tab(s) orally once a day    MEDICATIONS  (STANDING):  aMIOdarone Infusion 1 mG/Min (33.333 mL/Hr) IV Continuous <Continuous>  aMIOdarone Infusion 0.5 mG/Min (16.667 mL/Hr) IV Continuous <Continuous>  aspirin enteric coated 81 milliGRAM(s) Oral daily  cefTRIAXone Injectable. 2000 milliGRAM(s) IV Push every 24 hours  cholecalciferol 2000 Unit(s) Oral daily  finasteride 5 milliGRAM(s) Oral daily  heparin  Injectable 5000 Unit(s) SubCutaneous every 12 hours  potassium chloride  10 mEq/100 mL IVPB 10 milliEquivalent(s) IV Intermittent every 1 hour  tamsulosin 0.4 milliGRAM(s) Oral two times a day  vancomycin  IVPB 750 milliGRAM(s) IV Intermittent every 12 hours    MEDICATIONS  (PRN):  acetaminophen   Tablet .. 650 milliGRAM(s) Oral every 6 hours PRN Mild Pain (1 - 3), Moderate Pain (4 - 6)  acetaminophen  Suppository .. 650 milliGRAM(s) Rectal every 4 hours PRN Temp greater or equal to 38C (100.4F), Moderate Pain (4 - 6)      FAMILY HISTORY:  No pertinent family history in first degree relatives      SOCIAL HISTORY:  Tobacco-  no         Alcohol-    social          Illicit drugs-  no          Occupation-              Marital  status-  REVIEW OF SYSTEM:  Pertinent items are noted in HPI.    Vital Signs Last 24 Hrs  T(C): 40 (2020 17:00), Max: 40 (2020 17:00)  T(F): 104 (2020 17:00), Max: 104 (2020 17:00)  HR: 78 (2020 17:00) (67 - 130)  BP: 111/60 (2020 17:00) (86/54 - 156/95)  BP(mean): 77 (2020 17:00) (55 - 105)  RR: 23 (2020 17:00) (14 - 34)  SpO2: 98% (2020 17:00) (89% - 98%)    I&O's Summary    2020 07:  -  2020 07:00  --------------------------------------------------------  IN: 0 mL / OUT: 1000 mL / NET: -1000 mL    2020 07:01  -  2020 17:49  --------------------------------------------------------  IN: 350 mL / OUT: 650 mL / NET: -300 mL      PHYSICAL EXAM  General Appearance:  acutely ill  HEENT: PERRL, conjunctiva clear, EOM's intact, non injected pharynx, no exudate, TM   normal  Neck: Supple, , no adenopathy, thyroid: not enlarged, no carotid bruit or JVD  Back: Symmetric, no  tenderness,no soft tissue tenderness  Lungs: Clear to auscultation bilaterally,no adventitious breath sounds, normal   expiratory phase  Heart: Regular rate and rhythm, S1, normal, S2 reduced. 3/6 HILL base radiating to the neck. No diastolic murmur.   Abdomen: Soft, non-tender, bowel sounds active , no hepatosplenomegaly  Extremities: 1 + edema right ankle with mild erythema  Skin: Skin color, texture normal, no rashes   Neurologic: Alert and oriented X3 , cranial nerves intact, sensory and motor normal,        INTERPRETATION OF TELEMETRY:    ECG: sinus tachycardia 114 BPM, frequent VPCs, ST depression V4-V6 consistent with ischemia.        EXAM:  ECHO TTE WO CON COMP W DOP         PROCEDURE DATE:  2020        INTERPRETATION:  Transthoracic Echocardiography Report (TTE)       Summary     Fibrocalcific changes noted to the mitral valve leaflets with preserved   leaflet excursion.   Mild to Moderate mitral regurgitation is present.   EA reversal of themitral inflow consistent with reduced compliance of the   left ventricle.   Significant fibrocalcific changes noted to the aortic valve leaflets with   restriction in leaflet excursion. Peak and mean transaortic gradients are   58 and 36 mmHg respectively; this finding is consistent with moderate   aortic stenosis.   JERILYN by continuity is 1.04 cm2 and the dimensionless index is .28   suggesting moderate to severe stenosis.   Moderate (2+) aortic regurgitation is present.   The tricuspid valve leaflets are thin and pliable; valve motion is normal.   Mild (1+) tricuspid valve regurgitation is present.   Borderline moderate pulmonary hypertension.   Trace pulmonic valvular regurgitation is present.   The left atrium is mildly dilated.   The left ventricle is normal in size, wall thickness, wall motion and   contractility.   Estimated left ventricular ejection fraction is 55 %.   The right atrium appears mildly dilated.   Normal appearing right ventricle structure and function.   The IVC is dilated.   No evidence of pericardial effusion.   No evidence of pleural effusion.     Signature     ----------------------------------------------------------------   Electronically signed by Crystal Anne MD(Interpreting     physician) on 2020 07:28 AM          LABS:                          10.8   5.58  )-----------( 122      ( 2020 15:34 )             32.5         137  |  107  |  25<H>  ----------------------------<  127<H>  3.6   |  23  |  0.94    Ca    8.2<L>      2020 15:34  Phos  2.6       Mg     2.3         TPro  5.9<L>  /  Alb  2.9<L>  /  TBili  0.9  /  DBili  x   /  AST  138<H>  /  ALT  103<H>  /  AlkPhos  101  30    CARDIAC MARKERS ( 2020 15:34 )  0.741 ng/mL / x     / x     / x     / x      CARDIAC MARKERS ( 2020 07:27 )  0.589 ng/mL / x     / x     / x     / x      CARDIAC MARKERS ( 2020 04:58 )  0.273 ng/mL / x     / x     / x     / x      CARDIAC MARKERS ( 2020 00:55 )  0.235 ng/mL / x     / 1858 U/L / x     / 8.6 ng/mL  CARDIAC MARKERS ( 2020 19:57 )  0.293 ng/mL / x     / x     / x     / x              PT/INR - ( 2020 19:57 )   PT: 14.9 sec;   INR: 1.33 ratio         PTT - ( 2020 19:57 )  PTT:29.1 sec  Urinalysis Basic - ( 2020 22:06 )    Color: Yellow / Appearance: very cloudy / S.020 / pH: x  Gluc: x / Ketone: Trace  / Bili: Negative / Urobili: Negative mg/dL   Blood: x / Protein: 30 mg/dL / Nitrite: Negative   Leuk Esterase: Trace / RBC: >50 /HPF / WBC 3-5   Sq Epi: x / Non Sq Epi: Few / Bacteria: Many      ABG - ( 2020 04:55 )  pH, Arterial: 7.57  pH, Blood: x     /  pCO2: 22    /  pO2: 144   / HCO3: 21    / Base Excess: -.3   /  SaO2: 99                    RADIOLOGY & ADDITIONAL STUDIES:    EXAM:  XR CHEST PORTABLE IMMED 1V                            PROCEDURE DATE:  2020          INTERPRETATION:  AP erect chest on 2020 at 3:30 PM. Patient is short of breath.    Heart is magnified by technique. The lung fields and pleural surfaces are unremarkable.    The above findings are similar to  study.    Left PICC line on the earlier study has been removed.    IMPRESSION: Removal of PICC line.    TALON MATHEW                       IMPRESSION:  1, Fever and sepsis syndrome. Source most likely right ankle surgical site vs. PICC line. No TTE evidence of endocarditis.   2. Elevated troponin consistent with sepsis. Cannot exclude NSTEMI.  3. Moderate AS and AR.  4. VPCs due to sepsis and or NSTEMI.   5.HBP. Antihypertensives on hold due to sepsis.   PLAN:  Agree with IV amiodarone, ASA, SQ heparin, antibiotics per ID. Continue pravastatin. Repeat EKG daily for the next few days. Will follow.

## 2020-01-30 NOTE — CONSULT NOTE ADULT - SUBJECTIVE AND OBJECTIVE BOX
Patient is a 84y old  Male who presents with a chief complaint of SIRS     HPI:  85 y/o M with h/o HTN, HLD, BPH with chronic chowdary, right ankle fracture (Oct 10, 2019) s/p ORIF, recently hospitalized (1/10/2020) for RLE cellulitis and possible underlying acute OM and/or ankle hardware infection on vancomycin IV and ceftriaxone (since 1/10 via PICC line) was admitted on  for lower leg pains. He states for the past week PTA, he has had sharp severe leg pains. They occur every day, often lasting for hours at a time, and are not related to exertion. He states that when the pains come it makes it difficult to walk, although when it is not there, he can walk without difficulty. The pains are from his ankle up to his upper thighs (he cannot tell from where it starts and stops). He reported a fever to 104F at home. In ER he was febrile to 102.7F and received vancomycin IV and cefepime.     PMH: as above  PSH: as above  Meds: per reconciliation sheet, noted below  MEDICATIONS  (STANDING):  aspirin enteric coated 81 milliGRAM(s) Oral daily  cholecalciferol 2000 Unit(s) Oral daily  finasteride 5 milliGRAM(s) Oral daily  heparin  Injectable 5000 Unit(s) SubCutaneous every 12 hours  tamsulosin 0.4 milliGRAM(s) Oral two times a day    MEDICATIONS  (PRN):  acetaminophen   Tablet .. 650 milliGRAM(s) Oral every 6 hours PRN Mild Pain (1 - 3), Moderate Pain (4 - 6)    Allergies    No Known Allergies    Intolerances    Lipitor (Joint Pain)    Social: no smoking, no alcohol, no illegal drugs; no recent travel, no exposure to TB  FAMILY HISTORY:  No pertinent family history in first degree relatives    no history of premature cardiovascular disease in first degree relatives    ROS: the patient denies HA, no seizures, no dizziness, no sore throat, no nasal congestion, no blurry vision, no CP, no palpitations, no SOB, no cough, no abdominal pain, no diarrhea, no N/V, no dysuria, no leg pain, no claudication, no rash, no joint aches, no rectal pain or bleeding, no night sweats; has legs pains  All other systems reviewed and are negative    Vital Signs Last 24 Hrs  T(C): 39.3 (2020 06:20), Max: 39.3 (2020 06:20)  T(F): 102.7 (2020 06:20), Max: 102.7 (2020 06:20)  HR: 85 (2020 07:00) (72 - 95)  BP: 121/56 (2020 07:00) (107/52 - 137/62)  BP(mean): 72 (:00) (72 - 72)  RR: 23 (:00) (16 - 25)  SpO2: 97% (2020 07:00) (92% - 97%)  Daily Height in cm: 180.34 (2020 19:27)    Daily Weight in k.4 (2020 06:20)    PE:    Constitutional:  No acute distress  HEENT: NC/AT, EOMI, PERRLA, conjunctivae clear; ears and nose atraumatic; pharynx benign  Neck: supple; thyroid not palpable  Back: no tenderness  Respiratory: respiratory effort normal; clear to auscultation  Cardiovascular: S1S2 regular, no murmurs  Abdomen: soft, not tender, not distended, positive BS; no liver or spleen organomegaly  Genitourinary: no suprapubic tenderness  Lymphatic: no LN palpable  Musculoskeletal: no muscle tenderness, no joint swelling or tenderness  Extremities: 1+ pedal edema  Neurological/ Psychiatric: AxOx3, judgement and insight normal; moving all extremities  Skin: no rashes; no palpable lesions    Labs: all available labs reviewed                        10.6   5.92  )-----------( 109      ( 2020 07:27 )             32.0         139  |  110<H>  |  21  ----------------------------<  105<H>  3.6   |  21<L>  |  0.77    Ca    8.1<L>      2020 07:27  Phos  2.6       Mg     2.3         TPro  5.7<L>  /  Alb  2.8<L>  /  TBili  0.9  /  DBili  x   /  AST  101<H>  /  ALT  79<H>  /  AlkPhos  85  01-30     LIVER FUNCTIONS - ( 2020 07:27 )  Alb: 2.8 g/dL / Pro: 5.7 gm/dL / ALK PHOS: 85 U/L / ALT: 79 U/L / AST: 101 U/L / GGT: x           Urinalysis Basic - ( 2020 22:06 )    Color: Yellow / Appearance: very cloudy / S.020 / pH: x  Gluc: x / Ketone: Trace  / Bili: Negative / Urobili: Negative mg/dL   Blood: x / Protein: 30 mg/dL / Nitrite: Negative   Leuk Esterase: Trace / RBC: >50 /HPF / WBC 3-5   Sq Epi: x / Non Sq Epi: Few / Bacteria: Many        Radiology: all available radiological tests reviewed    < from: Xray Chest 1 View-PORTABLE IMMEDIATE (20 @ 04:35) >  LEFT PICC line catheter tip in SVC. Cardiomegaly. No evidence of active chest disease.    < end of copied text >      Advanced directives addressed: full resuscitation Patient is a 84y old  Male who presents with a chief complaint of SIRS     HPI:  83 y/o M with h/o HTN, HLD, BPH with chronic chowdary, right ankle fracture (Oct 10, 2019) s/p ORIF, recently hospitalized (1/10/2020) for RLE cellulitis and possible underlying acute OM and/or ankle hardware infection on vancomycin IV and ceftriaxone (since 1/10 via PICC line) was admitted on  for lower leg pains. He states for the past week PTA, he has had sharp severe leg pains. They occur every day, often lasting for hours at a time, and are not related to exertion. He states that when the pains come it makes it difficult to walk, although when it is not there, he can walk without difficulty. The pains are from his ankle up to his upper thighs (he cannot tell from where it starts and stops). He reported a fever to 104F at home. In ER he was febrile to 102.7F and received vancomycin IV and cefepime.     PMH: as above  PSH: as above  Meds: per reconciliation sheet, noted below  MEDICATIONS  (STANDING):  aspirin enteric coated 81 milliGRAM(s) Oral daily  cholecalciferol 2000 Unit(s) Oral daily  finasteride 5 milliGRAM(s) Oral daily  heparin  Injectable 5000 Unit(s) SubCutaneous every 12 hours  tamsulosin 0.4 milliGRAM(s) Oral two times a day    MEDICATIONS  (PRN):  acetaminophen   Tablet .. 650 milliGRAM(s) Oral every 6 hours PRN Mild Pain (1 - 3), Moderate Pain (4 - 6)    Allergies    No Known Allergies    Intolerances    Lipitor (Joint Pain)    Social: no smoking, no alcohol, no illegal drugs; no recent travel, no exposure to TB  FAMILY HISTORY:  No pertinent family history in first degree relatives    no history of premature cardiovascular disease in first degree relatives    ROS: the patient had chills; denies HA, no seizures, no dizziness, no sore throat, no nasal congestion, no blurry vision, no CP, no palpitations, no SOB, no cough, no abdominal pain, no diarrhea, no N/V, no dysuria, has lower leg pain, no claudication, no rash, no joint aches, no rectal pain or bleeding, no night sweats; has legs pains  All other systems reviewed and are negative    Vital Signs Last 24 Hrs  T(C): 39.3 (2020 06:20), Max: 39.3 (2020 06:20)  T(F): 102.7 (2020 06:20), Max: 102.7 (2020 06:20)  HR: 85 (2020 07:00) (72 - 95)  BP: 121/56 (2020 07:00) (107/52 - 137/62)  BP(mean): 72 (2020 07:00) (72 - 72)  RR: 23 (2020 07:00) (16 - 25)  SpO2: 97% (2020 07:00) (92% - 97%)  Daily Height in cm: 180.34 (2020 19:27)    Daily Weight in k.4 (2020 06:20)    PE:    Constitutional:  No acute distress  HEENT: NC/AT, EOMI, PERRLA, conjunctivae clear; ears and nose atraumatic; pharynx benign  Neck: supple; thyroid not palpable  Back: no tenderness  Respiratory: respiratory effort normal; clear to auscultation  Cardiovascular: S1S2 regular, no murmurs  Abdomen: soft, not tender, not distended, positive BS; no liver or spleen organomegaly  Genitourinary: no suprapubic tenderness  Lymphatic: no LN palpable  Musculoskeletal: no muscle tenderness, no joint swelling or tenderness  Extremities: 2+ right lower pedal edema; mild erythema; much improved from prior hospitalization; no warmth; no skin break  Neurological/ Psychiatric: AxOx3, judgement and insight normal; moving all extremities  Skin: no rashes; no palpable lesions    Labs: all available labs reviewed                        10.6   5.92  )-----------( 109      ( 2020 07:27 )             32.0     01-30    139  |  110<H>  |  21  ----------------------------<  105<H>  3.6   |  21<L>  |  0.77    Ca    8.1<L>      2020 07:27  Phos  2.6     01-30  Mg     2.3     -30    TPro  5.7<L>  /  Alb  2.8<L>  /  TBili  0.9  /  DBili  x   /  AST  101<H>  /  ALT  79<H>  /  AlkPhos  85       LIVER FUNCTIONS - ( 2020 07:27 )  Alb: 2.8 g/dL / Pro: 5.7 gm/dL / ALK PHOS: 85 U/L / ALT: 79 U/L / AST: 101 U/L / GGT: x           Urinalysis Basic - ( 2020 22:06 )    Color: Yellow / Appearance: very cloudy / S.020 / pH: x  Gluc: x / Ketone: Trace  / Bili: Negative / Urobili: Negative mg/dL   Blood: x / Protein: 30 mg/dL / Nitrite: Negative   Leuk Esterase: Trace / RBC: >50 /HPF / WBC 3-5   Sq Epi: x / Non Sq Epi: Few / Bacteria: Many        Radiology: all available radiological tests reviewed    < from: Xray Chest 1 View-PORTABLE IMMEDIATE (20 @ 04:35) >  LEFT PICC line catheter tip in SVC. Cardiomegaly. No evidence of active chest disease.    < end of copied text >      Advanced directives addressed: full resuscitation

## 2020-01-30 NOTE — PROGRESS NOTE ADULT - ASSESSMENT
A/P: Patient is an 84 year old male who has gone into acute hypoxic respiratory failure from pulmonary edema  mod-severe AS    Plan:  Transferred to the ICU  Placed on CPAP 12  lasix 40 IVP x 1  [place]  On 100% Fi O2--Will slowly wean down  NPO while on NIV  continue ASA for NSTEMI  Amio given for NSVT and a drip was started  CXR reviewed and appears to have increased PVC  D/W EP  D/W Patient and his family    D/W the patients wife and he is a full CODE  MOLST FILLED OUT AND ON CHART

## 2020-01-31 DIAGNOSIS — I47.2 VENTRICULAR TACHYCARDIA: ICD-10-CM

## 2020-01-31 LAB
ADD ON TEST-SPECIMEN IN LAB: SIGNIFICANT CHANGE UP
ALBUMIN SERPL ELPH-MCNC: 2.9 G/DL — LOW (ref 3.3–5)
ALP SERPL-CCNC: 108 U/L — SIGNIFICANT CHANGE UP (ref 40–120)
ALT FLD-CCNC: 97 U/L — HIGH (ref 12–78)
ANION GAP SERPL CALC-SCNC: 6 MMOL/L — SIGNIFICANT CHANGE UP (ref 5–17)
ANION GAP SERPL CALC-SCNC: 8 MMOL/L — SIGNIFICANT CHANGE UP (ref 5–17)
AST SERPL-CCNC: 103 U/L — HIGH (ref 15–37)
BILIRUB DIRECT SERPL-MCNC: 0.3 MG/DL — HIGH (ref 0–0.2)
BILIRUB INDIRECT FLD-MCNC: 0.7 MG/DL — SIGNIFICANT CHANGE UP (ref 0.2–1)
BILIRUB SERPL-MCNC: 1 MG/DL — SIGNIFICANT CHANGE UP (ref 0.2–1.2)
BUN SERPL-MCNC: 25 MG/DL — HIGH (ref 7–23)
BUN SERPL-MCNC: 27 MG/DL — HIGH (ref 7–23)
CALCIUM SERPL-MCNC: 8.1 MG/DL — LOW (ref 8.5–10.1)
CALCIUM SERPL-MCNC: 8.3 MG/DL — LOW (ref 8.5–10.1)
CHLORIDE SERPL-SCNC: 107 MMOL/L — SIGNIFICANT CHANGE UP (ref 96–108)
CHLORIDE SERPL-SCNC: 108 MMOL/L — SIGNIFICANT CHANGE UP (ref 96–108)
CK SERPL-CCNC: 1526 U/L — HIGH (ref 26–308)
CO2 SERPL-SCNC: 23 MMOL/L — SIGNIFICANT CHANGE UP (ref 22–31)
CO2 SERPL-SCNC: 23 MMOL/L — SIGNIFICANT CHANGE UP (ref 22–31)
CREAT SERPL-MCNC: 1.03 MG/DL — SIGNIFICANT CHANGE UP (ref 0.5–1.3)
CREAT SERPL-MCNC: 1.09 MG/DL — SIGNIFICANT CHANGE UP (ref 0.5–1.3)
CULTURE RESULTS: NO GROWTH — SIGNIFICANT CHANGE UP
GLUCOSE SERPL-MCNC: 116 MG/DL — HIGH (ref 70–99)
GLUCOSE SERPL-MCNC: 122 MG/DL — HIGH (ref 70–99)
HAV IGM SER-ACNC: SIGNIFICANT CHANGE UP
HBV CORE IGM SER-ACNC: SIGNIFICANT CHANGE UP
HBV SURFACE AG SER-ACNC: SIGNIFICANT CHANGE UP
HCT VFR BLD CALC: 36.1 % — LOW (ref 39–50)
HCV AB S/CO SERPL IA: 0.11 S/CO — SIGNIFICANT CHANGE UP (ref 0–0.99)
HCV AB SERPL-IMP: SIGNIFICANT CHANGE UP
HGB BLD-MCNC: 11.8 G/DL — LOW (ref 13–17)
MAGNESIUM SERPL-MCNC: 2 MG/DL — SIGNIFICANT CHANGE UP (ref 1.6–2.6)
MAGNESIUM SERPL-MCNC: 2.2 MG/DL — SIGNIFICANT CHANGE UP (ref 1.6–2.6)
MCHC RBC-ENTMCNC: 30.6 PG — SIGNIFICANT CHANGE UP (ref 27–34)
MCHC RBC-ENTMCNC: 32.7 GM/DL — SIGNIFICANT CHANGE UP (ref 32–36)
MCV RBC AUTO: 93.8 FL — SIGNIFICANT CHANGE UP (ref 80–100)
PHOSPHATE SERPL-MCNC: 3.3 MG/DL — SIGNIFICANT CHANGE UP (ref 2.5–4.5)
PLATELET # BLD AUTO: 120 K/UL — LOW (ref 150–400)
POTASSIUM SERPL-MCNC: 3.7 MMOL/L — SIGNIFICANT CHANGE UP (ref 3.5–5.3)
POTASSIUM SERPL-MCNC: 4 MMOL/L — SIGNIFICANT CHANGE UP (ref 3.5–5.3)
POTASSIUM SERPL-SCNC: 3.7 MMOL/L — SIGNIFICANT CHANGE UP (ref 3.5–5.3)
POTASSIUM SERPL-SCNC: 4 MMOL/L — SIGNIFICANT CHANGE UP (ref 3.5–5.3)
PROCALCITONIN SERPL-MCNC: 6.58 NG/ML — HIGH (ref 0.02–0.1)
PROT SERPL-MCNC: 6.2 GM/DL — SIGNIFICANT CHANGE UP (ref 6–8.3)
RBC # BLD: 3.85 M/UL — LOW (ref 4.2–5.8)
RBC # FLD: 13.9 % — SIGNIFICANT CHANGE UP (ref 10.3–14.5)
SODIUM SERPL-SCNC: 137 MMOL/L — SIGNIFICANT CHANGE UP (ref 135–145)
SODIUM SERPL-SCNC: 138 MMOL/L — SIGNIFICANT CHANGE UP (ref 135–145)
SPECIMEN SOURCE: SIGNIFICANT CHANGE UP
VANCOMYCIN TROUGH SERPL-MCNC: 8.5 UG/ML — LOW (ref 10–20)
WBC # BLD: 9.21 K/UL — SIGNIFICANT CHANGE UP (ref 3.8–10.5)
WBC # FLD AUTO: 9.21 K/UL — SIGNIFICANT CHANGE UP (ref 3.8–10.5)

## 2020-01-31 PROCEDURE — 71260 CT THORAX DX C+: CPT | Mod: 26

## 2020-01-31 PROCEDURE — 99232 SBSQ HOSP IP/OBS MODERATE 35: CPT

## 2020-01-31 PROCEDURE — 76705 ECHO EXAM OF ABDOMEN: CPT | Mod: 26

## 2020-01-31 PROCEDURE — 74177 CT ABD & PELVIS W/CONTRAST: CPT | Mod: 26

## 2020-01-31 PROCEDURE — 93010 ELECTROCARDIOGRAM REPORT: CPT

## 2020-01-31 PROCEDURE — 71045 X-RAY EXAM CHEST 1 VIEW: CPT | Mod: 26

## 2020-01-31 RX ORDER — MORPHINE SULFATE 50 MG/1
12 CAPSULE, EXTENDED RELEASE ORAL ONCE
Refills: 0 | Status: DISCONTINUED | OUTPATIENT
Start: 2020-01-31 | End: 2020-01-31

## 2020-01-31 RX ORDER — MORPHINE SULFATE 50 MG/1
1 CAPSULE, EXTENDED RELEASE ORAL ONCE
Refills: 0 | Status: DISCONTINUED | OUTPATIENT
Start: 2020-01-31 | End: 2020-01-31

## 2020-01-31 RX ORDER — ACETAMINOPHEN 500 MG
1000 TABLET ORAL ONCE
Refills: 0 | Status: COMPLETED | OUTPATIENT
Start: 2020-01-31 | End: 2020-01-31

## 2020-01-31 RX ORDER — PIPERACILLIN AND TAZOBACTAM 4; .5 G/20ML; G/20ML
3.38 INJECTION, POWDER, LYOPHILIZED, FOR SOLUTION INTRAVENOUS EVERY 8 HOURS
Refills: 0 | Status: DISCONTINUED | OUTPATIENT
Start: 2020-01-31 | End: 2020-02-03

## 2020-01-31 RX ORDER — POTASSIUM CHLORIDE 20 MEQ
10 PACKET (EA) ORAL
Refills: 0 | Status: COMPLETED | OUTPATIENT
Start: 2020-01-31 | End: 2020-01-31

## 2020-01-31 RX ORDER — FUROSEMIDE 40 MG
40 TABLET ORAL ONCE
Refills: 0 | Status: COMPLETED | OUTPATIENT
Start: 2020-01-31 | End: 2020-01-31

## 2020-01-31 RX ORDER — PIPERACILLIN AND TAZOBACTAM 4; .5 G/20ML; G/20ML
3.38 INJECTION, POWDER, LYOPHILIZED, FOR SOLUTION INTRAVENOUS ONCE
Refills: 0 | Status: COMPLETED | OUTPATIENT
Start: 2020-01-31 | End: 2020-01-31

## 2020-01-31 RX ORDER — FUROSEMIDE 40 MG
40 TABLET ORAL DAILY
Refills: 0 | Status: DISCONTINUED | OUTPATIENT
Start: 2020-01-31 | End: 2020-02-06

## 2020-01-31 RX ORDER — OXYCODONE AND ACETAMINOPHEN 5; 325 MG/1; MG/1
1 TABLET ORAL ONCE
Refills: 0 | Status: DISCONTINUED | OUTPATIENT
Start: 2020-01-31 | End: 2020-01-31

## 2020-01-31 RX ORDER — GABAPENTIN 400 MG/1
300 CAPSULE ORAL
Refills: 0 | Status: DISCONTINUED | OUTPATIENT
Start: 2020-01-31 | End: 2020-02-11

## 2020-01-31 RX ORDER — AMIODARONE HYDROCHLORIDE 400 MG/1
0.5 TABLET ORAL
Qty: 900 | Refills: 0 | Status: DISCONTINUED | OUTPATIENT
Start: 2020-01-31 | End: 2020-02-02

## 2020-01-31 RX ORDER — IBUPROFEN 200 MG
800 TABLET ORAL ONCE
Refills: 0 | Status: COMPLETED | OUTPATIENT
Start: 2020-01-31 | End: 2020-01-31

## 2020-01-31 RX ADMIN — Medication 81 MILLIGRAM(S): at 11:08

## 2020-01-31 RX ADMIN — Medication 400 MILLIGRAM(S): at 06:04

## 2020-01-31 RX ADMIN — MORPHINE SULFATE 1 MILLIGRAM(S): 50 CAPSULE, EXTENDED RELEASE ORAL at 20:45

## 2020-01-31 RX ADMIN — Medication 2000 UNIT(S): at 12:50

## 2020-01-31 RX ADMIN — MORPHINE SULFATE 1 MILLIGRAM(S): 50 CAPSULE, EXTENDED RELEASE ORAL at 20:14

## 2020-01-31 RX ADMIN — AMIODARONE HYDROCHLORIDE 16.67 MG/MIN: 400 TABLET ORAL at 13:51

## 2020-01-31 RX ADMIN — OXYCODONE AND ACETAMINOPHEN 1 TABLET(S): 5; 325 TABLET ORAL at 01:05

## 2020-01-31 RX ADMIN — Medication 1000 MILLIGRAM(S): at 06:08

## 2020-01-31 RX ADMIN — HEPARIN SODIUM 5000 UNIT(S): 5000 INJECTION INTRAVENOUS; SUBCUTANEOUS at 17:39

## 2020-01-31 RX ADMIN — PIPERACILLIN AND TAZOBACTAM 200 GRAM(S): 4; .5 INJECTION, POWDER, LYOPHILIZED, FOR SOLUTION INTRAVENOUS at 13:51

## 2020-01-31 RX ADMIN — Medication 40 MILLIGRAM(S): at 05:28

## 2020-01-31 RX ADMIN — TAMSULOSIN HYDROCHLORIDE 0.4 MILLIGRAM(S): 0.4 CAPSULE ORAL at 17:31

## 2020-01-31 RX ADMIN — HEPARIN SODIUM 5000 UNIT(S): 5000 INJECTION INTRAVENOUS; SUBCUTANEOUS at 05:28

## 2020-01-31 RX ADMIN — PIPERACILLIN AND TAZOBACTAM 25 GRAM(S): 4; .5 INJECTION, POWDER, LYOPHILIZED, FOR SOLUTION INTRAVENOUS at 22:12

## 2020-01-31 RX ADMIN — Medication 650 MILLIGRAM(S): at 11:08

## 2020-01-31 RX ADMIN — GABAPENTIN 300 MILLIGRAM(S): 400 CAPSULE ORAL at 14:18

## 2020-01-31 RX ADMIN — Medication 800 MILLIGRAM(S): at 23:00

## 2020-01-31 RX ADMIN — FINASTERIDE 5 MILLIGRAM(S): 5 TABLET, FILM COATED ORAL at 11:09

## 2020-01-31 RX ADMIN — Medication 650 MILLIGRAM(S): at 17:31

## 2020-01-31 RX ADMIN — Medication 100 MILLIEQUIVALENT(S): at 04:40

## 2020-01-31 RX ADMIN — MORPHINE SULFATE 1 MILLIGRAM(S): 50 CAPSULE, EXTENDED RELEASE ORAL at 22:09

## 2020-01-31 RX ADMIN — Medication 110 MILLIGRAM(S): at 17:37

## 2020-01-31 RX ADMIN — Medication 100 MILLIEQUIVALENT(S): at 06:21

## 2020-01-31 RX ADMIN — Medication 800 MILLIGRAM(S): at 22:10

## 2020-01-31 RX ADMIN — Medication 100 MILLIEQUIVALENT(S): at 03:34

## 2020-01-31 RX ADMIN — Medication 40 MILLIGRAM(S): at 12:50

## 2020-01-31 RX ADMIN — Medication 650 MILLIGRAM(S): at 03:00

## 2020-01-31 RX ADMIN — OXYCODONE AND ACETAMINOPHEN 1 TABLET(S): 5; 325 TABLET ORAL at 00:41

## 2020-01-31 RX ADMIN — GABAPENTIN 300 MILLIGRAM(S): 400 CAPSULE ORAL at 17:31

## 2020-01-31 RX ADMIN — MORPHINE SULFATE 1 MILLIGRAM(S): 50 CAPSULE, EXTENDED RELEASE ORAL at 22:30

## 2020-01-31 NOTE — PROVIDER CONTACT NOTE (OTHER) - ACTION/TREATMENT ORDERED:
I spoke with Nelson the doctor will be aware of the consult.
Called answering service, spoke with Reeder.
Called the office of Dr. Mtz.  Please fax [phone]

## 2020-01-31 NOTE — CHART NOTE - NSCHARTNOTEFT_GEN_A_CORE
Result of CT reviewed with wife at bedside as well as modifications in Abx.  Potential candidate for av replacement once sepsis has cleared

## 2020-01-31 NOTE — PROGRESS NOTE ADULT - SUBJECTIVE AND OBJECTIVE BOX
Patient is a 84y old  Male who presents with a chief complaint of SIRS (2020 00:30)    HPI:  2020- Cardiology Consultation: Patient with HBP, HLD and known moderate aortic stenosis and regurgitation is S/P ORIF left ankle 2019 the admitted for right leg cellulitis 1/10/2020 and discharged with a PICC line for 6 weeks of antibiotics. He presented today with bilateral leg pain and was noted to be febrile to 104, breathless and having frequent VPCs. The patient denies chest pain. There is no history of CAD or heart failure. EP has started him on IV amiodarone.      Followup HPI:  - Breathless. Remains on Bipap. Getting prn doses of IV furosemide for respiratory distress. Denies chest pain.     PAST MEDICAL & SURGICAL HISTORY:  HLD (hyperlipidemia)  Enlarged prostate  HTN (hypertension)  Status post ORIF of fracture of ankle: Right, 10/2019      Review of symptoms:  Negative except for as noted in today's HPI.      MEDICATIONS  (STANDING):  aMIOdarone Infusion 1 mG/Min (33.333 mL/Hr) IV Continuous <Continuous>  aMIOdarone Infusion 0.5 mG/Min (16.667 mL/Hr) IV Continuous <Continuous>  aspirin enteric coated 81 milliGRAM(s) Oral daily  cefTRIAXone Injectable. 2000 milliGRAM(s) IV Push every 24 hours  cholecalciferol 2000 Unit(s) Oral daily  finasteride 5 milliGRAM(s) Oral daily  heparin  Injectable 5000 Unit(s) SubCutaneous every 12 hours  tamsulosin 0.4 milliGRAM(s) Oral two times a day  vancomycin  IVPB 750 milliGRAM(s) IV Intermittent every 12 hours    MEDICATIONS  (PRN):  acetaminophen   Tablet .. 650 milliGRAM(s) Oral every 6 hours PRN Mild Pain (1 - 3), Moderate Pain (4 - 6)  acetaminophen  Suppository .. 650 milliGRAM(s) Rectal every 4 hours PRN Temp greater or equal to 38C (100.4F), Moderate Pain (4 - 6)          Vital Signs Last 24 Hrs  T(C): 39.9 (2020 07:00), Max: 40.2 (2020 20:00)  T(F): 103.8 (2020 07:00), Max: 104.3 (2020 20:00)  HR: 71 (2020 07:00) (67 - 130)  BP: 92/53 (2020 07:00) (86/54 - 164/116)  BP(mean): 65 (2020 07:00) (55 - 124)  RR: 23 (2020 07:00) (14 - 34)  SpO2: 100% (2020 07:00) (87% - 100%)    I&O's Summary    2020 07:01  -  2020 07:00  --------------------------------------------------------  IN: 1179 mL / OUT: 1600 mL / NET: -421 mL        PHYSICAL EXAM  General Appearance: alert, mildly breathless.  HEENT:   Neck:   Lungs: bibasilar crackles  Heart: 2/6 HILL base  Abdomen: soft and nontender  Extremities: 1+ edema right lateral malleolar area with mild erythema  Neurologic: no focal deficit      INTERPRETATION OF TELEMETRY: RSR, occasional VPCs.     ECG: sinus rhythm vs ectopic atrial rhythm 78 BPM, RBBB and possible INWI age undetermined.     LABS:                          11.8   9.21  )-----------( 120      ( 2020 05:28 )             36.1         137  |  108  |  27<H>  ----------------------------<  122<H>  4.0   |  23  |  1.03    Ca    8.1<L>      2020 05:41  Phos  3.3       Mg     2.0         TPro  6.2  /  Alb  2.9<L>  /  TBili  1.0  /  DBili  0.3<H>  /  AST  103<H>  /  ALT  97<H>  /  AlkPhos  108  31    CARDIAC MARKERS ( 2020 05:41 )  x     / x     / 1526 U/L / x     / x      CARDIAC MARKERS ( 2020 01:05 )  0.684 ng/mL / x     / x     / x     / x      CARDIAC MARKERS ( 2020 15:34 )  0.741 ng/mL / x     / x     / x     / x      CARDIAC MARKERS ( 2020 07:27 )  0.589 ng/mL / x     / x     / x     / x      CARDIAC MARKERS ( 2020 04:58 )  0.273 ng/mL / x     / x     / x     / x      CARDIAC MARKERS ( 2020 00:55 )  0.235 ng/mL / x     / 1858 U/L / x     / 8.6 ng/mL  CARDIAC MARKERS ( 2020 19:57 )  0.293 ng/mL / x     / x     / x     / x              PT/INR - ( 2020 19:57 )   PT: 14.9 sec;   INR: 1.33 ratio         PTT - ( 2020 19:57 )  PTT:29.1 sec  Urinalysis Basic - ( 2020 22:06 )    Color: Yellow / Appearance: very cloudy / S.020 / pH: x  Gluc: x / Ketone: Trace  / Bili: Negative / Urobili: Negative mg/dL   Blood: x / Protein: 30 mg/dL / Nitrite: Negative   Leuk Esterase: Trace / RBC: >50 /HPF / WBC 3-5   Sq Epi: x / Non Sq Epi: Few / Bacteria: Many      ABG - ( 2020 04:55 )  pH, Arterial: 7.57  pH, Blood: x     /  pCO2: 22    /  pO2: 144   / HCO3: 21    / Base Excess: -.3   /  SaO2: 99                    RADIOLOGY & ADDITIONAL STUDIES:    IMPRESSION:  1, Systemic inflammatory response syndrome. Source undetermined. Blood cultures neg so far. PICC line removed. No TTE evidence of endocarditis.   2. Elevated troponin consistent with sepsis. Cannot exclude NSTEMI.  3. Moderate AS and AR.  4. VPCs due to sepsis and or NSTEMI. Improved on IV amiodarone.   5.HBP. Antihypertensives on hold due to sepsis.   PLAN:  To have CT chest, abd, pelvis per intensivist. Continue amiodarone, ASA, IV furosemide as needed for respiratory compromise, IV antibiotics per ID. Patient is currently not a candidate for TAVR based on echocardiographic criteria and TAVR can not be considered given the ongoing febrile illness.

## 2020-01-31 NOTE — PROGRESS NOTE ADULT - SUBJECTIVE AND OBJECTIVE BOX
85 yo M with a PMH HTN, HLD, and BPH with chronic chowdary, recent ankle fracture s/p ORIF. who presents with leg pains. He states for the past week, he has had sharp severe leg pains. They occur every day, often lasting for hours at a time, and are not related to exertion. He states that when the pains come it makes it difficult to walk, although when it is not there, he can walk without difficulty. The pains are from his ankle up to his upper thighs (he cannot tell from where it starts and stops). Of note, he was recently admitted (2 weeks ago) for cellulitis with concern for OM, and had a PICC line placed, to be on antibiotics (Vanc/Ceftriaxone) for 6 weeks. At the time of admission he denied nausea, vomiting, fevers, chills, cough, chest pain, SOB, abdominal pain, diarrhea, dysuria, hematuria, dizziness, lightheadedness, or rash. He states the swelling in his legs from his cellulitis has improved.    Of note, per ER documentation, he presented due to fevers, and reportedly had a Tmax of 104 at home. The patient denies this.    In the ED, he was given aspirin 325 mg PO x1, Vancomycin 1g IV x1, Cefepime 1g IV x1, Ibuprofen 600 mg PO x1, and LR 2.5L x1. (2020 23:42)    : Patient was transferred from the cardiac step down to the ICU after being found to have NSVT. Patient currently denies chest pain, shortness of breath, palpitations, orthopnea, n/v.  Patient started on IV antibiotics followed by ID. He was placed on BiPAP for respiratory distress. EP was consulted for NSVT and he was started on Amiodarone gtt.    : In the past 24 hours patient has remained hemodynamically stable, with a TMAX of 103.8. He remained on BiPAP overnight. Blood Cultures drawn on 2020 show no growth to date. Currently on Amiodarone gtt    EKG:  TELE:    MEDICATIONS  (STANDING):  aMIOdarone Infusion 1 mG/Min (33.333 mL/Hr) IV Continuous <Continuous>  aMIOdarone Infusion 0.5 mG/Min (16.667 mL/Hr) IV Continuous <Continuous>  aspirin enteric coated 81 milliGRAM(s) Oral daily  cholecalciferol 2000 Unit(s) Oral daily  doxycycline IVPB 100 milliGRAM(s) IV Intermittent every 12 hours  finasteride 5 milliGRAM(s) Oral daily  furosemide   Injectable 40 milliGRAM(s) IV Push daily  heparin  Injectable 5000 Unit(s) SubCutaneous every 12 hours  tamsulosin 0.4 milliGRAM(s) Oral two times a day    MEDICATIONS  (PRN):  acetaminophen   Tablet .. 650 milliGRAM(s) Oral every 6 hours PRN Mild Pain (1 - 3), Moderate Pain (4 - 6)      Allergies: No Known Allergies    Intolerances: Lipitor (Joint Pain)    PHYSICAL EXAMINATION:  Vital Signs Last 24 Hrs  T(C): 38.7 (2020 08:00), Max: 40.2 (2020 20:00)  T(F): 101.6 (2020 08:00), Max: 104.3 (2020 20:00)  HR: 75 (2020 09:00) (67 - 130)  BP: 106/57 (2020 09:00) (80/58 - 164/116)  BP(mean): 73 (2020 09:00) (55 - 124)  RR: 17 (2020 09:00) (14 - 34)  SpO2: 94% (2020 09:00) (87% - 100%)      GENERAL APPEARANCE:  Elderly male in bed in NAD on BiPAP, family at bedside  HEENT: No icterus.  NECK:    HEART:   The cardiac impulse has a normal quality. HILL III/VI RUSB   CHEST:    ABDOMEN:  Soft and nontender.   EXTREMITIES:        LABS:                        11.8   9.21  )-----------( 120      ( 2020 05:28 )             36.1         137  |  108  |  27<H>  ----------------------------<  122<H>  4.0   |  23  |  1.03    Ca    8.1<L>      2020 05:41  Phos  3.3       Mg     2.0         TPro  6.2  /  Alb  2.9<L>  /  TBili  1.0  /  DBili  0.3<H>  /  AST  103<H>  /  ALT  97<H>  /  AlkPhos  108      PT/INR - ( 2020 19:57 )   PT: 14.9 sec;   INR: 1.33 ratio         PTT - ( 2020 19:57 )  PTT:29.1 sec  Urinalysis Basic - ( 2020 22:06 )    Color: Yellow / Appearance: very cloudy / S.020 / pH: x  Gluc: x / Ketone: Trace  / Bili: Negative / Urobili: Negative mg/dL   Blood: x / Protein: 30 mg/dL / Nitrite: Negative   Leuk Esterase: Trace / RBC: >50 /HPF / WBC 3-5   Sq Epi: x / Non Sq Epi: Few / Bacteria: Many      CARDIAC MARKERS ( 2020 05:41 )  x     / x     / 1526 U/L / x     / x      CARDIAC MARKERS ( 2020 01:05 )  0.684 ng/mL / x     / x     / x     / x      CARDIAC MARKERS ( 2020 15:34 )  0.741 ng/mL / x     / x     / x     / x      CARDIAC MARKERS ( 2020 07:27 )  0.589 ng/mL / x     / x     / x     / x      CARDIAC MARKERS ( 2020 04:58 )  0.273 ng/mL / x     / x     / x     / x      CARDIAC MARKERS ( 2020 00:55 )  0.235 ng/mL / x     / 1858 U/L / x     / 8.6 ng/mL  CARDIAC MARKERS ( 2020 19:57 )  0.293 ng/mL / x     / x     / x     / x          RADIOLOGY & ADDITIONAL TESTS:  U/S of the b/l Lower extremities 2020: No evidence of deep venous thrombosis in either lower extremity.    CXR 2020 : LEFT PICC line catheter tip in SVC. Cardiomegaly. No evidence of active chest disease./    ECHO 2020:  Fibrocalcific changes noted to the mitral valve leaflets with preserved  leaflet excursion.   Mild to Moderate mitral regurgitation is present.   EA reversal of the mitral inflow consistent with reduced compliance of the   left ventricle./   Significant fibrocalcific changes noted to the aortic valve leaflets with   restriction in leaflet excursion. Peak and mean transaortic gradients are   58 and 36 mmHg respectively; this finding is consistent with moderate   aortic stenosis.   JERILYN by continuity is 1.04 cm2 and the dimensionless index is .28   suggesting moderate to severe stenosis.   Moderate (2+) aortic regurgitation is present.   The tricuspid valve leaflets are thin and pliable; valve motion is normal.   Mild (1+) tricuspid valve regurgitation is present.   Borderline moderate pulmonary hypertension.   Trace pulmonic valvular regurgitation is present.   The left atrium is mildly dilated.   The left ventricle is normal in size, wall thickness, wall motion and   contractility.   Estimated left ventricular ejection fraction is 55 %.   The right atrium appears mildly dilated.   Normal appearing right ventricle structure and function.   The IVC is dilated.   No evidence of pericardial effusion.   No evidence of pleural effusion.    RUQ U/S 2020: Liver is grossly unremarkable. Right-sided pleural effusion. 1.6 cm right renal cyst with peripheral calcification.

## 2020-01-31 NOTE — PROGRESS NOTE ADULT - ASSESSMENT
85 y/o M with h/o HTN, HLD, BPH with chronic chowdary, right ankle fracture (Oct 10, 2019) s/p ORIF, recently hospitalized (1/10/2020) for RLE cellulitis and possible underlying acute OM and/or ankle hardware infection on vancomycin IV and ceftriaxone (since 1/10 via PICC line) was admitted on 1/29 for lower leg pains. He states for the past week PTA, he has had sharp severe leg pains. They occur every day, often lasting for hours at a time, and are not related to exertion. He states that when the pains come it makes it difficult to walk, although when it is not there, he can walk without difficulty. The pains are from his ankle up to his upper thighs (he cannot tell from where it starts and stops). He reported a fever to 104F at home. In ER he was febrile to 102.7F and received vancomycin IV and cefepime.     1. Febrile syndrome. Probable sepsis. Possible PICC Line infection. RLE cellulitis improving with possible underlying acute OM and/or ankle hardware infection. s/p recent ankle fracture s/p ORIF. BPH with chronic chowdary. Acute CHF exacerbation.  -concerned about drug fever  -BC x 2 are negative to date  -PICC Line is out  -on vancomycin 760 mg IV q12h and ceftriaxone 1 gm IV qd (since 1/10/2020)  -d/c vancomycin and d/c ceftriaxone  -start doxycycline 100 mg IV q12h  -reason for abx use and side effects reviewed with patient; monitor BMP   -elevate leg  -continue abx coverage  -repeat BC x 2  -monitor temps  -f/u CBC  -supportive care  2. Other issues:   -care per medicine

## 2020-01-31 NOTE — CHART NOTE - NSCHARTNOTEFT_GEN_A_CORE
Patient is a 84y old  Male who presents with a chief complaint of SIRS (2020 00:30)      BRIEF HOSPITAL COURSE:   84M with PMHx HTN, HLD, BPH, sp ORIF R ankle fx, LE cellulitis/OM, sp PICC with IV abx (1/10) who presented to ED with complaints of fever and leg pain. Initially admitted to medical floor, being treated for presumed sepsis. Received 2.5liters fluid. While on floor with RRT for acute hypoxia and tachycardia. Pt noted to be having runs of NSVT. ICU consulted, placed on NIPPV, given lasix x 1 with acute 700ml diuresis. Transferred to ICU. EPS evaluated and placed on IV amio for NSVT.       Events: Called to evaluate pt this am for increasing O2 requirements following a successfully period off NIPPV, pt trialed on AM 50%--> 100%NRB with sats 88-90%, trialed on HFNC 45liters and 60%, continual desaturations and increasing WOB, repeat CXR shows increased pulmonary vascular markings. Hemodynamics stable, NSVT has resolved, remains on IV amio infusion, still febrile. Denies CP,+SOB/cough, no N/V.    PAST MEDICAL & SURGICAL HISTORY:  HLD (hyperlipidemia)  Enlarged prostate  HTN (hypertension)  Status post ORIF of fracture of ankle: Right, 10/2019      Review of Systems:  negative unless otherwise stated above.    Medications:  cefTRIAXone Injectable. 2000 milliGRAM(s) IV Push every 24 hours  vancomycin  IVPB 750 milliGRAM(s) IV Intermittent every 12 hours    aMIOdarone Infusion 1 mG/Min IV Continuous <Continuous>  aMIOdarone Infusion 0.5 mG/Min IV Continuous <Continuous>  tamsulosin 0.4 milliGRAM(s) Oral two times a day      acetaminophen   Tablet .. 650 milliGRAM(s) Oral every 6 hours PRN  acetaminophen  IVPB .. 1000 milliGRAM(s) IV Intermittent once  acetaminophen  Suppository .. 650 milliGRAM(s) Rectal every 4 hours PRN      aspirin enteric coated 81 milliGRAM(s) Oral daily  heparin  Injectable 5000 Unit(s) SubCutaneous every 12 hours        finasteride 5 milliGRAM(s) Oral daily    cholecalciferol 2000 Unit(s) Oral daily  potassium chloride  10 mEq/100 mL IVPB 10 milliEquivalent(s) IV Intermittent every 1 hour                ICU Vital Signs Last 24 Hrs  T(C): 38.2 (2020 03:20), Max: 40.2 (2020 20:00)  T(F): 100.8 (2020 03:20), Max: 104.3 (2020 20:00)  HR: 90 (2020 05:46) (67 - 130)  BP: 106/56 (2020 04:00) (86/54 - 156/95)  BP(mean): 73 (2020 04:00) (55 - 107)  ABP: --  ABP(mean): --  RR: 33 (2020 05:46) (14 - 34)  SpO2: 87% (2020 05:46) (87% - 100%)      ABG - ( 2020 04:55 )  pH, Arterial: 7.57  pH, Blood: x     /  pCO2: 22    /  pO2: 144   / HCO3: 21    / Base Excess: -.3   /  SaO2: 99                        LABS:                        11.8   9.21  )-----------( 120      ( 2020 05:28 )             36.1     31    138  |  107  |  25<H>  ----------------------------<  116<H>  3.7   |  23  |  1.09    Ca    8.3<L>      2020 01:05  Phos  2.6     30  Mg     2.2     31    TPro  5.9<L>  /  Alb  2.9<L>  /  TBili  0.9  /  DBili  x   /  AST  138<H>  /  ALT  103<H>  /  AlkPhos  101  0130      CARDIAC MARKERS ( 2020 01:05 )  0.684 ng/mL / x     / x     / x     / x      CARDIAC MARKERS ( 2020 15:34 )  0.741 ng/mL / x     / x     / x     / x      CARDIAC MARKERS ( 2020 07:27 )  0.589 ng/mL / x     / x     / x     / x      CARDIAC MARKERS ( 2020 04:58 )  0.273 ng/mL / x     / x     / x     / x      CARDIAC MARKERS ( 2020 00:55 )  0.235 ng/mL / x     / 1858 U/L / x     / 8.6 ng/mL  CARDIAC MARKERS ( 2020 19:57 )  0.293 ng/mL / x     / x     / x     / x          CAPILLARY BLOOD GLUCOSE        PT/INR - ( 2020 19:57 )   PT: 14.9 sec;   INR: 1.33 ratio         PTT - ( 2020 19:57 )  PTT:29.1 sec  Urinalysis Basic - ( 2020 22:06 )    Color: Yellow / Appearance: very cloudy / S.020 / pH: x  Gluc: x / Ketone: Trace  / Bili: Negative / Urobili: Negative mg/dL   Blood: x / Protein: 30 mg/dL / Nitrite: Negative   Leuk Esterase: Trace / RBC: >50 /HPF / WBC 3-5   Sq Epi: x / Non Sq Epi: Few / Bacteria: Many      CULTURES:  Culture Results:   No growth to date. ( @ 19:57)  Culture Results:   No growth to date. ( @ 19:57)      Physical Examination:    General: mild to mod resp distress (use of accessory muscles), AA, interactive, following commands.     HEENT: Pupils equal, reactive to light.  Symmetric.    PULM: + insp rales bilaterally    CVS: Regular rate and rhythm, + murmur    ABD: Soft, nondistended, nontender, normoactive bowel sounds, no rebound or guarding    EXT: ankle edema noted    SKIN: Warm and well perfused, no rashes noted.    RADIOLOGY:   CXR: no ptx or effusion, appearance of increased perivascular markings compared to prior films.     CRITICAL CARE TIME SPENT: 65 mins assessing presenting problems of acute illness that poses high probability of life threatening deterioration or end organ damage/dysfunction.  Medical decision making including Initiating plan of care, reviewing data, reviewing radiology, direct patient bedside evaluation and interpretation of vital signs, any necessary ventilator management , discussion with multidisciplinary team,  all non inclusive of procedures.    IMP:  1. acute pulmonary edema  2. AS- suspect likely more severe AS  3. acute hypoxemia  4. NSVT -resolved    Plan:  1. IV lasix 40mg x 1 now  2. will place back on NIPPV (BIPAP) in attempt to rescue, if unable to rescue on NIPPV may require intubation  3. cont IV amio infusion, keeping K>4 and Mg>2  4. cont IV abx at direction of ID, repeat BCx sent  5. follow up with cardiology  6. 12 lead EKG this am, repeat enzymes

## 2020-01-31 NOTE — PROGRESS NOTE ADULT - SUBJECTIVE AND OBJECTIVE BOX
CC:  Sepsis     HPI:    83 y/o male with HTN, HLD, BPH with recent R ankle Fx (10/19)--admitted with possible OM, PICC placed and sent home on IV vanco/CTX about 2 weeks ago--admitted this time with apparent sepsis.  TTE shows mod aortic stenosi.   He developed flash pulm edema with fever and tachycardia on --RRT--tx to ICU for further care.  PICC was pulled on .  BCx negative thus far.      :  ICU D # 2.  Tm 104.7 Confused overnight.  More awake and alert this morning.  Was on NIV.  On AMio gtt for NSVT      PMH:  As above.     PSH:  As above.     FH: Non Contributory other than those listed in HPI    Social History:      MEDICATIONS  (STANDING):  aMIOdarone Infusion 1 mG/Min (33.333 mL/Hr) IV Continuous <Continuous>  aMIOdarone Infusion 0.5 mG/Min (16.667 mL/Hr) IV Continuous <Continuous>  aspirin enteric coated 81 milliGRAM(s) Oral daily  cefTRIAXone Injectable. 2000 milliGRAM(s) IV Push every 24 hours  cholecalciferol 2000 Unit(s) Oral daily  finasteride 5 milliGRAM(s) Oral daily  heparin  Injectable 5000 Unit(s) SubCutaneous every 12 hours  tamsulosin 0.4 milliGRAM(s) Oral two times a day  vancomycin  IVPB 750 milliGRAM(s) IV Intermittent every 12 hours    MEDICATIONS  (PRN):  acetaminophen   Tablet .. 650 milliGRAM(s) Oral every 6 hours PRN Mild Pain (1 - 3), Moderate Pain (4 - 6)      Allergies: NKDA    ROS:  SEE BELOW      Weight (kg): 92.5 ( @ 15:10)    ICU Vital Signs Last 24 Hrs  T(C): 38.7 (2020 08:00), Max: 40.2 (2020 20:00)  T(F): 101.6 (2020 08:00), Max: 104.3 (2020 20:00)  HR: 73 (2020 08:00) (67 - 130)  BP: 82/61 (2020 08:00) (82/61 - 164/116)  BP(mean): 70 (2020 08:00) (55 - 124)  ABP: --  ABP(mean): --  RR: 21 (2020 08:00) (14 - 34)  SpO2: 100% (2020 08:00) (87% - 100%)          I&O's Summary    2020 07:01  -  2020 07:00  --------------------------------------------------------  IN: 1179 mL / OUT: 1600 mL / NET: -421 mL        Physical Exam:  SEE BELOW                          11.8   9.21  )-----------( 120      ( 2020 05:28 )             36.1           137  |  108  |  27<H>  ----------------------------<  122<H>  4.0   |  23  |  1.03    Ca    8.1<L>      2020 05:41  Phos  3.3       Mg     2.0         TPro  6.2  /  Alb  2.9<L>  /  TBili  1.0  /  DBili  0.3<H>  /  AST  103<H>  /  ALT  97<H>  /  AlkPhos  108        CARDIAC MARKERS ( 2020 05:41 )  x     / x     / 1526 U/L / x     / x      CARDIAC MARKERS ( 2020 01:05 )  0.684 ng/mL / x     / x     / x     / x      CARDIAC MARKERS ( 2020 15:34 )  0.741 ng/mL / x     / x     / x     / x      CARDIAC MARKERS ( 2020 07:27 )  0.589 ng/mL / x     / x     / x     / x      CARDIAC MARKERS ( 2020 04:58 )  0.273 ng/mL / x     / x     / x     / x      CARDIAC MARKERS ( 2020 00:55 )  0.235 ng/mL / x     / 1858 U/L / x     / 8.6 ng/mL  CARDIAC MARKERS ( 2020 19:57 )  0.293 ng/mL / x     / x     / x     / x            ABG - ( 2020 04:55 )  pH, Arterial: 7.57  pH, Blood: x     /  pCO2: 22    /  pO2: 144   / HCO3: 21    / Base Excess: -.3   /  SaO2: 99                  Urinalysis Basic - ( 2020 22:06 )    Color: Yellow / Appearance: very cloudy / S.020 / pH: x  Gluc: x / Ketone: Trace  / Bili: Negative / Urobili: Negative mg/dL   Blood: x / Protein: 30 mg/dL / Nitrite: Negative   Leuk Esterase: Trace / RBC: >50 /HPF / WBC 3-5   Sq Epi: x / Non Sq Epi: Few / Bacteria: Many        DVT Prophylaxis:                                                            Contraindication:     Advanced Directives:    Discussed with:    Visit Information:  Time spent excluding procedure:  30 cc mins    ** Time is exclusive of billed procedures and/or teaching and/or routine family updates.

## 2020-01-31 NOTE — PROGRESS NOTE ADULT - SUBJECTIVE AND OBJECTIVE BOX
Patient is a 84y old  Male who presents with a chief complaint of SIRS (2020 17:56)      BRIEF HOSPITAL COURSE: ***    Events last 24 hours: ***    PAST MEDICAL & SURGICAL HISTORY:  HLD (hyperlipidemia)  Enlarged prostate  HTN (hypertension)  Status post ORIF of fracture of ankle: Right, 10/2019      Review of Systems:  CONSTITUTIONAL: No fever, chills, or fatigue  EYES: No eye pain, visual disturbances, or discharge  ENMT:  No difficulty hearing, tinnitus, vertigo; No sinus or throat pain  NECK: No pain or stiffness  RESPIRATORY: No cough, wheezing, chills or hemoptysis; No shortness of breath  CARDIOVASCULAR: No chest pain, palpitations, dizziness, or leg swelling  GASTROINTESTINAL: No abdominal or epigastric pain. No nausea, vomiting, or hematemesis; No diarrhea or constipation. No melena or hematochezia.  GENITOURINARY: No dysuria, frequency, hematuria, or incontinence  NEUROLOGICAL: No headaches, memory loss, loss of strength, numbness, or tremors  SKIN: No itching, burning, rashes, or lesions   MUSCULOSKELETAL: No joint pain or swelling; No muscle, back, or extremity pain  PSYCHIATRIC: No depression, anxiety, mood swings, or difficulty sleeping      Medications:  cefTRIAXone Injectable. 2000 milliGRAM(s) IV Push every 24 hours  vancomycin  IVPB 750 milliGRAM(s) IV Intermittent every 12 hours    aMIOdarone Infusion 1 mG/Min IV Continuous <Continuous>  aMIOdarone Infusion 0.5 mG/Min IV Continuous <Continuous>  tamsulosin 0.4 milliGRAM(s) Oral two times a day      acetaminophen   Tablet .. 650 milliGRAM(s) Oral every 6 hours PRN  acetaminophen  Suppository .. 650 milliGRAM(s) Rectal every 4 hours PRN  oxycodone    5 mG/acetaminophen 325 mG 1 Tablet(s) Oral once      aspirin enteric coated 81 milliGRAM(s) Oral daily  heparin  Injectable 5000 Unit(s) SubCutaneous every 12 hours        finasteride 5 milliGRAM(s) Oral daily    cholecalciferol 2000 Unit(s) Oral daily                ICU Vital Signs Last 24 Hrs  T(C): 39.9 (2020 23:00), Max: 40.2 (2020 20:00)  T(F): 103.9 (2020 23:00), Max: 104.3 (2020 20:00)  HR: 97 (2020 23:10) (67 - 130)  BP: 108/74 (2020 23:00) (86/54 - 156/95)  BP(mean): 81 (2020 23:00) (55 - 107)  ABP: --  ABP(mean): --  RR: 16 (2020 23:00) (14 - 34)  SpO2: 98% (2020 23:10) (89% - 100%)      ABG - ( 2020 04:55 )  pH, Arterial: 7.57  pH, Blood: x     /  pCO2: 22    /  pO2: 144   / HCO3: 21    / Base Excess: -.3   /  SaO2: 99                        LABS:                        10.8   5.58  )-----------( 122      ( 2020 15:34 )             32.5     01-30    137  |  107  |  25<H>  ----------------------------<  127<H>  3.6   |  23  |  0.94    Ca    8.2<L>      2020 15:34  Phos  2.6     30  Mg     2.3     30    TPro  5.9<L>  /  Alb  2.9<L>  /  TBili  0.9  /  DBili  x   /  AST  138<H>  /  ALT  103<H>  /  AlkPhos  101  01-30      CARDIAC MARKERS ( 2020 15:34 )  0.741 ng/mL / x     / x     / x     / x      CARDIAC MARKERS ( 2020 07:27 )  0.589 ng/mL / x     / x     / x     / x      CARDIAC MARKERS ( 2020 04:58 )  0.273 ng/mL / x     / x     / x     / x      CARDIAC MARKERS ( 2020 00:55 )  0.235 ng/mL / x     / 1858 U/L / x     / 8.6 ng/mL  CARDIAC MARKERS ( 2020 19:57 )  0.293 ng/mL / x     / x     / x     / x          CAPILLARY BLOOD GLUCOSE        PT/INR - ( 2020 19:57 )   PT: 14.9 sec;   INR: 1.33 ratio         PTT - ( 2020 19:57 )  PTT:29.1 sec  Urinalysis Basic - ( 2020 22:06 )    Color: Yellow / Appearance: very cloudy / S.020 / pH: x  Gluc: x / Ketone: Trace  / Bili: Negative / Urobili: Negative mg/dL   Blood: x / Protein: 30 mg/dL / Nitrite: Negative   Leuk Esterase: Trace / RBC: >50 /HPF / WBC 3-5   Sq Epi: x / Non Sq Epi: Few / Bacteria: Many      CULTURES:      Physical Examination:    General: No acute distress.  Alert, oriented, interactive, nonfocal    HEENT: Pupils equal, reactive to light.  Symmetric.    PULM: Clear to auscultation bilaterally, no significant sputum production    CVS: Regular rate and rhythm, no murmurs, rubs, or gallops    ABD: Soft, nondistended, nontender, normoactive bowel sounds, no masses    EXT: No edema, nontender    SKIN: Warm and well perfused, no rashes noted.    RADIOLOGY: ***    CRITICAL CARE TIME SPENT: *** Patient is a 84y old  Male who presents with a chief complaint of SIRS (2020 17:56)      BRIEF HOSPITAL COURSE:   84M with PMHx HTN, HLD, BPH, sp ORIF R ankle fx, LE cellulitis/OM, sp PICC with IV abx (1/10) who presented to ED with complaints of fever and leg pain. Initially admitted to medical floor, being treated for presumed sepsis. Received 2.5liters fluid. While on floor with RRT for acute hypoxia and tachycardia. Pt noted to be having runs of NSVT. ICU consulted, placed on NIPPV, given lasix x 1 with acute 700ml diuresis. Transferred to ICU. EPS evaluated and placed on IV amio for NSVT.     Events last 24 hours: Called to evaluate pt for intermittent bigemminy and NSVT 4-5 beats. Remains with stable hemodynamics, on NIPPV and weaned to 35%. Still complaining of leg pain and discomfort. Fevers throughout the night. Denies CP, N/V. Feels his SOB is improved.     PAST MEDICAL & SURGICAL HISTORY:  HLD (hyperlipidemia)  Enlarged prostate  HTN (hypertension)  Status post ORIF of fracture of ankle: Right, 10/2019      Review of Systems:  CONSTITUTIONAL: + fever, chills, or fatigue  EYES: No eye pain, visual disturbances, or discharge  ENMT:  No difficulty hearing, tinnitus, vertigo; No sinus or throat pain  RESPIRATORY: No cough, wheezing, chills or hemoptysis; + shortness of breath  CARDIOVASCULAR: No chest pain, palpitations, dizziness, or leg swelling  GASTROINTESTINAL: No abdominal or epigastric pain. No nausea, vomiting, or hematemesis; No diarrhea or constipation. No melena or hematochezia.  GENITOURINARY: No dysuria, frequency, hematuria, or incontinence  NEUROLOGICAL: No headaches, memory loss, loss of strength, numbness, or tremors  MUSCULOSKELETAL: + joint pain No swelling; No muscle, back, + LE extremity pain        Medications:  cefTRIAXone Injectable. 2000 milliGRAM(s) IV Push every 24 hours  vancomycin  IVPB 750 milliGRAM(s) IV Intermittent every 12 hours    aMIOdarone Infusion 1 mG/Min IV Continuous <Continuous>  aMIOdarone Infusion 0.5 mG/Min IV Continuous <Continuous>  tamsulosin 0.4 milliGRAM(s) Oral two times a day      acetaminophen   Tablet .. 650 milliGRAM(s) Oral every 6 hours PRN  acetaminophen  Suppository .. 650 milliGRAM(s) Rectal every 4 hours PRN  oxycodone    5 mG/acetaminophen 325 mG 1 Tablet(s) Oral once      aspirin enteric coated 81 milliGRAM(s) Oral daily  heparin  Injectable 5000 Unit(s) SubCutaneous every 12 hours        finasteride 5 milliGRAM(s) Oral daily    cholecalciferol 2000 Unit(s) Oral daily                ICU Vital Signs Last 24 Hrs  T(C): 39.9 (2020 23:00), Max: 40.2 (2020 20:00)  T(F): 103.9 (2020 23:00), Max: 104.3 (2020 20:00)  HR: 97 (2020 23:10) (67 - 130)  BP: 108/74 (2020 23:00) (86/54 - 156/95)  BP(mean): 81 (2020 23:00) (55 - 107)  ABP: --  ABP(mean): --  RR: 16 (2020 23:00) (14 - 34)  SpO2: 98% (2020 23:10) (89% - 100%)      ABG - ( 2020 04:55 )  pH, Arterial: 7.57  pH, Blood: x     /  pCO2: 22    /  pO2: 144   / HCO3: 21    / Base Excess: -.3   /  SaO2: 99                        LABS:                        10.8   5.58  )-----------( 122      ( 2020 15:34 )             32.5     01-30    137  |  107  |  25<H>  ----------------------------<  127<H>  3.6   |  23  |  0.94    Ca    8.2<L>      2020 15:34  Phos  2.6     -30  Mg     2.3     30    TPro  5.9<L>  /  Alb  2.9<L>  /  TBili  0.9  /  DBili  x   /  AST  138<H>  /  ALT  103<H>  /  AlkPhos  101  01-30      CARDIAC MARKERS ( 2020 15:34 )  0.741 ng/mL / x     / x     / x     / x      CARDIAC MARKERS ( 2020 07:27 )  0.589 ng/mL / x     / x     / x     / x      CARDIAC MARKERS ( 2020 04:58 )  0.273 ng/mL / x     / x     / x     / x      CARDIAC MARKERS ( 2020 00:55 )  0.235 ng/mL / x     / 1858 U/L / x     / 8.6 ng/mL  CARDIAC MARKERS ( 2020 19:57 )  0.293 ng/mL / x     / x     / x     / x          CAPILLARY BLOOD GLUCOSE        PT/INR - ( 2020 19:57 )   PT: 14.9 sec;   INR: 1.33 ratio         PTT - ( 2020 19:57 )  PTT:29.1 sec  Urinalysis Basic - ( 2020 22:06 )    Color: Yellow / Appearance: very cloudy / S.020 / pH: x  Gluc: x / Ketone: Trace  / Bili: Negative / Urobili: Negative mg/dL   Blood: x / Protein: 30 mg/dL / Nitrite: Negative   Leuk Esterase: Trace / RBC: >50 /HPF / WBC 3-5   Sq Epi: x / Non Sq Epi: Few / Bacteria: Many      CULTURES:      Physical Examination:    General: No acute distress.  Alert, oriented, interactive, nonfocal    HEENT: Pupils equal, reactive to light.  Symmetric, sclera anicteric    PULM: Few scattered insp rales at bases, otherwise clear, no significant sputum production    CVS: Regular rate, SR, bigemminy and NSVT seen on tele, + murmur    ABD: Soft, nondistended, nontender, normoactive bowel sounds, no rebound or guarding    EXT: RLE edema, no erythema    SKIN: Warm and well perfused, no rashes noted.    RADIOLOGY:   EXAM:  XR CHEST PORTABLE IMMED 1V                            PROCEDURE DATE:  2020          INTERPRETATION:  AP erect chest on 2020 at 3:30 PM. Patient is short of breath.    Heart is magnified by technique. The lung fields and pleural surfaces are unremarkable.    The above findings are similar to  earlier study.    Left PICC line on the earlier study has been removed.    IMPRESSION: Removal of PICC line.                TALON MATHEW   This document has been electronically signed. 2020  3:41PM    EXAM:  ECHO TTE WO CON COMP W DOP         PROCEDURE DATE:  2020        INTERPRETATION:  Transthoracic Echocardiography Report (TTE)     Demographics     Patient name       FADI ARRIAZA       Age           84 year(s)     Med Rec #          [id number]            Gender        Male     Account #          [id number]         Date of Birth 1935     Interpreting       Crystal Anne, Room Number   0334   Physician          MD     Referring          Isikarenr Aryles        Sonographer   Landon Shaihk   Physician                                             RDAUTUMN     Date of study      2020 05:21 AM     Height             71 in                Weight        179.9 pounds    Type of Study:     TTE procedure: ECHO TTE WO CON COMP W DOP     BP: 110/50 mmHg     Study Location: EDTechnical Quality: Fair    Indications   1) R94.31 - Abnormal electrocardiogram ECG EKG   2) I38 - Endocarditis    M-Mode Measurements (cm)     LVEDd: 5.23 cm            LVESd: 3.72 cm   IVSEd: 1.14 cm   LVPWd: 1.01 cm            AO Root Dimension: 4.1 cm                             ACS: 0.7 cm                             LA: 4.6 cm                             LVOT: 2.2 cm    Doppler Measurements:     AV Velocity:381 cm/s                 MV Peak E-Wave: 89.8 cm/s   AV Peak Gradient: 58.06 mmHg         MV Peak A-Wave: 114 cm/s   AV Mean Gradient: 36 mmHg            MV E/A Ratio: 0.79 %   AV Area (Continuity):1.04 cm^2       MV Peak Gradient: 3.23 mmHg   TR Velocity:311 cm/s   TR Gradient:38.6884 mmHg   Estimated RAP:10 mmHg   RVSP:49 mmHg     Findings     Mitral Valve   Fibrocalcific changes noted to the mitral valve leaflets with preserved   leaflet excursion.   Mild to Moderate mitral regurgitation is present.   EA reversal of the mitral inflow consistent with reduced compliance of the   left ventricle.     Aortic Valve   Significant fibrocalcific changes noted to the aortic valve leaflets with   restriction in leaflet excursion. Peak and mean transaortic gradients are   58 and 36 mmHg respectively; this finding is consistent with moderate   aortic stenosis.   JERILYN by continuity is 1.04 cm2 and the dimensionless index is .28   suggesting moderate to severe stenosis.   Moderate (2+) aortic regurgitation is present.     Tricuspid Valve   The tricuspid valve leaflets are thin and pliable; valve motion is normal.   Mild (1+) tricuspid valve regurgitation is present.   Borderline moderate pulmonary hypertension.     Pulmonic Valve   Trace pulmonic valvular regurgitation is present.     Left Atrium   The left atrium is mildly dilated.     Left Ventricle   The left ventricle is normal in size, wall thickness, wall motion and   contractility.   Estimated left ventricular ejection fraction is 55 %.     Right Atrium   The right atrium appears mildly dilated.     Right Ventricle   Normal appearing right ventricle structure and function.     Pericardial Effusion   No evidence of pericardial effusion.     Pleural Effusion   No evidence of pleural effusion.     Miscellaneous   The IVC is dilated. Collapses more than 50% with respiration.     Impression     Summary     Fibrocalcific changes noted to the mitral valve leaflets with preserved   leaflet excursion.   Mild to Moderate mitral regurgitation is present.   EA reversal of the mitral inflow consistent with reduced compliance of the   left ventricle.   Significant fibrocalcific changes noted to the aortic valve leaflets with   restriction in leaflet excursion. Peak and mean transaortic gradients are   58 and 36 mmHg respectively; this finding is consistent with moderate   aortic stenosis.   JERILYN by continuity is 1.04 cm2 and the dimensionless index is .28   suggesting moderate to severe stenosis.   Moderate (2+) aortic regurgitation is present.   The tricuspid valve leaflets are thin and pliable; valve motion is normal.   Mild (1+) tricuspid valve regurgitation is present.   Borderline moderate pulmonary hypertension.   Trace pulmonic valvular regurgitation is present.   The left atrium is mildly dilated.   The left ventricle is normal in size, wall thickness, wall motion and   contractility.   Estimated left ventricular ejection fraction is 55 %.   The right atrium appears mildly dilated.   Normal appearing right ventricle structure and function.   The IVC is dilated.   No evidence of pericardial effusion.   No evidence of pleural effusion.     Signature     ----------------------------------------------------------------   Electronically signed by Crystal Anne MD(Interpreting   physician) on 2020 07:28 AM    EXAM:  US DPLX LWR EXT VEINS COMPL BI                            PROCEDURE DATE:  2020          INTERPRETATION:  CLINICAL INFORMATION: Lower extremity swelling.    COMPARISON: 1/10/2020    TECHNIQUE: Duplex sonography of the BILATERAL LOWER extremity veins with color and spectral Doppler, with and without compression.      FINDINGS:    There is normal compressibility of the bilateral common femoral, femoral and popliteal veins.     Doppler examination shows normal spontaneous and phasic flow.    No calf vein thrombosis is detected, noting that the peroneal veins are not visualized bilaterally.    IMPRESSION:     No evidence of deep venous thrombosis in either lower extremity.                      JANETH GUIDO   This document has been electronically signed. Jerry 30 2020  2:36AM      CRITICAL CARE TIME SPENT: 35 mins assessing presenting problems of acute illness that poses high probability of life threatening deterioration or end organ damage/dysfunction.  Medical decision making including. Initiating plan of care, reviewing data, reviewing radiology, direct patient bedside evaluation and interpretation of vital signs, any necessary ventilator management,  discussion with multidisciplinary team,  all non inclusive of procedures

## 2020-01-31 NOTE — PROGRESS NOTE ADULT - ASSESSMENT
IMP:    83 y/o male with HTN, HLD, BPH with recent R ankle Fx (10/19) transferred to ICU on 1/30 for acute type 1 resp failure related to aortic stenosis and sepsis of unclear source  NSVT     High risk for acute decompensation and deterioration     Plan:    CT C/A/P  Abx to be changed to eliminate possibility of drug fever  DC posey  NC as tolerated  Will start daily IV furosemide to keep on dry side  Amio gtt   DVT prophy--Sqhep    ICU care--d/w ICU staff and pt-- All concerns addressed

## 2020-01-31 NOTE — PROGRESS NOTE ADULT - ASSESSMENT
83 yo M with a PMH HTN, HLD, and BPH, Moderate-Severe Aortic Stenosis,  admitted with Sepsis 2/2 to possible PICC line infection. EP consulted for runs of GENEVA, and Denisse,

## 2020-01-31 NOTE — PROGRESS NOTE ADULT - PROBLEM SELECTOR PLAN 1
-continue amiodarone infusion-pt has a CT scan later today (has not been taking meals)  -consider switching to po Amio with food when taking regular meals  -Kep K greater than 4.0, Mg greater than 2.0  -Plan of care discussed with patient, wife and nursing staff  -labs, EKG and tele reviewed

## 2020-01-31 NOTE — PROGRESS NOTE ADULT - ASSESSMENT
84M with PMHx HTN, HLD, BPH, sp ORIF R ankle fx, LE cellulitis/OM, sp PICC with IV abx (1/10) with:  1. SIRS/Sepsis- Bcx pending/u/a unremarkable, WBC normal, PICC line removed  2. bilateral LE myalgia/pain - ? given combination of pain/transaminitis/fever/rhabdomyolysis if this a reaction to statin, LE duplex negative  3. NSVT- felt to be secondary sepsis/SIRS  4. moderate to severe AS  5. transaminitis- bilis normal, no abd pain, RV function normal on Echo, ? drug related  6. acute hypoxic respiratory failure requiring NIPPV rescue secondary to acute pulmonary edema with AS now resolved  7. rhabdomyolysis- CPK elevation is out of proportion to trop elevation, MB normal    Plan:  1. cont empiric IV abx at ID direction, follow up Bcx  -check procal  -trend CBC  2. statin discontinued, will cont to hold for now, pain management  3. cont IV amio, keep K>4 and Mg>2, Echo shows no WMA, follow up with EPS, trend LFTs if continue to rise may need to consider alternative to amio  4. trend CPK and trop  5. will trial off NIPPV to NC 4liters NC, actively titrating FiO2 for sats>90%, will place on standby with PRN use as needed  6. ASA  7. will hold IV hydration at this time due to recent acute pulmonary edema with fluids  8. check Hep panel, RUQ sono, avoid MAP<65, cautious use of hepatoxins

## 2020-01-31 NOTE — PROGRESS NOTE ADULT - SUBJECTIVE AND OBJECTIVE BOX
EP Nurse Practitioner Progress note:   s/p NSVT on Amiodarone maintenance infusion in ICU  observed overnight -No further episodes of VT/NSVT     Patient feels well.  Denies chest pain, sl short of breath-nasal cannula in use, dizziness or palpitations at this time.        EKG:NSR HR 73 with RBBB Qtc 484ms  TELE: NSR with freq PACs, PVC,s no further NSVT detected on amio infusion  GENERAL: appears well, OOB to chair  CV: RRR, S1 S2, no murmur, rub, clicks or gallop noted  RESP: LCTA bilaterally, no wheeze, no rhonchi or crackles noted  GI/: soft, NT/ND  NEURO: AOx4, no focal deficits  EXTREMITIES: no edema, clubbing or cyanosis noted      T(C): 38.7 (01-31-20 @ 08:00), Max: 40.2 (01-30-20 @ 20:00)  HR: 75 (01-31-20 @ 09:00) (67 - 130)  BP: 106/57 (01-31-20 @ 09:00) (80/58 - 164/116)  RR: 17 (01-31-20 @ 09:00) (14 - 34)  SpO2: 94% (01-31-20 @ 09:00) (87% - 100%)  Wt(kg): --  CBC Full  -  ( 31 Jan 2020 05:28 )  WBC Count : 9.21 K/uL  RBC Count : 3.85 M/uL  Hemoglobin : 11.8 g/dL  Hematocrit : 36.1 %  Platelet Count - Automated : 120 K/uL  Mean Cell Volume : 93.8 fl  Mean Cell Hemoglobin : 30.6 pg  Mean Cell Hemoglobin Concentration : 32.7 gm/dL  Auto Neutrophil # : x  Auto Lymphocyte # : x  Auto Monocyte # : x  Auto Eosinophil # : x  Auto Basophil # : x  Auto Neutrophil % : x  Auto Lymphocyte % : x  Auto Monocyte % : x  Auto Eosinophil % : x  Auto Basophil % : x    01-31    137  |  108  |  27<H>  ----------------------------<  122<H>  4.0   |  23  |  1.03    Ca    8.1<L>      31 Jan 2020 05:41  Phos  3.3     01-31  Mg     2.0     01-31    TPro  6.2  /  Alb  2.9<L>  /  TBili  1.0  /  DBili  0.3<H>  /  AST  103<H>  /  ALT  97<H>  /  AlkPhos  108  01-31    CARDIAC MARKERS ( 31 Jan 2020 05:41 )  x     / x     / 1526 U/L / x     / x      CARDIAC MARKERS ( 31 Jan 2020 01:05 )  0.684 ng/mL / x     / x     / x     / x      CARDIAC MARKERS ( 30 Jan 2020 15:34 )  0.741 ng/mL / x     / x     / x     / x      CARDIAC MARKERS ( 30 Jan 2020 07:27 )  0.589 ng/mL / x     / x     / x     / x      CARDIAC MARKERS ( 30 Jan 2020 04:58 )  0.273 ng/mL / x     / x     / x     / x      CARDIAC MARKERS ( 30 Jan 2020 00:55 )  0.235 ng/mL / x     / 1858 U/L / x     / 8.6 ng/mL  CARDIAC MARKERS ( 29 Jan 2020 19:57 )  0.293 ng/mL / x     / x     / x     / x              MEDICATIONS  (STANDING):  aMIOdarone Infusion 1 mG/Min (33.333 mL/Hr) IV Continuous <Continuous>  aMIOdarone Infusion 0.5 mG/Min (16.667 mL/Hr) IV Continuous <Continuous>  aspirin enteric coated 81 milliGRAM(s) Oral daily  cholecalciferol 2000 Unit(s) Oral daily  doxycycline IVPB 100 milliGRAM(s) IV Intermittent every 12 hours  finasteride 5 milliGRAM(s) Oral daily  furosemide   Injectable 40 milliGRAM(s) IV Push daily  heparin  Injectable 5000 Unit(s) SubCutaneous every 12 hours  tamsulosin 0.4 milliGRAM(s) Oral two times a day    MEDICATIONS  (PRN):  acetaminophen   Tablet .. 650 milliGRAM(s) Oral every 6 hours PRN Mild Pain (1 - 3), Moderate Pain (4 - 6)        HPI:  85 yo M with a PMH HTN, HLD, and BPH who presents with leg pains. He states for the past week, he has had sharp severe leg pains. They occur every day, often lasting for hours at a time, and are not related to exertion. He states that when the pains come it makes it difficult to walk, although when it is not there, he can walk without difficulty. The pains are from his ankle up to his upper thighs (he cannot tell from where it starts and stops). Of note, he was recently admitted (2 weeks ago) for cellulitis with concern for OM, and had a PICC line placed, to be on antibiotics (Vanc/Ceftriaxone) for 6 weeks.  He denies nausea, vomiting, fevers, chills, cough, chest pain, SOB, abdominal pain, diarrhea, dysuria, hematuria, dizziness, lightheadedness, or rash. He states the swelling in his legs from his cellulitis has improved.      now s/p NSVT in ICU on amiodarone infusion-EP following    ASSESSMENT/PLAN:    -continue amiodarone infusion-pt has a CT scan later today (has not been taking meals)  -consider switching to po Amio with food when taking regular meals  -Kep K greater than 4.0, Mg greater than 2.0  -Plan of care discussed with patient, wife and nursing staff  -labs, EKG and tele assessed

## 2020-01-31 NOTE — PROVIDER CONTACT NOTE (OTHER) - SITUATION
elevated troponins, borderline hypoxia, EKG changes rule out endocarditis.
Notified Dr. Mcqueen office regarding consult spoke with Geno from answering service.
Pt admitted to  for sepsis
called service and lines were connected and both Dr. Kimball and Jose Angel spoke.
elevated troponins, borderline hypoxia, EKG changes, rule out endocarditis

## 2020-01-31 NOTE — PROGRESS NOTE ADULT - SUBJECTIVE AND OBJECTIVE BOX
Date of service: 20 @ 09:18    Events noted  Had acute SOB and noted in pulmonary edema  Febrile to 104.3F despite removing PICC line yesterday  Leg - no pain    ROS: denies dizziness, no HA, has SOB or cough, no abdominal pain, no diarrhea or constipation; no dysuria    MEDICATIONS  (STANDING):  aMIOdarone Infusion 1 mG/Min (33.333 mL/Hr) IV Continuous <Continuous>  aMIOdarone Infusion 0.5 mG/Min (16.667 mL/Hr) IV Continuous <Continuous>  aspirin enteric coated 81 milliGRAM(s) Oral daily  cholecalciferol 2000 Unit(s) Oral daily  doxycycline IVPB 100 milliGRAM(s) IV Intermittent every 12 hours  finasteride 5 milliGRAM(s) Oral daily  heparin  Injectable 5000 Unit(s) SubCutaneous every 12 hours  tamsulosin 0.4 milliGRAM(s) Oral two times a day      Vital Signs Last 24 Hrs  T(C): 38.7 (2020 08:00), Max: 40.2 (2020 20:00)  T(F): 101.6 (2020 08:00), Max: 104.3 (2020 20:00)  HR: 73 (2020 08:00) (67 - 130)  BP: 82/61 (2020 08:00) (82/61 - 164/116)  BP(mean): 70 (2020 08:00) (55 - 124)  RR: 21 (2020 08:00) (14 - 34)  SpO2: 100% (2020 08:00) (87% - 100%)    Physical Exam:      Constitutional:  No acute distress  HEENT: NC/AT, EOMI, PERRLA, conjunctivae clear; ears and nose atraumatic; pharynx benign  Neck: supple; thyroid not palpable  Back: no tenderness  Respiratory: respiratory effort normal; crackles at bases  Cardiovascular: S1S2 regular, no murmurs  Abdomen: soft, not tender, not distended, positive BS; no liver or spleen organomegaly  Genitourinary: no suprapubic tenderness  Lymphatic: no LN palpable  Musculoskeletal: no muscle tenderness, no joint swelling or tenderness  Extremities: 2+ right lower pedal edema; mild erythema; much improved from prior hospitalization; no warmth; no skin break  Neurological/ Psychiatric: AxOx3, judgement and insight normal; moving all extremities  Skin: no rashes; no palpable lesions    Labs: all available labs reviewed                        10.6   5.92  )-----------( 109      ( 2020 07:27 )             32.0         139  |  110<H>  |  21  ----------------------------<  105<H>  3.6   |  21<L>  |  0.77    Ca    8.1<L>      2020 07:27  Phos  2.6       Mg     2.3         TPro  5.7<L>  /  Alb  2.8<L>  /  TBili  0.9  /  DBili  x   /  AST  101<H>  /  ALT  79<H>  /  AlkPhos  85  30     LIVER FUNCTIONS - ( 2020 07:27 )  Alb: 2.8 g/dL / Pro: 5.7 gm/dL / ALK PHOS: 85 U/L / ALT: 79 U/L / AST: 101 U/L / GGT: x           Urinalysis Basic - ( 2020 22:06 )    Color: Yellow / Appearance: very cloudy / S.020 / pH: x  Gluc: x / Ketone: Trace  / Bili: Negative / Urobili: Negative mg/dL   Blood: x / Protein: 30 mg/dL / Nitrite: Negative   Leuk Esterase: Trace / RBC: >50 /HPF / WBC 3-5   Sq Epi: x / Non Sq Epi: Few / Bacteria: Many      Culture - Urine (collected 2020 22:06)  Source: .Urine None  Final Report (2020 09:11):    No growth    Culture - Blood (collected 2020 19:57)  Source: .Blood None  Preliminary Report (2020 01:03):    No growth to date.    Culture - Blood (collected 2020 19:57)  Source: .Blood None  Preliminary Report (2020 01:03):    No growth to date.    Radiology: all available radiological tests reviewed    < from: Xray Chest 1 View-PORTABLE IMMEDIATE (20 @ 04:35) >  LEFT PICC line catheter tip in SVC. Cardiomegaly. No evidence of active chest disease.    < end of copied text >      Advanced directives addressed: full resuscitation

## 2020-01-31 NOTE — CDI QUERY NOTE - NSCDIOTHERTXTBX_GEN_ALL_CORE_HH
Patient admitted with Sepsis  right ankle fracture (Oct 10, 2019) s/p ORIF, recently hospitalized (1/10/2020) for RLE cellulitis and possible underlying acute OM and/or ankle hardware infection on vancomycin IV and ceftriaxone (since 1/10 via PICC line).    Per ID : sepsis. Possible PICC Line infection. RLE cellulitis improving with possible underlying acute OM and/or ankle hardware infection.     Can the Suspected etiology of the Sepsis be further clarified   a) Sepsis likely due to PIC Line infection, PIC line now removed  b) Sepsis likely due to ankle hardware infection ( S/P ORIF with hardware )  c) Sepsis likely due to  underlying acute OM of right ankle and ankle hardware infection.   d) Other suspected source of Sepsis, please clarify

## 2020-02-01 LAB
ANION GAP SERPL CALC-SCNC: 6 MMOL/L — SIGNIFICANT CHANGE UP (ref 5–17)
BUN SERPL-MCNC: 38 MG/DL — HIGH (ref 7–23)
CALCIUM SERPL-MCNC: 8.3 MG/DL — LOW (ref 8.5–10.1)
CHLORIDE SERPL-SCNC: 102 MMOL/L — SIGNIFICANT CHANGE UP (ref 96–108)
CO2 SERPL-SCNC: 26 MMOL/L — SIGNIFICANT CHANGE UP (ref 22–31)
CREAT SERPL-MCNC: 1.14 MG/DL — SIGNIFICANT CHANGE UP (ref 0.5–1.3)
GLUCOSE SERPL-MCNC: 90 MG/DL — SIGNIFICANT CHANGE UP (ref 70–99)
HCT VFR BLD CALC: 31.2 % — LOW (ref 39–50)
HGB BLD-MCNC: 10.2 G/DL — LOW (ref 13–17)
MAGNESIUM SERPL-MCNC: 1.9 MG/DL — SIGNIFICANT CHANGE UP (ref 1.6–2.6)
MCHC RBC-ENTMCNC: 30.4 PG — SIGNIFICANT CHANGE UP (ref 27–34)
MCHC RBC-ENTMCNC: 32.7 GM/DL — SIGNIFICANT CHANGE UP (ref 32–36)
MCV RBC AUTO: 93.1 FL — SIGNIFICANT CHANGE UP (ref 80–100)
PHOSPHATE SERPL-MCNC: 4.2 MG/DL — SIGNIFICANT CHANGE UP (ref 2.5–4.5)
PLATELET # BLD AUTO: 121 K/UL — LOW (ref 150–400)
POTASSIUM SERPL-MCNC: 3.7 MMOL/L — SIGNIFICANT CHANGE UP (ref 3.5–5.3)
POTASSIUM SERPL-SCNC: 3.7 MMOL/L — SIGNIFICANT CHANGE UP (ref 3.5–5.3)
RBC # BLD: 3.35 M/UL — LOW (ref 4.2–5.8)
RBC # FLD: 14.2 % — SIGNIFICANT CHANGE UP (ref 10.3–14.5)
SODIUM SERPL-SCNC: 134 MMOL/L — LOW (ref 135–145)
WBC # BLD: 7.71 K/UL — SIGNIFICANT CHANGE UP (ref 3.8–10.5)
WBC # FLD AUTO: 7.71 K/UL — SIGNIFICANT CHANGE UP (ref 3.8–10.5)

## 2020-02-01 PROCEDURE — 99291 CRITICAL CARE FIRST HOUR: CPT

## 2020-02-01 PROCEDURE — 93010 ELECTROCARDIOGRAM REPORT: CPT

## 2020-02-01 RX ORDER — MORPHINE SULFATE 50 MG/1
2 CAPSULE, EXTENDED RELEASE ORAL ONCE
Refills: 0 | Status: DISCONTINUED | OUTPATIENT
Start: 2020-02-01 | End: 2020-02-01

## 2020-02-01 RX ORDER — IBUPROFEN 200 MG
400 TABLET ORAL ONCE
Refills: 0 | Status: COMPLETED | OUTPATIENT
Start: 2020-02-01 | End: 2020-02-01

## 2020-02-01 RX ORDER — FUROSEMIDE 40 MG
40 TABLET ORAL ONCE
Refills: 0 | Status: COMPLETED | OUTPATIENT
Start: 2020-02-01 | End: 2020-02-01

## 2020-02-01 RX ORDER — ACETAMINOPHEN 500 MG
1000 TABLET ORAL ONCE
Refills: 0 | Status: COMPLETED | OUTPATIENT
Start: 2020-02-01 | End: 2020-02-01

## 2020-02-01 RX ORDER — ACETAMINOPHEN 500 MG
650 TABLET ORAL EVERY 6 HOURS
Refills: 0 | Status: DISCONTINUED | OUTPATIENT
Start: 2020-02-01 | End: 2020-02-03

## 2020-02-01 RX ADMIN — Medication 110 MILLIGRAM(S): at 05:31

## 2020-02-01 RX ADMIN — TAMSULOSIN HYDROCHLORIDE 0.4 MILLIGRAM(S): 0.4 CAPSULE ORAL at 05:31

## 2020-02-01 RX ADMIN — Medication 1000 MILLIGRAM(S): at 15:00

## 2020-02-01 RX ADMIN — MORPHINE SULFATE 2 MILLIGRAM(S): 50 CAPSULE, EXTENDED RELEASE ORAL at 11:34

## 2020-02-01 RX ADMIN — GABAPENTIN 300 MILLIGRAM(S): 400 CAPSULE ORAL at 17:32

## 2020-02-01 RX ADMIN — HEPARIN SODIUM 5000 UNIT(S): 5000 INJECTION INTRAVENOUS; SUBCUTANEOUS at 17:33

## 2020-02-01 RX ADMIN — MORPHINE SULFATE 2 MILLIGRAM(S): 50 CAPSULE, EXTENDED RELEASE ORAL at 11:50

## 2020-02-01 RX ADMIN — GABAPENTIN 300 MILLIGRAM(S): 400 CAPSULE ORAL at 05:31

## 2020-02-01 RX ADMIN — PIPERACILLIN AND TAZOBACTAM 25 GRAM(S): 4; .5 INJECTION, POWDER, LYOPHILIZED, FOR SOLUTION INTRAVENOUS at 05:32

## 2020-02-01 RX ADMIN — PIPERACILLIN AND TAZOBACTAM 25 GRAM(S): 4; .5 INJECTION, POWDER, LYOPHILIZED, FOR SOLUTION INTRAVENOUS at 21:43

## 2020-02-01 RX ADMIN — Medication 400 MILLIGRAM(S): at 13:30

## 2020-02-01 RX ADMIN — PIPERACILLIN AND TAZOBACTAM 25 GRAM(S): 4; .5 INJECTION, POWDER, LYOPHILIZED, FOR SOLUTION INTRAVENOUS at 14:01

## 2020-02-01 RX ADMIN — Medication 110 MILLIGRAM(S): at 17:32

## 2020-02-01 RX ADMIN — FINASTERIDE 5 MILLIGRAM(S): 5 TABLET, FILM COATED ORAL at 14:01

## 2020-02-01 RX ADMIN — Medication 400 MILLIGRAM(S): at 12:40

## 2020-02-01 RX ADMIN — AMIODARONE HYDROCHLORIDE 16.67 MG/MIN: 400 TABLET ORAL at 21:43

## 2020-02-01 RX ADMIN — Medication 40 MILLIGRAM(S): at 14:57

## 2020-02-01 RX ADMIN — Medication 81 MILLIGRAM(S): at 14:00

## 2020-02-01 RX ADMIN — Medication 400 MILLIGRAM(S): at 14:22

## 2020-02-01 RX ADMIN — HEPARIN SODIUM 5000 UNIT(S): 5000 INJECTION INTRAVENOUS; SUBCUTANEOUS at 05:30

## 2020-02-01 RX ADMIN — Medication 40 MILLIGRAM(S): at 09:35

## 2020-02-01 RX ADMIN — TAMSULOSIN HYDROCHLORIDE 0.4 MILLIGRAM(S): 0.4 CAPSULE ORAL at 17:32

## 2020-02-01 NOTE — SWALLOW BEDSIDE ASSESSMENT ADULT - SLP GENERAL OBSERVATIONS
pt seated in bed, awake and alert, able to voice needs and intentions when biPAP was removed.  Procedure was explained to the pt and family;

## 2020-02-01 NOTE — SWALLOW BEDSIDE ASSESSMENT ADULT - SWALLOW EVAL: CRITERIA FOR SKILLED INTERVENTION MET
demonstrates skilled criteria for swallowing intervention/Service will follow for resumption of diet.

## 2020-02-01 NOTE — PROGRESS NOTE ADULT - ASSESSMENT
IMP:    83 y/o male with HTN, HLD, BPH with recent R ankle Fx (10/19) transferred to ICU on 1/30 for acute type 1 resp failure related to aortic stenosis and sepsis--likely related to LLL infiltrate   Acute Heart failure secondary to aortic stenosis   NSVT on Amio gtt    High risk for acute decompensation and deterioration requiring intubation     Plan:    Doxy and pip/hao (2)  Follow up Cxs--No new Cxs thus far  Observe off NIV  NC as tolerated  Cont daily IV furosemide to keep on dry side  Amio gtt   DVT prophy--sqhep   PRADEEP chowdary    ICU care--d/w ICU staff and pt-- All concerns addressed.  D/W dr wakefield yesterday

## 2020-02-01 NOTE — PROGRESS NOTE ADULT - SUBJECTIVE AND OBJECTIVE BOX
Patient is a 84y old  Male who presents with a chief complaint of SIRS (01 Feb 2020 08:52)      HPI:  83 yo M with a PMH HTN, HLD, and BPH who presents with leg pains. He states for the past week, he has had sharp severe leg pains. They occur every day, often lasting for hours at a time, and are not related to exertion. He states that when the pains come it makes it difficult to walk, although when it is not there, he can walk without difficulty. The pains are from his ankle up to his upper thighs (he cannot tell from where it starts and stops). Of note, he was recently admitted (2 weeks ago) for cellulitis with concern for OM, and had a PICC line placed, to be on antibiotics (Vanc/Ceftriaxone) for 6 weeks.  He denies nausea, vomiting, fevers, chills, cough, chest pain, SOB, abdominal pain, diarrhea, dysuria, hematuria, dizziness, lightheadedness, or rash. He states the swelling in his legs from his cellulitis has improved.    Of note, per ER documentation, he presented due to fevers, and reportedly had a Tmax of 104 at home. The patient denies this.    Attempted to call patient's wife multiple times overnight and left message without response.  Patient does not know who his cardiologist is.    In the ED, he was given aspirin 325 mg PO x1, Vancomycin 1g IV x1, Cefepime 1g IV x1, Ibuprofen 600 mg PO x1, and LR 2.5L x1. (29 Jan 2020 23:42)  1/30/2020- Cardiology Consultation: Patient with HBP, HLD and known moderate aortic stenosis and regurgitation is S/P ORIF left ankle 1/1/2019 the admitted for right leg cellulitis 1/10/2020 and discharged with a PICC line for 6 weeks of antibiotics. He presented today with bilateral leg pain and was noted to be febrile to 104, breathless and having frequent VPCs. The patient denies chest pain. There is no history of CAD or heart failure. EP has started him on IV amiodarone.      Followup HPI:  1/31- Breathless. Remains on Bipap. Getting prn doses of IV furosemide for respiratory distress. Denies chest pain.   2/1 on bipap. wife at bedside states he is better than yesterday. appears fairly comfortable on bipap    PAST MEDICAL & SURGICAL HISTORY:  HLD (hyperlipidemia)  Enlarged prostate  HTN (hypertension)  Status post ORIF of fracture of ankle: Right, 10/2019        MEDICATIONS  (STANDING):  aMIOdarone Infusion 1 mG/Min (33.333 mL/Hr) IV Continuous <Continuous>  aMIOdarone Infusion 0.5 mG/Min (16.667 mL/Hr) IV Continuous <Continuous>  aspirin enteric coated 81 milliGRAM(s) Oral daily  cholecalciferol 2000 Unit(s) Oral daily  doxycycline IVPB 100 milliGRAM(s) IV Intermittent every 12 hours  finasteride 5 milliGRAM(s) Oral daily  furosemide   Injectable 40 milliGRAM(s) IV Push daily  gabapentin 300 milliGRAM(s) Oral two times a day  heparin  Injectable 5000 Unit(s) SubCutaneous every 12 hours  piperacillin/tazobactam IVPB.. 3.375 Gram(s) IV Intermittent every 8 hours  tamsulosin 0.4 milliGRAM(s) Oral two times a day    MEDICATIONS  (PRN):  acetaminophen   Tablet .. 650 milliGRAM(s) Oral every 6 hours PRN Mild Pain (1 - 3), Moderate Pain (4 - 6)          Vital Signs Last 24 Hrs  T(C): 36.6 (01 Feb 2020 10:00), Max: 40.4 (31 Jan 2020 18:00)  T(F): 97.9 (01 Feb 2020 10:00), Max: 104.8 (31 Jan 2020 18:00)  HR: 68 (01 Feb 2020 11:00) (59 - 132)  BP: 97/53 (01 Feb 2020 11:00) (82/46 - 177/128)  BP(mean): 67 (01 Feb 2020 11:00) (58 - 134)  RR: 24 (01 Feb 2020 11:00) (15 - 36)  SpO2: 95% (01 Feb 2020 11:00) (77% - 99%)    I&O's Summary    31 Jan 2020 07:01  -  01 Feb 2020 07:00  --------------------------------------------------------  IN: 1835 mL / OUT: 1700 mL / NET: 135 mL    01 Feb 2020 07:01  -  01 Feb 2020 11:53  --------------------------------------------------------  IN: 0 mL / OUT: 100 mL / NET: -100 mL        PHYSICAL EXAM  General Appearance: alert  HEENT: ncat bipap in place  Neck:   Back:   Lungs: dec bs  Heart: rrs1 s2 2/6 monica base  Abdomen:   Extremities: 1+ edema right  Skin:   Neurologic:       INTERPRETATION OF TELEMETRY:    ECG:        LABS:                          10.2   7.71  )-----------( 121      ( 01 Feb 2020 06:30 )             31.2     02-01    134<L>  |  102  |  38<H>  ----------------------------<  90  3.7   |  26  |  1.14    Ca    8.3<L>      01 Feb 2020 06:30  Phos  4.2     02-01  Mg     1.9     02-01    TPro  6.2  /  Alb  2.9<L>  /  TBili  1.0  /  DBili  0.3<H>  /  AST  103<H>  /  ALT  97<H>  /  AlkPhos  108  01-31    CARDIAC MARKERS ( 31 Jan 2020 05:41 )  x     / x     / 1526 U/L / x     / x      CARDIAC MARKERS ( 31 Jan 2020 01:05 )  0.684 ng/mL / x     / x     / x     / x      CARDIAC MARKERS ( 30 Jan 2020 15:34 )  0.741 ng/mL / x     / x     / x     / x            Pro BNP  [id number] 01-31 @ 05:41  D Dimer  -- 01-31 @ 05:41              RADIOLOGY & ADDITIONAL STUDIES:

## 2020-02-01 NOTE — PROGRESS NOTE ADULT - SUBJECTIVE AND OBJECTIVE BOX
CC:  Respiratory Failure     HPI:    83 y/o male with HTN, HLD, BPH with recent R ankle Fx (10/19)--admitted with possible OM, PICC placed and sent home on IV vanco/CTX about 2 weeks ago--admitted this time with apparent sepsis.  TTE shows mod aortic stenosi.   He developed flash pulm edema with fever and tachycardia on 1/30--RRT--tx to ICU for further care.  PICC was pulled on 1/30.  BCx negative thus far.      1/31:  ICU D # 2.  Tm 104.7 Confused overnight.  More awake and alert this morning.  Was on NIV.  On Amio gtt for NSVT    2/1:  ICU D # 3.  Temps lower.  CT chest with likely LLL infiltrate.  On NIV overnight.        PMH:  As above.     PSH:  As above.     FH: Non Contributory other than those listed in HPI    Social History:      MEDICATIONS  (STANDING):  aMIOdarone Infusion 1 mG/Min (33.333 mL/Hr) IV Continuous <Continuous>  aMIOdarone Infusion 0.5 mG/Min (16.667 mL/Hr) IV Continuous <Continuous>  aspirin enteric coated 81 milliGRAM(s) Oral daily  cholecalciferol 2000 Unit(s) Oral daily  doxycycline IVPB 100 milliGRAM(s) IV Intermittent every 12 hours  finasteride 5 milliGRAM(s) Oral daily  furosemide   Injectable 40 milliGRAM(s) IV Push daily  gabapentin 300 milliGRAM(s) Oral two times a day  heparin  Injectable 5000 Unit(s) SubCutaneous every 12 hours  piperacillin/tazobactam IVPB.. 3.375 Gram(s) IV Intermittent every 8 hours  tamsulosin 0.4 milliGRAM(s) Oral two times a day    MEDICATIONS  (PRN):  acetaminophen   Tablet .. 650 milliGRAM(s) Oral every 6 hours PRN Mild Pain (1 - 3), Moderate Pain (4 - 6)      Allergies: NKDA    ROS:  SEE BELOW        ICU Vital Signs Last 24 Hrs  T(C): 36.3 (01 Feb 2020 07:00), Max: 40.4 (31 Jan 2020 18:00)  T(F): 97.4 (01 Feb 2020 07:00), Max: 104.8 (31 Jan 2020 18:00)  HR: 60 (01 Feb 2020 08:00) (59 - 132)  BP: 92/45 (01 Feb 2020 08:00) (82/46 - 177/128)  BP(mean): 61 (01 Feb 2020 08:00) (58 - 134)  ABP: --  ABP(mean): --  RR: 23 (01 Feb 2020 08:00) (15 - 36)  SpO2: 93% (01 Feb 2020 08:00) (82% - 99%)          I&O's Summary    31 Jan 2020 07:01  -  01 Feb 2020 07:00  --------------------------------------------------------  IN: 1835 mL / OUT: 1700 mL / NET: 135 mL        Physical Exam:  SEE BELOW                          10.2   7.71  )-----------( 121      ( 01 Feb 2020 06:30 )             31.2       02-01    134<L>  |  102  |  38<H>  ----------------------------<  90  3.7   |  26  |  1.14    Ca    8.3<L>      01 Feb 2020 06:30  Phos  4.2     02-01  Mg     1.9     02-01    TPro  6.2  /  Alb  2.9<L>  /  TBili  1.0  /  DBili  0.3<H>  /  AST  103<H>  /  ALT  97<H>  /  AlkPhos  108  01-31      CARDIAC MARKERS ( 31 Jan 2020 05:41 )  x     / x     / 1526 U/L / x     / x      CARDIAC MARKERS ( 31 Jan 2020 01:05 )  0.684 ng/mL / x     / x     / x     / x      CARDIAC MARKERS ( 30 Jan 2020 15:34 )  0.741 ng/mL / x     / x     / x     / x                    DVT Prophylaxis:                                                            Contraindication:     Advanced Directives:    Discussed with:    Visit Information:  Time spent excluding procedure:  45 cc mins    ** Time is exclusive of billed procedures and/or teaching and/or routine family updates.

## 2020-02-01 NOTE — PROGRESS NOTE ADULT - ASSESSMENT
1, Systemic inflammatory response syndrome. Source undetermined. Blood cultures neg so far. PICC line removed. No TTE evidence of endocarditis.   2. Elevated troponin consistent with sepsis. Cannot exclude NSTEMI.  3. Moderate-severe  AS   4. VPCs due to sepsis and or NSTEMI. Improved on IV amiodarone.   5.HBP. Antihypertensives on hold due to sepsis.   6. pul edema  PLAN:  Continue amiodarone, ASA, IV furosemide bipap  as needed for respiratory compromise, IV antibiotics per ID.

## 2020-02-01 NOTE — SWALLOW BEDSIDE ASSESSMENT ADULT - COMMENTS
Admitted with SIRS:    85 y/o male with HTN, HLD, BPH with recent R ankle Fx (10/19)--admitted with possible OM, PICC placed and sent home on IV vanco/CTX about 2 weeks ago--admitted this time with apparent sepsis.  TTE shows mod aortic stenosis.   He developed flash pulmonary edema with fever and tachycardia on 1/30--RRT--tx to ICU for further care.  PICC was pulled on 1/30.  BCx negative thus far.     As per nsg, pt was tolerating po diet.  Service called for possible aspiration.  pt on BiPAp:  As per Nsg today, pt was able to take apple sauce by mouth without overt difficulty (fed at wife's request), but then had coughing, and 02 sats could not be maintained even on nasal cannula at 6 liters. pt placed back on BiPAP. Sats on BiPAP are at 99 -100    1/31:  ICU D # 2.  Tm 104.7 Confused overnight.  More awake and alert this morning.  Was on NIV.  On AMio gtt for NSVT

## 2020-02-01 NOTE — SWALLOW BEDSIDE ASSESSMENT ADULT - SWALLOW EVAL: DIAGNOSIS
oral-pharyngeal swallow skills are WFL: pt is able to follow through actively with the swallow sequence . See below for procedure.   BUT PT IS UNABLE TO MAINTAIN 02 SATS WHEN OFF BIPAP TO SUPPORT EATING AT THIS TIME..

## 2020-02-01 NOTE — PROGRESS NOTE ADULT - SUBJECTIVE AND OBJECTIVE BOX
Patient is a 84y old  Male who presents with a chief complaint of SIRS (01 Feb 2020 11:53)    PAST MEDICAL & SURGICAL HISTORY:  HLD (hyperlipidemia)  Enlarged prostate  HTN (hypertension)  Status post ORIF of fracture of ankle: Right, 10/2019    JOSE APONTE   84y    Male      Review of Systems:    SOB                   All other ROS are negative.    Allergies    No Known Allergies    Intolerances    Lipitor (Joint Pain)        ICU Vital Signs Last 24 Hrs  T(C): 37.3 (01 Feb 2020 21:00), Max: 39.4 (01 Feb 2020 14:22)  T(F): 99.1 (01 Feb 2020 21:00), Max: 103 (01 Feb 2020 15:03)  HR: 66 (01 Feb 2020 21:24) (59 - 89)  BP: 84/46 (01 Feb 2020 21:00) (75/50 - 122/55)  BP(mean): 59 (01 Feb 2020 21:00) (58 - 100)  ABP: --  ABP(mean): --  RR: 20 (01 Feb 2020 21:00) (15 - 32)  SpO2: 96% (01 Feb 2020 21:24) (77% - 100%)            LABS:                        10.2   7.71  )-----------( 121      ( 01 Feb 2020 06:30 )             31.2     02-01    134<L>  |  102  |  38<H>  ----------------------------<  90  3.7   |  26  |  1.14    Ca    8.3<L>      01 Feb 2020 06:30  Phos  4.2     02-01  Mg     1.9     02-01    TPro  6.2  /  Alb  2.9<L>  /  TBili  1.0  /  DBili  0.3<H>  /  AST  103<H>  /  ALT  97<H>  /  AlkPhos  108  01-31      CARDIAC MARKERS ( 31 Jan 2020 05:41 )  x     / x     / 1526 U/L / x     / x      CARDIAC MARKERS ( 31 Jan 2020 01:05 )  0.684 ng/mL / x     / x     / x     / x          CAPILLARY BLOOD GLUCOSE            CULTURES:  Culture Results:   No growth to date. (01-31 @ 05:41)  Culture Results:   No growth to date. (01-31 @ 05:28)  Culture Results:   No growth (01-29 @ 22:06)  Culture Results:   No growth to date. (01-29 @ 19:57)  Culture Results:   No growth to date. (01-29 @ 19:57)      Medications:  MEDICATIONS  (STANDING):  aMIOdarone Infusion 1 mG/Min (33.333 mL/Hr) IV Continuous <Continuous>  aMIOdarone Infusion 0.5 mG/Min (16.667 mL/Hr) IV Continuous <Continuous>  aspirin enteric coated 81 milliGRAM(s) Oral daily  cholecalciferol 2000 Unit(s) Oral daily  doxycycline IVPB 100 milliGRAM(s) IV Intermittent every 12 hours  finasteride 5 milliGRAM(s) Oral daily  furosemide   Injectable 40 milliGRAM(s) IV Push daily  gabapentin 300 milliGRAM(s) Oral two times a day  heparin  Injectable 5000 Unit(s) SubCutaneous every 12 hours  piperacillin/tazobactam IVPB.. 3.375 Gram(s) IV Intermittent every 8 hours  tamsulosin 0.4 milliGRAM(s) Oral two times a day    MEDICATIONS  (PRN):  acetaminophen   Tablet .. 650 milliGRAM(s) Oral every 6 hours PRN Temp greater or equal to 38.5C (101.3F), Mild Pain (1 - 3)        01-31 @ 07:01  -  02-01 @ 07:00  --------------------------------------------------------  IN: 1835 mL / OUT: 1700 mL / NET: 135 mL    02-01 @ 07:01  -  02-01 @ 21:47  --------------------------------------------------------  IN: 606 mL / OUT: 1050 mL / NET: -444 mL        RADIOLOGY/IMAGING/ECHO    < from: Transthoracic Echocardiogram (01.30.20 @ 06:17) >  Summary     Fibrocalcific changes noted to the mitral valve leaflets with preserved   leaflet excursion.   Mild to Moderate mitral regurgitation is present.   EA reversal of themitral inflow consistent with reduced compliance of the   left ventricle.   Significant fibrocalcific changes noted to the aortic valve leaflets with   restriction in leaflet excursion. Peak and mean transaortic gradients are   58 and 36 mmHg respectively; this finding is consistent with moderate   aortic stenosis.   JERILYN by continuity is 1.04 cm2 and the dimensionless index is .28   suggesting moderate to severe stenosis.   Moderate (2+) aortic regurgitation is present.   The tricuspid valve leaflets are thin and pliable; valve motion is normal.   Mild (1+) tricuspid valve regurgitation is present.   Borderline moderate pulmonary hypertension.   Trace pulmonic valvular regurgitation is present.   The left atrium is mildly dilated.   The left ventricle is normal in size, wall thickness, wall motion and   contractility.   Estimated left ventricular ejection fraction is 55 %.   The right atrium appears mildly dilated.   Normal appearing right ventricle structure and function.   The IVC is dilated.   No evidence of pericardial effusion.   No evidence of pleural effusion.      < from: CT Chest w/ IV Cont (01.31.20 @ 11:44) >  IMPRESSION:     Cardiomegaly with bilateral perihilar opacity and small bilateral pleural effusions, consistent with a pulmonary edema pattern.    Bibasilar consolidation, likely atelectasis, less likely pneumonia.    Nonobstructing stone in the left renal pelvis with surrounding urothelial inflammatory change, which may be reactive. Correlate with urinalysis.      < end of copied text >    Assessment/Plan:    84M with PMHx  AS MR HTN, HLD, BPH, sp ORIF R ankle fx, in January with  LE cellulitis/OM, sp PICC with IV abx.  Admit to [facility] 1/29 with fever,  rapid response 1/30 for  resp distress after sepsis fluids with pulm edema.        SIRS possible sepsis fever PCT elevation all cultures NGTD.  Fever curve is down PICC line out MS improved   Acute hypoxemic resp failure  with resp distress due to PNA/fluid overload.  Diuresed.    Decompensated HFpEF  AS/MR     NSVT  transition to PO amio  Possible statin induced rhabdo.  Med stopped CPK coming down.          ABX changed to  Doxycycline/pip/tazo  ID following   Transition from BIPAP  to HFNC  65% 40 liter/min   Cautious diuresis as he is preload dependent and BP is marginal  ?? RAVI AVR/TAVR w/u at later date.        CRITICAL CARE TIME SPENT: 32 minutes assessing presenting problems of acute illness, which pose high probability of life threatening deterioration or end organ damage/dysfunction, as well as medical decision making including initiating plan of care, reviewing data, reviewing radiologic exams, discussing with multidisciplinary team,  discussing goals of care with patient/family, and writing this note.  Non-inclusive of procedures performed, Patient is a 84y old  Male who presents with a chief complaint of SIRS (01 Feb 2020 11:53)    PAST MEDICAL & SURGICAL HISTORY:  HLD (hyperlipidemia)  Enlarged prostate  HTN (hypertension)  Status post ORIF of fracture of ankle: Right, 10/2019    JOSE APONTE   84y    Male      Review of Systems:    SOB                   All other ROS are negative.    Allergies    No Known Allergies    Intolerances    Lipitor (Joint Pain)        ICU Vital Signs Last 24 Hrs  T(C): 37.3 (01 Feb 2020 21:00), Max: 39.4 (01 Feb 2020 14:22)  T(F): 99.1 (01 Feb 2020 21:00), Max: 103 (01 Feb 2020 15:03)  HR: 66 (01 Feb 2020 21:24) (59 - 89)  BP: 84/46 (01 Feb 2020 21:00) (75/50 - 122/55)  BP(mean): 59 (01 Feb 2020 21:00) (58 - 100)  ABP: --  ABP(mean): --  RR: 20 (01 Feb 2020 21:00) (15 - 32)  SpO2: 96% (01 Feb 2020 21:24) (77% - 100%)            LABS:                        10.2   7.71  )-----------( 121      ( 01 Feb 2020 06:30 )             31.2     02-01    134<L>  |  102  |  38<H>  ----------------------------<  90  3.7   |  26  |  1.14    Ca    8.3<L>      01 Feb 2020 06:30  Phos  4.2     02-01  Mg     1.9     02-01    TPro  6.2  /  Alb  2.9<L>  /  TBili  1.0  /  DBili  0.3<H>  /  AST  103<H>  /  ALT  97<H>  /  AlkPhos  108  01-31      CARDIAC MARKERS ( 31 Jan 2020 05:41 )  x     / x     / 1526 U/L / x     / x      CARDIAC MARKERS ( 31 Jan 2020 01:05 )  0.684 ng/mL / x     / x     / x     / x          CAPILLARY BLOOD GLUCOSE            CULTURES:  Culture Results:   No growth to date. (01-31 @ 05:41)  Culture Results:   No growth to date. (01-31 @ 05:28)  Culture Results:   No growth (01-29 @ 22:06)  Culture Results:   No growth to date. (01-29 @ 19:57)  Culture Results:   No growth to date. (01-29 @ 19:57)      Medications:  MEDICATIONS  (STANDING):  aMIOdarone Infusion 1 mG/Min (33.333 mL/Hr) IV Continuous <Continuous>  aMIOdarone Infusion 0.5 mG/Min (16.667 mL/Hr) IV Continuous <Continuous>  aspirin enteric coated 81 milliGRAM(s) Oral daily  cholecalciferol 2000 Unit(s) Oral daily  doxycycline IVPB 100 milliGRAM(s) IV Intermittent every 12 hours  finasteride 5 milliGRAM(s) Oral daily  furosemide   Injectable 40 milliGRAM(s) IV Push daily  gabapentin 300 milliGRAM(s) Oral two times a day  heparin  Injectable 5000 Unit(s) SubCutaneous every 12 hours  piperacillin/tazobactam IVPB.. 3.375 Gram(s) IV Intermittent every 8 hours  tamsulosin 0.4 milliGRAM(s) Oral two times a day    MEDICATIONS  (PRN):  acetaminophen   Tablet .. 650 milliGRAM(s) Oral every 6 hours PRN Temp greater or equal to 38.5C (101.3F), Mild Pain (1 - 3)        01-31 @ 07:01  -  02-01 @ 07:00  --------------------------------------------------------  IN: 1835 mL / OUT: 1700 mL / NET: 135 mL    02-01 @ 07:01  -  02-01 @ 21:47  --------------------------------------------------------  IN: 606 mL / OUT: 1050 mL / NET: -444 mL        RADIOLOGY/IMAGING/ECHO    < from: Transthoracic Echocardiogram (01.30.20 @ 06:17) >  Summary     Fibrocalcific changes noted to the mitral valve leaflets with preserved   leaflet excursion.   Mild to Moderate mitral regurgitation is present.   EA reversal of themitral inflow consistent with reduced compliance of the   left ventricle.   Significant fibrocalcific changes noted to the aortic valve leaflets with   restriction in leaflet excursion. Peak and mean transaortic gradients are   58 and 36 mmHg respectively; this finding is consistent with moderate   aortic stenosis.   JERILYN by continuity is 1.04 cm2 and the dimensionless index is .28   suggesting moderate to severe stenosis.   Moderate (2+) aortic regurgitation is present.   The tricuspid valve leaflets are thin and pliable; valve motion is normal.   Mild (1+) tricuspid valve regurgitation is present.   Borderline moderate pulmonary hypertension.   Trace pulmonic valvular regurgitation is present.   The left atrium is mildly dilated.   The left ventricle is normal in size, wall thickness, wall motion and   contractility.   Estimated left ventricular ejection fraction is 55 %.   The right atrium appears mildly dilated.   Normal appearing right ventricle structure and function.   The IVC is dilated.   No evidence of pericardial effusion.   No evidence of pleural effusion.      < from: CT Chest w/ IV Cont (01.31.20 @ 11:44) >  IMPRESSION:     Cardiomegaly with bilateral perihilar opacity and small bilateral pleural effusions, consistent with a pulmonary edema pattern.    Bibasilar consolidation, likely atelectasis, less likely pneumonia.    Nonobstructing stone in the left renal pelvis with surrounding urothelial inflammatory change, which may be reactive. Correlate with urinalysis.        Assessment/Plan:    84M with PMHx  AS MR HTN, HLD, BPH, sp ORIF R ankle in October 2019, here in January with   R LE cellulitis/OM, sp PICC with IV abx.  Admit to [facility] 1/29 with fever,  rapid response 1/30 for  resp distress after sepsis fluids with pulm edema.        SIRS possible sepsis fever PCT elevation all cultures NGTD.  Fever curve is down PICC line out MS improved   Acute hypoxemic resp failure  with resp distress due to PNA/fluid overload.  Diuresed.    Decompensated HFpEF  AS/MR     NSVT  transition to PO amio if able to come off BIPAP  Possible statin induced rhabdo.  Med stopped CPK coming down.          ABX changed to  Doxycycline/pip/tazo  ID following   Transition from BIPAP  to HFNC  65% 40 liter/min   Cautious diuresis as he is preload dependent and BP is marginal  ?? RAVI AVR/TAVR w/u at later date.        CRITICAL CARE TIME SPENT: 32 minutes assessing presenting problems of acute illness, which pose high probability of life threatening deterioration or end organ damage/dysfunction, as well as medical decision making including initiating plan of care, reviewing data, reviewing radiologic exams, discussing with multidisciplinary team,  discussing goals of care with patient/family, and writing this note.  Non-inclusive of procedures performed,

## 2020-02-01 NOTE — SWALLOW BEDSIDE ASSESSMENT ADULT - CONSISTENCIES ADMINISTERED
Disc with Nsg: Pt about to have a Motrin for pain:  BiPAP mask removed and pill was placed on pt's tongue: sip of sterile water given , with 3 further trials of water as a chaser./thin liquid

## 2020-02-02 LAB
ALBUMIN SERPL ELPH-MCNC: 2.3 G/DL — LOW (ref 3.3–5)
ALP SERPL-CCNC: 106 U/L — SIGNIFICANT CHANGE UP (ref 40–120)
ALT FLD-CCNC: 69 U/L — SIGNIFICANT CHANGE UP (ref 12–78)
ANION GAP SERPL CALC-SCNC: 9 MMOL/L — SIGNIFICANT CHANGE UP (ref 5–17)
AST SERPL-CCNC: 60 U/L — HIGH (ref 15–37)
BILIRUB SERPL-MCNC: 1.1 MG/DL — SIGNIFICANT CHANGE UP (ref 0.2–1.2)
BUN SERPL-MCNC: 36 MG/DL — HIGH (ref 7–23)
CALCIUM SERPL-MCNC: 7.9 MG/DL — LOW (ref 8.5–10.1)
CHLORIDE SERPL-SCNC: 103 MMOL/L — SIGNIFICANT CHANGE UP (ref 96–108)
CK SERPL-CCNC: 525 U/L — HIGH (ref 26–308)
CO2 SERPL-SCNC: 24 MMOL/L — SIGNIFICANT CHANGE UP (ref 22–31)
CREAT SERPL-MCNC: 1.14 MG/DL — SIGNIFICANT CHANGE UP (ref 0.5–1.3)
GLUCOSE SERPL-MCNC: 94 MG/DL — SIGNIFICANT CHANGE UP (ref 70–99)
HCT VFR BLD CALC: 29.7 % — LOW (ref 39–50)
HGB BLD-MCNC: 10.2 G/DL — LOW (ref 13–17)
LACTATE SERPL-SCNC: 1.3 MMOL/L — SIGNIFICANT CHANGE UP (ref 0.7–2)
MAGNESIUM SERPL-MCNC: 1.9 MG/DL — SIGNIFICANT CHANGE UP (ref 1.6–2.6)
MCHC RBC-ENTMCNC: 31.1 PG — SIGNIFICANT CHANGE UP (ref 27–34)
MCHC RBC-ENTMCNC: 34.3 GM/DL — SIGNIFICANT CHANGE UP (ref 32–36)
MCV RBC AUTO: 90.5 FL — SIGNIFICANT CHANGE UP (ref 80–100)
PHOSPHATE SERPL-MCNC: 3.7 MG/DL — SIGNIFICANT CHANGE UP (ref 2.5–4.5)
PLATELET # BLD AUTO: 129 K/UL — LOW (ref 150–400)
POTASSIUM SERPL-MCNC: 3 MMOL/L — LOW (ref 3.5–5.3)
POTASSIUM SERPL-SCNC: 3 MMOL/L — LOW (ref 3.5–5.3)
PROT SERPL-MCNC: 5.6 GM/DL — LOW (ref 6–8.3)
RAPID RVP RESULT: SIGNIFICANT CHANGE UP
RBC # BLD: 3.28 M/UL — LOW (ref 4.2–5.8)
RBC # FLD: 14.2 % — SIGNIFICANT CHANGE UP (ref 10.3–14.5)
SODIUM SERPL-SCNC: 136 MMOL/L — SIGNIFICANT CHANGE UP (ref 135–145)
WBC # BLD: 7.49 K/UL — SIGNIFICANT CHANGE UP (ref 3.8–10.5)
WBC # FLD AUTO: 7.49 K/UL — SIGNIFICANT CHANGE UP (ref 3.8–10.5)

## 2020-02-02 PROCEDURE — 99291 CRITICAL CARE FIRST HOUR: CPT

## 2020-02-02 PROCEDURE — 93010 ELECTROCARDIOGRAM REPORT: CPT

## 2020-02-02 RX ORDER — POTASSIUM CHLORIDE 20 MEQ
40 PACKET (EA) ORAL EVERY 4 HOURS
Refills: 0 | Status: COMPLETED | OUTPATIENT
Start: 2020-02-02 | End: 2020-02-02

## 2020-02-02 RX ORDER — ACETAMINOPHEN 500 MG
1000 TABLET ORAL ONCE
Refills: 0 | Status: COMPLETED | OUTPATIENT
Start: 2020-02-02 | End: 2020-02-02

## 2020-02-02 RX ORDER — METOPROLOL TARTRATE 50 MG
25 TABLET ORAL
Refills: 0 | Status: DISCONTINUED | OUTPATIENT
Start: 2020-02-02 | End: 2020-02-11

## 2020-02-02 RX ORDER — CHLORHEXIDINE GLUCONATE 213 G/1000ML
15 SOLUTION TOPICAL
Refills: 0 | Status: DISCONTINUED | OUTPATIENT
Start: 2020-02-02 | End: 2020-02-11

## 2020-02-02 RX ORDER — AMIODARONE HYDROCHLORIDE 400 MG/1
200 TABLET ORAL DAILY
Refills: 0 | Status: DISCONTINUED | OUTPATIENT
Start: 2020-02-02 | End: 2020-02-11

## 2020-02-02 RX ADMIN — CHLORHEXIDINE GLUCONATE 15 MILLILITER(S): 213 SOLUTION TOPICAL at 18:16

## 2020-02-02 RX ADMIN — Medication 2000 UNIT(S): at 12:25

## 2020-02-02 RX ADMIN — Medication 81 MILLIGRAM(S): at 12:25

## 2020-02-02 RX ADMIN — Medication 1000 MILLIGRAM(S): at 07:56

## 2020-02-02 RX ADMIN — GABAPENTIN 300 MILLIGRAM(S): 400 CAPSULE ORAL at 06:18

## 2020-02-02 RX ADMIN — Medication 40 MILLIEQUIVALENT(S): at 09:12

## 2020-02-02 RX ADMIN — PIPERACILLIN AND TAZOBACTAM 25 GRAM(S): 4; .5 INJECTION, POWDER, LYOPHILIZED, FOR SOLUTION INTRAVENOUS at 22:04

## 2020-02-02 RX ADMIN — PIPERACILLIN AND TAZOBACTAM 25 GRAM(S): 4; .5 INJECTION, POWDER, LYOPHILIZED, FOR SOLUTION INTRAVENOUS at 06:21

## 2020-02-02 RX ADMIN — GABAPENTIN 300 MILLIGRAM(S): 400 CAPSULE ORAL at 18:16

## 2020-02-02 RX ADMIN — Medication 650 MILLIGRAM(S): at 22:00

## 2020-02-02 RX ADMIN — Medication 650 MILLIGRAM(S): at 12:25

## 2020-02-02 RX ADMIN — Medication 110 MILLIGRAM(S): at 18:15

## 2020-02-02 RX ADMIN — Medication 650 MILLIGRAM(S): at 19:30

## 2020-02-02 RX ADMIN — AMIODARONE HYDROCHLORIDE 200 MILLIGRAM(S): 400 TABLET ORAL at 09:52

## 2020-02-02 RX ADMIN — TAMSULOSIN HYDROCHLORIDE 0.4 MILLIGRAM(S): 0.4 CAPSULE ORAL at 18:16

## 2020-02-02 RX ADMIN — Medication 110 MILLIGRAM(S): at 05:21

## 2020-02-02 RX ADMIN — Medication 650 MILLIGRAM(S): at 13:17

## 2020-02-02 RX ADMIN — Medication 40 MILLIEQUIVALENT(S): at 10:38

## 2020-02-02 RX ADMIN — Medication 25 MILLIGRAM(S): at 18:16

## 2020-02-02 RX ADMIN — Medication 400 MILLIGRAM(S): at 06:20

## 2020-02-02 RX ADMIN — HEPARIN SODIUM 5000 UNIT(S): 5000 INJECTION INTRAVENOUS; SUBCUTANEOUS at 18:16

## 2020-02-02 RX ADMIN — HEPARIN SODIUM 5000 UNIT(S): 5000 INJECTION INTRAVENOUS; SUBCUTANEOUS at 07:03

## 2020-02-02 RX ADMIN — Medication 40 MILLIGRAM(S): at 05:21

## 2020-02-02 RX ADMIN — TAMSULOSIN HYDROCHLORIDE 0.4 MILLIGRAM(S): 0.4 CAPSULE ORAL at 06:19

## 2020-02-02 RX ADMIN — FINASTERIDE 5 MILLIGRAM(S): 5 TABLET, FILM COATED ORAL at 12:25

## 2020-02-02 RX ADMIN — PIPERACILLIN AND TAZOBACTAM 25 GRAM(S): 4; .5 INJECTION, POWDER, LYOPHILIZED, FOR SOLUTION INTRAVENOUS at 14:11

## 2020-02-02 NOTE — PROGRESS NOTE ADULT - SUBJECTIVE AND OBJECTIVE BOX
Worsening oxygenation due to PNA/ADHF (VHD AS/MR)  diastolic dysfx.         CCT 34    Not tolerating BIPAP  now on HF nasal canula 50 liters 95% fio2    POCUS diffuse bilateral B lines  Febrile with tachypnea increased WOB    Trying to aggressively diurese now to avoid the need for intubation & mechanical ventilation .         Even though PCT is high, send RVP.

## 2020-02-02 NOTE — PROGRESS NOTE ADULT - ASSESSMENT
1, Systemic inflammatory response syndrome. Source undetermined. Blood cultures neg so far. PICC line removed. No TTE evidence of endocarditis.   2. Elevated troponin consistent with sepsis. Cannot exclude NSTEMI.  3. Moderate-severe  AS   4. VPCs due to sepsis and or NSTEMI. Improved on IV amiodarone.   5.HBP. Antihypertensives on hold due to sepsis.   6. pul edema  PLAN:  change to oral  amiodarone,  cont aASA, IV furosemide bipap  as needed for respiratory compromise, IV antibiotics per ID.   add low dose bb.  will need cath hopefully this week to exclude severe CAd as cause of pulmonary edema.

## 2020-02-02 NOTE — PROGRESS NOTE ADULT - SUBJECTIVE AND OBJECTIVE BOX
Patient is a 84y old  Male who presents with a chief complaint of SIRS (02 Feb 2020 06:08)      HPI:  85 yo M with a PMH HTN, HLD, and BPH who presents with leg pains. He states for the past week, he has had sharp severe leg pains. They occur every day, often lasting for hours at a time, and are not related to exertion. He states that when the pains come it makes it difficult to walk, although when it is not there, he can walk without difficulty. The pains are from his ankle up to his upper thighs (he cannot tell from where it starts and stops). Of note, he was recently admitted (2 weeks ago) for cellulitis with concern for OM, and had a PICC line placed, to be on antibiotics (Vanc/Ceftriaxone) for 6 weeks.  He denies nausea, vomiting, fevers, chills, cough, chest pain, SOB, abdominal pain, diarrhea, dysuria, hematuria, dizziness, lightheadedness, or rash. He states the swelling in his legs from his cellulitis has improved.    Of note, per ER documentation, he presented due to fevers, and reportedly had a Tmax of 104 at home. The patient denies this.    Attempted to call patient's wife multiple times overnight and left message without response.  Patient does not know who his cardiologist is.    In the ED, he was given aspirin 325 mg PO x1, Vancomycin 1g IV x1, Cefepime 1g IV x1, Ibuprofen 600 mg PO x1, and LR 2.5L x1. (29 Jan 2020 23:42)  1/30/2020- Cardiology Consultation: Patient with HBP, HLD and known moderate aortic stenosis and regurgitation is S/P ORIF left ankle 1/1/2019 the admitted for right leg cellulitis 1/10/2020 and discharged with a PICC line for 6 weeks of antibiotics. He presented today with bilateral leg pain and was noted to be febrile to 104, breathless and having frequent VPCs. The patient denies chest pain. There is no history of CAD or heart failure. EP has started him on IV amiodarone.      Followup HPI:  1/31- Breathless. Remains on Bipap. Getting prn doses of IV furosemide for respiratory distress. Denies chest pain.   2/1 on bipap. wife at bedside states he is better than yesterday. appears fairly comfortable on bipap  2/2 appears comfortable. still having intermittent episodes of tachycardia and then worsening dyspnea. temp to 102    PAST MEDICAL & SURGICAL HISTORY:  HLD (hyperlipidemia)  Enlarged prostate  HTN (hypertension)  Status post ORIF of fracture of ankle: Right, 10/2019        MEDICATIONS  (STANDING):  aMIOdarone Infusion 1 mG/Min (33.333 mL/Hr) IV Continuous <Continuous>  aMIOdarone Infusion 0.5 mG/Min (16.667 mL/Hr) IV Continuous <Continuous>  aspirin enteric coated 81 milliGRAM(s) Oral daily  cholecalciferol 2000 Unit(s) Oral daily  doxycycline IVPB 100 milliGRAM(s) IV Intermittent every 12 hours  finasteride 5 milliGRAM(s) Oral daily  furosemide   Injectable 40 milliGRAM(s) IV Push daily  gabapentin 300 milliGRAM(s) Oral two times a day  heparin  Injectable 5000 Unit(s) SubCutaneous every 12 hours  piperacillin/tazobactam IVPB.. 3.375 Gram(s) IV Intermittent every 8 hours  tamsulosin 0.4 milliGRAM(s) Oral two times a day    MEDICATIONS  (PRN):  acetaminophen   Tablet .. 650 milliGRAM(s) Oral every 6 hours PRN Temp greater or equal to 38.5C (101.3F), Mild Pain (1 - 3)          Vital Signs Last 24 Hrs  T(C): 38.3 (02 Feb 2020 07:00), Max: 39.4 (01 Feb 2020 14:22)  T(F): 100.9 (02 Feb 2020 07:00), Max: 103 (01 Feb 2020 15:03)  HR: 79 (02 Feb 2020 07:00) (64 - 103)  BP: 95/52 (02 Feb 2020 07:00) (75/50 - 124/110)  BP(mean): 64 (02 Feb 2020 07:00) (52 - 115)  RR: 26 (02 Feb 2020 07:00) (18 - 32)  SpO2: 98% (02 Feb 2020 07:00) (76% - 100%)    I&O's Summary    01 Feb 2020 07:01  -  02 Feb 2020 07:00  --------------------------------------------------------  IN: 1201 mL / OUT: 1870 mL / NET: -669 mL        PHYSICAL EXAM  General Appearance: alert  HEENT: ncat   Neck:   Back:   Lungs: dec bs  Heart: rrs1 s2 2/6 monica base  Abdomen:   Extremities: 1+ edema right  Skin:   Neurologic:         INTERPRETATION OF TELEMETRY:    ECG:        LABS:                          10.2   7.49  )-----------( 129      ( 02 Feb 2020 06:56 )             29.7     02-02    136  |  103  |  36<H>  ----------------------------<  94  3.0<L>   |  24  |  1.14    Ca    7.9<L>      02 Feb 2020 06:56  Phos  3.7     02-02  Mg     1.9     02-02    TPro  5.6<L>  /  Alb  2.3<L>  /  TBili  1.1  /  DBili  x   /  AST  60<H>  /  ALT  69  /  AlkPhos  106  02-02    CARDIAC MARKERS ( 02 Feb 2020 06:56 )  x     / x     / 525 U/L / x     / x            Pro BNP  94060 01-31 @ 05:41  D Dimer  -- 01-31 @ 05:41              RADIOLOGY & ADDITIONAL STUDIES:

## 2020-02-02 NOTE — PROGRESS NOTE ADULT - SUBJECTIVE AND OBJECTIVE BOX
CC:  Respiratory Failure     HPI:    83 y/o male with HTN, HLD, BPH with recent R ankle Fx (10/19)--admitted with possible OM, PICC placed and sent home on IV vanco/CTX about 2 weeks ago--admitted this time with apparent sepsis.  TTE shows mod aortic stenosi.   He developed flash pulm edema with fever and tachycardia on 1/30--RRT--tx to ICU for further care.  PICC was pulled on 1/30.  BCx negative thus far.      1/31:  ICU D # 2.  Tm 104.7 Confused overnight.  More awake and alert this morning.  Was on NIV.  On Amio gtt for NSVT    2/1:  ICU D # 3.  Temps lower.  CT chest with likely LLL infiltrate.  On NIV overnight      2/2:  ICU D # 4.  Tm 102.  Flashed earlier this morning--placed on HFNC and given furosemide 40--u/o 600ml.  Case d/w Dr estrada--AV is mod-severe stenosis--will also need to r/o ischemia--LHC this week.  Will change to PO amio    PMH:  As above.     PSH:  As above.     FH: Non Contributory other than those listed in HPI    Social History:      MEDICATIONS  (STANDING):  aMIOdarone    Tablet 200 milliGRAM(s) Oral daily  aMIOdarone Infusion 1 mG/Min (33.333 mL/Hr) IV Continuous <Continuous>  aspirin enteric coated 81 milliGRAM(s) Oral daily  chlorhexidine 0.12% Liquid 15 milliLiter(s) Swish and Spit two times a day  cholecalciferol 2000 Unit(s) Oral daily  doxycycline IVPB 100 milliGRAM(s) IV Intermittent every 12 hours  finasteride 5 milliGRAM(s) Oral daily  furosemide   Injectable 40 milliGRAM(s) IV Push daily  gabapentin 300 milliGRAM(s) Oral two times a day  heparin  Injectable 5000 Unit(s) SubCutaneous every 12 hours  metoprolol tartrate 25 milliGRAM(s) Oral two times a day  piperacillin/tazobactam IVPB.. 3.375 Gram(s) IV Intermittent every 8 hours  potassium chloride    Tablet ER 40 milliEquivalent(s) Oral every 4 hours  tamsulosin 0.4 milliGRAM(s) Oral two times a day    MEDICATIONS  (PRN):  acetaminophen   Tablet .. 650 milliGRAM(s) Oral every 6 hours PRN Temp greater or equal to 38.5C (101.3F), Mild Pain (1 - 3)      Allergies: NKDA    ROS:  SEE BELOW        ICU Vital Signs Last 24 Hrs  T(C): 37.9 (02 Feb 2020 08:00), Max: 39.4 (01 Feb 2020 14:22)  T(F): 100.2 (02 Feb 2020 08:00), Max: 103 (01 Feb 2020 15:03)  HR: 76 (02 Feb 2020 08:00) (64 - 103)  BP: 102/87 (02 Feb 2020 08:00) (75/50 - 124/110)  BP(mean): 94 (02 Feb 2020 08:00) (52 - 115)  ABP: --  ABP(mean): --  RR: 24 (02 Feb 2020 08:00) (18 - 32)  SpO2: 96% (02 Feb 2020 08:00) (76% - 100%)          I&O's Summary    01 Feb 2020 07:01  -  02 Feb 2020 07:00  --------------------------------------------------------  IN: 1201 mL / OUT: 1870 mL / NET: -669 mL        Physical Exam:  SEE BELOW                          10.2   7.49  )-----------( 129      ( 02 Feb 2020 06:56 )             29.7       02-02    136  |  103  |  36<H>  ----------------------------<  94  3.0<L>   |  24  |  1.14    Ca    7.9<L>      02 Feb 2020 06:56  Phos  3.7     02-02  Mg     1.9     02-02    TPro  5.6<L>  /  Alb  2.3<L>  /  TBili  1.1  /  DBili  x   /  AST  60<H>  /  ALT  69  /  AlkPhos  106  02-02      CARDIAC MARKERS ( 02 Feb 2020 06:56 )  x     / x     / 525 U/L / x     / x                    DVT Prophylaxis:                                                            Contraindication:     Advanced Directives:    Discussed with:    Visit Information:  Time spent excluding procedure:      ** Time is exclusive of billed procedures and/or teaching and/or routine family updates.

## 2020-02-02 NOTE — PROGRESS NOTE ADULT - MS EXT PE MLT D E PC
no pedal edema/no cyanosis/R ankle mild redness
no cyanosis/pedal edema
no cyanosis/no pedal edema
pedal edema

## 2020-02-02 NOTE — PROGRESS NOTE ADULT - RS GEN PE MLT RESP DETAILS PC
airway patent/breath sounds equal
airway patent
diminished breath sounds, L/diminished breath sounds, R/airway patent
rales/breath sounds equal

## 2020-02-02 NOTE — PROGRESS NOTE ADULT - ASSESSMENT
IMP:    85 y/o male with HTN, HLD, BPH with recent R ankle Fx (10/19) transferred to ICU on 1/30 for acute type 1 resp failure related to aortic stenosis and sepsis--likely related to LLL infiltrate--will r/o ischemia   Acute Heart failure secondary to aortic stenosis   NSVT on Amio gtt    High risk for acute decompensation and deterioration requiring intubation     Plan:    Doxy and pip/hao (3)  Follow up Cxs--No new Cxs thus far--temp curve better  [facility] this week  PO amio and start low dose BBx  Observe off NIV  NC as tolerated  Cont daily IV furosemide to keep on dry side  DVT prophy--sqhep     ICU care--d/w ICU staff and pt-- All concerns addressed.  D/W dr estrada

## 2020-02-03 LAB
ANION GAP SERPL CALC-SCNC: 8 MMOL/L — SIGNIFICANT CHANGE UP (ref 5–17)
BUN SERPL-MCNC: 36 MG/DL — HIGH (ref 7–23)
CALCIUM SERPL-MCNC: 9.1 MG/DL — SIGNIFICANT CHANGE UP (ref 8.5–10.1)
CHLORIDE SERPL-SCNC: 102 MMOL/L — SIGNIFICANT CHANGE UP (ref 96–108)
CO2 SERPL-SCNC: 25 MMOL/L — SIGNIFICANT CHANGE UP (ref 22–31)
CREAT SERPL-MCNC: 0.94 MG/DL — SIGNIFICANT CHANGE UP (ref 0.5–1.3)
GLUCOSE SERPL-MCNC: 88 MG/DL — SIGNIFICANT CHANGE UP (ref 70–99)
HCT VFR BLD CALC: 31 % — LOW (ref 39–50)
HGB BLD-MCNC: 10.3 G/DL — LOW (ref 13–17)
MAGNESIUM SERPL-MCNC: 2.1 MG/DL — SIGNIFICANT CHANGE UP (ref 1.6–2.6)
MCHC RBC-ENTMCNC: 30.7 PG — SIGNIFICANT CHANGE UP (ref 27–34)
MCHC RBC-ENTMCNC: 33.2 GM/DL — SIGNIFICANT CHANGE UP (ref 32–36)
MCV RBC AUTO: 92.3 FL — SIGNIFICANT CHANGE UP (ref 80–100)
PHOSPHATE SERPL-MCNC: 3.1 MG/DL — SIGNIFICANT CHANGE UP (ref 2.5–4.5)
PLATELET # BLD AUTO: 169 K/UL — SIGNIFICANT CHANGE UP (ref 150–400)
POTASSIUM SERPL-MCNC: 3.1 MMOL/L — LOW (ref 3.5–5.3)
POTASSIUM SERPL-SCNC: 3.1 MMOL/L — LOW (ref 3.5–5.3)
RBC # BLD: 3.36 M/UL — LOW (ref 4.2–5.8)
RBC # FLD: 14.3 % — SIGNIFICANT CHANGE UP (ref 10.3–14.5)
SODIUM SERPL-SCNC: 135 MMOL/L — SIGNIFICANT CHANGE UP (ref 135–145)
WBC # BLD: 8.6 K/UL — SIGNIFICANT CHANGE UP (ref 3.8–10.5)
WBC # FLD AUTO: 8.6 K/UL — SIGNIFICANT CHANGE UP (ref 3.8–10.5)

## 2020-02-03 PROCEDURE — 93010 ELECTROCARDIOGRAM REPORT: CPT

## 2020-02-03 PROCEDURE — 99291 CRITICAL CARE FIRST HOUR: CPT

## 2020-02-03 RX ORDER — PIPERACILLIN AND TAZOBACTAM 4; .5 G/20ML; G/20ML
3.38 INJECTION, POWDER, LYOPHILIZED, FOR SOLUTION INTRAVENOUS EVERY 8 HOURS
Refills: 0 | Status: DISCONTINUED | OUTPATIENT
Start: 2020-02-03 | End: 2020-02-05

## 2020-02-03 RX ORDER — ACETAMINOPHEN 500 MG
650 TABLET ORAL EVERY 6 HOURS
Refills: 0 | Status: DISCONTINUED | OUTPATIENT
Start: 2020-02-03 | End: 2020-02-11

## 2020-02-03 RX ORDER — PIPERACILLIN AND TAZOBACTAM 4; .5 G/20ML; G/20ML
3.38 INJECTION, POWDER, LYOPHILIZED, FOR SOLUTION INTRAVENOUS EVERY 8 HOURS
Refills: 0 | Status: DISCONTINUED | OUTPATIENT
Start: 2020-02-03 | End: 2020-02-03

## 2020-02-03 RX ORDER — POTASSIUM CHLORIDE 20 MEQ
40 PACKET (EA) ORAL EVERY 4 HOURS
Refills: 0 | Status: COMPLETED | OUTPATIENT
Start: 2020-02-03 | End: 2020-02-03

## 2020-02-03 RX ADMIN — Medication 25 MILLIGRAM(S): at 17:51

## 2020-02-03 RX ADMIN — GABAPENTIN 300 MILLIGRAM(S): 400 CAPSULE ORAL at 17:49

## 2020-02-03 RX ADMIN — CHLORHEXIDINE GLUCONATE 15 MILLILITER(S): 213 SOLUTION TOPICAL at 06:47

## 2020-02-03 RX ADMIN — CHLORHEXIDINE GLUCONATE 15 MILLILITER(S): 213 SOLUTION TOPICAL at 17:49

## 2020-02-03 RX ADMIN — Medication 650 MILLIGRAM(S): at 09:26

## 2020-02-03 RX ADMIN — TAMSULOSIN HYDROCHLORIDE 0.4 MILLIGRAM(S): 0.4 CAPSULE ORAL at 05:06

## 2020-02-03 RX ADMIN — Medication 2000 UNIT(S): at 12:17

## 2020-02-03 RX ADMIN — Medication 650 MILLIGRAM(S): at 10:30

## 2020-02-03 RX ADMIN — Medication 650 MILLIGRAM(S): at 02:49

## 2020-02-03 RX ADMIN — Medication 40 MILLIEQUIVALENT(S): at 09:25

## 2020-02-03 RX ADMIN — Medication 650 MILLIGRAM(S): at 01:49

## 2020-02-03 RX ADMIN — Medication 110 MILLIGRAM(S): at 05:05

## 2020-02-03 RX ADMIN — Medication 81 MILLIGRAM(S): at 12:17

## 2020-02-03 RX ADMIN — TAMSULOSIN HYDROCHLORIDE 0.4 MILLIGRAM(S): 0.4 CAPSULE ORAL at 17:52

## 2020-02-03 RX ADMIN — Medication 40 MILLIGRAM(S): at 05:06

## 2020-02-03 RX ADMIN — FINASTERIDE 5 MILLIGRAM(S): 5 TABLET, FILM COATED ORAL at 12:17

## 2020-02-03 RX ADMIN — HEPARIN SODIUM 5000 UNIT(S): 5000 INJECTION INTRAVENOUS; SUBCUTANEOUS at 05:06

## 2020-02-03 RX ADMIN — GABAPENTIN 300 MILLIGRAM(S): 400 CAPSULE ORAL at 05:06

## 2020-02-03 RX ADMIN — Medication 100 MILLIGRAM(S): at 17:49

## 2020-02-03 RX ADMIN — PIPERACILLIN AND TAZOBACTAM 25 GRAM(S): 4; .5 INJECTION, POWDER, LYOPHILIZED, FOR SOLUTION INTRAVENOUS at 22:10

## 2020-02-03 RX ADMIN — Medication 40 MILLIEQUIVALENT(S): at 13:42

## 2020-02-03 RX ADMIN — PIPERACILLIN AND TAZOBACTAM 25 GRAM(S): 4; .5 INJECTION, POWDER, LYOPHILIZED, FOR SOLUTION INTRAVENOUS at 05:06

## 2020-02-03 RX ADMIN — AMIODARONE HYDROCHLORIDE 200 MILLIGRAM(S): 400 TABLET ORAL at 05:06

## 2020-02-03 RX ADMIN — HEPARIN SODIUM 5000 UNIT(S): 5000 INJECTION INTRAVENOUS; SUBCUTANEOUS at 17:50

## 2020-02-03 RX ADMIN — Medication 25 MILLIGRAM(S): at 05:06

## 2020-02-03 RX ADMIN — PIPERACILLIN AND TAZOBACTAM 25 GRAM(S): 4; .5 INJECTION, POWDER, LYOPHILIZED, FOR SOLUTION INTRAVENOUS at 13:42

## 2020-02-03 NOTE — PROGRESS NOTE ADULT - ASSESSMENT
83 y/o M admitted 1/29 with sepsis likely result of LLL infiltrate, Pneumonia due to gram negative organisms most likley  acute type 1 hypoxic respiratory failure due to combimnation of pneumonia and flash pulmonary edema/acute systolic HF - HFrEF - potentially due to AS vs cardiac ischemia  Will need cardiac cath to determine specific etiology  NSVT on Amio gtt      PMHx of HTN, HLD, BPH with recent R ankle Fx (10/19) - treated for osteomyelitis    High risk for acute decompensation and deterioration requiring intubation     Plan:    Doxy and pip/hao (3)  Follow up Cxs--No new Cxs thus far--temp curve better  [facility] this week  PO amio and start low dose BBx  Observe off NIV  NC as tolerated  Cont daily IV furosemide to keep on dry side  DVT prophy--sqhep     ICU care--d/w ICU staff and pt-- All concerns addressed.  D/W dr estrada 85 y/o M admitted 1/29 with sepsis likely result of LLL infiltrate, Pneumonia due to gram negative organisms most likley  acute type 1 hypoxic respiratory failure due to combimnation of pneumonia and flash pulmonary edema/acute systolic HF - HFrEF - potentially due to AS vs cardiac ischemia  Will need cardiac cath to determine specific etiology  NSVT on Amio gtt    PMHx of HTN, HLD, BPH with recent R ankle Fx (10/19) - treated for osteomyelitis    until etiology of pulmonary edema determined and treated, pt remains high risk for acute decompensation and deterioration requiring intubation     Plan at this time is for continued care in ICU  HOB 30  Continue abx - Doxy and pip/hao   F/u cultures  LHC to be arranged by cardiology as d/w Dr Tubbs  PO amio and start low dose BBx  ASA  High flow O2 as needed  Cont daily IV furosemide to keep on dry side  DVT prophy--sqhep   Supportive care

## 2020-02-03 NOTE — PROGRESS NOTE ADULT - ASSESSMENT
85 y/o M with h/o HTN, HLD, BPH with chronic chowdary, right ankle fracture (Oct 10, 2019) s/p ORIF, recently hospitalized (1/10/2020) for RLE cellulitis and possible underlying acute OM and/or ankle hardware infection on vancomycin IV and ceftriaxone (since 1/10 via PICC line) was admitted on 1/29 for lower leg pains. He states for the past week PTA, he has had sharp severe leg pains. They occur every day, often lasting for hours at a time, and are not related to exertion. He states that when the pains come it makes it difficult to walk, although when it is not there, he can walk without difficulty. The pains are from his ankle up to his upper thighs (he cannot tell from where it starts and stops). He reported a fever to 104F at home. In ER he was febrile to 102.7F and received vancomycin IV and cefepime.     1. Febrile syndrome. Probable sepsis. Possible PICC Line infection. RLE cellulitis improving with possible underlying acute OM and/or ankle hardware infection. s/p recent ankle fracture s/p ORIF. BPH with chronic chowdary. Acute CHF exacerbation.  -concerned about drug fever  -BC x 2 are negative to date  -PICC Line is out  -on vancomycin 760 mg IV q12h and ceftriaxone 1 gm IV qd (since 1/10/2020)  -on doxycycline 100 mg IV q12h # 3  -started on zosyn 3.375 gm IV q8h # 2  -tolerating abx well so far; no side effects noted  -continue IV abx coverage; change doxy to PO  -elevate leg  -continue abx coverage  -repeat BC x 2  -monitor temps  -f/u CBC  -supportive care  2. Other issues:   -care per medicine

## 2020-02-03 NOTE — PROGRESS NOTE ADULT - SUBJECTIVE AND OBJECTIVE BOX
Patient is a 84y old  Male who presents with a chief complaint of SIRS (02 Feb 2020 08:30)      HPI:  85 yo M with a PMH HTN, HLD, and BPH who presents with leg pains. He states for the past week, he has had sharp severe leg pains. They occur every day, often lasting for hours at a time, and are not related to exertion. He states that when the pains come it makes it difficult to walk, although when it is not there, he can walk without difficulty. The pains are from his ankle up to his upper thighs (he cannot tell from where it starts and stops). Of note, he was recently admitted (2 weeks ago) for cellulitis with concern for OM, and had a PICC line placed, to be on antibiotics (Vanc/Ceftriaxone) for 6 weeks.  He denies nausea, vomiting, fevers, chills, cough, chest pain, SOB, abdominal pain, diarrhea, dysuria, hematuria, dizziness, lightheadedness, or rash. He states the swelling in his legs from his cellulitis has improved.    Of note, per ER documentation, he presented due to fevers, and reportedly had a Tmax of 104 at home. The patient denies this.    Attempted to call patient's wife multiple times overnight and left message without response.  Patient does not know who his cardiologist is.    In the ED, he was given aspirin 325 mg PO x1, Vancomycin 1g IV x1, Cefepime 1g IV x1, Ibuprofen 600 mg PO x1, and LR 2.5L x1. (29 Jan 2020 23:42)  1/30/2020- Cardiology Consultation: Patient with HBP, HLD and known moderate aortic stenosis and regurgitation is S/P ORIF left ankle 1/1/2019 the admitted for right leg cellulitis 1/10/2020 and discharged with a PICC line for 6 weeks of antibiotics. He presented today with bilateral leg pain and was noted to be febrile to 104, breathless and having frequent VPCs. The patient denies chest pain. There is no history of CAD or heart failure. EP has started him on IV amiodarone.      Followup HPI:  1/31- Breathless. Remains on Bipap. Getting prn doses of IV furosemide for respiratory distress. Denies chest pain.   2/1 on bipap. wife at bedside states he is better than yesterday. appears fairly comfortable on bipap  2/2 appears comfortable. still having intermittent episodes of tachycardia and then worsening dyspnea. temp to 102  2/3 alert appears comfortable on high flow oxygen. temp 100.5    PAST MEDICAL & SURGICAL HISTORY:  HLD (hyperlipidemia)  Enlarged prostate  HTN (hypertension)  Status post ORIF of fracture of ankle: Right, 10/2019        MEDICATIONS  (STANDING):  aMIOdarone    Tablet 200 milliGRAM(s) Oral daily  aspirin enteric coated 81 milliGRAM(s) Oral daily  chlorhexidine 0.12% Liquid 15 milliLiter(s) Swish and Spit two times a day  cholecalciferol 2000 Unit(s) Oral daily  doxycycline IVPB 100 milliGRAM(s) IV Intermittent every 12 hours  finasteride 5 milliGRAM(s) Oral daily  furosemide   Injectable 40 milliGRAM(s) IV Push daily  gabapentin 300 milliGRAM(s) Oral two times a day  heparin  Injectable 5000 Unit(s) SubCutaneous every 12 hours  metoprolol tartrate 25 milliGRAM(s) Oral two times a day  piperacillin/tazobactam IVPB.. 3.375 Gram(s) IV Intermittent every 8 hours  tamsulosin 0.4 milliGRAM(s) Oral two times a day    MEDICATIONS  (PRN):  acetaminophen   Tablet .. 650 milliGRAM(s) Oral every 6 hours PRN Temp greater or equal to 38.5C (101.3F), Mild Pain (1 - 3)          Vital Signs Last 24 Hrs  T(C): 38.1 (03 Feb 2020 07:00), Max: 38.3 (02 Feb 2020 20:00)  T(F): 100.5 (03 Feb 2020 07:00), Max: 101 (02 Feb 2020 20:00)  HR: 78 (03 Feb 2020 07:34) (65 - 93)  BP: 110/54 (03 Feb 2020 07:00) (85/50 - 140/107)  BP(mean): 69 (03 Feb 2020 07:00) (58 - 118)  RR: 22 (03 Feb 2020 07:34) (17 - 30)  SpO2: 96% (03 Feb 2020 07:34) (92% - 100%)    I&O's Summary    02 Feb 2020 07:01  -  03 Feb 2020 07:00  --------------------------------------------------------  IN: 400 mL / OUT: 1350 mL / NET: -950 mL        PHYSICAL EXAM  General Appearance: alert  HEENT: ncat   Neck:   Back:   Lungs: dec bs  Heart: rrs1 s2 2/6 monica base  Abdomen:   Extremities: 1+ edema right  Skin:   Neurologic:       INTERPRETATION OF TELEMETRY:    ECG:        LABS:                          10.3   8.60  )-----------( 169      ( 03 Feb 2020 06:22 )             31.0     02-03    135  |  102  |  36<H>  ----------------------------<  88  3.1<L>   |  25  |  0.94    Ca    9.1      03 Feb 2020 06:22  Phos  3.1     02-03  Mg     2.1     02-03    TPro  5.6<L>  /  Alb  2.3<L>  /  TBili  1.1  /  DBili  x   /  AST  60<H>  /  ALT  69  /  AlkPhos  106  02-02    CARDIAC MARKERS ( 02 Feb 2020 06:56 )  x     / x     / 525 U/L / x     / x            Pro BNP  [id number] 01-31 @ 05:41  D Dimer  -- 01-31 @ 05:41              RADIOLOGY & ADDITIONAL STUDIES:

## 2020-02-03 NOTE — PROGRESS NOTE ADULT - SUBJECTIVE AND OBJECTIVE BOX
83 y/o M admitted with RIGHT ankle Fx (10/19) with possible OM, PICC placed and sent home on IV vanco/CTX about 2 weeks prior to presentation.  Now admitted 1/29 with apparent sepsis.  TTE with mod aortic stenosis - pt developed flash pulm edema with fever and tachycardia on 1/30 necessitating RRT  and transfer to ICU for further care.  PICC was pulled on 1/30.  BCx negative thus far.      1/31: Tm 104.7 Confused overnight.  More awake and alert this morning.  Was on NIV.  On Amio gtt for NSVT  2/1:  Temps lower.  CT chest with likely LLL infiltrate.  On NIV overnight    2/2: Tm 102.  again with flash pulmonary edema in am --placed on HFNC and given furosemide 40- -u/o 600ml.  Case d/w Dr Tubbs -- AV is mod-severe stenosis--will also need to r/o ischemia--LHC this week.  Will change to PO amio.    above d/w Dr Blackburn who has been caring for the patient.     2/3: OOB to chair, tolerating PO diet, remains on high flow O2 - hemodynamics reasonable.  D/w Dr Tubbs - will do cardiac cath this week if pt remains stable.  No new issues - No overnight events.  Tm 101*F        PMHx HTN, HLD, BPH     Allergies    No Known Allergies    Intolerances    Lipitor (Joint Pain)    ICU Vital Signs Last 24 Hrs  T(C): 37.9 (03 Feb 2020 18:00), Max: 38.3 (02 Feb 2020 20:00)  T(F): 100.2 (03 Feb 2020 18:00), Max: 101 (02 Feb 2020 20:00)  HR: 70 (03 Feb 2020 18:00) (65 - 93)  BP: 93/60 (03 Feb 2020 18:00) (87/37 - 140/107)  BP(mean): 69 (03 Feb 2020 18:00) (50 - 118)  RR: 27 (03 Feb 2020 18:00) (17 - 30)  SpO2: 100% (03 Feb 2020 18:00) (92% - 100%)          I&O's Summary    02 Feb 2020 07:01  -  03 Feb 2020 07:00  --------------------------------------------------------  IN: 400 mL / OUT: 1350 mL / NET: -950 mL    03 Feb 2020 07:01  -  03 Feb 2020 18:45  --------------------------------------------------------  IN: 1200 mL / OUT: 400 mL / NET: 800 mL                              10.3   8.60  )-----------( 169      ( 03 Feb 2020 06:22 )             31.0       02-03    135  |  102  |  36<H>  ----------------------------<  88  3.1<L>   |  25  |  0.94    Ca    9.1      03 Feb 2020 06:22  Phos  3.1     02-03  Mg     2.1     02-03    TPro  5.6<L>  /  Alb  2.3<L>  /  TBili  1.1  /  DBili  x   /  AST  60<H>  /  ALT  69  /  AlkPhos  106  02-02      CAPILLARY BLOOD GLUCOSE          LIVER FUNCTIONS - ( 02 Feb 2020 06:56 )  Alb: 2.3 g/dL / Pro: 5.6 gm/dL / ALK PHOS: 106 U/L / ALT: 69 U/L / AST: 60 U/L / GGT: x             CARDIAC MARKERS ( 02 Feb 2020 06:56 )  x     / x     / 525 U/L / x     / x          MEDICATIONS  (STANDING):  aMIOdarone    Tablet 200 milliGRAM(s) Oral daily  aspirin enteric coated 81 milliGRAM(s) Oral daily  chlorhexidine 0.12% Liquid 15 milliLiter(s) Swish and Spit two times a day  cholecalciferol 2000 Unit(s) Oral daily  doxycycline hyclate Capsule 100 milliGRAM(s) Oral every 12 hours  finasteride 5 milliGRAM(s) Oral daily  furosemide   Injectable 40 milliGRAM(s) IV Push daily  gabapentin 300 milliGRAM(s) Oral two times a day  heparin  Injectable 5000 Unit(s) SubCutaneous every 12 hours  metoprolol tartrate 25 milliGRAM(s) Oral two times a day  piperacillin/tazobactam IVPB.. 3.375 Gram(s) IV Intermittent every 8 hours  tamsulosin 0.4 milliGRAM(s) Oral two times a day    MEDICATIONS  (PRN):  acetaminophen   Tablet .. 650 milliGRAM(s) Oral every 6 hours PRN Temp greater or equal to 38C (100.4F), Mild Pain (1 - 3)      Review of Systems:   · Additional ROS	NO CP, NO SOB, NO NVD, NO chills - tolerating PO diet, OOB to chair. NO headache/LOC, NO dysuria, NO uritcaria.  ALL other ROS are NEGATIVE.	    Physical Exam:   · Constitutional	detailed exam	  · Constitutional Details	ill, frail/cachectic	  · Respiratory	detailed exam	  · Respiratory Details	airway patent, Good AE B/L - NO W/R/R	  · Cardiovascular	detailed exam	  · Cardiovascular Details	regular rate and rhythm	  · Gastrointestinal	detailed exam	  · GI Normal	soft; nontender (+) BS	  · Genitourinary		  · Extremities	detailed exam	  · Extremities Details	no cyanosis; no pedal edema	  · Neurological	detailed exam	  · Neurological Details	alert and oriented x 3; Grossly non focal	  · Skin	detailed exam	  · Skin Details	warm and dry; color normal	  · Musculoskeletal	detailed exam	  · Musculoskeletal Details	decreased ROM, reasonable strength x 4 extremities	  Psychiatric details:   	                            Normal behavior and Normal affect    DVT Prophylaxis: SQ heparin, venodynes    Advanced Directives:  Discussed with:    Visit Information:  30  minutes of Critical Care time spent providing medical care for patient's acute illness/conditions that impairs at least one vital organ system and/or poses a high risk of imminent or life threatening deterioration in the patient's condition.  It includes time spent reviewing labs, radiology, discussing goals of care with patient and/or family, and discussing the case with a multidisciplinary team in an effort to prevent further life threatening deterioration or end organ damage.  This time is independent of any procedures performed.    ** Time is exclusive of billed procedures and/or teaching and/or routine family updates.

## 2020-02-03 NOTE — PROGRESS NOTE ADULT - ASSESSMENT
1, Systemic inflammatory response syndrome. Source undetermined. Blood cultures neg so far. PICC line removed. No TTE evidence of endocarditis.   2. Elevated troponin consistent with sepsis. Cannot exclude NSTEMI.  3. Moderate-severe  AS   4. VPCs due to sepsis and or NSTEMI. Improved on IV amiodarone.   5.HBP. Antihypertensives on hold due to sepsis.   6. pul edema  PLAN:  change to oral  amiodarone,  cont aASA, IV furosemide bipap  as needed for respiratory compromise, IV antibiotics per ID.   cont  bb.  will need cath hopefully this week to exclude severe CAd as cause of pulmonary edema.

## 2020-02-03 NOTE — PROGRESS NOTE ADULT - SUBJECTIVE AND OBJECTIVE BOX
HPI: 83 yo male with PMH HTN, HLD,  presented with leg pain, recent hospitalization for lower extremity cellulitis with concern for OM with PICC line placement, on admission noted to have fever 104F and frequent ventricular ectopy.  Denies h/o CAD or heart failure. Started on IV loading with amiodarone.      Subjective:    EKG:  TELE: 24 hrs - sinus rhythm  70-80bpm, no NSVT noted    MEDICATIONS  (STANDING):  aMIOdarone    Tablet 200 milliGRAM(s) Oral daily  aspirin enteric coated 81 milliGRAM(s) Oral daily  chlorhexidine 0.12% Liquid 15 milliLiter(s) Swish and Spit two times a day  cholecalciferol 2000 Unit(s) Oral daily  doxycycline hyclate Capsule 100 milliGRAM(s) Oral every 12 hours  finasteride 5 milliGRAM(s) Oral daily  furosemide   Injectable 40 milliGRAM(s) IV Push daily  gabapentin 300 milliGRAM(s) Oral two times a day  heparin  Injectable 5000 Unit(s) SubCutaneous every 12 hours  metoprolol tartrate 25 milliGRAM(s) Oral two times a day  piperacillin/tazobactam IVPB.. 3.375 Gram(s) IV Intermittent every 8 hours  tamsulosin 0.4 milliGRAM(s) Oral two times a day    MEDICATIONS  (PRN):  acetaminophen   Tablet .. 650 milliGRAM(s) Oral every 6 hours PRN Temp greater or equal to 38C (100.4F), Mild Pain (1 - 3)      Allergies    No Known Allergies    Intolerances    Lipitor (Joint Pain)      Vital Signs Last 24 Hrs  T(C): 37.4 (03 Feb 2020 13:00), Max: 38.3 (02 Feb 2020 20:00)  T(F): 99.4 (03 Feb 2020 13:00), Max: 101 (02 Feb 2020 20:00)  HR: 65 (03 Feb 2020 13:00) (65 - 93)  BP: 94/45 (03 Feb 2020 13:00) (87/37 - 140/107)  BP(mean): 60 (03 Feb 2020 13:00) (50 - 118)  RR: 20 (03 Feb 2020 13:00) (17 - 30)  SpO2: 100% (03 Feb 2020 13:00) (92% - 100%)    PHYSICAL EXAMINATION:  GENERAL: In no apparent distress, well nourished, and hydrated.  HEAD:  Atraumatic, Normocephalic  EYES: EOMI, PERRLA, conjunctiva and sclera clear  ENMT: No tonsillar erythema, exudates, or enlargement; Moist mucous membranes, Good dentition, No lesions  NECK: Supple and normal thyroid.  No JVD or carotid bruit.  Carotid pulse is 2+ bilaterally.  HEART: Regular rate and rhythm; No murmurs, rubs, or gallops.  PULMONARY: Clear to auscultation and perfusion.  No rales, wheezing, or rhonchi bilaterally.  ABDOMEN: Soft, Nontender, Nondistended; Bowel sounds present  EXTREMITIES:  2+ Peripheral Pulses, No clubbing, cyanosis, or edema  LYMPH: No lymphadenopathy noted  NEUROLOGICAL: Grossly nonfocal    LABS:                        10.3   8.60  )-----------( 169      ( 03 Feb 2020 06:22 )             31.0     02-03    135  |  102  |  36<H>  ----------------------------<  88  3.1<L>   |  25  |  0.94    Ca    9.1      03 Feb 2020 06:22  Phos  3.1     02-03  Mg     2.1     02-03    TPro  5.6<L>  /  Alb  2.3<L>  /  TBili  1.1  /  DBili  x   /  AST  60<H>  /  ALT  69  /  AlkPhos  106  02-02        CARDIAC MARKERS ( 02 Feb 2020 06:56 )  x     / x     / 525 U/L / x     / x            RADIOLOGY & ADDITIONAL TESTS:

## 2020-02-03 NOTE — PROGRESS NOTE ADULT - ASSESSMENT
85 yo male admitted with systemic inflammatory response syndrome, tele with episodes of NSVT and frequent ventricular bigemini, likely precipitated by sespsis.   PICC line has been removed, no TTE evidence of endocarditis. ECHO with normal LVEF, with Moderate to Severe Aortic Stenosis.  last 24hrs telemetry with sinus rhythm no recurrence of NSVT, rare isolated PVC noted.  amio IV load complete, now on 200mg daily maintenance.   ischemic eval per general cardiology.  management per ICD attending.  No further EP intervention at this time.

## 2020-02-03 NOTE — PROGRESS NOTE ADULT - SUBJECTIVE AND OBJECTIVE BOX
Date of service: 20 @ 13:23    OOB to chair  Alert  SOB is improved  Right lower leg swelling and redness are improved  Has fever    ROS: denies dizziness, no HA, has cough; no abdominal pain, no diarrhea or constipation; no dysuria    MEDICATIONS  (STANDING):  aMIOdarone    Tablet 200 milliGRAM(s) Oral daily  aspirin enteric coated 81 milliGRAM(s) Oral daily  chlorhexidine 0.12% Liquid 15 milliLiter(s) Swish and Spit two times a day  cholecalciferol 2000 Unit(s) Oral daily  doxycycline IVPB 100 milliGRAM(s) IV Intermittent every 12 hours  finasteride 5 milliGRAM(s) Oral daily  furosemide   Injectable 40 milliGRAM(s) IV Push daily  gabapentin 300 milliGRAM(s) Oral two times a day  heparin  Injectable 5000 Unit(s) SubCutaneous every 12 hours  metoprolol tartrate 25 milliGRAM(s) Oral two times a day  piperacillin/tazobactam IVPB.. 3.375 Gram(s) IV Intermittent every 8 hours  potassium chloride   Powder 40 milliEquivalent(s) Oral every 4 hours  tamsulosin 0.4 milliGRAM(s) Oral two times a day      Vital Signs Last 24 Hrs  T(C): 37.4 (2020 13:00), Max: 38.3 (2020 20:00)  T(F): 99.4 (2020 13:00), Max: 101 (2020 20:00)  HR: 65 (2020 13:00) (65 - 93)  BP: 94/45 (2020 13:00) (87/37 - 140/107)  BP(mean): 60 (2020 13:00) (50 - 118)  RR: 20 (2020 13:00) (17 - 30)  SpO2: 100% (2020 13:00) (92% - 100%)    Physical Exam:      Constitutional:  No acute distress  HEENT: NC/AT, EOMI, PERRLA, conjunctivae clear; ears and nose atraumatic; pharynx benign  Neck: supple; thyroid not palpable  Back: no tenderness  Respiratory: respiratory effort normal; crackles at bases  Cardiovascular: S1S2 regular, no murmurs  Abdomen: soft, not tender, not distended, positive BS; no liver or spleen organomegaly  Genitourinary: no suprapubic tenderness  Lymphatic: no LN palpable  Musculoskeletal: no muscle tenderness, no joint swelling or tenderness  Extremities: 2+ right lower pedal edema; mild erythema; much improved from prior hospitalization; no warmth; no skin break  Neurological/ Psychiatric: AxOx3, judgement and insight normal; moving all extremities  Skin: no rashes; no palpable lesions    Labs: reviewed                        10.3   8.60  )-----------( 169      ( 2020 06:22 )             31.0     02-03    135  |  102  |  36<H>  ----------------------------<  88  3.1<L>   |  25  |  0.94    Ca    9.1      2020 06:22  Phos  3.1     02-03  Mg     2.1     02-03    TPro  5.6<L>  /  Alb  2.3<L>  /  TBili  1.1  /  DBili  x   /  AST  60<H>  /  ALT  69  /  AlkPhos  106  02-02                        10.6   5.92  )-----------( 109      ( 2020 07:27 )             32.0     01-30    139  |  110<H>  |  21  ----------------------------<  105<H>  3.6   |  21<L>  |  0.77    Ca    8.1<L>      2020 07:27  Phos  2.6     01-30  Mg     2.3     01-30    TPro  5.7<L>  /  Alb  2.8<L>  /  TBili  0.9  /  DBili  x   /  AST  101<H>  /  ALT  79<H>  /  AlkPhos  85  01-30     LIVER FUNCTIONS - ( 2020 07:27 )  Alb: 2.8 g/dL / Pro: 5.7 gm/dL / ALK PHOS: 85 U/L / ALT: 79 U/L / AST: 101 U/L / GGT: x           Urinalysis Basic - ( 2020 22:06 )    Color: Yellow / Appearance: very cloudy / S.020 / pH: x  Gluc: x / Ketone: Trace  / Bili: Negative / Urobili: Negative mg/dL   Blood: x / Protein: 30 mg/dL / Nitrite: Negative   Leuk Esterase: Trace / RBC: >50 /HPF / WBC 3-5   Sq Epi: x / Non Sq Epi: Few / Bacteria: Many      Culture - Urine (collected 2020 22:06)  Source: .Urine None  Final Report (2020 09:11):    No growth    Culture - Blood (collected 2020 19:57)  Source: .Blood None  Preliminary Report (2020 01:03):    No growth to date.    Culture - Blood (collected 2020 19:57)  Source: .Blood None  Preliminary Report (2020 01:03):    No growth to date.    Radiology: all available radiological tests reviewed    < from: Xray Chest 1 View-PORTABLE IMMEDIATE (20 @ 04:35) >  LEFT PICC line catheter tip in SVC. Cardiomegaly. No evidence of active chest disease.    < end of copied text >    < from: CT Chest w/ IV Cont (20 @ 11:44) >  Cardiomegaly with bilateral perihilar opacity and small bilateral pleural effusions, consistent with a pulmonary edema pattern.  Bibasilar consolidation, likely atelectasis, less likely pneumonia.    < end of copied text >      Advanced directives addressed: full resuscitation

## 2020-02-04 LAB
ANION GAP SERPL CALC-SCNC: 8 MMOL/L — SIGNIFICANT CHANGE UP (ref 5–17)
BUN SERPL-MCNC: 32 MG/DL — HIGH (ref 7–23)
CALCIUM SERPL-MCNC: 8.9 MG/DL — SIGNIFICANT CHANGE UP (ref 8.5–10.1)
CHLORIDE SERPL-SCNC: 106 MMOL/L — SIGNIFICANT CHANGE UP (ref 96–108)
CO2 SERPL-SCNC: 24 MMOL/L — SIGNIFICANT CHANGE UP (ref 22–31)
CREAT SERPL-MCNC: 0.82 MG/DL — SIGNIFICANT CHANGE UP (ref 0.5–1.3)
CULTURE RESULTS: SIGNIFICANT CHANGE UP
CULTURE RESULTS: SIGNIFICANT CHANGE UP
GLUCOSE SERPL-MCNC: 104 MG/DL — HIGH (ref 70–99)
HCT VFR BLD CALC: 28.1 % — LOW (ref 39–50)
HGB BLD-MCNC: 9 G/DL — LOW (ref 13–17)
MAGNESIUM SERPL-MCNC: 2 MG/DL — SIGNIFICANT CHANGE UP (ref 1.6–2.6)
MCHC RBC-ENTMCNC: 29.9 PG — SIGNIFICANT CHANGE UP (ref 27–34)
MCHC RBC-ENTMCNC: 32 GM/DL — SIGNIFICANT CHANGE UP (ref 32–36)
MCV RBC AUTO: 93.4 FL — SIGNIFICANT CHANGE UP (ref 80–100)
NT-PROBNP SERPL-SCNC: 4497 PG/ML — HIGH (ref 0–450)
PHOSPHATE SERPL-MCNC: 2.9 MG/DL — SIGNIFICANT CHANGE UP (ref 2.5–4.5)
PLATELET # BLD AUTO: 186 K/UL — SIGNIFICANT CHANGE UP (ref 150–400)
POTASSIUM SERPL-MCNC: 3.2 MMOL/L — LOW (ref 3.5–5.3)
POTASSIUM SERPL-SCNC: 3.2 MMOL/L — LOW (ref 3.5–5.3)
PROCALCITONIN SERPL-MCNC: 3.05 NG/ML — HIGH (ref 0.02–0.1)
RBC # BLD: 3.01 M/UL — LOW (ref 4.2–5.8)
RBC # FLD: 14.4 % — SIGNIFICANT CHANGE UP (ref 10.3–14.5)
SODIUM SERPL-SCNC: 138 MMOL/L — SIGNIFICANT CHANGE UP (ref 135–145)
SPECIMEN SOURCE: SIGNIFICANT CHANGE UP
SPECIMEN SOURCE: SIGNIFICANT CHANGE UP
WBC # BLD: 8.98 K/UL — SIGNIFICANT CHANGE UP (ref 3.8–10.5)
WBC # FLD AUTO: 8.98 K/UL — SIGNIFICANT CHANGE UP (ref 3.8–10.5)

## 2020-02-04 PROCEDURE — 99233 SBSQ HOSP IP/OBS HIGH 50: CPT

## 2020-02-04 PROCEDURE — 71045 X-RAY EXAM CHEST 1 VIEW: CPT | Mod: 26

## 2020-02-04 RX ORDER — SODIUM CHLORIDE 9 MG/ML
1000 INJECTION, SOLUTION INTRAVENOUS
Refills: 0 | Status: DISCONTINUED | OUTPATIENT
Start: 2020-02-04 | End: 2020-02-05

## 2020-02-04 RX ORDER — POTASSIUM CHLORIDE 20 MEQ
40 PACKET (EA) ORAL EVERY 4 HOURS
Refills: 0 | Status: COMPLETED | OUTPATIENT
Start: 2020-02-04 | End: 2020-02-04

## 2020-02-04 RX ADMIN — CHLORHEXIDINE GLUCONATE 15 MILLILITER(S): 213 SOLUTION TOPICAL at 05:39

## 2020-02-04 RX ADMIN — Medication 25 MILLIGRAM(S): at 05:40

## 2020-02-04 RX ADMIN — Medication 40 MILLIEQUIVALENT(S): at 22:07

## 2020-02-04 RX ADMIN — Medication 2000 UNIT(S): at 12:17

## 2020-02-04 RX ADMIN — PIPERACILLIN AND TAZOBACTAM 25 GRAM(S): 4; .5 INJECTION, POWDER, LYOPHILIZED, FOR SOLUTION INTRAVENOUS at 14:10

## 2020-02-04 RX ADMIN — TAMSULOSIN HYDROCHLORIDE 0.4 MILLIGRAM(S): 0.4 CAPSULE ORAL at 05:40

## 2020-02-04 RX ADMIN — Medication 40 MILLIEQUIVALENT(S): at 17:46

## 2020-02-04 RX ADMIN — GABAPENTIN 300 MILLIGRAM(S): 400 CAPSULE ORAL at 17:43

## 2020-02-04 RX ADMIN — TAMSULOSIN HYDROCHLORIDE 0.4 MILLIGRAM(S): 0.4 CAPSULE ORAL at 17:47

## 2020-02-04 RX ADMIN — Medication 40 MILLIEQUIVALENT(S): at 15:57

## 2020-02-04 RX ADMIN — FINASTERIDE 5 MILLIGRAM(S): 5 TABLET, FILM COATED ORAL at 12:16

## 2020-02-04 RX ADMIN — Medication 100 MILLIGRAM(S): at 05:39

## 2020-02-04 RX ADMIN — CHLORHEXIDINE GLUCONATE 15 MILLILITER(S): 213 SOLUTION TOPICAL at 17:43

## 2020-02-04 RX ADMIN — Medication 25 MILLIGRAM(S): at 17:44

## 2020-02-04 RX ADMIN — Medication 81 MILLIGRAM(S): at 12:17

## 2020-02-04 RX ADMIN — PIPERACILLIN AND TAZOBACTAM 25 GRAM(S): 4; .5 INJECTION, POWDER, LYOPHILIZED, FOR SOLUTION INTRAVENOUS at 05:41

## 2020-02-04 RX ADMIN — PIPERACILLIN AND TAZOBACTAM 25 GRAM(S): 4; .5 INJECTION, POWDER, LYOPHILIZED, FOR SOLUTION INTRAVENOUS at 22:07

## 2020-02-04 RX ADMIN — HEPARIN SODIUM 5000 UNIT(S): 5000 INJECTION INTRAVENOUS; SUBCUTANEOUS at 05:39

## 2020-02-04 RX ADMIN — HEPARIN SODIUM 5000 UNIT(S): 5000 INJECTION INTRAVENOUS; SUBCUTANEOUS at 17:43

## 2020-02-04 RX ADMIN — GABAPENTIN 300 MILLIGRAM(S): 400 CAPSULE ORAL at 05:41

## 2020-02-04 RX ADMIN — Medication 40 MILLIGRAM(S): at 05:39

## 2020-02-04 RX ADMIN — Medication 100 MILLIGRAM(S): at 17:43

## 2020-02-04 RX ADMIN — AMIODARONE HYDROCHLORIDE 200 MILLIGRAM(S): 400 TABLET ORAL at 05:39

## 2020-02-04 NOTE — PROGRESS NOTE ADULT - SUBJECTIVE AND OBJECTIVE BOX
Patient is a 84y old  Male who presents with a chief complaint of SIRS (03 Feb 2020 18:45)      HPI:  83 yo M with a PMH HTN, HLD, and BPH who presents with leg pains. He states for the past week, he has had sharp severe leg pains. They occur every day, often lasting for hours at a time, and are not related to exertion. He states that when the pains come it makes it difficult to walk, although when it is not there, he can walk without difficulty. The pains are from his ankle up to his upper thighs (he cannot tell from where it starts and stops). Of note, he was recently admitted (2 weeks ago) for cellulitis with concern for OM, and had a PICC line placed, to be on antibiotics (Vanc/Ceftriaxone) for 6 weeks.  He denies nausea, vomiting, fevers, chills, cough, chest pain, SOB, abdominal pain, diarrhea, dysuria, hematuria, dizziness, lightheadedness, or rash. He states the swelling in his legs from his cellulitis has improved.    Of note, per ER documentation, he presented due to fevers, and reportedly had a Tmax of 104 at home. The patient denies this.    Attempted to call patient's wife multiple times overnight and left message without response.  Patient does not know who his cardiologist is.    In the ED, he was given aspirin 325 mg PO x1, Vancomycin 1g IV x1, Cefepime 1g IV x1, Ibuprofen 600 mg PO x1, and LR 2.5L x1. (29 Jan 2020 23:42)  1/31- Breathless. Remains on Bipap. Getting prn doses of IV furosemide for respiratory distress. Denies chest pain.   2/1 on bipap. wife at bedside states he is better than yesterday. appears fairly comfortable on bipap  2/2 appears comfortable. still having intermittent episodes of tachycardia and then worsening dyspnea. temp to 102  2/3 alert appears comfortable on high flow oxygen. temp 100.5  2/4 alert, appears comfortable, no complaints    PAST MEDICAL & SURGICAL HISTORY:  HLD (hyperlipidemia)  Enlarged prostate  HTN (hypertension)  Status post ORIF of fracture of ankle: Right, 10/2019        MEDICATIONS  (STANDING):  aMIOdarone    Tablet 200 milliGRAM(s) Oral daily  aspirin enteric coated 81 milliGRAM(s) Oral daily  chlorhexidine 0.12% Liquid 15 milliLiter(s) Swish and Spit two times a day  cholecalciferol 2000 Unit(s) Oral daily  doxycycline hyclate Capsule 100 milliGRAM(s) Oral every 12 hours  finasteride 5 milliGRAM(s) Oral daily  furosemide   Injectable 40 milliGRAM(s) IV Push daily  gabapentin 300 milliGRAM(s) Oral two times a day  heparin  Injectable 5000 Unit(s) SubCutaneous every 12 hours  metoprolol tartrate 25 milliGRAM(s) Oral two times a day  piperacillin/tazobactam IVPB.. 3.375 Gram(s) IV Intermittent every 8 hours  tamsulosin 0.4 milliGRAM(s) Oral two times a day    MEDICATIONS  (PRN):  acetaminophen   Tablet .. 650 milliGRAM(s) Oral every 6 hours PRN Temp greater or equal to 38C (100.4F), Mild Pain (1 - 3)          Vital Signs Last 24 Hrs  T(C): 37 (04 Feb 2020 04:00), Max: 38.2 (03 Feb 2020 10:00)  T(F): 98.6 (04 Feb 2020 04:00), Max: 100.7 (03 Feb 2020 10:00)  HR: 61 (04 Feb 2020 09:00) (61 - 82)  BP: 98/47 (04 Feb 2020 09:00) (87/37 - 124/62)  BP(mean): 64 (04 Feb 2020 09:00) (50 - 89)  RR: 19 (04 Feb 2020 09:00) (17 - 28)  SpO2: 96% (04 Feb 2020 09:00) (92% - 100%)    I&O's Summary    03 Feb 2020 07:01  -  04 Feb 2020 07:00  --------------------------------------------------------  IN: 1520 mL / OUT: 1100 mL / NET: 420 mL        PHYSICAL EXAM  General Appearance: alert  HEENT: ncat   Neck:   Back:   Lungs: dec bs  Heart: rrs1 s2 2/6 monica base  Abdomen:   Extremities: trace  edema right  Skin:   Neurologic:       INTERPRETATION OF TELEMETRY:    ECG:        LABS:                          9.0    8.98  )-----------( 186      ( 04 Feb 2020 07:02 )             28.1     02-04    138  |  106  |  32<H>  ----------------------------<  104<H>  3.2<L>   |  24  |  0.82    Ca    8.9      04 Feb 2020 07:02  Phos  2.9     02-04  Mg     2.0     02-04            Pro BNP  4497 02-04 @ 07:02  D Dimer  -- 02-04 @ 07:02  Pro BNP  [id number] 01-31 @ 05:41  D Dimer  -- 01-31 @ 05:41              RADIOLOGY & ADDITIONAL STUDIES:

## 2020-02-04 NOTE — PROGRESS NOTE ADULT - SUBJECTIVE AND OBJECTIVE BOX
Date of service: 20 @ 13:15    OOB to chair  Weak looking  Has low grade fever  Lethargic    ROS limited: no HA, no SOB or cough, no abdominal pain, no diarrhea or constipation; no dysuria, no legs pain    MEDICATIONS  (STANDING):  aMIOdarone    Tablet 200 milliGRAM(s) Oral daily  aspirin enteric coated 81 milliGRAM(s) Oral daily  chlorhexidine 0.12% Liquid 15 milliLiter(s) Swish and Spit two times a day  cholecalciferol 2000 Unit(s) Oral daily  doxycycline hyclate Capsule 100 milliGRAM(s) Oral every 12 hours  finasteride 5 milliGRAM(s) Oral daily  furosemide   Injectable 40 milliGRAM(s) IV Push daily  gabapentin 300 milliGRAM(s) Oral two times a day  heparin  Injectable 5000 Unit(s) SubCutaneous every 12 hours  metoprolol tartrate 25 milliGRAM(s) Oral two times a day  piperacillin/tazobactam IVPB.. 3.375 Gram(s) IV Intermittent every 8 hours  tamsulosin 0.4 milliGRAM(s) Oral two times a day      Vital Signs Last 24 Hrs  T(C): 36.9 (2020 10:00), Max: 37.9 (2020 18:00)  T(F): 98.5 (2020 10:00), Max: 100.2 (2020 18:00)  HR: 67 (2020 12:00) (59 - 79)  BP: 104/47 (2020 12:00) (93/53 - 124/62)  BP(mean): 65 (2020 12:00) (62 - 89)  RR: 17 (2020 12:00) (15 - 28)  SpO2: 99% (2020 12:00) (92% - 100%)    Physical Exam:      Constitutional:  No acute distress  HEENT: NC/AT, EOMI, PERRLA, conjunctivae clear; ears and nose atraumatic; pharynx benign  Neck: supple; thyroid not palpable  Back: no tenderness  Respiratory: respiratory effort normal; crackles at bases  Cardiovascular: S1S2 regular, no murmurs  Abdomen: soft, not tender, not distended, positive BS; no liver or spleen organomegaly  Genitourinary: no suprapubic tenderness  Lymphatic: no LN palpable  Musculoskeletal: no muscle tenderness, no joint swelling or tenderness  Extremities: 2+ right lower pedal edema; mild erythema; much improved; no warmth; no skin break  Neurological/ Psychiatric: AxOx3, judgement and insight normal; moving all extremities  Skin: no rashes; no palpable lesions    Labs: reviewed                        9.0    8.98  )-----------( 186      ( 2020 07:02 )             28.1     02-04    138  |  106  |  32<H>  ----------------------------<  104<H>  3.2<L>   |  24  |  0.82    Ca    8.9      2020 07:02  Phos  2.9     02-04  Mg     2.0     02-04                        10.6   5.92  )-----------( 109      ( 2020 07:27 )             32.0     01-30    139  |  110<H>  |  21  ----------------------------<  105<H>  3.6   |  21<L>  |  0.77    Ca    8.1<L>      2020 07:27  Phos  2.6     01-30  Mg     2.3     -30    TPro  5.7<L>  /  Alb  2.8<L>  /  TBili  0.9  /  DBili  x   /  AST  101<H>  /  ALT  79<H>  /  AlkPhos  85  01-30     LIVER FUNCTIONS - ( 2020 07:27 )  Alb: 2.8 g/dL / Pro: 5.7 gm/dL / ALK PHOS: 85 U/L / ALT: 79 U/L / AST: 101 U/L / GGT: x           Urinalysis Basic - ( 2020 22:06 )    Color: Yellow / Appearance: very cloudy / S.020 / pH: x  Gluc: x / Ketone: Trace  / Bili: Negative / Urobili: Negative mg/dL   Blood: x / Protein: 30 mg/dL / Nitrite: Negative   Leuk Esterase: Trace / RBC: >50 /HPF / WBC 3-5   Sq Epi: x / Non Sq Epi: Few / Bacteria: Many      Culture - Urine (collected 2020 22:06)  Source: .Urine None  Final Report (2020 09:11):    No growth    Culture - Blood (collected 2020 19:57)  Source: .Blood None  Preliminary Report (2020 01:03):    No growth to date.    Culture - Blood (collected 2020 19:57)  Source: .Blood None  Preliminary Report (2020 01:03):    No growth to date.    Culture - Blood (20 @ 05:41)    Specimen Source: .Blood None    Culture Results:   No growth to date.    Radiology: all available radiological tests reviewed    < from: Xray Chest 1 View-PORTABLE IMMEDIATE (20 @ 04:35) >  LEFT PICC line catheter tip in SVC. Cardiomegaly. No evidence of active chest disease.    < end of copied text >    < from: CT Chest w/ IV Cont (20 @ 11:44) >  Cardiomegaly with bilateral perihilar opacity and small bilateral pleural effusions, consistent with a pulmonary edema pattern.  Bibasilar consolidation, likely atelectasis, less likely pneumonia.    < end of copied text >      Advanced directives addressed: full resuscitation

## 2020-02-04 NOTE — PROGRESS NOTE ADULT - ASSESSMENT
85 y/o M with h/o HTN, HLD, BPH with chronic chowdary, right ankle fracture (Oct 10, 2019) s/p ORIF, recently hospitalized (1/10/2020) for RLE cellulitis and possible underlying acute OM and/or ankle hardware infection on vancomycin IV and ceftriaxone (since 1/10 via PICC line) was admitted on 1/29 for lower leg pains. He states for the past week PTA, he has had sharp severe leg pains. They occur every day, often lasting for hours at a time, and are not related to exertion. He states that when the pains come it makes it difficult to walk, although when it is not there, he can walk without difficulty. The pains are from his ankle up to his upper thighs (he cannot tell from where it starts and stops). He reported a fever to 104F at home. In ER he was febrile to 102.7F and received vancomycin IV and cefepime.     1. Low grade fever. RLE cellulitis improving with possible underlying acute OM and/or ankle hardware infection. s/p recent ankle fracture s/p ORIF. BPH with chronic chowdary. Acute CHF exacerbation.  -fever is down  -BC x 2 are negative to date  -PICC Line is out  -on vancomycin 760 mg IV q12h and ceftriaxone 1 gm IV qd (since 1/10/2020)  -on doxycycline 100 mg PO q12h # 4 and zosyn 3.375 gm IV q8h # 3  -tolerating abx well so far; no side effects noted  -elevate leg  -continue abx coverage  -repeat BC x 2  -monitor temps  -f/u CBC  -supportive care  2. Other issues:   -care per medicine

## 2020-02-04 NOTE — PHYSICAL THERAPY INITIAL EVALUATION ADULT - GENERAL OBSERVATIONS, REHAB EVAL
pt rec'd supine in bed in ICU, monitors, SCDs, HF O2 in place. pt sleeping, arouses w/ heavy verbal stim, remained drowsy during bedside exam, more alart once sitting EOB/OOBTC

## 2020-02-04 NOTE — PHYSICAL THERAPY INITIAL EVALUATION ADULT - DIAGNOSIS, PT EVAL
Systemic inflammatory response syndrome, sepsis likely result of LLL infiltrate, Pneumonia, hypoxic respiratory failure due to combination of pneumonia and flash pulmonary edema/acute systolic HF

## 2020-02-04 NOTE — PROGRESS NOTE ADULT - SUBJECTIVE AND OBJECTIVE BOX
85 y/o M admitted with RIGHT ankle Fx (10/19) with possible OM, PICC placed and sent home on IV vanco/CTX about 2 weeks prior to presentation.  Now admitted 1/29 with apparent sepsis.  TTE with mod aortic stenosis - pt developed flash pulm edema with fever and tachycardia on 1/30 necessitating RRT  and transfer to ICU for further care.  PICC was pulled on 1/30.  BCx negative thus far.      1/31: Tm 104.7 Confused overnight.  More awake and alert this morning.  Was on NIV.  On Amio gtt for NSVT  2/1:  Temps lower.  CT chest with likely LLL infiltrate.  On NIV overnight    2/2: Tm 102.  again with flash pulmonary edema in am --placed on HFNC and given furosemide 40- -u/o 600ml.  Case d/w Dr Tubbs -- AV is mod-severe stenosis--will also need to r/o ischemia--LHC this week.  Will change to PO amio.    above d/w Dr Blackburn who has been caring for the patient.     2/3: OOB to chair, tolerating PO diet, remains on high flow O2 - hemodynamics reasonable.  D/w Dr Tubbs - will do cardiac cath this week if pt remains stable.  No new issues - No overnight events.  Tm 101*F      2/4: down to 45% FiO2 on high flow NC - hemodynamics reasonable - no further evidence of flash pulm edema last 24-48 hrs.    D/w Dr Tubbs who will arrange for cardiac cath 2/5.    Notably BNP down to 4k.  OOB to chair and tolerating diet as appropriate.        Allergies    No Known Allergies    ICU Vital Signs Last 24 Hrs  T(C): 36.9 (04 Feb 2020 14:00), Max: 37.9 (03 Feb 2020 18:00)  T(F): 98.5 (04 Feb 2020 14:00), Max: 100.2 (03 Feb 2020 18:00)  HR: 61 (04 Feb 2020 14:00) (59 - 79)  BP: 98/50 (04 Feb 2020 14:00) (93/53 - 124/62)  BP(mean): 65 (04 Feb 2020 14:00) (60 - 89)  RR: 21 (04 Feb 2020 14:00) (15 - 28)  SpO2: 98% (04 Feb 2020 14:00) (92% - 100%)          I&O's Summary    03 Feb 2020 07:01  -  04 Feb 2020 07:00  --------------------------------------------------------  IN: 1520 mL / OUT: 1100 mL / NET: 420 mL    04 Feb 2020 07:01  -  04 Feb 2020 14:42  --------------------------------------------------------  IN: 300 mL / OUT: 475 mL / NET: -175 mL                              9.0    8.98  )-----------( 186      ( 04 Feb 2020 07:02 )             28.1       02-04    138  |  106  |  32<H>  ----------------------------<  104<H>  3.2<L>   |  24  |  0.82    Ca    8.9      04 Feb 2020 07:02  Phos  2.9     02-04  Mg     2.0     02-04          MEDICATIONS  (STANDING):  aMIOdarone    Tablet 200 milliGRAM(s) Oral daily  aspirin enteric coated 81 milliGRAM(s) Oral daily  chlorhexidine 0.12% Liquid 15 milliLiter(s) Swish and Spit two times a day  cholecalciferol 2000 Unit(s) Oral daily  doxycycline hyclate Capsule 100 milliGRAM(s) Oral every 12 hours  finasteride 5 milliGRAM(s) Oral daily  furosemide   Injectable 40 milliGRAM(s) IV Push daily  gabapentin 300 milliGRAM(s) Oral two times a day  heparin  Injectable 5000 Unit(s) SubCutaneous every 12 hours  lactated ringers. 1000 milliLiter(s) (35 mL/Hr) IV Continuous <Continuous>  metoprolol tartrate 25 milliGRAM(s) Oral two times a day  piperacillin/tazobactam IVPB.. 3.375 Gram(s) IV Intermittent every 8 hours  potassium chloride   Powder 40 milliEquivalent(s) Oral every 4 hours  tamsulosin 0.4 milliGRAM(s) Oral two times a day    MEDICATIONS  (PRN):  acetaminophen   Tablet .. 650 milliGRAM(s) Oral every 6 hours PRN Temp greater or equal to 38C (100.4F), Mild Pain (1 - 3)        Review of Systems:   · Additional ROS	NO CP, NO SOB, NO NVD, NO chills - tolerating PO diet, OOB to chair. NO headache/LOC, NO dysuria, NO uritcaria.  ALL other ROS are NEGATIVE.	    Physical Exam:   · Constitutional	detailed exam	  · Constitutional Details	ill, frail/cachectic	  · Respiratory	detailed exam	  · Respiratory Details	airway patent, Good AE B/L - NO W/R/R	  · Cardiovascular	detailed exam	  · Cardiovascular Details	regular rate and rhythm	  · Gastrointestinal	detailed exam	  · GI Normal	soft; nontender (+) BS	  · Genitourinary		  · Extremities	detailed exam	  · Extremities Details	no cyanosis; no pedal edema	  · Neurological	detailed exam	  · Neurological Details	alert and oriented x 3; Grossly non focal	  · Skin	detailed exam	  · Skin Details	warm and dry; color normal	  · Musculoskeletal	detailed exam	  · Musculoskeletal Details	decreased ROM, reasonable strength x 4 extremities	  Psychiatric details:   	                            Normal behavior and Normal affect        DVT Prophylaxis: SQ heparin, venodynes    Advanced Directives:  Discussed with:    Visit Information: SSQ

## 2020-02-04 NOTE — PROGRESS NOTE ADULT - ASSESSMENT
83 y/o M admitted 1/29 with sepsis likely result of LLL infiltrate, Pneumonia due to gram negative organisms most likely  acute type 1 hypoxic respiratory failure due to combination of pneumonia and flash pulmonary edema/acute systolic HF - HFrEF - potentially due to AS vs cardiac ischemia  Will need cardiac cath to determine specific etiology  NSVT on Amio gtt  remains on high flow NC O2 at 45% FiO2 - hemodynamics reasonable.    PMHx of HTN, HLD, BPH with recent R ankle Fx (10/19) - treated for osteomyelitis    until etiology of pulmonary edema determined and treated, pt remains high risk for acute decompensation and deterioration requiring intubation     Plan at this time is for continued care in ICU  HOB 30  Continue abx - Doxy and pip/hao   F/u cultures  C to be arranged for 2/5 by cardiology as d/w Dr Tubbs  NPO after midnight, IVF to start at that time  PO amio and low dose BBx  ASA  High flow O2 as needed  Cont daily IV furosemide to keep on dry side  DVT prophy--sqhep   Supportive care

## 2020-02-04 NOTE — PHYSICAL THERAPY INITIAL EVALUATION ADULT - PRECAUTIONS/LIMITATIONS, REHAB EVAL
fall precautions/cardiac precautions/adm mid Jan. due to RLE cellulitis/Wound complications s/p Ankle ORIF 10/19 oxygen therapy device and L/min/fall precautions/cardiac precautions/HF O2; adm mid Jan. due to RLE cellulitis/Wound complications s/p Ankle ORIF 10/19

## 2020-02-04 NOTE — PHYSICAL THERAPY INITIAL EVALUATION ADULT - PERTINENT HX OF CURRENT PROBLEM, REHAB EVAL
dmitted 1/29 with apparent sepsis.  TTE with mod aortic stenosis - pt developed flash pulm edema with fever and tachycardia on 1/30 necessitating RRT and transfer to ICU for further care.  PICC was pulled on 1/30.  BCx negative. RAVI neg. Elevated troponin consistent with sepsis, Cannot exclude NSTEMI per cardiol. plan is for cath tomorrow. pt w/ NSVT on Amio gtt.  (was recently admitted (2 weeks ago) for cellulitis with concern for OM, and had a PICC line placed, to be on antibiotics x  6 wk)

## 2020-02-04 NOTE — PHYSICAL THERAPY INITIAL EVALUATION ADULT - ACTIVE RANGE OF MOTION EXAMINATION, REHAB EVAL
except ltd B hip flex (reports due to thigh discomfort due to cholesterol med-off)/bilateral upper extremity Active ROM was WFL (within functional limits)/bilateral  lower extremity Active ROM was WFL (within functional limits)

## 2020-02-04 NOTE — PROGRESS NOTE ADULT - ASSESSMENT
1, Systemic inflammatory response syndrome. Source undetermined. Blood cultures neg so far. PICC line removed. No TTE evidence of endocarditis.   2. Elevated troponin consistent with sepsis. Cannot exclude NSTEMI.  3. Moderate-severe  AS   4. VPCs due to sepsis and or NSTEMI. Improved on IV amiodarone.   5.HBP. Antihypertensives on hold due to sepsis.   6. pul edema  PLAN:  cont  oral  amiodarone,  cont aASA, IV furosemide oxygen  IV antibiotics per ID.   cont  bb.  cath  tomorrow

## 2020-02-05 LAB
ANION GAP SERPL CALC-SCNC: 6 MMOL/L — SIGNIFICANT CHANGE UP (ref 5–17)
BUN SERPL-MCNC: 24 MG/DL — HIGH (ref 7–23)
CALCIUM SERPL-MCNC: 8.8 MG/DL — SIGNIFICANT CHANGE UP (ref 8.5–10.1)
CHLORIDE SERPL-SCNC: 109 MMOL/L — HIGH (ref 96–108)
CO2 SERPL-SCNC: 27 MMOL/L — SIGNIFICANT CHANGE UP (ref 22–31)
CREAT SERPL-MCNC: 0.81 MG/DL — SIGNIFICANT CHANGE UP (ref 0.5–1.3)
CULTURE RESULTS: SIGNIFICANT CHANGE UP
CULTURE RESULTS: SIGNIFICANT CHANGE UP
GLUCOSE SERPL-MCNC: 108 MG/DL — HIGH (ref 70–99)
HCT VFR BLD CALC: 30.1 % — LOW (ref 39–50)
HGB BLD-MCNC: 9.6 G/DL — LOW (ref 13–17)
MAGNESIUM SERPL-MCNC: 2.1 MG/DL — SIGNIFICANT CHANGE UP (ref 1.6–2.6)
MCHC RBC-ENTMCNC: 30.1 PG — SIGNIFICANT CHANGE UP (ref 27–34)
MCHC RBC-ENTMCNC: 31.9 GM/DL — LOW (ref 32–36)
MCV RBC AUTO: 94.4 FL — SIGNIFICANT CHANGE UP (ref 80–100)
PHOSPHATE SERPL-MCNC: 2.3 MG/DL — LOW (ref 2.5–4.5)
PLATELET # BLD AUTO: 229 K/UL — SIGNIFICANT CHANGE UP (ref 150–400)
POTASSIUM SERPL-MCNC: 3.8 MMOL/L — SIGNIFICANT CHANGE UP (ref 3.5–5.3)
POTASSIUM SERPL-SCNC: 3.8 MMOL/L — SIGNIFICANT CHANGE UP (ref 3.5–5.3)
RBC # BLD: 3.19 M/UL — LOW (ref 4.2–5.8)
RBC # FLD: 14.3 % — SIGNIFICANT CHANGE UP (ref 10.3–14.5)
SODIUM SERPL-SCNC: 142 MMOL/L — SIGNIFICANT CHANGE UP (ref 135–145)
SPECIMEN SOURCE: SIGNIFICANT CHANGE UP
SPECIMEN SOURCE: SIGNIFICANT CHANGE UP
WBC # BLD: 8.51 K/UL — SIGNIFICANT CHANGE UP (ref 3.8–10.5)
WBC # FLD AUTO: 8.51 K/UL — SIGNIFICANT CHANGE UP (ref 3.8–10.5)

## 2020-02-05 PROCEDURE — 99233 SBSQ HOSP IP/OBS HIGH 50: CPT

## 2020-02-05 RX ADMIN — TAMSULOSIN HYDROCHLORIDE 0.4 MILLIGRAM(S): 0.4 CAPSULE ORAL at 05:56

## 2020-02-05 RX ADMIN — PIPERACILLIN AND TAZOBACTAM 25 GRAM(S): 4; .5 INJECTION, POWDER, LYOPHILIZED, FOR SOLUTION INTRAVENOUS at 13:31

## 2020-02-05 RX ADMIN — CHLORHEXIDINE GLUCONATE 15 MILLILITER(S): 213 SOLUTION TOPICAL at 06:01

## 2020-02-05 RX ADMIN — Medication 2000 UNIT(S): at 13:30

## 2020-02-05 RX ADMIN — AMIODARONE HYDROCHLORIDE 200 MILLIGRAM(S): 400 TABLET ORAL at 06:01

## 2020-02-05 RX ADMIN — Medication 25 MILLIGRAM(S): at 06:00

## 2020-02-05 RX ADMIN — FINASTERIDE 5 MILLIGRAM(S): 5 TABLET, FILM COATED ORAL at 13:32

## 2020-02-05 RX ADMIN — GABAPENTIN 300 MILLIGRAM(S): 400 CAPSULE ORAL at 18:13

## 2020-02-05 RX ADMIN — Medication 100 MILLIGRAM(S): at 06:01

## 2020-02-05 RX ADMIN — SODIUM CHLORIDE 35 MILLILITER(S): 9 INJECTION, SOLUTION INTRAVENOUS at 02:00

## 2020-02-05 RX ADMIN — Medication 25 MILLIGRAM(S): at 18:13

## 2020-02-05 RX ADMIN — Medication 650 MILLIGRAM(S): at 13:30

## 2020-02-05 RX ADMIN — CHLORHEXIDINE GLUCONATE 15 MILLILITER(S): 213 SOLUTION TOPICAL at 18:14

## 2020-02-05 RX ADMIN — HEPARIN SODIUM 5000 UNIT(S): 5000 INJECTION INTRAVENOUS; SUBCUTANEOUS at 18:13

## 2020-02-05 RX ADMIN — TAMSULOSIN HYDROCHLORIDE 0.4 MILLIGRAM(S): 0.4 CAPSULE ORAL at 18:15

## 2020-02-05 RX ADMIN — HEPARIN SODIUM 5000 UNIT(S): 5000 INJECTION INTRAVENOUS; SUBCUTANEOUS at 05:56

## 2020-02-05 RX ADMIN — Medication 81 MILLIGRAM(S): at 13:32

## 2020-02-05 RX ADMIN — PIPERACILLIN AND TAZOBACTAM 25 GRAM(S): 4; .5 INJECTION, POWDER, LYOPHILIZED, FOR SOLUTION INTRAVENOUS at 05:56

## 2020-02-05 RX ADMIN — GABAPENTIN 300 MILLIGRAM(S): 400 CAPSULE ORAL at 06:00

## 2020-02-05 RX ADMIN — Medication 650 MILLIGRAM(S): at 14:00

## 2020-02-05 RX ADMIN — Medication 100 MILLIGRAM(S): at 18:13

## 2020-02-05 RX ADMIN — Medication 40 MILLIGRAM(S): at 05:57

## 2020-02-05 NOTE — PROGRESS NOTE ADULT - SUBJECTIVE AND OBJECTIVE BOX
83 y/o M admitted with RIGHT ankle Fx (10/19) with possible OM, PICC placed and sent home on IV vanco/CTX about 2 weeks prior to presentation.  Now admitted 1/29 with apparent sepsis.  TTE with mod aortic stenosis - pt developed flash pulm edema with fever and tachycardia on 1/30 necessitating RRT  and transfer to ICU for further care.  PICC was pulled on 1/30.  BCx negative thus far.      1/31: Tm 104.7 Confused overnight.  More awake and alert this morning.  Was on NIV.  On Amio gtt for NSVT  2/1:  Temps lower.  CT chest with likely LLL infiltrate.  On NIV overnight    2/2: Tm 102.  again with flash pulmonary edema in am --placed on HFNC and given furosemide 40- -u/o 600ml.  Case d/w Dr Tubbs -- AV is mod-severe stenosis--will also need to r/o ischemia--LHC this week.  Will change to PO amio.    above d/w Dr Blackburn who has been caring for the patient.     2/3: OOB to chair, tolerating PO diet, remains on high flow O2 - hemodynamics reasonable.  D/w Dr Tubbs - will do cardiac cath this week if pt remains stable.  No new issues - No overnight events.  Tm 101*F      2/4: down to 45% FiO2 on high flow NC - hemodynamics reasonable - no further evidence of flash pulm edema last 24-48 hrs.    D/w Dr Tubbs who will arrange for cardiac cath 2/5.    Notably BNP down to 4k.  OOB to chair and tolerating diet as appropriate.    2/5: uneventful cath this morning - no significant CAD - titrated off of high-flow oxygen - hemodynamics reasonable - No new issues, No overnight events.    Allergies    No Known Allergies    Intolerances    Lipitor (Joint Pain)        ICU Vital Signs Last 24 Hrs  T(C): 37.5 (05 Feb 2020 10:15), Max: 37.6 (04 Feb 2020 22:00)  T(F): 99.5 (05 Feb 2020 10:15), Max: 99.6 (04 Feb 2020 22:00)  HR: 60 (05 Feb 2020 15:00) (59 - 84)  BP: 109/69 (05 Feb 2020 15:00) (101/65 - 141/63)  BP(mean): 82 (05 Feb 2020 15:00) (66 - 87)  RR: 19 (05 Feb 2020 15:00) (13 - 25)  SpO2: 100% (05 Feb 2020 15:00) (95% - 100%)          I&O's Summary    04 Feb 2020 07:01  -  05 Feb 2020 07:00  --------------------------------------------------------  IN: 1025 mL / OUT: 675 mL / NET: 350 mL    05 Feb 2020 07:01  -  05 Feb 2020 16:25  --------------------------------------------------------  IN: 0 mL / OUT: 850 mL / NET: -850 mL                              9.6    8.51  )-----------( 229      ( 05 Feb 2020 06:51 )             30.1       02-05    142  |  109<H>  |  24<H>  ----------------------------<  108<H>  3.8   |  27  |  0.81    Ca    8.8      05 Feb 2020 06:51  Phos  2.3     02-05  Mg     2.1     02-05    MEDICATIONS  (STANDING):  aMIOdarone    Tablet 200 milliGRAM(s) Oral daily  aspirin enteric coated 81 milliGRAM(s) Oral daily  chlorhexidine 0.12% Liquid 15 milliLiter(s) Swish and Spit two times a day  cholecalciferol 2000 Unit(s) Oral daily  doxycycline hyclate Capsule 100 milliGRAM(s) Oral every 12 hours  finasteride 5 milliGRAM(s) Oral daily  furosemide   Injectable 40 milliGRAM(s) IV Push daily  gabapentin 300 milliGRAM(s) Oral two times a day  heparin  Injectable 5000 Unit(s) SubCutaneous every 12 hours  lactated ringers. 1000 milliLiter(s) (35 mL/Hr) IV Continuous <Continuous>  metoprolol tartrate 25 milliGRAM(s) Oral two times a day  tamsulosin 0.4 milliGRAM(s) Oral two times a day    MEDICATIONS  (PRN):  acetaminophen   Tablet .. 650 milliGRAM(s) Oral every 6 hours PRN Temp greater or equal to 38C (100.4F), Mild Pain (1 - 3)      Review of Systems:   · Additional ROS	NO CP, NO SOB, NO NVD, NO chills - tolerating PO diet, OOB to chair. NO headache/LOC, NO dysuria, NO uritcaria.  ALL other ROS are NEGATIVE.	    Physical Exam:   · Constitutional	detailed exam	  · Constitutional Details	ill, frail/cachectic	  · Respiratory	detailed exam	  · Respiratory Details	airway patent, Good AE B/L - NO W/R/R	  · Cardiovascular	detailed exam	  · Cardiovascular Details	regular rate and rhythm	  · Gastrointestinal	detailed exam	  · GI Normal	soft; nontender (+) BS	  · Genitourinary		  · Extremities	detailed exam	  · Extremities Details	no cyanosis; no pedal edema	  · Neurological	detailed exam	  · Neurological Details	alert and oriented x 3; Grossly non focal	  · Skin	detailed exam	  · Skin Details	warm and dry; color normal	  · Musculoskeletal	detailed exam	  · Musculoskeletal Details	decreased ROM, reasonable strength x 4 extremities	  Psychiatric details:   	                            Normal behavior and Normal affect      DVT Prophylaxis:    Advanced Directives:  Discussed with:    Visit Information:          minutes of Critical Care time spent providing medical care for patient's acute illness/conditions that impairs at least one vital organ system and/or poses a high risk of imminent or life threatening deterioration in the patient's condition.  It includes time spent reviewing labs, radiology, discussing goals of care with patient and/or family, and discussing the case with a multidisciplinary team in an effort to prevent further life threatening deterioration or end organ damage.  This time is independent of any procedures performed.    ** Time is exclusive of billed procedures and/or teaching and/or routine family updates. 85 y/o M admitted with RIGHT ankle Fx (10/19) with possible OM, PICC placed and sent home on IV vanco/CTX about 2 weeks prior to presentation.  Now admitted 1/29 with apparent sepsis.  TTE with mod aortic stenosis - pt developed flash pulm edema with fever and tachycardia on 1/30 necessitating RRT  and transfer to ICU for further care.  PICC was pulled on 1/30.  BCx negative thus far.      1/31: Tm 104.7 Confused overnight.  More awake and alert this morning.  Was on NIV.  On Amio gtt for NSVT  2/1:  Temps lower.  CT chest with likely LLL infiltrate.  On NIV overnight    2/2: Tm 102.  again with flash pulmonary edema in am --placed on HFNC and given furosemide 40- -u/o 600ml.  Case d/w Dr Tubbs -- AV is mod-severe stenosis--will also need to r/o ischemia--LHC this week.  Will change to PO amio.    above d/w Dr Blackburn who has been caring for the patient.     2/3: OOB to chair, tolerating PO diet, remains on high flow O2 - hemodynamics reasonable.  D/w Dr Tubbs - will do cardiac cath this week if pt remains stable.  No new issues - No overnight events.  Tm 101*F      2/4: down to 45% FiO2 on high flow NC - hemodynamics reasonable - no further evidence of flash pulm edema last 24-48 hrs.    D/w Dr Tubbs who will arrange for cardiac cath 2/5.    Notably BNP down to 4k.  OOB to chair and tolerating diet as appropriate.    2/5: uneventful cath this morning - no significant CAD - titrated off of high-flow oxygen - hemodynamics reasonable - No new issues, No overnight events.    Allergies    No Known Allergies    Intolerances    Lipitor (Joint Pain)        ICU Vital Signs Last 24 Hrs  T(C): 37.5 (05 Feb 2020 10:15), Max: 37.6 (04 Feb 2020 22:00)  T(F): 99.5 (05 Feb 2020 10:15), Max: 99.6 (04 Feb 2020 22:00)  HR: 60 (05 Feb 2020 15:00) (59 - 84)  BP: 109/69 (05 Feb 2020 15:00) (101/65 - 141/63)  BP(mean): 82 (05 Feb 2020 15:00) (66 - 87)  RR: 19 (05 Feb 2020 15:00) (13 - 25)  SpO2: 100% (05 Feb 2020 15:00) (95% - 100%)          I&O's Summary    04 Feb 2020 07:01  -  05 Feb 2020 07:00  --------------------------------------------------------  IN: 1025 mL / OUT: 675 mL / NET: 350 mL    05 Feb 2020 07:01  -  05 Feb 2020 16:25  --------------------------------------------------------  IN: 0 mL / OUT: 850 mL / NET: -850 mL                              9.6    8.51  )-----------( 229      ( 05 Feb 2020 06:51 )             30.1       02-05    142  |  109<H>  |  24<H>  ----------------------------<  108<H>  3.8   |  27  |  0.81    Ca    8.8      05 Feb 2020 06:51  Phos  2.3     02-05  Mg     2.1     02-05    MEDICATIONS  (STANDING):  aMIOdarone    Tablet 200 milliGRAM(s) Oral daily  aspirin enteric coated 81 milliGRAM(s) Oral daily  chlorhexidine 0.12% Liquid 15 milliLiter(s) Swish and Spit two times a day  cholecalciferol 2000 Unit(s) Oral daily  doxycycline hyclate Capsule 100 milliGRAM(s) Oral every 12 hours  finasteride 5 milliGRAM(s) Oral daily  furosemide   Injectable 40 milliGRAM(s) IV Push daily  gabapentin 300 milliGRAM(s) Oral two times a day  heparin  Injectable 5000 Unit(s) SubCutaneous every 12 hours  lactated ringers. 1000 milliLiter(s) (35 mL/Hr) IV Continuous <Continuous>  metoprolol tartrate 25 milliGRAM(s) Oral two times a day  tamsulosin 0.4 milliGRAM(s) Oral two times a day    MEDICATIONS  (PRN):  acetaminophen   Tablet .. 650 milliGRAM(s) Oral every 6 hours PRN Temp greater or equal to 38C (100.4F), Mild Pain (1 - 3)      Review of Systems:   · Additional ROS	NO CP, NO SOB, NO NVD, NO chills - tolerating PO diet, OOB to chair. NO headache/LOC, NO dysuria, NO uritcaria.  ALL other ROS are NEGATIVE.	    Physical Exam:   · Constitutional	detailed exam	  · Constitutional Details	ill, frail/cachectic	  · Respiratory	detailed exam	  · Respiratory Details	airway patent, Good AE B/L - NO W/R/R	  · Cardiovascular	detailed exam	  · Cardiovascular Details	regular rate and rhythm	  · Gastrointestinal	detailed exam	  · GI Normal	soft; nontender (+) BS	  · Genitourinary		  · Extremities	detailed exam	  · Extremities Details	no cyanosis; no pedal edema	  · Neurological	detailed exam	  · Neurological Details	alert and oriented x 3; Grossly non focal	  · Skin	detailed exam	  · Skin Details	warm and dry; color normal	  · Musculoskeletal	detailed exam	  · Musculoskeletal Details	decreased ROM, reasonable strength x 4 extremities	  Psychiatric details:   	                            Normal behavior and Normal affect    DVT Prophylaxis: SQ heparin, venodynes    Visit Information: SSQ

## 2020-02-05 NOTE — PROGRESS NOTE ADULT - SUBJECTIVE AND OBJECTIVE BOX
Date of service: 20 @ 15:10    Lying in bed in NAD  Weak looking  Noted with left forearm edema with rash; had several peripheral lines in that area  Has low grade fever    ROS: denies dizziness, no HA, no SOB or cough, no abdominal pain, no diarrhea or constipation; no dysuria, no legs pain    MEDICATIONS  (STANDING):  aMIOdarone    Tablet 200 milliGRAM(s) Oral daily  aspirin enteric coated 81 milliGRAM(s) Oral daily  chlorhexidine 0.12% Liquid 15 milliLiter(s) Swish and Spit two times a day  cholecalciferol 2000 Unit(s) Oral daily  doxycycline hyclate Capsule 100 milliGRAM(s) Oral every 12 hours  finasteride 5 milliGRAM(s) Oral daily  furosemide   Injectable 40 milliGRAM(s) IV Push daily  gabapentin 300 milliGRAM(s) Oral two times a day  heparin  Injectable 5000 Unit(s) SubCutaneous every 12 hours  lactated ringers. 1000 milliLiter(s) (35 mL/Hr) IV Continuous <Continuous>  metoprolol tartrate 25 milliGRAM(s) Oral two times a day  piperacillin/tazobactam IVPB.. 3.375 Gram(s) IV Intermittent every 8 hours  tamsulosin 0.4 milliGRAM(s) Oral two times a day      Vital Signs Last 24 Hrs  T(C): 37.5 (2020 10:15), Max: 37.6 (2020 22:00)  T(F): 99.5 (2020 10:15), Max: 99.6 (2020 22:00)  HR: 67 (2020 12:00) (59 - 84)  BP: 132/58 (2020 12:00) (101/65 - 141/63)  BP(mean): 80 (2020 12:00) (66 - 87)  RR: 18 (2020 12:00) (13 - 25)  SpO2: 100% (2020 12:00) (95% - 100%)    Physical Exam:      Constitutional:  No acute distress  HEENT: NC/AT, EOMI, PERRLA, conjunctivae clear  Neck: supple; thyroid not palpable  Back: no tenderness  Respiratory: respiratory effort normal; crackles at bases  Cardiovascular: S1S2 regular, no murmurs  Abdomen: soft, not tender, not distended, positive BS  Genitourinary: no suprapubic tenderness  Lymphatic: no LN palpable  Musculoskeletal: no muscle tenderness, no joint swelling or tenderness  Extremities: 2+ right lower pedal edema; mild erythema; much improved; no warmth; no skin break  Left forearm erythema edema and tenderness; prior peripheral lines removed  Neurological/ Psychiatric: AxOx3, judgement and insight normal; moving all extremities  Skin: no rashes; no palpable lesions    Labs: reviewed                        9.6    8.51  )-----------( 229      ( 2020 06:51 )             30.1     02-05    142  |  109<H>  |  24<H>  ----------------------------<  108<H>  3.8   |  27  |  0.81    Ca    8.8      2020 06:51  Phos  2.3     02-05  Mg     2.1     02-05                        9.0    8.98  )-----------( 186      ( 2020 07:02 )             28.1     02-04    138  |  106  |  32<H>  ----------------------------<  104<H>  3.2<L>   |  24  |  0.82    Ca    8.9      2020 07:02  Phos  2.9     02-04  Mg     2.0     02-04                        10.6   5.92  )-----------( 109      ( 2020 07:27 )             32.0     01-30    139  |  110<H>  |  21  ----------------------------<  105<H>  3.6   |  21<L>  |  0.77    Ca    8.1<L>      2020 07:27  Phos  2.6     01-30  Mg     2.3     30    TPro  5.7<L>  /  Alb  2.8<L>  /  TBili  0.9  /  DBili  x   /  AST  101<H>  /  ALT  79<H>  /  AlkPhos  85  30     LIVER FUNCTIONS - ( 2020 07:27 )  Alb: 2.8 g/dL / Pro: 5.7 gm/dL / ALK PHOS: 85 U/L / ALT: 79 U/L / AST: 101 U/L / GGT: x           Urinalysis Basic - ( 2020 22:06 )    Color: Yellow / Appearance: very cloudy / S.020 / pH: x  Gluc: x / Ketone: Trace  / Bili: Negative / Urobili: Negative mg/dL   Blood: x / Protein: 30 mg/dL / Nitrite: Negative   Leuk Esterase: Trace / RBC: >50 /HPF / WBC 3-5   Sq Epi: x / Non Sq Epi: Few / Bacteria: Many      Culture - Urine (collected 2020 22:06)  Source: .Urine None  Final Report (2020 09:11):    No growth    Culture - Blood (collected 2020 19:57)  Source: .Blood None  Preliminary Report (2020 01:03):    No growth to date.    Culture - Blood (collected 2020 19:57)  Source: .Blood None  Preliminary Report (2020 01:03):    No growth to date.    Culture - Blood (20 @ 05:41)    Specimen Source: .Blood None    Culture Results:   No growth to date.    Radiology: all available radiological tests reviewed    < from: Xray Chest 1 View-PORTABLE IMMEDIATE (20 @ 04:35) >  LEFT PICC line catheter tip in SVC. Cardiomegaly. No evidence of active chest disease.    < end of copied text >    < from: CT Chest w/ IV Cont (20 @ 11:44) >  Cardiomegaly with bilateral perihilar opacity and small bilateral pleural effusions, consistent with a pulmonary edema pattern.  Bibasilar consolidation, likely atelectasis, less likely pneumonia.    < end of copied text >      Advanced directives addressed: full resuscitation

## 2020-02-05 NOTE — PROGRESS NOTE ADULT - SUBJECTIVE AND OBJECTIVE BOX
HPI:  85 yo M with a PMH HTN, HLD, BPH, moderate AS/AI, cellulitis admitted with Rt ankle fracture, developed sepsis secondary to Pneumonia. Pt brought to cath lab for Rt and Lt heart cath.     ASA class: IV  Cr: 0.81  GFR: 83  Bleeding risk: 4.3%    Now, Pt is s/p Rt and Lt heart cath, revealed normal coronary with PCW pressure at 16 mmHg     Cath Report < from: Cardiac Cath Lab - Adult (02.05.20 @ 08:12) >  Angiographic Findings     Cardiac Arteries and Lesion Findings    LMCA: Normal.    LAD: Normal.    LCx: Normal.    RCA: Normal.     Impression     Diagnostic Conclusions     Normal coronary arteries.     Recommendations     Manage with medical therapy.   Valvular surgery is recommended.    < end of copied text >    ROS: + pain in leg, denies chest pain or palpitation     Vital Signs;  T(C): 36.3 (02-05-20 @ 07:00), Max: 37.6 (02-04-20 @ 22:00)  HR: 63 (02-05-20 @ 07:00) (59 - 74)  BP: 114/49 (02-05-20 @ 07:00) (98/50 - 141/63)  RR: 20 (02-05-20 @ 07:00) (15 - 25)  SpO2: 98% (02-05-20 @ 07:00) (94% - 100%)    Phsical Exam   General: awake, no acute distress   HEENT: NCAT, neck supple   CV: RRR, normal S1S2, no murmur/ rub  0.81  Pulmonary: no wheezing or rales   GI: +BS, soft, non-tender, non-distended   Extremities: 2+ pedal pulses, 1+ edema in LE Rt> Lt   Skin: Rt. femoral access site with 7fr Femoral sheath and 6 fr arterial sheath (to be pulled by RN):  no hematoma or bleeding     Labs:      Medications:  acetaminophen   Tablet .. 650 milliGRAM(s) Oral every 6 hours PRN  aMIOdarone    Tablet 200 milliGRAM(s) Oral daily  aspirin enteric coated 81 milliGRAM(s) Oral daily  chlorhexidine 0.12% Liquid 15 milliLiter(s) Swish and Spit two times a day  cholecalciferol 2000 Unit(s) Oral daily  doxycycline hyclate Capsule 100 milliGRAM(s) Oral every 12 hours  finasteride 5 milliGRAM(s) Oral daily  furosemide   Injectable 40 milliGRAM(s) IV Push daily  gabapentin 300 milliGRAM(s) Oral two times a day  heparin  Injectable 5000 Unit(s) SubCutaneous every 12 hours  lactated ringers. 1000 milliLiter(s) IV Continuous <Continuous>  metoprolol tartrate 25 milliGRAM(s) Oral two times a day  piperacillin/tazobactam IVPB.. 3.375 Gram(s) IV Intermittent every 8 hours  tamsulosin 0.4 milliGRAM(s) Oral two times a day      # s/p RHC and LHC; normal coronaries and severe AS   - return to ICU  - continue current medications as per ICU primary team   - post procedure, outcome and follow up care reviewed with patient/MD  - recommend Valve procedure once medically stable   - follow up with cardiologist

## 2020-02-05 NOTE — PROGRESS NOTE ADULT - ASSESSMENT
83 y/o M with h/o HTN, HLD, BPH with chronic chowdary, right ankle fracture (Oct 10, 2019) s/p ORIF, recently hospitalized (1/10/2020) for RLE cellulitis and possible underlying acute OM and/or ankle hardware infection on vancomycin IV and ceftriaxone (since 1/10 via PICC line) was admitted on 1/29 for lower leg pains. He states for the past week PTA, he has had sharp severe leg pains. They occur every day, often lasting for hours at a time, and are not related to exertion. He states that when the pains come it makes it difficult to walk, although when it is not there, he can walk without difficulty. The pains are from his ankle up to his upper thighs (he cannot tell from where it starts and stops). He reported a fever to 104F at home. In ER he was febrile to 102.7F and received vancomycin IV and cefepime.     1. Low grade fever. RLE cellulitis improving with possible underlying acute OM and/or ankle hardware infection. s/p recent ankle fracture s/p ORIF. BPH with chronic chowdary. Acute CHF exacerbation.  -fever is down  -BC x 2 are negative to date  -PICC Line is out  -on vancomycin 760 mg IV q12h and ceftriaxone 1 gm IV qd (since 1/10/2020)  -on doxycycline 100 mg PO q12h # 5 and zosyn 3.375 gm IV q8h # 4  -tolerating abx well so far; no side effects noted  -elevate leg  -d/c zosyn  -avoid periperal lines in left forearm; may place midline  -continue abx coverage with doxy PO  -repeat BC x 2  -monitor temps  -f/u CBC  -supportive care  2. Other issues:   -care per medicine

## 2020-02-05 NOTE — PROGRESS NOTE ADULT - ASSESSMENT
1, Systemic inflammatory response syndrome. Source undetermined. Blood cultures neg so far. PICC line removed. No TTE evidence of endocarditis.   2. Elevated troponin consistent with sepsis. Cannot exclude NSTEMI. cath today with normal coronaries  3. Moderate-severe  AS   4. VPCs due to sepsis and or NSTEMI. Improved on IV amiodarone.   5.HBP. Antihypertensives on hold due to sepsis.   6. pul edema improved mean pcwp today 16  PLAN:  cont  oral  amiodarone,  furosemide oxygen  IV antibiotics per ID.   cont  bb.  patient will need avr however will need to be off abx with no infection first  ok to stop asa  discussed with patient and spouse and intensivist

## 2020-02-05 NOTE — PROGRESS NOTE ADULT - ASSESSMENT
83 y/o M admitted 1/29 with sepsis likely result of LLL infiltrate, Pneumonia due to gram negative organisms most likely  acute type 1 hypoxic respiratory failure due to combination of pneumonia and flash pulmonary edema/acute systolic HF - HFrEF - potentially due to AS vs cardiac ischemia  Will need cardiac cath to determine specific etiology  NSVT on Amio gtt  remains on high flow NC O2 at 45% FiO2 - hemodynamics reasonable.  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    PMHx of HTN, HLD, BPH with recent R ankle Fx (10/19) - treated for osteomyelitis    until etiology of pulmonary edema determined and treated, pt remains high risk for acute decompensation and deterioration requiring intubation     Plan at this time is for continued care in ICU  HOB 30  Continue abx - Doxy and pip/hao   F/u cultures  Resume Diet  PO amio and low dose BBx  ASA  titrate FiO2 to Sat>92%  Cont daily IV furosemide to keep on dry side  DVT prophy--sqhep   Supportive care

## 2020-02-05 NOTE — PROGRESS NOTE ADULT - SUBJECTIVE AND OBJECTIVE BOX
Patient is a 84y old  Male who presents with a chief complaint of SIRS (05 Feb 2020 09:01)      HPI:  83 yo M with a PMH HTN, HLD, and BPH who presents with leg pains. He states for the past week, he has had sharp severe leg pains. They occur every day, often lasting for hours at a time, and are not related to exertion. He states that when the pains come it makes it difficult to walk, although when it is not there, he can walk without difficulty. The pains are from his ankle up to his upper thighs (he cannot tell from where it starts and stops). Of note, he was recently admitted (2 weeks ago) for cellulitis with concern for OM, and had a PICC line placed, to be on antibiotics (Vanc/Ceftriaxone) for 6 weeks.  He denies nausea, vomiting, fevers, chills, cough, chest pain, SOB, abdominal pain, diarrhea, dysuria, hematuria, dizziness, lightheadedness, or rash. He states the swelling in his legs from his cellulitis has improved.    Of note, per ER documentation, he presented due to fevers, and reportedly had a Tmax of 104 at home. The patient denies this.    Attempted to call patient's wife multiple times overnight and left message without response.  Patient does not know who his cardiologist is.    In the ED, he was given aspirin 325 mg PO x1, Vancomycin 1g IV x1, Cefepime 1g IV x1, Ibuprofen 600 mg PO x1, and LR 2.5L x1. (29 Jan 2020 23:42)  1/31- Breathless. Remains on Bipap. Getting prn doses of IV furosemide for respiratory distress. Denies chest pain.   2/1 on bipap. wife at bedside states he is better than yesterday. appears fairly comfortable on bipap  2/2 appears comfortable. still having intermittent episodes of tachycardia and then worsening dyspnea. temp to 102  2/3 alert appears comfortable on high flow oxygen. temp 100.5  2/4 alert, appears comfortable, no complaints  2/5 s/p cath no complaints    PAST MEDICAL & SURGICAL HISTORY:  HLD (hyperlipidemia)  Enlarged prostate  HTN (hypertension)  Status post ORIF of fracture of ankle: Right, 10/2019        MEDICATIONS  (STANDING):  aMIOdarone    Tablet 200 milliGRAM(s) Oral daily  aspirin enteric coated 81 milliGRAM(s) Oral daily  chlorhexidine 0.12% Liquid 15 milliLiter(s) Swish and Spit two times a day  cholecalciferol 2000 Unit(s) Oral daily  doxycycline hyclate Capsule 100 milliGRAM(s) Oral every 12 hours  finasteride 5 milliGRAM(s) Oral daily  furosemide   Injectable 40 milliGRAM(s) IV Push daily  gabapentin 300 milliGRAM(s) Oral two times a day  heparin  Injectable 5000 Unit(s) SubCutaneous every 12 hours  lactated ringers. 1000 milliLiter(s) (35 mL/Hr) IV Continuous <Continuous>  metoprolol tartrate 25 milliGRAM(s) Oral two times a day  piperacillin/tazobactam IVPB.. 3.375 Gram(s) IV Intermittent every 8 hours  tamsulosin 0.4 milliGRAM(s) Oral two times a day    MEDICATIONS  (PRN):  acetaminophen   Tablet .. 650 milliGRAM(s) Oral every 6 hours PRN Temp greater or equal to 38C (100.4F), Mild Pain (1 - 3)          Vital Signs Last 24 Hrs  T(C): 36.3 (05 Feb 2020 07:00), Max: 37.6 (04 Feb 2020 22:00)  T(F): 97.3 (05 Feb 2020 07:00), Max: 99.6 (04 Feb 2020 22:00)  HR: 61 (05 Feb 2020 10:00) (59 - 74)  BP: 127/50 (05 Feb 2020 10:00) (98/50 - 141/63)  BP(mean): 77 (05 Feb 2020 06:00) (60 - 87)  RR: 20 (05 Feb 2020 10:00) (15 - 25)  SpO2: 98% (05 Feb 2020 10:00) (94% - 100%)    I&O's Summary    04 Feb 2020 07:01  -  05 Feb 2020 07:00  --------------------------------------------------------  IN: 1025 mL / OUT: 675 mL / NET: 350 mL    05 Feb 2020 07:01  -  05 Feb 2020 10:25  --------------------------------------------------------  IN: 0 mL / OUT: 850 mL / NET: -850 mL        PHYSICAL EXAM  General Appearance: alert  HEENT: ncat   Neck:   Back:   Lungs: dec bs  Heart: rrs1 s2 2/6 [place]  Abdomen:   Extremities: trace  edema right  Skin:   Neurologic:       INTERPRETATION OF TELEMETRY:    ECG:        LABS:                          9.6    8.51  )-----------( 229      ( 05 Feb 2020 06:51 )             30.1     02-05    142  |  109<H>  |  24<H>  ----------------------------<  108<H>  3.8   |  27  |  0.81    Ca    8.8      05 Feb 2020 06:51  Phos  2.3     02-05  Mg     2.1     02-05            Pro BNP  [id number] 02-04 @ 07:02  D Dimer  -- 02-04 @ 07:02  Pro BNP  [id number] 01-31 @ 05:41  D Dimer  -- 01-31 @ 05:41              RADIOLOGY & ADDITIONAL STUDIES:

## 2020-02-05 NOTE — PACU DISCHARGE NOTE - COMMENTS
Patient transferred to ICU. pt A/Ox4. Vital signs stable.  Patient with no complain of pain, SOB, or chest pain. Right groin soft with no s/s of bleeding or edema. Transferred to ICU via stretcher with RN on CM and NRB at 15L. Report given to accepting RN: Vishal. Pt tolerated transfer well.

## 2020-02-06 ENCOUNTER — APPOINTMENT (OUTPATIENT)
Dept: UROLOGY | Facility: CLINIC | Age: 85
End: 2020-02-06

## 2020-02-06 LAB
ANION GAP SERPL CALC-SCNC: 3 MMOL/L — LOW (ref 5–17)
BUN SERPL-MCNC: 21 MG/DL — SIGNIFICANT CHANGE UP (ref 7–23)
CALCIUM SERPL-MCNC: 8.8 MG/DL — SIGNIFICANT CHANGE UP (ref 8.5–10.1)
CHLORIDE SERPL-SCNC: 107 MMOL/L — SIGNIFICANT CHANGE UP (ref 96–108)
CO2 SERPL-SCNC: 31 MMOL/L — SIGNIFICANT CHANGE UP (ref 22–31)
CREAT SERPL-MCNC: 0.83 MG/DL — SIGNIFICANT CHANGE UP (ref 0.5–1.3)
GLUCOSE SERPL-MCNC: 102 MG/DL — HIGH (ref 70–99)
HCT VFR BLD CALC: 30.7 % — LOW (ref 39–50)
HGB BLD-MCNC: 9.8 G/DL — LOW (ref 13–17)
MAGNESIUM SERPL-MCNC: 1.9 MG/DL — SIGNIFICANT CHANGE UP (ref 1.6–2.6)
MCHC RBC-ENTMCNC: 30.3 PG — SIGNIFICANT CHANGE UP (ref 27–34)
MCHC RBC-ENTMCNC: 31.9 GM/DL — LOW (ref 32–36)
MCV RBC AUTO: 95 FL — SIGNIFICANT CHANGE UP (ref 80–100)
NT-PROBNP SERPL-SCNC: 5602 PG/ML — HIGH (ref 0–450)
PHOSPHATE SERPL-MCNC: 2.2 MG/DL — LOW (ref 2.5–4.5)
PLATELET # BLD AUTO: 253 K/UL — SIGNIFICANT CHANGE UP (ref 150–400)
POTASSIUM SERPL-MCNC: 3.3 MMOL/L — LOW (ref 3.5–5.3)
POTASSIUM SERPL-SCNC: 3.3 MMOL/L — LOW (ref 3.5–5.3)
RBC # BLD: 3.23 M/UL — LOW (ref 4.2–5.8)
RBC # FLD: 13.8 % — SIGNIFICANT CHANGE UP (ref 10.3–14.5)
SODIUM SERPL-SCNC: 141 MMOL/L — SIGNIFICANT CHANGE UP (ref 135–145)
WBC # BLD: 7.34 K/UL — SIGNIFICANT CHANGE UP (ref 3.8–10.5)
WBC # FLD AUTO: 7.34 K/UL — SIGNIFICANT CHANGE UP (ref 3.8–10.5)

## 2020-02-06 PROCEDURE — 99233 SBSQ HOSP IP/OBS HIGH 50: CPT

## 2020-02-06 PROCEDURE — 71045 X-RAY EXAM CHEST 1 VIEW: CPT | Mod: 26

## 2020-02-06 RX ORDER — FUROSEMIDE 40 MG
80 TABLET ORAL DAILY
Refills: 0 | Status: DISCONTINUED | OUTPATIENT
Start: 2020-02-07 | End: 2020-02-11

## 2020-02-06 RX ORDER — SODIUM,POTASSIUM PHOSPHATES 278-250MG
2 POWDER IN PACKET (EA) ORAL EVERY 4 HOURS
Refills: 0 | Status: COMPLETED | OUTPATIENT
Start: 2020-02-06 | End: 2020-02-06

## 2020-02-06 RX ORDER — POTASSIUM CHLORIDE 20 MEQ
40 PACKET (EA) ORAL EVERY 4 HOURS
Refills: 0 | Status: COMPLETED | OUTPATIENT
Start: 2020-02-06 | End: 2020-02-06

## 2020-02-06 RX ADMIN — Medication 81 MILLIGRAM(S): at 11:15

## 2020-02-06 RX ADMIN — Medication 40 MILLIEQUIVALENT(S): at 12:53

## 2020-02-06 RX ADMIN — Medication 40 MILLIGRAM(S): at 05:23

## 2020-02-06 RX ADMIN — Medication 100 MILLIGRAM(S): at 17:20

## 2020-02-06 RX ADMIN — FINASTERIDE 5 MILLIGRAM(S): 5 TABLET, FILM COATED ORAL at 11:15

## 2020-02-06 RX ADMIN — HEPARIN SODIUM 5000 UNIT(S): 5000 INJECTION INTRAVENOUS; SUBCUTANEOUS at 17:20

## 2020-02-06 RX ADMIN — AMIODARONE HYDROCHLORIDE 200 MILLIGRAM(S): 400 TABLET ORAL at 05:22

## 2020-02-06 RX ADMIN — Medication 25 MILLIGRAM(S): at 17:20

## 2020-02-06 RX ADMIN — Medication 2000 UNIT(S): at 11:16

## 2020-02-06 RX ADMIN — TAMSULOSIN HYDROCHLORIDE 0.4 MILLIGRAM(S): 0.4 CAPSULE ORAL at 17:20

## 2020-02-06 RX ADMIN — Medication 40 MILLIEQUIVALENT(S): at 17:21

## 2020-02-06 RX ADMIN — Medication 2 PACKET(S): at 12:53

## 2020-02-06 RX ADMIN — Medication 2 PACKET(S): at 17:21

## 2020-02-06 RX ADMIN — CHLORHEXIDINE GLUCONATE 15 MILLILITER(S): 213 SOLUTION TOPICAL at 05:13

## 2020-02-06 RX ADMIN — GABAPENTIN 300 MILLIGRAM(S): 400 CAPSULE ORAL at 05:22

## 2020-02-06 RX ADMIN — TAMSULOSIN HYDROCHLORIDE 0.4 MILLIGRAM(S): 0.4 CAPSULE ORAL at 05:23

## 2020-02-06 RX ADMIN — HEPARIN SODIUM 5000 UNIT(S): 5000 INJECTION INTRAVENOUS; SUBCUTANEOUS at 05:23

## 2020-02-06 RX ADMIN — Medication 2 PACKET(S): at 21:42

## 2020-02-06 RX ADMIN — Medication 25 MILLIGRAM(S): at 05:23

## 2020-02-06 RX ADMIN — GABAPENTIN 300 MILLIGRAM(S): 400 CAPSULE ORAL at 17:20

## 2020-02-06 RX ADMIN — Medication 100 MILLIGRAM(S): at 05:23

## 2020-02-06 NOTE — PROGRESS NOTE ADULT - SUBJECTIVE AND OBJECTIVE BOX
Date of service: 20 @ 11:21    OOB in bed   Left forearm redness and swelling is persistent  No chills  No fever    ROS: denies dizziness, no HA, no SOB or cough, no abdominal pain, no diarrhea or constipation; no dysuria, no legs pain, no new rashes    MEDICATIONS  (STANDING):  aMIOdarone    Tablet 200 milliGRAM(s) Oral daily  aspirin enteric coated 81 milliGRAM(s) Oral daily  chlorhexidine 0.12% Liquid 15 milliLiter(s) Swish and Spit two times a day  cholecalciferol 2000 Unit(s) Oral daily  doxycycline hyclate Capsule 100 milliGRAM(s) Oral every 12 hours  finasteride 5 milliGRAM(s) Oral daily  furosemide   Injectable 40 milliGRAM(s) IV Push daily  gabapentin 300 milliGRAM(s) Oral two times a day  heparin  Injectable 5000 Unit(s) SubCutaneous every 12 hours  metoprolol tartrate 25 milliGRAM(s) Oral two times a day  tamsulosin 0.4 milliGRAM(s) Oral two times a day      Vital Signs Last 24 Hrs  T(C): 36.8 (2020 10:00), Max: 37.6 (2020 00:00)  T(F): 98.3 (2020 10:00), Max: 99.6 (2020 00:00)  HR: 73 (2020 11:00) (56 - 82)  BP: 109/61 (2020 11:00) (109/61 - 174/63)  BP(mean): 76 (2020 11:00) (70 - 121)  RR: 18 (2020 11:00) (11 - 24)  SpO2: 95% (2020 11:00) (93% - 100%)    Physical Exam:      Constitutional:  No acute distress  HEENT: NC/AT, EOMI, PERRLA, conjunctivae clear  Neck: supple; thyroid not palpable  Back: no tenderness  Respiratory: respiratory effort normal; crackles at bases  Cardiovascular: S1S2 regular, no murmurs  Abdomen: soft, not tender, not distended, positive BS  Genitourinary: no suprapubic tenderness  Lymphatic: no LN palpable  Musculoskeletal: no muscle tenderness, no joint swelling or tenderness  Extremities: 2+ right lower pedal edema; mild erythema; much improved; no warmth; no skin break  Left forearm erythema edema and tenderness; prior peripheral lines removed  Neurological/ Psychiatric: AxOx3, judgement and insight normal; moving all extremities  Skin: no rashes; no palpable lesions    Labs: reviewed                        9.8    7.34  )-----------( 253      ( 2020 06:34 )             30.7     02-06    141  |  107  |  21  ----------------------------<  102<H>  3.3<L>   |  31  |  0.83    Ca    8.8      2020 06:34  Phos  2.2     02-06  Mg     1.9     02-06                                 10.6   5.92  )-----------( 109      ( 2020 07:27 )             32.0     01-30    139  |  110<H>  |  21  ----------------------------<  105<H>  3.6   |  21<L>  |  0.77    Ca    8.1<L>      2020 07:27  Phos  2.6     01-30  Mg     2.3     -30    TPro  5.7<L>  /  Alb  2.8<L>  /  TBili  0.9  /  DBili  x   /  AST  101<H>  /  ALT  79<H>  /  AlkPhos  85  01-30     LIVER FUNCTIONS - ( 2020 07:27 )  Alb: 2.8 g/dL / Pro: 5.7 gm/dL / ALK PHOS: 85 U/L / ALT: 79 U/L / AST: 101 U/L / GGT: x           Urinalysis Basic - ( 2020 22:06 )    Color: Yellow / Appearance: very cloudy / S.020 / pH: x  Gluc: x / Ketone: Trace  / Bili: Negative / Urobili: Negative mg/dL   Blood: x / Protein: 30 mg/dL / Nitrite: Negative   Leuk Esterase: Trace / RBC: >50 /HPF / WBC 3-5   Sq Epi: x / Non Sq Epi: Few / Bacteria: Many      Culture - Urine (collected 2020 22:06)  Source: .Urine None  Final Report (2020 09:11):    No growth    Culture - Blood (collected 2020 19:57)  Source: .Blood None  Preliminary Report (2020 01:03):    No growth to date.    Culture - Blood (collected 2020 19:57)  Source: .Blood None  Preliminary Report (2020 01:03):    No growth to date.    Culture - Blood (20 @ 05:41)    Specimen Source: .Blood None    Culture Results:   No growth to date.    Radiology: all available radiological tests reviewed    < from: Xray Chest 1 View-PORTABLE IMMEDIATE (20 @ 04:35) >  LEFT PICC line catheter tip in SVC. Cardiomegaly. No evidence of active chest disease.    < end of copied text >    < from: CT Chest w/ IV Cont (20 @ 11:44) >  Cardiomegaly with bilateral perihilar opacity and small bilateral pleural effusions, consistent with a pulmonary edema pattern.  Bibasilar consolidation, likely atelectasis, less likely pneumonia.    < end of copied text >      Advanced directives addressed: full resuscitation

## 2020-02-06 NOTE — PROGRESS NOTE ADULT - SUBJECTIVE AND OBJECTIVE BOX
Patient is a 84y old  Male who presents with a chief complaint of SIRS (05 Feb 2020 16:25)    HPI:  1/30/2020- Cardiology Consultation: Patient with HBP, HLD and known moderate aortic stenosis and regurgitation is S/P ORIF left ankle 1/1/2019 the admitted for right leg cellulitis 1/10/2020 and discharged with a PICC line for 6 weeks of antibiotics. He presented today with bilateral leg pain and was noted to be febrile to 104, breathless and having frequent VPCs. The patient denies chest pain. There is no history of CAD or heart failure. EP has started him on IV amiodarone.    Followup HPI:  1/31- Breathless. Remains on Bipap. Getting prn doses of IV furosemide for respiratory distress. Denies chest pain.   2/1 on bipap. wife at bedside states he is better than yesterday. appears fairly comfortable on bipap  2/2 appears comfortable. still having intermittent episodes of tachycardia and then worsening dyspnea. temp to 102  2/3 alert appears comfortable on high flow oxygen. temp 100.5  2/4 alert, appears comfortable, no complaints  2/5 s/p cath no complaints  2/6. Feels weak. No other complaints. On nasal O2 today.    PAST MEDICAL & SURGICAL HISTORY:  HLD (hyperlipidemia)  Enlarged prostate  HTN (hypertension)  Status post ORIF of fracture of ankle: Right, 10/2019      Review of symptoms:  Negative except for as noted in today's HPI.      MEDICATIONS  (STANDING):  aMIOdarone    Tablet 200 milliGRAM(s) Oral daily  aspirin enteric coated 81 milliGRAM(s) Oral daily  chlorhexidine 0.12% Liquid 15 milliLiter(s) Swish and Spit two times a day  cholecalciferol 2000 Unit(s) Oral daily  doxycycline hyclate Capsule 100 milliGRAM(s) Oral every 12 hours  finasteride 5 milliGRAM(s) Oral daily  furosemide   Injectable 40 milliGRAM(s) IV Push daily  gabapentin 300 milliGRAM(s) Oral two times a day  heparin  Injectable 5000 Unit(s) SubCutaneous every 12 hours  metoprolol tartrate 25 milliGRAM(s) Oral two times a day  tamsulosin 0.4 milliGRAM(s) Oral two times a day    MEDICATIONS  (PRN):  acetaminophen   Tablet .. 650 milliGRAM(s) Oral every 6 hours PRN Temp greater or equal to 38C (100.4F), Mild Pain (1 - 3)          Vital Signs Last 24 Hrs  T(C): 37.1 (06 Feb 2020 06:00), Max: 37.6 (06 Feb 2020 00:00)  T(F): 98.7 (06 Feb 2020 06:00), Max: 99.6 (06 Feb 2020 00:00)  HR: 63 (06 Feb 2020 08:00) (56 - 84)  BP: 134/51 (06 Feb 2020 08:00) (109/69 - 174/63)  BP(mean): 76 (06 Feb 2020 08:00) (73 - 121)  RR: 17 (06 Feb 2020 08:00) (11 - 24)  SpO2: 99% (06 Feb 2020 08:00) (93% - 100%)    I&O's Summary    05 Feb 2020 07:01  -  06 Feb 2020 07:00  --------------------------------------------------------  IN: 350 mL / OUT: 1750 mL / NET: -1400 mL        PHYSICAL EXAM  General Appearance: comfortable, weak  HEENT:   Neck:   Lungs: clear  Heart: 2/6 [place]  Abdomen:   Extremities: red left forearm  Neurologic: no focal deficits      INTERPRETATION OF TELEMETRY: RSR    ECG:    LABS:                          9.8    7.34  )-----------( 253      ( 06 Feb 2020 06:34 )             30.7     02-06    141  |  107  |  21  ----------------------------<  102<H>  3.3<L>   |  31  |  0.83    Ca    8.8      06 Feb 2020 06:34  Phos  2.2     02-06  Mg     1.9     02-06            Pro BNP  [id number] 02-06 @ 06:34  D Dimer  -- 02-06 @ 06:34  Pro BNP  4497 02-04 @ 07:02  D Dimer  -- 02-04 @ 07:02  Pro BNP  [id number] 01-31 @ 05:41  D Dimer  -- 01-31 @ 05:41              RADIOLOGY & ADDITIONAL STUDIES:    IMPRESSION:  1, Systemic inflammatory response syndrome. Source undetermined. Blood cultures neg so far. PICC line removed. No TTE evidence of endocarditis. Clinically much improved.   2. Elevated troponin consistent with sepsis. Cannot exclude NSTEMI. Cath 2/5 with normal coronaries  3. Moderate-severe  AS   4. VPCs due to sepsis . Improved on oral amiodarone.   5.HBP. Antihypertensives on hold due to sepsis.   6. pulmonary edema improved mean pcwp  16 on 2/5.  PLAN:  cont  oral  amiodarone,  furosemide oxygen  IV antibiotics per ID.   cont  bb.  patient will need avr however will need to be off abx with no infection first  ok to stop asa  PLAN:

## 2020-02-06 NOTE — PROGRESS NOTE ADULT - ASSESSMENT
85 y/o M with h/o HTN, HLD, BPH with chronic chowdary, right ankle fracture (Oct 10, 2019) s/p ORIF, recently hospitalized (1/10/2020) for RLE cellulitis and possible underlying acute OM and/or ankle hardware infection on vancomycin IV and ceftriaxone (since 1/10 via PICC line) was admitted on 1/29 for lower leg pains. He states for the past week PTA, he has had sharp severe leg pains. They occur every day, often lasting for hours at a time, and are not related to exertion. He states that when the pains come it makes it difficult to walk, although when it is not there, he can walk without difficulty. The pains are from his ankle up to his upper thighs (he cannot tell from where it starts and stops). He reported a fever to 104F at home. In ER he was febrile to 102.7F and received vancomycin IV and cefepime.     1. Low grade fever. Left forearm erythema/ edema ?cellulitis vs peripheral line infiltration. RLE cellulitis improving with possible underlying acute OM and/or ankle hardware infection. s/p recent ankle fracture s/p ORIF. BPH with chronic chowdary. CHF.  -left arm erythema ans edema is slightly improved today  -no leukocytosis  -BC x 2 are negative to date  -right leg erythema and edema is resolving  -s/p vancomycin 750 mg IV q12h and ceftriaxone 1 gm IV qd (since 1/10/2020)  -s/p zosyn 3.375 gm IV q8h # 4  -on doxycycline 100 mg PO q12h # 6  -tolerating abx well so far; no side effects noted  -elevate leg  -new midline in place  -continue abx coverage with doxy PO  -plan for repeat BC x 2  -monitor temps  -f/u CBC  -supportive care  2. Other issues:   -care per medicine

## 2020-02-06 NOTE — PROGRESS NOTE ADULT - ASSESSMENT
85 y/o M admitted 1/29 with sepsis likely result of LLL infiltrate, Pneumonia due to gram negative organisms most likely  acute type 1 hypoxic respiratory failure due to combination of pneumonia and flash pulmonary edema/acute systolic HF - HFrEF - potentially due to AS vs cardiac ischemia  Will need cardiac cath to determine specific etiology  NSVT on Amio gtt  currently on 5L NC O2 - hemodynamics reasonable.  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  still needs AVR when infection has cleared    PMHx of HTN, HLD, BPH with recent R ankle Fx (10/19) - treated for osteomyelitis    Plan at this time is for transfer to Med Surg floor,   and should also include:  planning for evaluation for aortic valve replacement  close observation for recurrence of infection  HOB 30  Continue Doxycycline  completed pip/hao on 2/5  F/u cultures  Resume Diet  PO amio and low dose BBx  ASA  titrate FiO2 to Sat>92%  Cont daily IV furosemide to keep on dry side  DVT prophy-- sq hep   Supportive care 85 y/o M admitted 1/29 with sepsis likely result of LLL infiltrate, Pneumonia due to gram negative organisms most likely  acute type 1 hypoxic respiratory failure due to combination of pneumonia and flash pulmonary edema/acute systolic HF - HFrEF - potentially due to AS vs cardiac ischemia  Will need cardiac cath to determine specific etiology  NSVT on Amio gtt  currently on 5L NC O2 - hemodynamics reasonable.  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  still needs AVR when infection has cleared    PMHx of HTN, HLD, BPH with recent R ankle Fx (10/19) - treated for osteomyelitis    Plan at this time is for transfer to Med Surg floor,   and should also include:  planning for evaluation for aortic valve replacement  close observation for recurrence of infection  HOB 30  Continue Doxycycline for approx 2 more weeks  completed pip/hao on 2/5  F/u cultures  Diet  amiodarone and low dose BBx  ASA  titrate FiO2 to Sat>92%  Cont daily IV furosemide   DVT prophy-- sq hep   Supportive care 85 y/o M admitted 1/29 with sepsis likely result of LLL infiltrate, Pneumonia due to gram negative organisms most likely  acute type 1 hypoxic respiratory failure due to combination of pneumonia and flash pulmonary edema/acute systolic HF - HFrEF - potentially due to AS vs cardiac ischemia  Will need cardiac cath to determine specific etiology  NSVT on Amio gtt - no converted to PO amiodarone  currently on 5L NC O2 - hemodynamics reasonable.  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  still needs AVR when infection has cleared  B/L 'thigh pain' - cramping and weakness due to debility vs neuropathy?  Better with neurontin    PMHx of HTN, HLD, BPH with recent R ankle Fx (10/19) - treated for osteomyelitis    Plan at this time is for transfer to Med Surg floor,   and should also include:  planning for evaluation for aortic valve replacement  close observation for recurrence of infection  HOB 30  Continue Doxycycline for approx 2 more weeks  completed pip/hao on 2/5  F/u cultures  Diet  amiodarone and low dose BBx  ASA  titrate FiO2 to Sat>92%  Cont daily IV furosemide   DVT prophy-- sq hep   Supportive care    case d/w pt and wife and son in detail including all of the above - will need to decide where he wants to pursue valve replacement (ie. institution) - and will need to choose appropriate rehab facility for interim care.  Discussed issues of ongoing infection and need for long term abx.  All questions answered. 85 y/o M admitted 1/29 with sepsis likely result of LLL infiltrate, Pneumonia due to gram negative organisms most likely  acute type 1 hypoxic respiratory failure due to combination of pneumonia and flash pulmonary edema/acute systolic HF - HFrEF - potentially due to AS vs cardiac ischemia  Will need cardiac cath to determine specific etiology  NSVT on Amio gtt - no converted to PO amiodarone  currently on 5L NC O2 - hemodynamics reasonable.  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  still needs AVR when infection has cleared  B/L 'thigh pain' - cramping and weakness due to debility vs neuropathy?  Better with neurontin    PMHx of HTN, HLD, BPH with recent R ankle Fx (10/19) - treated for osteomyelitis    Plan at this time is for transfer to Med Surg floor,   and should also include:  planning for evaluation for aortic valve replacement  close observation for recurrence of infection  HOB 30  Continue Doxycycline for approx 2 more weeks  completed pip/hao on 2/5  F/u cultures  Diet  amiodarone and low dose BBx  ASA  titrate FiO2 to Sat>92%  Cont daily IV furosemide   DVT prophy-- sq hep   Supportive care    case d/w pt and wife and son in detail including all of the above - will need to decide where he wants to pursue valve replacement (ie. institution) - and will need to choose appropriate rehab facility for interim care.  Discussed issues of ongoing infection and need for long term abx.  All questions answered.    case d/w Dr De La Paz of the hospitalist service who will assume care for the patient.

## 2020-02-06 NOTE — PROGRESS NOTE ADULT - SUBJECTIVE AND OBJECTIVE BOX
85 y/o M admitted with RIGHT ankle Fx (10/19) with possible OM, PICC placed and sent home on IV vanco/CTX about 2 weeks prior to presentation.  Now admitted 1/29 with apparent sepsis.  TTE with mod aortic stenosis - pt developed flash pulm edema with fever and tachycardia on 1/30 necessitating RRT  and transfer to ICU for further care.  PICC was pulled on 1/30.  BCx negative thus far.      1/31: Tm 104.7 Confused overnight.  More awake and alert this morning.  Was on NIV.  On Amio gtt for NSVT  2/1:  Temps lower.  CT chest with likely LLL infiltrate.  On NIV overnight    2/2: Tm 102.  again with flash pulmonary edema in am --placed on HFNC and given furosemide 40- -u/o 600ml.  Case d/w Dr Tubbs -- AV is mod-severe stenosis--will also need to r/o ischemia--LHC this week.  Will change to PO amio.    above d/w Dr Blackburn who has been caring for the patient.     2/3: OOB to chair, tolerating PO diet, remains on high flow O2 - hemodynamics reasonable.  D/w Dr Tubbs - will do cardiac cath this week if pt remains stable.  No new issues - No overnight events.  Tm 101*F      2/4: down to 45% FiO2 on high flow NC - hemodynamics reasonable - no further evidence of flash pulm edema last 24-48 hrs.    D/w Dr Tubbs who will arrange for cardiac cath 2/5.    Notably BNP down to 4k.  OOB to chair and tolerating diet as appropriate.    2/5: uneventful cath this morning - no significant CAD - titrated off of high-flow oxygen - hemodynamics reasonable - No new issues, No overnight events.    2/6: tolerating 5L NC O2 without issues.  Hemodynamics reasonable.  BNP remains elevated.  D/w cardiology and ID - will likely require long-term abx and eval for infected ankle hardware by ortho - needs cardiac eval to see when they could consider valve replacement/repair given the underlying possibility of infection.      Allergies    No Known Allergies    Intolerances    Lipitor (Joint Pain)      ICU Vital Signs Last 24 Hrs  T(C): 36.8 (06 Feb 2020 10:00), Max: 37.6 (06 Feb 2020 00:00)  T(F): 98.3 (06 Feb 2020 10:00), Max: 99.6 (06 Feb 2020 00:00)  HR: 73 (06 Feb 2020 11:00) (56 - 82)  BP: 109/61 (06 Feb 2020 11:00) (109/61 - 174/63)  BP(mean): 76 (06 Feb 2020 11:00) (70 - 121)  RR: 18 (06 Feb 2020 11:00) (11 - 24)  SpO2: 95% (06 Feb 2020 11:00) (93% - 100%)          I&O's Summary    05 Feb 2020 07:01  -  06 Feb 2020 07:00  --------------------------------------------------------  IN: 350 mL / OUT: 1750 mL / NET: -1400 mL    06 Feb 2020 07:01  -  06 Feb 2020 11:29  --------------------------------------------------------  IN: 0 mL / OUT: 1200 mL / NET: -1200 mL                              9.8    7.34  )-----------( 253      ( 06 Feb 2020 06:34 )             30.7       02-06    141  |  107  |  21  ----------------------------<  102<H>  3.3<L>   |  31  |  0.83    Ca    8.8      06 Feb 2020 06:34  Phos  2.2     02-06  Mg     1.9     02-06      MEDICATIONS  (STANDING):  aMIOdarone    Tablet 200 milliGRAM(s) Oral daily  aspirin enteric coated 81 milliGRAM(s) Oral daily  chlorhexidine 0.12% Liquid 15 milliLiter(s) Swish and Spit two times a day  cholecalciferol 2000 Unit(s) Oral daily  doxycycline hyclate Capsule 100 milliGRAM(s) Oral every 12 hours  finasteride 5 milliGRAM(s) Oral daily  furosemide   Injectable 40 milliGRAM(s) IV Push daily  gabapentin 300 milliGRAM(s) Oral two times a day  heparin  Injectable 5000 Unit(s) SubCutaneous every 12 hours  metoprolol tartrate 25 milliGRAM(s) Oral two times a day  tamsulosin 0.4 milliGRAM(s) Oral two times a day    MEDICATIONS  (PRN):  acetaminophen   Tablet .. 650 milliGRAM(s) Oral every 6 hours PRN Temp greater or equal to 38C (100.4F), Mild Pain (1 - 3)          DVT Prophylaxis:    Advanced Directives:  Discussed with:    Visit Information:          minutes of Critical Care time spent providing medical care for patient's acute illness/conditions that impairs at least one vital organ system and/or poses a high risk of imminent or life threatening deterioration in the patient's condition.  It includes time spent reviewing labs, radiology, discussing goals of care with patient and/or family, and discussing the case with a multidisciplinary team in an effort to prevent further life threatening deterioration or end organ damage.  This time is independent of any procedures performed.    ** Time is exclusive of billed procedures and/or teaching and/or routine family updates. 85 y/o M admitted with RIGHT ankle Fx (10/19) with possible OM, PICC placed and sent home on IV vanco/CTX about 2 weeks prior to presentation.  Now admitted 1/29 with apparent sepsis.  TTE with mod aortic stenosis - pt developed flash pulm edema with fever and tachycardia on 1/30 necessitating RRT  and transfer to ICU for further care.  PICC was pulled on 1/30.  BCx negative thus far.      1/31: Tm 104.7 Confused overnight.  More awake and alert this morning.  Was on NIV.  On Amio gtt for NSVT  2/1:  Temps lower.  CT chest with likely LLL infiltrate.  On NIV overnight    2/2: Tm 102.  again with flash pulmonary edema in am --placed on HFNC and given furosemide 40- -u/o 600ml.  Case d/w Dr Tubbs -- AV is mod-severe stenosis--will also need to r/o ischemia--LHC this week.  Will change to PO amio.    above d/w Dr Blackburn who has been caring for the patient.     2/3: OOB to chair, tolerating PO diet, remains on high flow O2 - hemodynamics reasonable.  D/w Dr Tubbs - will do cardiac cath this week if pt remains stable.  No new issues - No overnight events.  Tm 101*F      2/4: down to 45% FiO2 on high flow NC - hemodynamics reasonable - no further evidence of flash pulm edema last 24-48 hrs.    D/w Dr Tubbs who will arrange for cardiac cath 2/5.    Notably BNP down to 4k.  OOB to chair and tolerating diet as appropriate.    2/5: uneventful cath this morning - no significant CAD - titrated off of high-flow oxygen - hemodynamics reasonable - No new issues, No overnight events.    2/6: tolerating 5L NC O2 without issues.  Hemodynamics reasonable.  BNP remains elevated.  D/w cardiology and ID - will likely require additional 2 weeks of ABx to treat LE cellulitis/possible osteomyelitis - all cultures remain NGTD.  Pt will need interventional cardiology eval to see when they could consider valve replacement/repair given the underlying possibility of infection.  Will need RAVI and CT to assess replacement valve size as well as CT surgery eval to determine candidacy for open valve vs TAVR.      Allergies    No Known Allergies    Intolerances    Lipitor (Joint Pain)      ICU Vital Signs Last 24 Hrs  T(C): 36.8 (06 Feb 2020 10:00), Max: 37.6 (06 Feb 2020 00:00)  T(F): 98.3 (06 Feb 2020 10:00), Max: 99.6 (06 Feb 2020 00:00)  HR: 73 (06 Feb 2020 11:00) (56 - 82)  BP: 109/61 (06 Feb 2020 11:00) (109/61 - 174/63)  BP(mean): 76 (06 Feb 2020 11:00) (70 - 121)  RR: 18 (06 Feb 2020 11:00) (11 - 24)  SpO2: 95% (06 Feb 2020 11:00) (93% - 100%)          I&O's Summary    05 Feb 2020 07:01  -  06 Feb 2020 07:00  --------------------------------------------------------  IN: 350 mL / OUT: 1750 mL / NET: -1400 mL    06 Feb 2020 07:01  -  06 Feb 2020 11:29  --------------------------------------------------------  IN: 0 mL / OUT: 1200 mL / NET: -1200 mL                              9.8    7.34  )-----------( 253      ( 06 Feb 2020 06:34 )             30.7       02-06    141  |  107  |  21  ----------------------------<  102<H>  3.3<L>   |  31  |  0.83    Ca    8.8      06 Feb 2020 06:34  Phos  2.2     02-06  Mg     1.9     02-06      MEDICATIONS  (STANDING):  aMIOdarone    Tablet 200 milliGRAM(s) Oral daily  aspirin enteric coated 81 milliGRAM(s) Oral daily  chlorhexidine 0.12% Liquid 15 milliLiter(s) Swish and Spit two times a day  cholecalciferol 2000 Unit(s) Oral daily  doxycycline hyclate Capsule 100 milliGRAM(s) Oral every 12 hours  finasteride 5 milliGRAM(s) Oral daily  furosemide   Injectable 40 milliGRAM(s) IV Push daily  gabapentin 300 milliGRAM(s) Oral two times a day  heparin  Injectable 5000 Unit(s) SubCutaneous every 12 hours  metoprolol tartrate 25 milliGRAM(s) Oral two times a day  tamsulosin 0.4 milliGRAM(s) Oral two times a day    MEDICATIONS  (PRN):  acetaminophen   Tablet .. 650 milliGRAM(s) Oral every 6 hours PRN Temp greater or equal to 38C (100.4F), Mild Pain (1 - 3)      Review of Systems:   · Additional ROS	NO CP, NO SOB, NO NVD, NO chills - tolerating PO diet, OOB to chair. NO headache/LOC, NO dysuria, NO uritcaria.  ALL other ROS are NEGATIVE.	    Physical Exam:   · Constitutional	detailed exam	  · Constitutional Details	ill, frail/cachectic	  · Respiratory	detailed exam	  · Respiratory Details	airway patent, Good AE B/L - NO W/R/R	  · Cardiovascular	detailed exam	  · Cardiovascular Details	regular rate and rhythm	  · Gastrointestinal	detailed exam	  · GI Normal	soft; nontender (+) BS	  · Genitourinary		  · Extremities	detailed exam	  · Extremities Details	no cyanosis; no pedal edema	  · Neurological	detailed exam	  · Neurological Details	alert and oriented x 3; Grossly non focal	  · Skin	detailed exam	  · Skin Details	warm and dry; color normal	  · Musculoskeletal	detailed exam	  · Musculoskeletal Details	decreased ROM, reasonable strength x 4 extremities	  Psychiatric details:   	                            Normal behavior and Normal affect       DVT Prophylaxis: SQ heparin, venodynes    Advanced Directives:  Discussed with:    Visit Information:  SSQ 85 y/o M admitted with RIGHT ankle Fx (10/19) with possible OM, PICC placed and sent home on IV vanco/CTX about 2 weeks prior to presentation.  Now admitted 1/29 with apparent sepsis.  TTE with mod aortic stenosis - pt developed flash pulm edema with fever and tachycardia on 1/30 necessitating RRT  and transfer to ICU for further care.  PICC was pulled on 1/30.  BCx negative thus far.      1/31: Tm 104.7 Confused overnight.  More awake and alert this morning.  Was on NIV.  On Amio gtt for NSVT  2/1:  Temps lower.  CT chest with likely LLL infiltrate.  On NIV overnight    2/2: Tm 102.  again with flash pulmonary edema in am --placed on HFNC and given furosemide 40- -u/o 600ml.  Case d/w Dr Tubbs -- AV is mod-severe stenosis--will also need to r/o ischemia--LHC this week.  Will change to PO amio.    above d/w Dr Blackburn who has been caring for the patient.     2/3: OOB to chair, tolerating PO diet, remains on high flow O2 - hemodynamics reasonable.  D/w Dr Tubbs - will do cardiac cath this week if pt remains stable.  No new issues - No overnight events.  Tm 101*F      2/4: down to 45% FiO2 on high flow NC - hemodynamics reasonable - no further evidence of flash pulm edema last 24-48 hrs.    D/w Dr Tubbs who will arrange for cardiac cath 2/5.    Notably BNP down to 4k.  OOB to chair and tolerating diet as appropriate.    2/5: uneventful cath this morning - no significant CAD - titrated off of high-flow oxygen - hemodynamics reasonable - No new issues, No overnight events.    2/6: tolerating 5L NC O2 without issues.  Hemodynamics reasonable.  BNP remains elevated.  D/w cardiology and ID - will likely require additional 2 weeks of ABx to treat LE cellulitis/possible osteomyelitis - all cultures remain NGTD.  Pt will need interventional cardiology eval to see when they could consider valve replacement/repair given the underlying possibility of infection.  Will need RAVI and CT to assess replacement valve size as well as CT surgery eval to determine candidacy for open valve vs TAVR.    Pt is afebrile and currently tolerating room air with >92 % sat      Allergies    No Known Allergies    Intolerances    Lipitor (Joint Pain)      ICU Vital Signs Last 24 Hrs  T(C): 36.8 (06 Feb 2020 10:00), Max: 37.6 (06 Feb 2020 00:00)  T(F): 98.3 (06 Feb 2020 10:00), Max: 99.6 (06 Feb 2020 00:00)  HR: 73 (06 Feb 2020 11:00) (56 - 82)  BP: 109/61 (06 Feb 2020 11:00) (109/61 - 174/63)  BP(mean): 76 (06 Feb 2020 11:00) (70 - 121)  RR: 18 (06 Feb 2020 11:00) (11 - 24)  SpO2: 95% (06 Feb 2020 11:00) (93% - 100%)          I&O's Summary    05 Feb 2020 07:01  -  06 Feb 2020 07:00  --------------------------------------------------------  IN: 350 mL / OUT: 1750 mL / NET: -1400 mL    06 Feb 2020 07:01  -  06 Feb 2020 11:29  --------------------------------------------------------  IN: 0 mL / OUT: 1200 mL / NET: -1200 mL                              9.8    7.34  )-----------( 253      ( 06 Feb 2020 06:34 )             30.7       02-06    141  |  107  |  21  ----------------------------<  102<H>  3.3<L>   |  31  |  0.83    Ca    8.8      06 Feb 2020 06:34  Phos  2.2     02-06  Mg     1.9     02-06      MEDICATIONS  (STANDING):  aMIOdarone    Tablet 200 milliGRAM(s) Oral daily  aspirin enteric coated 81 milliGRAM(s) Oral daily  chlorhexidine 0.12% Liquid 15 milliLiter(s) Swish and Spit two times a day  cholecalciferol 2000 Unit(s) Oral daily  doxycycline hyclate Capsule 100 milliGRAM(s) Oral every 12 hours  finasteride 5 milliGRAM(s) Oral daily  furosemide   Injectable 40 milliGRAM(s) IV Push daily  gabapentin 300 milliGRAM(s) Oral two times a day  heparin  Injectable 5000 Unit(s) SubCutaneous every 12 hours  metoprolol tartrate 25 milliGRAM(s) Oral two times a day  tamsulosin 0.4 milliGRAM(s) Oral two times a day    MEDICATIONS  (PRN):  acetaminophen   Tablet .. 650 milliGRAM(s) Oral every 6 hours PRN Temp greater or equal to 38C (100.4F), Mild Pain (1 - 3)      Review of Systems:   · Additional ROS	NO CP, NO SOB, NO NVD, NO chills - tolerating PO diet, OOB to chair. NO headache/LOC, NO dysuria, NO uritcaria.  ALL other ROS are NEGATIVE.	    Physical Exam:   · Constitutional	detailed exam	  · Constitutional Details	ill, frail/cachectic	  · Respiratory	detailed exam	  · Respiratory Details	airway patent, Good AE B/L - NO W/R/R	  · Cardiovascular	detailed exam	  · Cardiovascular Details	regular rate and rhythm	  · Gastrointestinal	detailed exam	  · GI Normal	soft; nontender (+) BS	  · Genitourinary		  · Extremities	detailed exam	  · Extremities Details	no cyanosis; no pedal edema	  · Neurological	detailed exam	  · Neurological Details	alert and oriented x 3; Grossly non focal	  · Skin	detailed exam	  · Skin Details	warm and dry; color normal	  · Musculoskeletal	detailed exam	  · Musculoskeletal Details	decreased ROM, reasonable strength x 4 extremities	  Psychiatric details:   	                            Normal behavior and Normal affect       DVT Prophylaxis: SQ heparin, venodynes    Advanced Directives:  Discussed with:    Visit Information:  SSVENANC[facility]

## 2020-02-07 LAB
ANION GAP SERPL CALC-SCNC: 7 MMOL/L — SIGNIFICANT CHANGE UP (ref 5–17)
BUN SERPL-MCNC: 21 MG/DL — SIGNIFICANT CHANGE UP (ref 7–23)
CALCIUM SERPL-MCNC: 8.9 MG/DL — SIGNIFICANT CHANGE UP (ref 8.5–10.1)
CHLORIDE SERPL-SCNC: 107 MMOL/L — SIGNIFICANT CHANGE UP (ref 96–108)
CO2 SERPL-SCNC: 30 MMOL/L — SIGNIFICANT CHANGE UP (ref 22–31)
CREAT SERPL-MCNC: 0.79 MG/DL — SIGNIFICANT CHANGE UP (ref 0.5–1.3)
GLUCOSE SERPL-MCNC: 106 MG/DL — HIGH (ref 70–99)
HCT VFR BLD CALC: 30.3 % — LOW (ref 39–50)
HGB BLD-MCNC: 9.9 G/DL — LOW (ref 13–17)
MAGNESIUM SERPL-MCNC: 1.9 MG/DL — SIGNIFICANT CHANGE UP (ref 1.6–2.6)
MCHC RBC-ENTMCNC: 30.5 PG — SIGNIFICANT CHANGE UP (ref 27–34)
MCHC RBC-ENTMCNC: 32.7 GM/DL — SIGNIFICANT CHANGE UP (ref 32–36)
MCV RBC AUTO: 93.2 FL — SIGNIFICANT CHANGE UP (ref 80–100)
PHOSPHATE SERPL-MCNC: 2.7 MG/DL — SIGNIFICANT CHANGE UP (ref 2.5–4.5)
PLATELET # BLD AUTO: 275 K/UL — SIGNIFICANT CHANGE UP (ref 150–400)
POTASSIUM SERPL-MCNC: 3.4 MMOL/L — LOW (ref 3.5–5.3)
POTASSIUM SERPL-SCNC: 3.4 MMOL/L — LOW (ref 3.5–5.3)
RBC # BLD: 3.25 M/UL — LOW (ref 4.2–5.8)
RBC # FLD: 13.8 % — SIGNIFICANT CHANGE UP (ref 10.3–14.5)
SODIUM SERPL-SCNC: 144 MMOL/L — SIGNIFICANT CHANGE UP (ref 135–145)
WBC # BLD: 7.18 K/UL — SIGNIFICANT CHANGE UP (ref 3.8–10.5)
WBC # FLD AUTO: 7.18 K/UL — SIGNIFICANT CHANGE UP (ref 3.8–10.5)

## 2020-02-07 PROCEDURE — 99233 SBSQ HOSP IP/OBS HIGH 50: CPT

## 2020-02-07 RX ORDER — POTASSIUM CHLORIDE 20 MEQ
40 PACKET (EA) ORAL ONCE
Refills: 0 | Status: COMPLETED | OUTPATIENT
Start: 2020-02-07 | End: 2020-02-07

## 2020-02-07 RX ADMIN — CHLORHEXIDINE GLUCONATE 15 MILLILITER(S): 213 SOLUTION TOPICAL at 05:48

## 2020-02-07 RX ADMIN — GABAPENTIN 300 MILLIGRAM(S): 400 CAPSULE ORAL at 17:21

## 2020-02-07 RX ADMIN — Medication 2000 UNIT(S): at 12:00

## 2020-02-07 RX ADMIN — Medication 25 MILLIGRAM(S): at 17:21

## 2020-02-07 RX ADMIN — FINASTERIDE 5 MILLIGRAM(S): 5 TABLET, FILM COATED ORAL at 12:01

## 2020-02-07 RX ADMIN — CHLORHEXIDINE GLUCONATE 15 MILLILITER(S): 213 SOLUTION TOPICAL at 17:21

## 2020-02-07 RX ADMIN — TAMSULOSIN HYDROCHLORIDE 0.4 MILLIGRAM(S): 0.4 CAPSULE ORAL at 17:21

## 2020-02-07 RX ADMIN — Medication 100 MILLIGRAM(S): at 17:21

## 2020-02-07 RX ADMIN — HEPARIN SODIUM 5000 UNIT(S): 5000 INJECTION INTRAVENOUS; SUBCUTANEOUS at 05:49

## 2020-02-07 RX ADMIN — Medication 100 MILLIGRAM(S): at 05:47

## 2020-02-07 RX ADMIN — TAMSULOSIN HYDROCHLORIDE 0.4 MILLIGRAM(S): 0.4 CAPSULE ORAL at 05:47

## 2020-02-07 RX ADMIN — AMIODARONE HYDROCHLORIDE 200 MILLIGRAM(S): 400 TABLET ORAL at 05:47

## 2020-02-07 RX ADMIN — Medication 40 MILLIEQUIVALENT(S): at 17:23

## 2020-02-07 RX ADMIN — Medication 80 MILLIGRAM(S): at 05:47

## 2020-02-07 RX ADMIN — HEPARIN SODIUM 5000 UNIT(S): 5000 INJECTION INTRAVENOUS; SUBCUTANEOUS at 17:21

## 2020-02-07 RX ADMIN — GABAPENTIN 300 MILLIGRAM(S): 400 CAPSULE ORAL at 05:46

## 2020-02-07 NOTE — PROGRESS NOTE ADULT - SUBJECTIVE AND OBJECTIVE BOX
83 y/o M admitted with RIGHT ankle Fx (10/19) with possible OM, PICC placed and sent home on IV vanco/CTX about 2 weeks prior to presentation.  Now admitted 1/29 with apparent sepsis.  TTE with mod aortic stenosis - pt developed flash pulm edema with fever and tachycardia on 1/30 necessitating RRT  and transfer to ICU for further care.  PICC was pulled on 1/30.  BCx negative thus far.      1/31: Tm 104.7 Confused overnight.  More awake and alert this morning.  Was on NIV.  On Amio gtt for NSVT  2/1:  Temps lower.  CT chest with likely LLL infiltrate.  On NIV overnight    2/2: Tm 102.  again with flash pulmonary edema in am --placed on HFNC and given furosemide 40- -u/o 600ml.  Case d/w Dr Tubbs -- AV is mod-severe stenosis--will also need to r/o ischemia--LHC this week.  Will change to PO amio.  above d/w Dr Blackburn who has been caring for the patient.  2/3: OOB to chair, tolerating PO diet, remains on high flow O2 - hemodynamics reasonable.  D/w Dr Tubbs - will do cardiac cath this week if pt remains stable.  No new issues - No overnight events.  Tm 101*F    2/4: down to 45% FiO2 on high flow NC - hemodynamics reasonable - no further evidence of flash pulm edema last 24-48 hrs.   D/w Dr Tubbs who will arrange for cardiac cath 2/5.    Notably BNP down to 4k.  OOB to chair and tolerating diet as appropriate.  2/5: uneventful cath this morning - no significant CAD - titrated off of high-flow oxygen - hemodynamics reasonable - No new issues, No overnight events.  2/6: tolerating 5L NC O2 without issues.  Hemodynamics reasonable.  BNP remains elevated.  D/w cardiology and ID - will likely require additional 2 weeks of ABx to treat LE cellulitis/possible osteomyelitis - all cultures remain NGTD.  Pt will need interventional cardiology eval to see when they could consider valve replacement/repair given the underlying possibility of infection.  Will need RAVI and CT to assess replacement valve size as well as CT surgery eval to determine candidacy for open valve vs TAVR.  Pt is afebrile and currently tolerating room air with >92 % sat  02/07/20: Patient seen and examined. He denies any complaints. He was transferred from ICU yesterday, admitted with sepsis, possibly due to LLL pneumonia, pulmonary edema, NSVTs. S/P IV zosyn. Now on amiodarone.           Vital Signs Last 24 Hrs  T(C): 36.8 (07 Feb 2020 11:48), Max: 37 (06 Feb 2020 23:40)  T(F): 98.2 (07 Feb 2020 11:48), Max: 98.6 (06 Feb 2020 23:40)  HR: 64 (07 Feb 2020 11:48) (61 - 73)  BP: 117/59 (07 Feb 2020 11:48) (111/52 - 140/50)  BP(mean): 76 (06 Feb 2020 22:30) (70 - 79)  RR: 18 (07 Feb 2020 11:48) (16 - 22)  SpO2: 93% (07 Feb 2020 11:48) (91% - 100%)      Physical Exam:   · Constitutional	detailed exam	  · Constitutional Details	ill, frail/cachectic	  · Respiratory	detailed exam	  · Respiratory Details	airway patent, Good AE B/L - NO W/R/R	  · Cardiovascular	detailed exam	  · Cardiovascular Details	regular rate and rhythm	  · Gastrointestinal	detailed exam	  · GI Normal	soft; nontender (+) BS	  · Genitourinary		  · Extremities	detailed exam	  · Extremities Details	no cyanosis; no pedal edema	  · Neurological	detailed exam	  · Neurological Details	alert and oriented x 3; Grossly non focal	  · Skin	detailed exam	  · Skin Details	warm and dry; color normal	  · Musculoskeletal	detailed exam	  · Musculoskeletal Details	decreased ROM, reasonable strength x 4 extremities	        Labs                            9.9    7.18  )-----------( 275      ( 07 Feb 2020 08:33 )             30.3     07 Feb 2020 08:33    144    |  107    |  21     ----------------------------<  106    3.4     |  30     |  0.79     Ca    8.9        07 Feb 2020 08:33  Phos  2.7       07 Feb 2020 08:33  Mg     1.9       07 Feb 2020 08:33            MEDICATIONS  (STANDING):  aMIOdarone    Tablet 200 milliGRAM(s) Oral daily  chlorhexidine 0.12% Liquid 15 milliLiter(s) Swish and Spit two times a day  cholecalciferol 2000 Unit(s) Oral daily  doxycycline hyclate Capsule 100 milliGRAM(s) Oral every 12 hours  finasteride 5 milliGRAM(s) Oral daily  furosemide    Tablet 80 milliGRAM(s) Oral daily  gabapentin 300 milliGRAM(s) Oral two times a day  heparin  Injectable 5000 Unit(s) SubCutaneous every 12 hours  metoprolol tartrate 25 milliGRAM(s) Oral two times a day  tamsulosin 0.4 milliGRAM(s) Oral two times a day    MEDICATIONS  (PRN):  acetaminophen   Tablet .. 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  Better with neurontin        planning for evaluation for aortic valve replacement  close observation for recurrence of infection  Continue Doxycycline for approx 2 more weeks  completed pip/hao on 2/5  F/u cultures  amiodarone and low dose BBx  ASA  titrate FiO2 to Sat>92%  Cont daily IV furosemide   DVT prophy-- sq hep   Supportive care

## 2020-02-07 NOTE — PROGRESS NOTE ADULT - SUBJECTIVE AND OBJECTIVE BOX
Date of service: 20 @ 12:58    Lying in bed in NAD  Weak looking  Left forarm erythema and edema is improving    ROS: no fever or chills; denies dizziness, no HA, no SOB or cough, no abdominal pain, no diarrhea or constipation; no dysuria, no legs pain    MEDICATIONS  (STANDING):  aMIOdarone    Tablet 200 milliGRAM(s) Oral daily  chlorhexidine 0.12% Liquid 15 milliLiter(s) Swish and Spit two times a day  cholecalciferol 2000 Unit(s) Oral daily  doxycycline hyclate Capsule 100 milliGRAM(s) Oral every 12 hours  finasteride 5 milliGRAM(s) Oral daily  furosemide    Tablet 80 milliGRAM(s) Oral daily  gabapentin 300 milliGRAM(s) Oral two times a day  heparin  Injectable 5000 Unit(s) SubCutaneous every 12 hours  metoprolol tartrate 25 milliGRAM(s) Oral two times a day  tamsulosin 0.4 milliGRAM(s) Oral two times a day      Vital Signs Last 24 Hrs  T(C): 36.8 (2020 11:48), Max: 37.1 (2020 14:00)  T(F): 98.2 (2020 11:48), Max: 98.7 (2020 14:00)  HR: 64 (2020 11:48) (61 - 80)  BP: 117/59 (2020 11:48) (105/53 - 140/50)  BP(mean): 76 (2020 22:30) (70 - 79)  RR: 18 (2020 11:48) (16 - 22)  SpO2: 93% (2020 11:48) (91% - 100%)    Physical Exam:      Constitutional:  No acute distress  HEENT: NC/AT, EOMI, PERRLA, conjunctivae clear  Neck: supple; thyroid not palpable  Back: no tenderness  Respiratory: respiratory effort normal; crackles at bases  Cardiovascular: S1S2 regular, no murmurs  Abdomen: soft, not tender, not distended, positive BS  Genitourinary: no suprapubic tenderness  Lymphatic: no LN palpable  Musculoskeletal: no muscle tenderness, no joint swelling or tenderness  Extremities: 2+ right lower pedal edema; mild erythema; much improved; no warmth; no skin break  Left forearm erythema edema and tenderness; prior peripheral lines removed  Neurological/ Psychiatric: AxOx3, judgement and insight normal; moving all extremities  Skin: no rashes; no palpable lesions    Labs: reviewed                        9.9    7.18  )-----------( 275      ( 2020 08:33 )             30.3     02-07    144  |  107  |  21  ----------------------------<  106<H>  3.4<L>   |  30  |  0.79    Ca    8.9      2020 08:33  Phos  2.7     02-07  Mg     1.9     02-07                                 10.6   5.92  )-----------( 109      ( 2020 07:27 )             32.0     01-30    139  |  110<H>  |  21  ----------------------------<  105<H>  3.6   |  21<L>  |  0.77    Ca    8.1<L>      2020 07:27  Phos  2.6     01-30  Mg     2.3     01-30    TPro  5.7<L>  /  Alb  2.8<L>  /  TBili  0.9  /  DBili  x   /  AST  101<H>  /  ALT  79<H>  /  AlkPhos  85  01-30     LIVER FUNCTIONS - ( 2020 07:27 )  Alb: 2.8 g/dL / Pro: 5.7 gm/dL / ALK PHOS: 85 U/L / ALT: 79 U/L / AST: 101 U/L / GGT: x           Urinalysis Basic - ( 2020 22:06 )    Color: Yellow / Appearance: very cloudy / S.020 / pH: x  Gluc: x / Ketone: Trace  / Bili: Negative / Urobili: Negative mg/dL   Blood: x / Protein: 30 mg/dL / Nitrite: Negative   Leuk Esterase: Trace / RBC: >50 /HPF / WBC 3-5   Sq Epi: x / Non Sq Epi: Few / Bacteria: Many      Culture - Urine (collected 2020 22:06)  Source: .Urine None  Final Report (2020 09:11):    No growth    Culture - Blood (collected 2020 19:57)  Source: .Blood None  Preliminary Report (2020 01:03):    No growth to date.    Culture - Blood (collected 2020 19:57)  Source: .Blood None  Preliminary Report (2020 01:03):    No growth to date.    Culture - Blood (20 @ 05:41)    Specimen Source: .Blood None    Culture Results:   No growth to date.    Radiology: all available radiological tests reviewed    < from: Xray Chest 1 View-PORTABLE IMMEDIATE (20 @ 04:35) >  LEFT PICC line catheter tip in SVC. Cardiomegaly. No evidence of active chest disease.    < end of copied text >    < from: CT Chest w/ IV Cont (20 @ 11:44) >  Cardiomegaly with bilateral perihilar opacity and small bilateral pleural effusions, consistent with a pulmonary edema pattern.  Bibasilar consolidation, likely atelectasis, less likely pneumonia.    < end of copied text >      Advanced directives addressed: full resuscitation

## 2020-02-07 NOTE — PROGRESS NOTE ADULT - SUBJECTIVE AND OBJECTIVE BOX
Patient is a 84y old  Male who presents with a chief complaint of SIRS (06 Feb 2020 11:28)    1/30/2020- Cardiology Consultation: Patient with HBP, HLD and known moderate aortic stenosis and regurgitation is S/P ORIF left ankle 1/1/2019 the admitted for right leg cellulitis 1/10/2020 and discharged with a PICC line for 6 weeks of antibiotics. He presented today with bilateral leg pain and was noted to be febrile to 104, breathless and having frequent VPCs. The patient denies chest pain. There is no history of CAD or heart failure. EP has started him on IV amiodarone.  Followup HPI:  1/31- Breathless. Remains on Bipap. Getting prn doses of IV furosemide for respiratory distress. Denies chest pain.   2/1 on bipap. wife at bedside states he is better than yesterday. appears fairly comfortable on bipap  2/2 appears comfortable. still having intermittent episodes of tachycardia and then worsening dyspnea. temp to 102  2/3 alert appears comfortable on high flow oxygen. temp 100.5  2/4 alert, appears comfortable, no complaints  2/5 s/p cath no complaints  2/6. Feels weak. No other complaints. On nasal O2 today.  2/7- no complaints. Now on med/surg bed on 5E.    PAST MEDICAL & SURGICAL HISTORY:  HLD (hyperlipidemia)  Enlarged prostate  HTN (hypertension)  Status post ORIF of fracture of ankle: Right, 10/2019      Review of symptoms:  Negative except for as noted in today's HPI.      MEDICATIONS  (STANDING):  aMIOdarone    Tablet 200 milliGRAM(s) Oral daily  chlorhexidine 0.12% Liquid 15 milliLiter(s) Swish and Spit two times a day  cholecalciferol 2000 Unit(s) Oral daily  doxycycline hyclate Capsule 100 milliGRAM(s) Oral every 12 hours  finasteride 5 milliGRAM(s) Oral daily  furosemide    Tablet 80 milliGRAM(s) Oral daily  gabapentin 300 milliGRAM(s) Oral two times a day  heparin  Injectable 5000 Unit(s) SubCutaneous every 12 hours  metoprolol tartrate 25 milliGRAM(s) Oral two times a day  tamsulosin 0.4 milliGRAM(s) Oral two times a day    MEDICATIONS  (PRN):  acetaminophen   Tablet .. 650 milliGRAM(s) Oral every 6 hours PRN Temp greater or equal to 38C (100.4F), Mild Pain (1 - 3)          Vital Signs Last 24 Hrs  T(C): 36.4 (07 Feb 2020 05:25), Max: 37.1 (06 Feb 2020 14:00)  T(F): 97.5 (07 Feb 2020 05:25), Max: 98.7 (06 Feb 2020 14:00)  HR: 73 (07 Feb 2020 05:25) (61 - 80)  BP: 123/45 (07 Feb 2020 05:25) (105/53 - 140/50)  BP(mean): 76 (06 Feb 2020 22:30) (69 - 79)  RR: 18 (07 Feb 2020 05:25) (12 - 22)  SpO2: 100% (07 Feb 2020 05:25) (91% - 100%)    I&O's Summary    06 Feb 2020 07:01  -  07 Feb 2020 07:00  --------------------------------------------------------  IN: 0 mL / OUT: 1350 mL / NET: -1350 mL        PHYSICAL EXAM  General Appearance: comfortable  HEENT:   Neck:   Lungs: clear  Heart: 2/6 HILL base  Abdomen: soft and nontender  Extremities: erythema left forearm  Neurologic: alert and oriented      INTERPRETATION OF TELEMETRY:    ECG:    LABS:                          9.8    7.34  )-----------( 253      ( 06 Feb 2020 06:34 )             30.7     02-06    141  |  107  |  21  ----------------------------<  102<H>  3.3<L>   |  31  |  0.83    Ca    8.8      06 Feb 2020 06:34  Phos  2.2     02-06  Mg     1.9     02-06            Pro BNP  [id number] 02-06 @ 06:34  D Dimer  -- 02-06 @ 06:34  Pro BNP  4497 02-04 @ 07:02  D Dimer  -- 02-04 @ 07:02          chest    RADIOLOGY & ADDITIONAL STUDIES:      EXAM:  XR CHEST PORTABLE ROUTINE 1V                          PROCEDURE DATE:  02/06/2020      INTERPRETATION:  AP erect chest on February 6, 2020 at 6:52 AM. Patient had pulmonary edema.    Heart enlargement again noted.    On February 4 there was a diffuse fine interstitial residual pattern consistent with CHF. Present film shows improvement with mild residual.    IMPRESSION: Improved CHF with mild residual.    TALON MATHEW   This document has been electronically signed. Feb 6 2020  9:11AM      IMPRESSION:  1, S/P systemic inflammatory response syndrome. Source undetermined. Blood cultures neg so far. PICC line removed. No TTE evidence of endocarditis. Clinically much improved.   2. Elevated troponin consistent with sepsis. Cannot exclude NSTEMI. Cath 2/5 with normal coronaries  3. Moderate-severe  AS   4. VPCs due to sepsis . Improved on oral amiodarone.   5.HBP. Antihypertensives on hold due to sepsis.   6. Pulmonary edema resolved.  PLAN:  Continue amiodarone, furosemide, metoprolol. Continue doxycycline for 14 more days per ID. If he remains stable from the cardiac viewpoint would suggest outpatient referral to  a cardiac surgery center where TAVR may be considered.

## 2020-02-07 NOTE — PROGRESS NOTE ADULT - ASSESSMENT
83 y/o M with h/o HTN, HLD, BPH with chronic chowdary, right ankle fracture (Oct 10, 2019) s/p ORIF, recently hospitalized (1/10/2020) for RLE cellulitis and possible underlying acute OM and/or ankle hardware infection on vancomycin IV and ceftriaxone (since 1/10 via PICC line) was admitted on 1/29 for lower leg pains. He states for the past week PTA, he has had sharp severe leg pains. They occur every day, often lasting for hours at a time, and are not related to exertion. He states that when the pains come it makes it difficult to walk, although when it is not there, he can walk without difficulty. The pains are from his ankle up to his upper thighs (he cannot tell from where it starts and stops). He reported a fever to 104F at home. In ER he was febrile to 102.7F and received vancomycin IV and cefepime.     1. Low grade fever. Left forearm erythema/ edema ?cellulitis vs peripheral line infiltration. RLE cellulitis improving with possible underlying acute OM and/or ankle hardware infection. s/p recent ankle fracture s/p ORIF. BPH with chronic chowdary. CHF.  -left arm erythema ans edema is improving  -no leukocytosis  -BC x 2 are negative to date  -right leg erythema and edema is resolving  -s/p vancomycin 750 mg IV q12h and ceftriaxone 1 gm IV qd (since 1/10/2020)  -s/p zosyn 3.375 gm IV q8h # 4  -on doxycycline 100 mg PO q12h # 7  -tolerating abx well so far; no side effects noted  -elevate leg  -new midline in place  -continue abx coverage with doxy PO  -plan for repeat BC x 2  -monitor temps  -f/u CBC  -supportive care  2. Other issues:   -care per medicine

## 2020-02-08 LAB
ANION GAP SERPL CALC-SCNC: 6 MMOL/L — SIGNIFICANT CHANGE UP (ref 5–17)
BUN SERPL-MCNC: 21 MG/DL — SIGNIFICANT CHANGE UP (ref 7–23)
CALCIUM SERPL-MCNC: 8.8 MG/DL — SIGNIFICANT CHANGE UP (ref 8.5–10.1)
CHLORIDE SERPL-SCNC: 107 MMOL/L — SIGNIFICANT CHANGE UP (ref 96–108)
CO2 SERPL-SCNC: 29 MMOL/L — SIGNIFICANT CHANGE UP (ref 22–31)
CREAT SERPL-MCNC: 0.88 MG/DL — SIGNIFICANT CHANGE UP (ref 0.5–1.3)
GLUCOSE SERPL-MCNC: 101 MG/DL — HIGH (ref 70–99)
HCT VFR BLD CALC: 34.2 % — LOW (ref 39–50)
HGB BLD-MCNC: 10.8 G/DL — LOW (ref 13–17)
MCHC RBC-ENTMCNC: 29.8 PG — SIGNIFICANT CHANGE UP (ref 27–34)
MCHC RBC-ENTMCNC: 31.6 GM/DL — LOW (ref 32–36)
MCV RBC AUTO: 94.5 FL — SIGNIFICANT CHANGE UP (ref 80–100)
PLATELET # BLD AUTO: 334 K/UL — SIGNIFICANT CHANGE UP (ref 150–400)
POTASSIUM SERPL-MCNC: 3.4 MMOL/L — LOW (ref 3.5–5.3)
POTASSIUM SERPL-SCNC: 3.4 MMOL/L — LOW (ref 3.5–5.3)
RBC # BLD: 3.62 M/UL — LOW (ref 4.2–5.8)
RBC # FLD: 13.8 % — SIGNIFICANT CHANGE UP (ref 10.3–14.5)
SODIUM SERPL-SCNC: 142 MMOL/L — SIGNIFICANT CHANGE UP (ref 135–145)
WBC # BLD: 7.42 K/UL — SIGNIFICANT CHANGE UP (ref 3.8–10.5)
WBC # FLD AUTO: 7.42 K/UL — SIGNIFICANT CHANGE UP (ref 3.8–10.5)

## 2020-02-08 PROCEDURE — 99232 SBSQ HOSP IP/OBS MODERATE 35: CPT

## 2020-02-08 RX ORDER — POTASSIUM CHLORIDE 20 MEQ
40 PACKET (EA) ORAL ONCE
Refills: 0 | Status: COMPLETED | OUTPATIENT
Start: 2020-02-08 | End: 2020-02-08

## 2020-02-08 RX ADMIN — CHLORHEXIDINE GLUCONATE 15 MILLILITER(S): 213 SOLUTION TOPICAL at 05:54

## 2020-02-08 RX ADMIN — AMIODARONE HYDROCHLORIDE 200 MILLIGRAM(S): 400 TABLET ORAL at 05:54

## 2020-02-08 RX ADMIN — Medication 2000 UNIT(S): at 11:25

## 2020-02-08 RX ADMIN — Medication 25 MILLIGRAM(S): at 17:40

## 2020-02-08 RX ADMIN — Medication 80 MILLIGRAM(S): at 05:54

## 2020-02-08 RX ADMIN — Medication 25 MILLIGRAM(S): at 05:54

## 2020-02-08 RX ADMIN — TAMSULOSIN HYDROCHLORIDE 0.4 MILLIGRAM(S): 0.4 CAPSULE ORAL at 05:53

## 2020-02-08 RX ADMIN — Medication 40 MILLIEQUIVALENT(S): at 21:44

## 2020-02-08 RX ADMIN — Medication 100 MILLIGRAM(S): at 05:53

## 2020-02-08 RX ADMIN — GABAPENTIN 300 MILLIGRAM(S): 400 CAPSULE ORAL at 17:41

## 2020-02-08 RX ADMIN — TAMSULOSIN HYDROCHLORIDE 0.4 MILLIGRAM(S): 0.4 CAPSULE ORAL at 17:40

## 2020-02-08 RX ADMIN — HEPARIN SODIUM 5000 UNIT(S): 5000 INJECTION INTRAVENOUS; SUBCUTANEOUS at 17:40

## 2020-02-08 RX ADMIN — FINASTERIDE 5 MILLIGRAM(S): 5 TABLET, FILM COATED ORAL at 11:25

## 2020-02-08 RX ADMIN — GABAPENTIN 300 MILLIGRAM(S): 400 CAPSULE ORAL at 05:53

## 2020-02-08 RX ADMIN — HEPARIN SODIUM 5000 UNIT(S): 5000 INJECTION INTRAVENOUS; SUBCUTANEOUS at 05:54

## 2020-02-08 RX ADMIN — Medication 100 MILLIGRAM(S): at 17:41

## 2020-02-08 RX ADMIN — CHLORHEXIDINE GLUCONATE 15 MILLILITER(S): 213 SOLUTION TOPICAL at 17:39

## 2020-02-08 NOTE — PROGRESS NOTE ADULT - SUBJECTIVE AND OBJECTIVE BOX
83 y/o M admitted with RIGHT ankle Fx (10/19) with possible OM, PICC placed and sent home on IV vanco/CTX about 2 weeks prior to presentation.  Now admitted 1/29 with apparent sepsis.  TTE with mod aortic stenosis - pt developed flash pulm edema with fever and tachycardia on 1/30 necessitating RRT  and transfer to ICU for further care.  PICC was pulled on 1/30.  BCx negative thus far.      1/31: Tm 104.7 Confused overnight.  More awake and alert this morning.  Was on NIV.  On Amio gtt for NSVT  2/1:  Temps lower.  CT chest with likely LLL infiltrate.  On NIV overnight    2/2: Tm 102.  again with flash pulmonary edema in am --placed on HFNC and given furosemide 40- -u/o 600ml.  Case d/w Dr Tubbs -- AV is mod-severe stenosis--will also need to r/o ischemia--LHC this week.  Will change to PO amio.  above d/w Dr Blackburn who has been caring for the patient.  2/3: OOB to chair, tolerating PO diet, remains on high flow O2 - hemodynamics reasonable.  D/w Dr Tubbs - will do cardiac cath this week if pt remains stable.  No new issues - No overnight events.  Tm 101*F    2/4: down to 45% FiO2 on high flow NC - hemodynamics reasonable - no further evidence of flash pulm edema last 24-48 hrs.   D/w Dr Tubbs who will arrange for cardiac cath 2/5.    Notably BNP down to 4k.  OOB to chair and tolerating diet as appropriate.  2/5: uneventful cath this morning - no significant CAD - titrated off of high-flow oxygen - hemodynamics reasonable - No new issues, No overnight events.  2/6: tolerating 5L NC O2 without issues.  Hemodynamics reasonable.  BNP remains elevated.  D/w cardiology and ID - will likely require additional 2 weeks of ABx to treat LE cellulitis/possible osteomyelitis - all cultures remain NGTD.  Pt will need interventional cardiology eval to see when they could consider valve replacement/repair given the underlying possibility of infection.  Will need RAVI and CT to assess replacement valve size as well as CT surgery eval to determine candidacy for open valve vs TAVR.  Pt is afebrile and currently tolerating room air with >92 % sat  02/07/20: Patient seen and examined. He denies any complaints. He was transferred from ICU yesterday, admitted with sepsis, possibly due to LLL pneumonia, pulmonary edema, NSVTs. S/P IV zosyn. Now on amiodarone.   02/08/20: Patient seen and examined. Feels much better today. Discussed with patient and wife at bed side regarding management and d/c plan. Agreeable for rehab.           Vital Signs Last 24 Hrs  T(C): 36.4 (08 Feb 2020 10:57), Max: 36.9 (07 Feb 2020 17:20)  T(F): 97.5 (08 Feb 2020 10:57), Max: 98.4 (07 Feb 2020 17:20)  HR: 72 (08 Feb 2020 10:57) (63 - 79)  BP: 91/47 (08 Feb 2020 10:57) (91/47 - 143/53)  BP(mean): --  RR: 20 (08 Feb 2020 10:57) (18 - 20)  SpO2: 98% (08 Feb 2020 10:57) (91% - 98%)      Physical Exam:   · Constitutional	detailed exam	  · Constitutional Details	ill, frail/cachectic	  · Respiratory	detailed exam	  · Respiratory Details	airway patent, Good AE B/L - NO W/R/R	  · Cardiovascular	detailed exam	  · Cardiovascular Details	regular rate and rhythm	  · Gastrointestinal	detailed exam	  · GI Normal	soft; nontender (+) BS	  · Genitourinary		  · Extremities	detailed exam	  · Extremities Details	no cyanosis; no pedal edema	  · Neurological	detailed exam	  · Neurological Details	alert and oriented x 3; Grossly non focal	  · Skin	detailed exam	  · Skin Details	warm and dry; color normal	  · Musculoskeletal	detailed exam	  · Musculoskeletal Details	decreased ROM, reasonable strength x 4 extremities	        Labs                                               10.8   7.42  )-----------( 334      ( 08 Feb 2020 08:23 )             34.2     08 Feb 2020 08:23    142    |  107    |  21     ----------------------------<  101    3.4     |  29     |  0.88     Ca    8.8        08 Feb 2020 08:23  Phos  2.7       07 Feb 2020 08:33  Mg     1.9       07 Feb 2020 08:33          MEDICATIONS  (STANDING):  aMIOdarone    Tablet 200 milliGRAM(s) Oral daily  chlorhexidine 0.12% Liquid 15 milliLiter(s) Swish and Spit two times a day  cholecalciferol 2000 Unit(s) Oral daily  doxycycline hyclate Capsule 100 milliGRAM(s) Oral every 12 hours  finasteride 5 milliGRAM(s) Oral daily  furosemide    Tablet 80 milliGRAM(s) Oral daily  gabapentin 300 milliGRAM(s) Oral two times a day  heparin  Injectable 5000 Unit(s) SubCutaneous every 12 hours  metoprolol tartrate 25 milliGRAM(s) Oral two times a day  tamsulosin 0.4 milliGRAM(s) Oral two times a day    MEDICATIONS  (PRN):  acetaminophen   Tablet .. 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  Better with neurontin  Hypokalemia due to lasix      planning for evaluation for aortic valve replacement  close observation for recurrence of infection  Continue Doxycycline for approx 2 more weeks  completed pip/hao on 2/5  amiodarone and low dose BBx  ASA  Supplement K  titrate FiO2 to Sat>92%  Cont daily po furosemide   DVT prophy-- sq hep   Supportive care    Possible d/c to rehab on Monday

## 2020-02-08 NOTE — PROGRESS NOTE ADULT - SUBJECTIVE AND OBJECTIVE BOX
Patient is a 84y old  Male who presents with a chief complaint of SIRS (07 Feb 2020 14:38)  1/30/2020- Cardiology Consultation: Patient with HBP, HLD and known moderate aortic stenosis and regurgitation is S/P ORIF left ankle 1/1/2019 the admitted for right leg cellulitis 1/10/2020 and discharged with a PICC line for 6 weeks of antibiotics. He presented today with bilateral leg pain and was noted to be febrile to 104, breathless and having frequent VPCs. The patient denies chest pain. There is no history of CAD or heart failure. EP has started him on IV amiodarone.    Followup HPI:    1/31- Breathless. Remains on Bipap. Getting prn doses of IV furosemide for respiratory distress. Denies chest pain.   2/1 on bipap. wife at bedside states he is better than yesterday. appears fairly comfortable on bipap  2/2 appears comfortable. still having intermittent episodes of tachycardia and then worsening dyspnea. temp to 102  2/3 alert appears comfortable on high flow oxygen. temp 100.5  2/4 alert, appears comfortable, no complaints  2/5 s/p cath no complaints  2/6. Feels weak. No other complaints. On nasal O2 today.  2/7- no complaints. Now on med/surg bed on 5E.  2/8-Feels generally weak but no other complaints. Denies shortness of breath.     PAST MEDICAL & SURGICAL HISTORY:  HLD (hyperlipidemia)  Enlarged prostate  HTN (hypertension)  Status post ORIF of fracture of ankle: Right, 10/2019      Review of symptoms:  Negative except for as noted in today's HPI.      MEDICATIONS  (STANDING):  aMIOdarone    Tablet 200 milliGRAM(s) Oral daily  chlorhexidine 0.12% Liquid 15 milliLiter(s) Swish and Spit two times a day  cholecalciferol 2000 Unit(s) Oral daily  doxycycline hyclate Capsule 100 milliGRAM(s) Oral every 12 hours  finasteride 5 milliGRAM(s) Oral daily  furosemide    Tablet 80 milliGRAM(s) Oral daily  gabapentin 300 milliGRAM(s) Oral two times a day  heparin  Injectable 5000 Unit(s) SubCutaneous every 12 hours  metoprolol tartrate 25 milliGRAM(s) Oral two times a day  tamsulosin 0.4 milliGRAM(s) Oral two times a day    MEDICATIONS  (PRN):  acetaminophen   Tablet .. 650 milliGRAM(s) Oral every 6 hours PRN Temp greater or equal to 38C (100.4F), Mild Pain (1 - 3)          Vital Signs Last 24 Hrs  T(C): 36.6 (08 Feb 2020 04:28), Max: 36.9 (07 Feb 2020 17:20)  T(F): 97.8 (08 Feb 2020 04:28), Max: 98.4 (07 Feb 2020 17:20)  HR: 79 (08 Feb 2020 04:28) (63 - 79)  BP: 143/53 (08 Feb 2020 04:28) (117/59 - 143/53)  BP(mean): --  RR: 18 (08 Feb 2020 04:28) (18 - 18)  SpO2: 91% (08 Feb 2020 04:28) (91% - 98%)    I&O's Summary      PHYSICAL EXAM  General Appearance: alert and oriented, comfortable  HEENT:   Neck:   Lungs: clear  Heart: 2/6 HILL base  Abdomen: soft and nontender  Extremities: no pedal edema. Left forearm less red and swollen.  Neurologic: no focal abnormality      INTERPRETATION OF TELEMETRY:    ECG:    LABS:                          9.9    7.18  )-----------( 275      ( 07 Feb 2020 08:33 )             30.3     02-07    144  |  107  |  21  ----------------------------<  106<H>  3.4<L>   |  30  |  0.79    Ca    8.9      07 Feb 2020 08:33  Phos  2.7     02-07  Mg     1.9     02-07            Pro BNP  5602 02-06 @ 06:34  D Dimer  -- 02-06 @ 06:34  Pro BNP  4497 02-04 @ 07:02  D Dimer  -- 02-04 @ 07:02              RADIOLOGY & ADDITIONAL STUDIES:    IMPRESSION:  1, S/P systemic inflammatory response syndrome. Source undetermined. Blood cultures neg so far. PICC line removed. No TTE evidence of endocarditis. Clinically much improved.   2. Elevated troponin consistent with sepsis.  Cath 2/5 with normal coronaries.  3. Moderate-severe  AS   4. VPCs due to sepsis . Improved on oral amiodarone.   5.HBP.  Stable.    6. Pulmonary edema resolved.  PLAN:  Continue amiodarone, furosemide, metoprolol. Continue doxycycline for 14 day course per ID. If he remains stable from the cardiac viewpoint would suggest outpatient referral to  a cardiac surgery center where TAVR may be considered. Dr. Ac Tubbs to see him post discharge in the office.

## 2020-02-08 NOTE — PROGRESS NOTE ADULT - SUBJECTIVE AND OBJECTIVE BOX
Date of service: 20 @ 14:20    OOB to chair  Left arm erythema and edema improving  Pain is improving    ROS: no fever or chills; denies dizziness, no HA, no SOB or cough, no abdominal pain, no diarrhea or constipation; no dysuria, no legs pain    MEDICATIONS  (STANDING):  aMIOdarone    Tablet 200 milliGRAM(s) Oral daily  chlorhexidine 0.12% Liquid 15 milliLiter(s) Swish and Spit two times a day  cholecalciferol 2000 Unit(s) Oral daily  doxycycline hyclate Capsule 100 milliGRAM(s) Oral every 12 hours  finasteride 5 milliGRAM(s) Oral daily  furosemide    Tablet 80 milliGRAM(s) Oral daily  gabapentin 300 milliGRAM(s) Oral two times a day  heparin  Injectable 5000 Unit(s) SubCutaneous every 12 hours  metoprolol tartrate 25 milliGRAM(s) Oral two times a day  potassium chloride    Tablet ER 40 milliEquivalent(s) Oral once  tamsulosin 0.4 milliGRAM(s) Oral two times a day      Vital Signs Last 24 Hrs  T(C): 36.4 (2020 10:57), Max: 36.9 (2020 17:20)  T(F): 97.5 (2020 10:57), Max: 98.4 (2020 17:20)  HR: 72 (2020 10:57) (63 - 79)  BP: 91/47 (2020 10:57) (91/47 - 143/53)  BP(mean): --  RR: 20 (2020 10:57) (18 - 20)  SpO2: 98% (2020 10:57) (91% - 98%)    Physical Exam:      Constitutional:  No acute distress  HEENT: NC/AT, EOMI, PERRLA, conjunctivae clear  Neck: supple; thyroid not palpable  Back: no tenderness  Respiratory: respiratory effort normal; crackles at bases  Cardiovascular: S1S2 regular, no murmurs  Abdomen: soft, not tender, not distended, positive BS  Genitourinary: no suprapubic tenderness  Lymphatic: no LN palpable  Musculoskeletal: no muscle tenderness, no joint swelling or tenderness  Extremities: 2+ right lower pedal edema; mild erythema; much improved; no warmth; no skin break  Left forearm erythema edema and tenderness improving  Neurological/ Psychiatric: AxOx3, moving all extremities  Skin: no rashes; no palpable lesions    Labs: reviewed                        9.9    7.18  )-----------( 275      ( 2020 08:33 )             30.3     02-07    144  |  107  |  21  ----------------------------<  106<H>  3.4<L>   |  30  |  0.79    Ca    8.9      2020 08:33  Phos  2.7     02-07  Mg     1.9     02-07                                 10.6   5.92  )-----------( 109      ( 2020 07:27 )             32.0     01-30    139  |  110<H>  |  21  ----------------------------<  105<H>  3.6   |  21<L>  |  0.77    Ca    8.1<L>      2020 07:27  Phos  2.6     01-30  Mg     2.3     -30    TPro  5.7<L>  /  Alb  2.8<L>  /  TBili  0.9  /  DBili  x   /  AST  101<H>  /  ALT  79<H>  /  AlkPhos  85  01-30     LIVER FUNCTIONS - ( 2020 07:27 )  Alb: 2.8 g/dL / Pro: 5.7 gm/dL / ALK PHOS: 85 U/L / ALT: 79 U/L / AST: 101 U/L / GGT: x           Urinalysis Basic - ( 2020 22:06 )    Color: Yellow / Appearance: very cloudy / S.020 / pH: x  Gluc: x / Ketone: Trace  / Bili: Negative / Urobili: Negative mg/dL   Blood: x / Protein: 30 mg/dL / Nitrite: Negative   Leuk Esterase: Trace / RBC: >50 /HPF / WBC 3-5   Sq Epi: x / Non Sq Epi: Few / Bacteria: Many      Culture - Urine (collected 2020 22:06)  Source: .Urine None  Final Report (2020 09:11):    No growth    Culture - Blood (collected 2020 19:57)  Source: .Blood None  Preliminary Report (2020 01:03):    No growth to date.    Culture - Blood (collected 2020 19:57)  Source: .Blood None  Preliminary Report (2020 01:03):    No growth to date.    Culture - Blood (20 @ 05:41)    Specimen Source: .Blood None    Culture Results:   No growth to date.    Radiology: all available radiological tests reviewed    < from: Xray Chest 1 View-PORTABLE IMMEDIATE (20 @ 04:35) >  LEFT PICC line catheter tip in SVC. Cardiomegaly. No evidence of active chest disease.    < end of copied text >    < from: CT Chest w/ IV Cont (20 @ 11:44) >  Cardiomegaly with bilateral perihilar opacity and small bilateral pleural effusions, consistent with a pulmonary edema pattern.  Bibasilar consolidation, likely atelectasis, less likely pneumonia.    < end of copied text >      Advanced directives addressed: full resuscitation

## 2020-02-08 NOTE — PROGRESS NOTE ADULT - ASSESSMENT
85 y/o M with h/o HTN, HLD, BPH with chronic chowdary, right ankle fracture (Oct 10, 2019) s/p ORIF, recently hospitalized (1/10/2020) for RLE cellulitis and possible underlying acute OM and/or ankle hardware infection on vancomycin IV and ceftriaxone (since 1/10 via PICC line) was admitted on 1/29 for lower leg pains. He states for the past week PTA, he has had sharp severe leg pains. They occur every day, often lasting for hours at a time, and are not related to exertion. He states that when the pains come it makes it difficult to walk, although when it is not there, he can walk without difficulty. The pains are from his ankle up to his upper thighs (he cannot tell from where it starts and stops). He reported a fever to 104F at home. In ER he was febrile to 102.7F and received vancomycin IV and cefepime.     1. Left forearm erythema/ edema ?cellulitis vs peripheral line infiltration. RLE cellulitis improving with possible underlying acute OM and/or ankle hardware infection. s/p recent ankle fracture s/p ORIF. BPH with chronic chowdary. CHF.  -left arm erythema ans edema is improving  -no leukocytosis  -BC x 2 are negative to date  -right leg erythema and edema is resolving  -s/p vancomycin 750 mg IV q12h and ceftriaxone 1 gm IV qd (since 1/10/2020)  -s/p zosyn 3.375 gm IV q8h # 4  -on doxycycline 100 mg PO q12h # 8  -tolerating abx well so far; no side effects noted  -elevate leg  -new midline in place  -continue abx coverage with doxy PO for 2 more weeks  -monitor temps  -f/u CBC  -supportive care  2. Other issues:   -care per medicine

## 2020-02-09 PROCEDURE — 99232 SBSQ HOSP IP/OBS MODERATE 35: CPT

## 2020-02-09 RX ADMIN — FINASTERIDE 5 MILLIGRAM(S): 5 TABLET, FILM COATED ORAL at 11:06

## 2020-02-09 RX ADMIN — Medication 25 MILLIGRAM(S): at 17:26

## 2020-02-09 RX ADMIN — CHLORHEXIDINE GLUCONATE 15 MILLILITER(S): 213 SOLUTION TOPICAL at 17:26

## 2020-02-09 RX ADMIN — Medication 100 MILLIGRAM(S): at 05:40

## 2020-02-09 RX ADMIN — Medication 2000 UNIT(S): at 11:06

## 2020-02-09 RX ADMIN — TAMSULOSIN HYDROCHLORIDE 0.4 MILLIGRAM(S): 0.4 CAPSULE ORAL at 05:40

## 2020-02-09 RX ADMIN — GABAPENTIN 300 MILLIGRAM(S): 400 CAPSULE ORAL at 17:26

## 2020-02-09 RX ADMIN — HEPARIN SODIUM 5000 UNIT(S): 5000 INJECTION INTRAVENOUS; SUBCUTANEOUS at 17:26

## 2020-02-09 RX ADMIN — Medication 25 MILLIGRAM(S): at 05:40

## 2020-02-09 RX ADMIN — HEPARIN SODIUM 5000 UNIT(S): 5000 INJECTION INTRAVENOUS; SUBCUTANEOUS at 05:40

## 2020-02-09 RX ADMIN — Medication 80 MILLIGRAM(S): at 05:40

## 2020-02-09 RX ADMIN — Medication 100 MILLIGRAM(S): at 17:26

## 2020-02-09 RX ADMIN — GABAPENTIN 300 MILLIGRAM(S): 400 CAPSULE ORAL at 05:40

## 2020-02-09 RX ADMIN — TAMSULOSIN HYDROCHLORIDE 0.4 MILLIGRAM(S): 0.4 CAPSULE ORAL at 17:26

## 2020-02-09 RX ADMIN — CHLORHEXIDINE GLUCONATE 15 MILLILITER(S): 213 SOLUTION TOPICAL at 05:47

## 2020-02-09 RX ADMIN — AMIODARONE HYDROCHLORIDE 200 MILLIGRAM(S): 400 TABLET ORAL at 05:40

## 2020-02-09 NOTE — PROGRESS NOTE ADULT - SUBJECTIVE AND OBJECTIVE BOX
83 y/o M admitted with RIGHT ankle Fx (10/19) with possible OM, PICC placed and sent home on IV vanco/CTX about 2 weeks prior to presentation.  Now admitted 1/29 with apparent sepsis.  TTE with mod aortic stenosis - pt developed flash pulm edema with fever and tachycardia on 1/30 necessitating RRT  and transfer to ICU for further care.  PICC was pulled on 1/30.  BCx negative thus far.      1/31: Tm 104.7 Confused overnight.  More awake and alert this morning.  Was on NIV.  On Amio gtt for NSVT  2/1:  Temps lower.  CT chest with likely LLL infiltrate.  On NIV overnight    2/2: Tm 102.  again with flash pulmonary edema in am --placed on HFNC and given furosemide 40- -u/o 600ml.  Case d/w Dr Tubbs -- AV is mod-severe stenosis--will also need to r/o ischemia--LHC this week.  Will change to PO amio.  above d/w Dr Blackburn who has been caring for the patient.  2/3: OOB to chair, tolerating PO diet, remains on high flow O2 - hemodynamics reasonable.  D/w Dr Tubbs - will do cardiac cath this week if pt remains stable.  No new issues - No overnight events.  Tm 101*F    2/4: down to 45% FiO2 on high flow NC - hemodynamics reasonable - no further evidence of flash pulm edema last 24-48 hrs.   D/w Dr Tubbs who will arrange for cardiac cath 2/5.    Notably BNP down to 4k.  OOB to chair and tolerating diet as appropriate.  2/5: uneventful cath this morning - no significant CAD - titrated off of high-flow oxygen - hemodynamics reasonable - No new issues, No overnight events.  2/6: tolerating 5L NC O2 without issues.  Hemodynamics reasonable.  BNP remains elevated.  D/w cardiology and ID - will likely require additional 2 weeks of ABx to treat LE cellulitis/possible osteomyelitis - all cultures remain NGTD.  Pt will need interventional cardiology eval to see when they could consider valve replacement/repair given the underlying possibility of infection.  Will need RAVI and CT to assess replacement valve size as well as CT surgery eval to determine candidacy for open valve vs TAVR.  Pt is afebrile and currently tolerating room air with >92 % sat  02/07/20: Patient seen and examined. He denies any complaints. He was transferred from ICU yesterday, admitted with sepsis, possibly due to LLL pneumonia, pulmonary edema, NSVTs. S/P IV zosyn. Now on amiodarone.   02/08/20: Patient seen and examined. Feels much better today. Discussed with patient and wife at bed side regarding management and d/c plan. Agreeable for rehab.   02/09/20: Patient seen and examined. No new issues.           Vital Signs Last 24 Hrs  T(C): 36.6 (09 Feb 2020 11:35), Max: 36.6 (09 Feb 2020 05:09)  T(F): 97.8 (09 Feb 2020 11:35), Max: 97.8 (09 Feb 2020 05:09)  HR: 66 (09 Feb 2020 11:35) (58 - 67)  BP: 110/59 (09 Feb 2020 11:35) (110/59 - 133/64)  BP(mean): --  RR: 20 (09 Feb 2020 11:35) (17 - 20)  SpO2: 98% (09 Feb 2020 11:35) (98% - 99%)      Physical Exam:   · Constitutional	detailed exam	  · Constitutional Details	ill, frail/cachectic	  · Respiratory	detailed exam	  · Respiratory Details	airway patent, Good AE B/L - NO W/R/R	  · Cardiovascular	detailed exam	  · Cardiovascular Details	regular rate and rhythm	  · Gastrointestinal	detailed exam	  · GI Normal	soft; nontender (+) BS	  · Genitourinary		  · Extremities	detailed exam	  · Extremities Details	no cyanosis; no pedal edema	  · Neurological	detailed exam	  · Neurological Details	alert and oriented x 3; Grossly non focal	  · Skin	detailed exam	  · Skin Details	warm and dry; color normal	  · Musculoskeletal	detailed exam	  · Musculoskeletal Details	decreased ROM, reasonable strength x 4 extremities	        Labs                                       10.8   7.42  )-----------( 334      ( 08 Feb 2020 08:23 )             34.2     08 Feb 2020 08:23    142    |  107    |  21     ----------------------------<  101    3.4     |  29     |  0.88     Ca    8.8        08 Feb 2020 08:23            MEDICATIONS  (STANDING):  aMIOdarone    Tablet 200 milliGRAM(s) Oral daily  chlorhexidine 0.12% Liquid 15 milliLiter(s) Swish and Spit two times a day  cholecalciferol 2000 Unit(s) Oral daily  doxycycline hyclate Capsule 100 milliGRAM(s) Oral every 12 hours  finasteride 5 milliGRAM(s) Oral daily  furosemide    Tablet 80 milliGRAM(s) Oral daily  gabapentin 300 milliGRAM(s) Oral two times a day  heparin  Injectable 5000 Unit(s) SubCutaneous every 12 hours  metoprolol tartrate 25 milliGRAM(s) Oral two times a day  tamsulosin 0.4 milliGRAM(s) Oral two times a day    MEDICATIONS  (PRN):  acetaminophen   Tablet .. 650 milliGRAM(s) Oral every 6 hours PRN Temp greater or equal to 38C (100.4F), Mild Pain (1 - 3)          Assessment and Plan:   · Assessment		  83 y/o M admitted 1/29 with sepsis likely result of LLL infiltrate, Pneumonia due to gram negative organisms most likely  acute type 1 hypoxic respiratory failure due to combination of pneumonia and flash pulmonary edema/acute systolic HF - HFrEF - potentially due to AS vs cardiac ischemia  NSVT S/P Amio gtt - on PO amiodarone now  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  still needs AVR when infection has cleared  B/L 'thigh pain' - cramping and weakness due to debility vs neuropathy?  Better with neurontin  Hypokalemia due to lasix      planning for evaluation for aortic valve replacement  close observation for recurrence of infection  Continue Doxycycline for approx 2 more weeks  completed pip/hao on 2/5  amiodarone and low dose BBx  ASA  Supplement K  titrate FiO2 to Sat>92%  Cont daily po furosemide   DVT prophy-- sq hep   Supportive care    Possible d/c to rehab tomorrow

## 2020-02-09 NOTE — PROGRESS NOTE ADULT - ASSESSMENT
85 y/o M with h/o HTN, HLD, BPH with chronic chowdary, right ankle fracture (Oct 10, 2019) s/p ORIF, recently hospitalized (1/10/2020) for RLE cellulitis and possible underlying acute OM and/or ankle hardware infection on vancomycin IV and ceftriaxone (since 1/10 via PICC line) was admitted on 1/29 for lower leg pains. He states for the past week PTA, he has had sharp severe leg pains. They occur every day, often lasting for hours at a time, and are not related to exertion. He states that when the pains come it makes it difficult to walk, although when it is not there, he can walk without difficulty. The pains are from his ankle up to his upper thighs (he cannot tell from where it starts and stops). He reported a fever to 104F at home. In ER he was febrile to 102.7F and received vancomycin IV and cefepime.     1. Left forearm erythema/ edema improving; likely due to prior peripheral line infiltration. RLE cellulitis resolving with possible underlying acute OM and/or ankle hardware infection. s/p recent ankle fracture s/p ORIF. BPH with chronic chowdary. CHF.  -left arm erythema ans edema is improving  -no leukocytosis  -BC x 2 are negative to date  -right leg erythema and edema is resolving  -s/p vancomycin 750 mg IV q12h and ceftriaxone 1 gm IV qd (since 1/10/2020)  -s/p zosyn 3.375 gm IV q8h # 4  -on doxycycline 100 mg PO q12h # 9  -tolerating abx well so far; no side effects noted  -elevate leg  -new midline in place  -continue abx coverage with doxy PO for 2 more weeks  -f/u with Dr. Bustillos as outpatient  -monitor temps  -f/u CBC  -supportive care  2. Other issues:   -care per medicine

## 2020-02-09 NOTE — PROGRESS NOTE ADULT - SUBJECTIVE AND OBJECTIVE BOX
Patient is a 84y old  Male who presents with a chief complaint of SIRS (08 Feb 2020 14:19)    1/30/2020- Cardiology Consultation: Patient with HBP, HLD and known moderate aortic stenosis and regurgitation is S/P ORIF left ankle 1/1/2019 the admitted for right leg cellulitis 1/10/2020 and discharged with a PICC line for 6 weeks of antibiotics. He presented today with bilateral leg pain and was noted to be febrile to 104, breathless and having frequent VPCs. The patient denies chest pain. There is no history of CAD or heart failure. EP has started him on IV amiodarone.  Followup HPI:  1/31- Breathless. Remains on Bipap. Getting prn doses of IV furosemide for respiratory distress. Denies chest pain.   2/1 on bipap. wife at bedside states he is better than yesterday. appears fairly comfortable on bipap  2/2 appears comfortable. still having intermittent episodes of tachycardia and then worsening dyspnea. temp to 102  2/3 alert appears comfortable on high flow oxygen. temp 100.5  2/4 alert, appears comfortable, no complaints  2/5 s/p cath no complaints  2/6. Feels weak. No other complaints. On nasal O2 today.  2/7- no complaints. Now on med/surg bed on 5E.  2/8-Feels generally weak but no other complaints. Denies shortness of breath.   2/9- no complaints.    PAST MEDICAL & SURGICAL HISTORY:  HLD (hyperlipidemia)  Enlarged prostate  HTN (hypertension)  Status post ORIF of fracture of ankle: Right, 10/2019      Review of symptoms:  Negative except for as noted in today's HPI.      MEDICATIONS  (STANDING):  aMIOdarone    Tablet 200 milliGRAM(s) Oral daily  chlorhexidine 0.12% Liquid 15 milliLiter(s) Swish and Spit two times a day  cholecalciferol 2000 Unit(s) Oral daily  doxycycline hyclate Capsule 100 milliGRAM(s) Oral every 12 hours  finasteride 5 milliGRAM(s) Oral daily  furosemide    Tablet 80 milliGRAM(s) Oral daily  gabapentin 300 milliGRAM(s) Oral two times a day  heparin  Injectable 5000 Unit(s) SubCutaneous every 12 hours  metoprolol tartrate 25 milliGRAM(s) Oral two times a day  tamsulosin 0.4 milliGRAM(s) Oral two times a day    MEDICATIONS  (PRN):  acetaminophen   Tablet .. 650 milliGRAM(s) Oral every 6 hours PRN Temp greater or equal to 38C (100.4F), Mild Pain (1 - 3)          Vital Signs Last 24 Hrs  T(C): 36.6 (09 Feb 2020 05:09), Max: 36.6 (09 Feb 2020 05:09)  T(F): 97.8 (09 Feb 2020 05:09), Max: 97.8 (09 Feb 2020 05:09)  HR: 58 (09 Feb 2020 05:09) (58 - 72)  BP: 125/46 (09 Feb 2020 05:50) (91/47 - 133/64)  BP(mean): --  RR: 18 (09 Feb 2020 05:09) (17 - 20)  SpO2: 99% (09 Feb 2020 05:09) (98% - 99%)    I&O's Summary      PHYSICAL EXAM  General Appearance: comfortable  HEENT:   Neck:   Lungs: clear  Heart: 2/6 HILL base  Abdomen:   Extremities: mild erythema left forearm and right ankle.  Neurologic:       INTERPRETATION OF TELEMETRY:    ECG:    LABS:                          10.8   7.42  )-----------( 334      ( 08 Feb 2020 08:23 )             34.2     02-08    142  |  107  |  21  ----------------------------<  101<H>  3.4<L>   |  29  |  0.88    Ca    8.8      08 Feb 2020 08:23            Pro BNP  [id number] 02-06 @ 06:34  D Dimer  -- 02-06 @ 06:34  Pro BNP  [id number] 02-04 @ 07:02  D Dimer  -- 02-04 @ 07:02              RADIOLOGY & ADDITIONAL STUDIES:    IMPRESSION:  1, S/P systemic inflammatory response syndrome. Source undetermined. Blood cultures neg so far. PICC line removed. No TTE evidence of endocarditis. Clinically much improved.   2. Elevated troponin consistent with sepsis.  Cath 2/5 with normal coronaries.  3. Moderate-severe  AS   4. VPCs due to sepsis . Improved on oral amiodarone.   5.HBP.  Stable.    6. Pulmonary edema resolved.  PLAN:  Continue amiodarone, furosemide, metoprolol. Continue doxycycline for 14 day course per ID. If he remains stable from the cardiac viewpoint would suggest outpatient referral to  a cardiac surgery center where TAVR may be considered. Dr. Ac Tubbs to see him post discharge in the office.

## 2020-02-09 NOTE — PROGRESS NOTE ADULT - SUBJECTIVE AND OBJECTIVE BOX
Date of service: 20 @ 14:34    Lying in bed in NAD  Both forearms are less edema and less tender  Right ankle with mild edema, but no erythema    ROS: no fever or chills; denies dizziness, no HA, no SOB or cough, no abdominal pain, no diarrhea or constipation; no dysuria, no legs pain, no rashes    MEDICATIONS  (STANDING):  aMIOdarone    Tablet 200 milliGRAM(s) Oral daily  chlorhexidine 0.12% Liquid 15 milliLiter(s) Swish and Spit two times a day  cholecalciferol 2000 Unit(s) Oral daily  doxycycline hyclate Capsule 100 milliGRAM(s) Oral every 12 hours  finasteride 5 milliGRAM(s) Oral daily  furosemide    Tablet 80 milliGRAM(s) Oral daily  gabapentin 300 milliGRAM(s) Oral two times a day  heparin  Injectable 5000 Unit(s) SubCutaneous every 12 hours  metoprolol tartrate 25 milliGRAM(s) Oral two times a day  tamsulosin 0.4 milliGRAM(s) Oral two times a day      Vital Signs Last 24 Hrs  T(C): 36.6 (2020 11:35), Max: 36.6 (2020 05:09)  T(F): 97.8 (2020 11:35), Max: 97.8 (2020 05:09)  HR: 66 (2020 11:35) (58 - 67)  BP: 110/59 (2020 11:35) (110/59 - 133/64)  BP(mean): --  RR: 20 (2020 11:35) (17 - 20)  SpO2: 98% (2020 11:35) (98% - 99%)    Physical Exam:      Constitutional:  No acute distress  HEENT: NC/AT, EOMI, PERRLA, conjunctivae clear  Neck: supple; thyroid not palpable  Back: no tenderness  Respiratory: respiratory effort normal; crackles at bases  Cardiovascular: S1S2 regular, no murmurs  Abdomen: soft, not tender, not distended, positive BS  Genitourinary: no suprapubic tenderness  Lymphatic: no LN palpable  Musculoskeletal: no muscle tenderness, no joint swelling or tenderness  Extremities: 2+ right lower pedal edema; mild erythema; much improved; no warmth; no skin break  Left forearm erythema edema and tenderness improving  Neurological/ Psychiatric: AxOx3, moving all extremities  Skin: no rashes; no palpable lesions    Labs: reviewed                        9.9    7.18  )-----------( 275      ( 2020 08:33 )             30.3     02-07    144  |  107  |  21  ----------------------------<  106<H>  3.4<L>   |  30  |  0.79    Ca    8.9      2020 08:33  Phos  2.7     02-07  Mg     1.9     02-07                                 10.6   5.92  )-----------( 109      ( 2020 07:27 )             32.0     01-30    139  |  110<H>  |  21  ----------------------------<  105<H>  3.6   |  21<L>  |  0.77    Ca    8.1<L>      2020 07:27  Phos  2.6     01-30  Mg     2.3     -30    TPro  5.7<L>  /  Alb  2.8<L>  /  TBili  0.9  /  DBili  x   /  AST  101<H>  /  ALT  79<H>  /  AlkPhos  85  01-30     LIVER FUNCTIONS - ( 2020 07:27 )  Alb: 2.8 g/dL / Pro: 5.7 gm/dL / ALK PHOS: 85 U/L / ALT: 79 U/L / AST: 101 U/L / GGT: x           Urinalysis Basic - ( 2020 22:06 )    Color: Yellow / Appearance: very cloudy / S.020 / pH: x  Gluc: x / Ketone: Trace  / Bili: Negative / Urobili: Negative mg/dL   Blood: x / Protein: 30 mg/dL / Nitrite: Negative   Leuk Esterase: Trace / RBC: >50 /HPF / WBC 3-5   Sq Epi: x / Non Sq Epi: Few / Bacteria: Many      Culture - Urine (collected 2020 22:06)  Source: .Urine None  Final Report (2020 09:11):    No growth    Culture - Blood (collected 2020 19:57)  Source: .Blood None  Preliminary Report (2020 01:03):    No growth to date.    Culture - Blood (collected 2020 19:57)  Source: .Blood None  Preliminary Report (2020 01:03):    No growth to date.    Culture - Blood (20 @ 05:41)    Specimen Source: .Blood None    Culture Results:   No growth to date.    Radiology: all available radiological tests reviewed    < from: Xray Chest 1 View-PORTABLE IMMEDIATE (20 @ 04:35) >  LEFT PICC line catheter tip in SVC. Cardiomegaly. No evidence of active chest disease.    < end of copied text >    < from: CT Chest w/ IV Cont (20 @ 11:44) >  Cardiomegaly with bilateral perihilar opacity and small bilateral pleural effusions, consistent with a pulmonary edema pattern.  Bibasilar consolidation, likely atelectasis, less likely pneumonia.    < end of copied text >      Advanced directives addressed: full resuscitation

## 2020-02-10 ENCOUNTER — TRANSCRIPTION ENCOUNTER (OUTPATIENT)
Age: 85
End: 2020-02-10

## 2020-02-10 LAB
ANION GAP SERPL CALC-SCNC: 7 MMOL/L — SIGNIFICANT CHANGE UP (ref 5–17)
BUN SERPL-MCNC: 22 MG/DL — SIGNIFICANT CHANGE UP (ref 7–23)
CALCIUM SERPL-MCNC: 8.8 MG/DL — SIGNIFICANT CHANGE UP (ref 8.5–10.1)
CHLORIDE SERPL-SCNC: 104 MMOL/L — SIGNIFICANT CHANGE UP (ref 96–108)
CO2 SERPL-SCNC: 29 MMOL/L — SIGNIFICANT CHANGE UP (ref 22–31)
CREAT SERPL-MCNC: 0.92 MG/DL — SIGNIFICANT CHANGE UP (ref 0.5–1.3)
GLUCOSE SERPL-MCNC: 107 MG/DL — HIGH (ref 70–99)
POTASSIUM SERPL-MCNC: 3.3 MMOL/L — LOW (ref 3.5–5.3)
POTASSIUM SERPL-SCNC: 3.3 MMOL/L — LOW (ref 3.5–5.3)
SODIUM SERPL-SCNC: 140 MMOL/L — SIGNIFICANT CHANGE UP (ref 135–145)

## 2020-02-10 PROCEDURE — 99232 SBSQ HOSP IP/OBS MODERATE 35: CPT

## 2020-02-10 RX ORDER — UBIDECARENONE 100 MG
1 CAPSULE ORAL
Qty: 0 | Refills: 0 | DISCHARGE

## 2020-02-10 RX ORDER — POTASSIUM CHLORIDE 20 MEQ
40 PACKET (EA) ORAL
Refills: 0 | Status: COMPLETED | OUTPATIENT
Start: 2020-02-10 | End: 2020-02-10

## 2020-02-10 RX ORDER — FUROSEMIDE 40 MG
1 TABLET ORAL
Qty: 0 | Refills: 0 | DISCHARGE
Start: 2020-02-10

## 2020-02-10 RX ORDER — AMLODIPINE BESYLATE 2.5 MG/1
1 TABLET ORAL
Qty: 0 | Refills: 0 | DISCHARGE

## 2020-02-10 RX ORDER — LOSARTAN POTASSIUM 100 MG/1
1 TABLET, FILM COATED ORAL
Qty: 0 | Refills: 0 | DISCHARGE

## 2020-02-10 RX ORDER — METOPROLOL TARTRATE 50 MG
1 TABLET ORAL
Qty: 0 | Refills: 0 | DISCHARGE
Start: 2020-02-10

## 2020-02-10 RX ORDER — OMEGA-3 ACID ETHYL ESTERS 1 G
1 CAPSULE ORAL
Qty: 0 | Refills: 0 | DISCHARGE

## 2020-02-10 RX ORDER — POTASSIUM CHLORIDE 20 MEQ
2 PACKET (EA) ORAL
Qty: 0 | Refills: 0 | DISCHARGE
Start: 2020-02-10

## 2020-02-10 RX ORDER — ACETAMINOPHEN 500 MG
2 TABLET ORAL
Qty: 0 | Refills: 0 | DISCHARGE
Start: 2020-02-10

## 2020-02-10 RX ORDER — AMIODARONE HYDROCHLORIDE 400 MG/1
1 TABLET ORAL
Qty: 0 | Refills: 0 | DISCHARGE
Start: 2020-02-10

## 2020-02-10 RX ORDER — GABAPENTIN 400 MG/1
1 CAPSULE ORAL
Qty: 0 | Refills: 0 | DISCHARGE
Start: 2020-02-10

## 2020-02-10 RX ADMIN — Medication 100 MILLIGRAM(S): at 17:13

## 2020-02-10 RX ADMIN — Medication 25 MILLIGRAM(S): at 05:24

## 2020-02-10 RX ADMIN — Medication 100 MILLIGRAM(S): at 05:24

## 2020-02-10 RX ADMIN — GABAPENTIN 300 MILLIGRAM(S): 400 CAPSULE ORAL at 17:13

## 2020-02-10 RX ADMIN — Medication 40 MILLIEQUIVALENT(S): at 17:31

## 2020-02-10 RX ADMIN — AMIODARONE HYDROCHLORIDE 200 MILLIGRAM(S): 400 TABLET ORAL at 05:24

## 2020-02-10 RX ADMIN — TAMSULOSIN HYDROCHLORIDE 0.4 MILLIGRAM(S): 0.4 CAPSULE ORAL at 17:13

## 2020-02-10 RX ADMIN — CHLORHEXIDINE GLUCONATE 15 MILLILITER(S): 213 SOLUTION TOPICAL at 17:13

## 2020-02-10 RX ADMIN — Medication 2000 UNIT(S): at 11:56

## 2020-02-10 RX ADMIN — TAMSULOSIN HYDROCHLORIDE 0.4 MILLIGRAM(S): 0.4 CAPSULE ORAL at 05:24

## 2020-02-10 RX ADMIN — Medication 80 MILLIGRAM(S): at 05:24

## 2020-02-10 RX ADMIN — HEPARIN SODIUM 5000 UNIT(S): 5000 INJECTION INTRAVENOUS; SUBCUTANEOUS at 05:23

## 2020-02-10 RX ADMIN — GABAPENTIN 300 MILLIGRAM(S): 400 CAPSULE ORAL at 05:24

## 2020-02-10 RX ADMIN — Medication 25 MILLIGRAM(S): at 17:13

## 2020-02-10 RX ADMIN — CHLORHEXIDINE GLUCONATE 15 MILLILITER(S): 213 SOLUTION TOPICAL at 05:23

## 2020-02-10 RX ADMIN — FINASTERIDE 5 MILLIGRAM(S): 5 TABLET, FILM COATED ORAL at 11:56

## 2020-02-10 RX ADMIN — Medication 40 MILLIEQUIVALENT(S): at 15:00

## 2020-02-10 RX ADMIN — HEPARIN SODIUM 5000 UNIT(S): 5000 INJECTION INTRAVENOUS; SUBCUTANEOUS at 17:14

## 2020-02-10 NOTE — DISCHARGE NOTE PROVIDER - NSDCCPCAREPLAN_GEN_ALL_CORE_FT
PRINCIPAL DISCHARGE DIAGNOSIS  Diagnosis: EKG abnormalities  Assessment and Plan of Treatment: Follow up with Dr Tubbs once discharge from rehab for possible TAVR

## 2020-02-10 NOTE — DISCHARGE NOTE PROVIDER - NSDCMRMEDTOKEN_GEN_ALL_CORE_FT
acetaminophen 325 mg oral tablet: 2 tab(s) orally every 6 hours, As needed, Temp greater or equal to 38C (100.4F), Mild Pain (1 - 3)  amiodarone 200 mg oral tablet: 1 tab(s) orally once a day  cholecalciferol 2000 intl units (50 mcg) oral tablet: 1 tab(s) orally once a day  doxycycline monohydrate 100 mg oral capsule: 1 cap(s) orally every 12 hours for 2 weeks  finasteride 5 mg oral tablet: 1 tab(s) orally once a day  Flomax 0.4 mg oral capsule: 1 cap(s) orally 2 times a day  gabapentin 300 mg oral capsule: 1 cap(s) orally 2 times a day  metoprolol tartrate 25 mg oral tablet: 1 tab(s) orally 2 times a day  potassium chloride 20 mEq oral tablet, extended release: 2 tab(s) orally once a day  pravastatin 20 mg oral tablet: 1 tab(s) orally once a day acetaminophen 325 mg oral tablet: 2 tab(s) orally every 6 hours, As needed, Temp greater or equal to 38C (100.4F), Mild Pain (1 - 3)  amiodarone 200 mg oral tablet: 1 tab(s) orally once a day  cholecalciferol 2000 intl units (50 mcg) oral tablet: 1 tab(s) orally once a day  doxycycline monohydrate 100 mg oral capsule: 1 cap(s) orally every 12 hours for 2 weeks  finasteride 5 mg oral tablet: 1 tab(s) orally once a day  Flomax 0.4 mg oral capsule: 1 cap(s) orally 2 times a day  gabapentin 300 mg oral capsule: 1 cap(s) orally 2 times a day  Lasix 80 mg oral tablet: 1 tab(s) orally once a day  metoprolol tartrate 25 mg oral tablet: 1 tab(s) orally 2 times a day  potassium chloride 20 mEq oral tablet, extended release: 2 tab(s) orally once a day  pravastatin 20 mg oral tablet: 1 tab(s) orally once a day

## 2020-02-10 NOTE — DISCHARGE NOTE PROVIDER - CARE PROVIDERS DIRECT ADDRESSES
[email or web address]

## 2020-02-10 NOTE — DISCHARGE NOTE PROVIDER - HOSPITAL COURSE
85 y/o M admitted with RIGHT ankle Fx (10/19) with possible OM, PICC placed and sent home on IV vanco/CTX about 2 weeks prior to presentation.    Now admitted 1/29 with apparent sepsis.  TTE with mod aortic stenosis - pt developed flash pulm edema with fever and tachycardia on 1/30 necessitating RRT    and transfer to ICU for further care.  PICC was pulled on 1/30.  BCx negative thus far.                  1/31: Tm 104.7 Confused overnight.  More awake and alert this morning.  Was on NIV.  On Amio gtt for NSVT    2/1:  Temps lower.  CT chest with likely LLL infiltrate.  On NIV overnight      2/2: Tm 102.  again with flash pulmonary edema in am --placed on HFNC and given furosemide 40- -u/o 600ml.  Case d/w Dr Tubbs -- AV is mod-severe stenosis--will also need to r/o ischemia--LHC this week.  Will change to PO amio.    above d/w Dr Blackburn who has been caring for the patient.    2/3: OOB to chair, tolerating PO diet, remains on high flow O2 - hemodynamics reasonable.  D/w Dr Tubbs - will do cardiac cath this week if pt remains stable.  No new issues - No overnight events.  Tm 101*F      2/4: down to 45% FiO2 on high flow NC - hemodynamics reasonable - no further evidence of flash pulm edema last 24-48 hrs.     D/w Dr Tubbs who will arrange for cardiac cath 2/5.      Notably BNP down to 4k.  OOB to chair and tolerating diet as appropriate.    2/5: uneventful cath this morning - no significant CAD - titrated off of high-flow oxygen - hemodynamics reasonable - No new issues, No overnight events.    2/6: tolerating 5L NC O2 without issues.  Hemodynamics reasonable.  BNP remains elevated.  D/w cardiology and ID - will likely require additional 2 weeks of ABx to treat LE cellulitis/possible osteomyelitis - all cultures remain NGTD.  Pt will need interventional cardiology eval to see when they could consider valve replacement/repair given the underlying possibility of infection.  Will need RAVI and CT to assess replacement valve size as well as CT surgery eval to determine candidacy for open valve vs TAVR.    Pt is afebrile and currently tolerating room air with >92 % sat    02/07/20: Patient seen and examined. He denies any complaints. He was transferred from ICU yesterday, admitted with sepsis, possibly due to LLL pneumonia, pulmonary edema, NSVTs. S/P IV zosyn. Now on amiodarone.     02/08/20: Patient seen and examined. Feels much better today. Discussed with patient and wife at bed side regarding management and d/c plan. Agreeable for rehab.     02/09/20: Patient seen and examined. No new issues.                Assessment and Plan:     · Assessment        85 y/o M admitted 1/29 with sepsis likely result of LLL infiltrate, Pneumonia due to gram negative organisms most likely    acute type 1 hypoxic respiratory failure due to combination of pneumonia and flash pulmonary edema/acute systolic HF - HFrEF - potentially due to AS vs cardiac ischemia    NSVT S/P Amio gtt - on PO amiodarone now    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    still needs AVR when infection has cleared    B/L 'thigh pain' - cramping and weakness due to debility vs neuropathy?  Better with neurontin    Hypokalemia due to lasix            planning for evaluation for aortic valve replacement    close observation for recurrence of infection    Continue Doxycycline for approx 2 more weeks    completed pip/hao on 2/5    amiodarone and low dose BBx    ASA    Supplement K    titrate FiO2 to Sat>92%    Cont daily po furosemide         Rehab today    Spent more than 30 min to prepare the discharge    PMD was notified. 83 y/o M admitted with RIGHT ankle Fx (10/19) with possible OM, PICC placed and sent home on IV vanco/CTX about 2 weeks prior to presentation.    Now admitted 1/29 with apparent sepsis.  TTE with mod aortic stenosis - pt developed flash pulm edema with fever and tachycardia on 1/30 necessitating RRT    and transfer to ICU for further care.  PICC was pulled on 1/30.  BCx negative thus far.                  1/31: Tm 104.7 Confused overnight.  More awake and alert this morning.  Was on NIV.  On Amio gtt for NSVT    2/1:  Temps lower.  CT chest with likely LLL infiltrate.  On NIV overnight      2/2: Tm 102.  again with flash pulmonary edema in am --placed on HFNC and given furosemide 40- -u/o 600ml.  Case d/w Dr Tubbs -- AV is mod-severe stenosis--will also need to r/o ischemia--LHC this week.  Will change to PO amio.    above d/w Dr Blackburn who has been caring for the patient.    2/3: OOB to chair, tolerating PO diet, remains on high flow O2 - hemodynamics reasonable.  D/w Dr Tubbs - will do cardiac cath this week if pt remains stable.  No new issues - No overnight events.  Tm 101*F      2/4: down to 45% FiO2 on high flow NC - hemodynamics reasonable - no further evidence of flash pulm edema last 24-48 hrs.     D/w Dr Tubbs who will arrange for cardiac cath 2/5.      Notably BNP down to 4k.  OOB to chair and tolerating diet as appropriate.    2/5: uneventful cath this morning - no significant CAD - titrated off of high-flow oxygen - hemodynamics reasonable - No new issues, No overnight events.    2/6: tolerating 5L NC O2 without issues.  Hemodynamics reasonable.  BNP remains elevated.  D/w cardiology and ID - will likely require additional 2 weeks of ABx to treat LE cellulitis/possible osteomyelitis - all cultures remain NGTD.  Pt will need interventional cardiology eval to see when they could consider valve replacement/repair given the underlying possibility of infection.  Will need RAVI and CT to assess replacement valve size as well as CT surgery eval to determine candidacy for open valve vs TAVR.    Pt is afebrile and currently tolerating room air with >92 % sat    02/07/20: Patient seen and examined. He denies any complaints. He was transferred from ICU yesterday, admitted with sepsis, possibly due to LLL pneumonia, pulmonary edema, NSVTs. S/P IV zosyn. Now on amiodarone.     02/08/20: Patient seen and examined. Feels much better today. Discussed with patient and wife at bed side regarding management and d/c plan. Agreeable for rehab.     02/09/20: Patient seen and examined. No new issues.    02/11/20: patient seen and examined. Waiting for rehab placement. No new issues. Discussed with patient and daughter  at bed side.                 Assessment and Plan:     · Assessment        83 y/o M admitted 1/29 with sepsis likely result of LLL infiltrate, Pneumonia due to gram negative organisms most likely    acute type 1 hypoxic respiratory failure due to combination of pneumonia and flash pulmonary edema/acute systolic HF - HFrEF - potentially due to AS vs cardiac ischemia    NSVT S/P Amio gtt - on PO amiodarone now    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    still needs AVR when infection has cleared    B/L 'thigh pain' - cramping and weakness due to debility vs neuropathy?  Better with neurontin    Hypokalemia due to lasix            planning for evaluation for aortic valve replacement    close observation for recurrence of infection    Continue Doxycycline for approx 2 more weeks    completed pip/hao on 2/5    amiodarone and low dose BBx    ASA    Supplemented  K    Off BIPAP    Cont daily po furosemide         Rehab today    Spent more than 30 min to prepare the discharge    PMD was notified.

## 2020-02-10 NOTE — DISCHARGE NOTE PROVIDER - CARE PROVIDER_API CALL
Ac Mtz)  Internal Medicine  [address] [facility], [facility] [address]  [place] [address]  Phone: [phone]  Fax: [phone]  Follow Up Time:     Ac Tubbs)  Cardiovascular Disease; Internal Medicine  [address] [place] [address]  Phone: [phone]  Fax: [phone]  Follow Up Time:

## 2020-02-10 NOTE — PROGRESS NOTE ADULT - SUBJECTIVE AND OBJECTIVE BOX
Patient is a 84y old  Male who presents with a chief complaint of SIRS (09 Feb 2020 14:33)    1/30/2020- Cardiology Consultation: Patient with HBP, HLD and known moderate aortic stenosis and regurgitation is S/P ORIF left ankle 1/1/2019 the admitted for right leg cellulitis 1/10/2020 and discharged with a PICC line for 6 weeks of antibiotics. He presented today with bilateral leg pain and was noted to be febrile to 104, breathless and having frequent VPCs. The patient denies chest pain. There is no history of CAD or heart failure. EP has started him on IV amiodarone.  Followup HPI:  1/31- Breathless. Remains on Bipap. Getting prn doses of IV furosemide for respiratory distress. Denies chest pain.   2/1 on bipap. wife at bedside states he is better than yesterday. appears fairly comfortable on bipap  2/2 appears comfortable. still having intermittent episodes of tachycardia and then worsening dyspnea. temp to 102  2/3 alert appears comfortable on high flow oxygen. temp 100.5  2/4 alert, appears comfortable, no complaints  2/5 s/p cath no complaints  2/6. Feels weak. No other complaints. On nasal O2 today.  2/7- no complaints. Now on med/surg bed on 5E.  2/8-Feels generally weak but no other complaints. Denies shortness of breath.   2/9- no complaints.  2/10- No complaints. Walking with a walker. States he is going to rehab facility today.    PAST MEDICAL & SURGICAL HISTORY:  HLD (hyperlipidemia)  Enlarged prostate  HTN (hypertension)  Status post ORIF of fracture of ankle: Right, 10/2019      Review of symptoms:  Negative except for as noted in today's HPI.      MEDICATIONS  (STANDING):  aMIOdarone    Tablet 200 milliGRAM(s) Oral daily  chlorhexidine 0.12% Liquid 15 milliLiter(s) Swish and Spit two times a day  cholecalciferol 2000 Unit(s) Oral daily  doxycycline hyclate Capsule 100 milliGRAM(s) Oral every 12 hours  finasteride 5 milliGRAM(s) Oral daily  furosemide    Tablet 80 milliGRAM(s) Oral daily  gabapentin 300 milliGRAM(s) Oral two times a day  heparin  Injectable 5000 Unit(s) SubCutaneous every 12 hours  metoprolol tartrate 25 milliGRAM(s) Oral two times a day  tamsulosin 0.4 milliGRAM(s) Oral two times a day    MEDICATIONS  (PRN):  acetaminophen   Tablet .. 650 milliGRAM(s) Oral every 6 hours PRN Temp greater or equal to 38C (100.4F), Mild Pain (1 - 3)          Vital Signs Last 24 Hrs  T(C): 36.4 (10 Feb 2020 05:13), Max: 36.8 (09 Feb 2020 22:02)  T(F): 97.5 (10 Feb 2020 05:13), Max: 98.2 (09 Feb 2020 22:02)  HR: 60 (10 Feb 2020 05:13) (60 - 68)  BP: 135/60 (10 Feb 2020 05:13) (110/59 - 135/60)  BP(mean): --  RR: 18 (10 Feb 2020 05:13) (16 - 20)  SpO2: 98% (10 Feb 2020 05:13) (98% - 100%)    I&O's Summary      PHYSICAL EXAM  General Appearance: comfortable  HEENT:   Neck:   Lungs: clear  Heart: 2/6 HILL base  Abdomen:   Extremities: no edema  Neurologic:       INTERPRETATION OF TELEMETRY:    ECG:    LABS:      02-10    140  |  104  |  22  ----------------------------<  107<H>  3.3<L>   |  29  |  0.92    Ca    8.8      10 Feb 2020 07:48            Pro BNP  [id number] 02-06 @ 06:34  D Dimer  -- 02-06 @ 06:34  Pro BNP  [id number] 02-04 @ 07:02  D Dimer  -- 02-04 @ 07:02              RADIOLOGY & ADDITIONAL STUDIES:    IMPRESSION:  1, S/P systemic inflammatory response syndrome. Source undetermined. Blood cultures neg.  PICC line removed. No TTE evidence of endocarditis. Clinically resolved.  2. Elevated troponin consistent with sepsis.  Cath 2/5/2020 with normal coronaries.  3. Moderate-severe  AS . Clinically stable.  4. VPCs due to sepsis . Improved on oral amiodarone.   5.HBP.  Stable.    6. Hypokalemia due to furosemide.   PLAN:  Suggest adding daily oral potassium supplementation to correct hypokalemia. Continue amiodarone, furosemide, metoprolol. Continue doxycycline for 14 day course per ID. If he remains stable from the cardiac viewpoint would suggest outpatient referral to  a cardiac surgery center where TAVR may be considered. Dr. Ac Tubbs to see him post discharge in the office.

## 2020-02-11 ENCOUNTER — TRANSCRIPTION ENCOUNTER (OUTPATIENT)
Age: 85
End: 2020-02-11

## 2020-02-11 VITALS
HEART RATE: 88 BPM | RESPIRATION RATE: 16 BRPM | DIASTOLIC BLOOD PRESSURE: 54 MMHG | OXYGEN SATURATION: 96 % | SYSTOLIC BLOOD PRESSURE: 118 MMHG | TEMPERATURE: 98 F

## 2020-02-11 PROCEDURE — 99239 HOSP IP/OBS DSCHRG MGMT >30: CPT

## 2020-02-11 RX ADMIN — GABAPENTIN 300 MILLIGRAM(S): 400 CAPSULE ORAL at 05:59

## 2020-02-11 RX ADMIN — Medication 25 MILLIGRAM(S): at 05:59

## 2020-02-11 RX ADMIN — FINASTERIDE 5 MILLIGRAM(S): 5 TABLET, FILM COATED ORAL at 11:20

## 2020-02-11 RX ADMIN — Medication 100 MILLIGRAM(S): at 05:59

## 2020-02-11 RX ADMIN — AMIODARONE HYDROCHLORIDE 200 MILLIGRAM(S): 400 TABLET ORAL at 05:59

## 2020-02-11 RX ADMIN — CHLORHEXIDINE GLUCONATE 15 MILLILITER(S): 213 SOLUTION TOPICAL at 05:59

## 2020-02-11 RX ADMIN — TAMSULOSIN HYDROCHLORIDE 0.4 MILLIGRAM(S): 0.4 CAPSULE ORAL at 05:59

## 2020-02-11 RX ADMIN — HEPARIN SODIUM 5000 UNIT(S): 5000 INJECTION INTRAVENOUS; SUBCUTANEOUS at 05:59

## 2020-02-11 RX ADMIN — Medication 2000 UNIT(S): at 11:20

## 2020-02-11 RX ADMIN — Medication 80 MILLIGRAM(S): at 05:59

## 2020-02-11 NOTE — PROGRESS NOTE ADULT - REASON FOR ADMISSION
SIRS
Sepsis
SIRS

## 2020-02-11 NOTE — PROGRESS NOTE ADULT - PROVIDER SPECIALTY LIST ADULT
Cardiology
Colorectal Surgery
Critical Care
Electrophysiology
Hospitalist
Infectious Disease
Critical Care
Critical Care

## 2020-02-11 NOTE — DISCHARGE NOTE NURSING/CASE MANAGEMENT/SOCIAL WORK - PATIENT PORTAL LINK FT
You can access the FollowMyHealth Patient Portal offered by [facility] by registering at the following website: [email or web address]. By joining [facility]’s FollowMyHealth portal, you will also be able to view your health information using other applications (apps) compatible with our system.

## 2020-02-11 NOTE — PROGRESS NOTE ADULT - SUBJECTIVE AND OBJECTIVE BOX
Patient is a 84y old  Male who presents with a chief complaint of SIRS (10 Feb 2020 13:00)      HPI:  83 yo M with a PMH HTN, HLD, and BPH who presents with leg pains. He states for the past week, he has had sharp severe leg pains. They occur every day, often lasting for hours at a time, and are not related to exertion. He states that when the pains come it makes it difficult to walk, although when it is not there, he can walk without difficulty. The pains are from his ankle up to his upper thighs (he cannot tell from where it starts and stops). Of note, he was recently admitted (2 weeks ago) for cellulitis with concern for OM, and had a PICC line placed, to be on antibiotics (Vanc/Ceftriaxone) for 6 weeks.  He denies nausea, vomiting, fevers, chills, cough, chest pain, SOB, abdominal pain, diarrhea, dysuria, hematuria, dizziness, lightheadedness, or rash. He states the swelling in his legs from his cellulitis has improved.    Of note, per ER documentation, he presented due to fevers, and reportedly had a Tmax of 104 at home. The patient denies this.    Attempted to call patient's wife multiple times overnight and left message without response.  Patient does not know who his cardiologist is.    In the ED, he was given aspirin 325 mg PO x1, Vancomycin 1g IV x1, Cefepime 1g IV x1, Ibuprofen 600 mg PO x1, and LR 2.5L x1. (29 Jan 2020 23:42)    1/30/2020- Cardiology Consultation: Patient with HBP, HLD and known moderate aortic stenosis and regurgitation is S/P ORIF left ankle 1/1/2019 the admitted for right leg cellulitis 1/10/2020 and discharged with a PICC line for 6 weeks of antibiotics. He presented today with bilateral leg pain and was noted to be febrile to 104, breathless and having frequent VPCs. The patient denies chest pain. There is no history of CAD or heart failure. EP has started him on IV amiodarone.  Followup HPI:  1/31- Breathless. Remains on Bipap. Getting prn doses of IV furosemide for respiratory distress. Denies chest pain.   2/1 on bipap. wife at bedside states he is better than yesterday. appears fairly comfortable on bipap  2/2 appears comfortable. still having intermittent episodes of tachycardia and then worsening dyspnea. temp to 102  2/3 alert appears comfortable on high flow oxygen. temp 100.5  2/4 alert, appears comfortable, no complaints  2/5 s/p cath no complaints  2/6. Feels weak. No other complaints. On nasal O2 today.  2/7- no complaints. Now on med/surg bed on 5E.  2/8-Feels generally weak but no other complaints. Denies shortness of breath.   2/9- no complaints.  2/10- No complaints. Walking with a walker. States he is going to rehab facility today.  2/11 no compalints  PAST MEDICAL & SURGICAL HISTORY:  HLD (hyperlipidemia)  Enlarged prostate  HTN (hypertension)  Status post ORIF of fracture of ankle: Right, 10/2019        MEDICATIONS  (STANDING):  aMIOdarone    Tablet 200 milliGRAM(s) Oral daily  chlorhexidine 0.12% Liquid 15 milliLiter(s) Swish and Spit two times a day  cholecalciferol 2000 Unit(s) Oral daily  doxycycline hyclate Capsule 100 milliGRAM(s) Oral every 12 hours  finasteride 5 milliGRAM(s) Oral daily  furosemide    Tablet 80 milliGRAM(s) Oral daily  gabapentin 300 milliGRAM(s) Oral two times a day  heparin  Injectable 5000 Unit(s) SubCutaneous every 12 hours  metoprolol tartrate 25 milliGRAM(s) Oral two times a day  tamsulosin 0.4 milliGRAM(s) Oral two times a day    MEDICATIONS  (PRN):  acetaminophen   Tablet .. 650 milliGRAM(s) Oral every 6 hours PRN Temp greater or equal to 38C (100.4F), Mild Pain (1 - 3)          Vital Signs Last 24 Hrs  T(C): 36.6 (11 Feb 2020 05:18), Max: 36.6 (10 Feb 2020 11:00)  T(F): 97.9 (11 Feb 2020 05:18), Max: 97.9 (10 Feb 2020 11:00)  HR: 59 (11 Feb 2020 05:18) (59 - 74)  BP: 136/53 (11 Feb 2020 05:18) (112/54 - 136/53)  BP(mean): 67 (10 Feb 2020 11:00) (67 - 67)  RR: 17 (11 Feb 2020 05:18) (17 - 18)  SpO2: 95% (11 Feb 2020 05:18) (95% - 97%)    I&O's Summary    10 Feb 2020 07:01  -  11 Feb 2020 07:00  --------------------------------------------------------  IN: 50 mL / OUT: 790 mL / NET: -740 mL        PHYSICAL EXAM  General Appearance: comfortable  HEENT:   Neck:   Lungs: clear  Heart: 2/6 [place]  Abdomen:   Extremities: no edema  Neurologic:       INTERPRETATION OF TELEMETRY:    ECG:        LABS:      02-10    140  |  104  |  22  ----------------------------<  107<H>  3.3<L>   |  29  |  0.92    Ca    8.8      10 Feb 2020 07:48            Pro BNP  5602 02-06 @ 06:34  D Dimer  -- 02-06 @ 06:34              RADIOLOGY & ADDITIONAL STUDIES:

## 2020-02-11 NOTE — PROGRESS NOTE ADULT - ASSESSMENT
1, S/P systemic inflammatory response syndrome. Source undetermined. Blood cultures neg.  PICC line removed. No TTE evidence of endocarditis. Clinically resolved.  2. Elevated troponin consistent with sepsis.  Cath 2/5/2020 with normal coronaries.  3. Moderate-severe  AS . Clinically stable.  4. VPCs due to sepsis . Improved on oral amiodarone.   5.HBP.  Stable.    6. Hypokalemia due to furosemide.   PLAN:  Suggest adding daily oral potassium supplementation to correct hypokalemia. Continue amiodarone, furosemide, metoprolol. Continue doxycycline for 14 day course per ID. If he remains stable from the cardiac viewpoint would suggest outpatient referral to  a cardiac surgery center where TAVR may be considered. Dr. Ac Tubbs to see him post discharge in the office.

## 2020-02-17 DIAGNOSIS — I10 ESSENTIAL (PRIMARY) HYPERTENSION: ICD-10-CM

## 2020-02-17 DIAGNOSIS — I08.3 COMBINED RHEUMATIC DISORDERS OF MITRAL, AORTIC AND TRICUSPID VALVES: ICD-10-CM

## 2020-02-17 DIAGNOSIS — A41.9 SEPSIS, UNSPECIFIED ORGANISM: ICD-10-CM

## 2020-02-17 DIAGNOSIS — T82.7XXA INFECTION AND INFLAMMATORY REACTION DUE TO OTHER CARDIAC AND VASCULAR DEVICES, IMPLANTS AND GRAFTS, INITIAL ENCOUNTER: ICD-10-CM

## 2020-02-17 DIAGNOSIS — L03.115 CELLULITIS OF RIGHT LOWER LIMB: ICD-10-CM

## 2020-02-17 DIAGNOSIS — I09.81 RHEUMATIC HEART FAILURE: ICD-10-CM

## 2020-02-17 DIAGNOSIS — M86.9 OSTEOMYELITIS, UNSPECIFIED: ICD-10-CM

## 2020-02-17 DIAGNOSIS — E87.6 HYPOKALEMIA: ICD-10-CM

## 2020-02-17 DIAGNOSIS — N17.9 ACUTE KIDNEY FAILURE, UNSPECIFIED: ICD-10-CM

## 2020-02-17 DIAGNOSIS — I50.33 ACUTE ON CHRONIC DIASTOLIC (CONGESTIVE) HEART FAILURE: ICD-10-CM

## 2020-02-17 DIAGNOSIS — M62.82 RHABDOMYOLYSIS: ICD-10-CM

## 2020-02-17 DIAGNOSIS — J15.6 PNEUMONIA DUE TO OTHER GRAM-NEGATIVE BACTERIA: ICD-10-CM

## 2020-02-17 DIAGNOSIS — I47.2 VENTRICULAR TACHYCARDIA: ICD-10-CM

## 2020-02-17 DIAGNOSIS — T50.1X5A ADVERSE EFFECT OF LOOP [HIGH-CEILING] DIURETICS, INITIAL ENCOUNTER: ICD-10-CM

## 2020-02-17 DIAGNOSIS — N40.0 BENIGN PROSTATIC HYPERPLASIA WITHOUT LOWER URINARY TRACT SYMPTOMS: ICD-10-CM

## 2020-02-17 DIAGNOSIS — D69.6 THROMBOCYTOPENIA, UNSPECIFIED: ICD-10-CM
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
## 2020-02-17 DIAGNOSIS — E78.5 HYPERLIPIDEMIA, UNSPECIFIED: ICD-10-CM
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
## 2020-02-17 DIAGNOSIS — J96.01 ACUTE RESPIRATORY FAILURE WITH HYPOXIA: ICD-10-CM

## 2020-02-17 DIAGNOSIS — I27.20 PULMONARY HYPERTENSION, UNSPECIFIED: ICD-10-CM

## 2020-02-27 NOTE — ED PROVIDER NOTE - PSH
Current Every Day Smoker     Packs/day: 0.50     Years: 30.00     Pack years: 15.00     Types: Cigarettes    Smokeless tobacco: Never Used    Tobacco comment: Pts 3rd time on chantix Jan 2020   Substance Use Topics    Alcohol use: Not Currently     Comment: \"occ - average one time per month\"    Drug use: Not Currently     Types: Marijuana     Comment: last used age 23       ALLERGIES    Allergies   Allergen Reactions    Niacin Itching       MEDICATIONS    Current Outpatient Medications on File Prior to Encounter   Medication Sig Dispense Refill    fenofibrate (TRICOR) 48 MG tablet Take 48 mg by mouth nightly      rivaroxaban (XARELTO) 20 MG TABS tablet Take 20 mg by mouth 2 times daily      Dulaglutide (TRULICITY) 1.5 IF/2.8XD SOPN Inject 1.5 mg into the skin once a week      furosemide (LASIX) 40 MG tablet Take 40 mg by mouth 2 times daily      gabapentin (NEURONTIN) 300 MG capsule Take 300 mg by mouth 2 times daily.  hydrALAZINE (APRESOLINE) 100 MG tablet Take 100 mg by mouth 2 times daily      labetalol (NORMODYNE) 200 MG tablet Take 200 mg by mouth 2 times daily      Mycophenolate Sodium 360 MG TBEC Take 360 mg by mouth 2 times daily      NIFEdipine (ADALAT CC) 60 MG extended release tablet Take 60 mg by mouth 2 times daily      predniSONE (DELTASONE) 10 MG tablet Take 10 mg by mouth daily      Sirolimus 2 MG TABS Take 2 mg by mouth daily      CHANTIX STARTING MONTH CINDY 0.5 MG X 11 & 1 MG X 42 tablet Take 0.5 mg by mouth daily       No current facility-administered medications on file prior to encounter. REVIEW OF SYSTEMS    Pertinent items are noted in HPI. Constitutional: Negative for systemic symptoms including fever, chills and malaise. Objective:      BP (!) 153/96   Pulse 82   Temp 99.7 °F (37.6 °C) (Temporal)   Resp 16     PHYSICAL EXAM    General: The patient is in no acute distress.     Mental status:  Patient is appropriate, is  oriented to place and plan of care.  Dermatologic exam: Visual inspection of the periwound reveals the skin to be normal in turgor and texture. Wound exam:  see wound description below     All active wounds listed below with today's date are evaluated      Wound 01/21/20 #1 left posterior leg (Active)   Wound Image   2/6/2020  3:14 PM   Wound Diabetic Daily 2 2/27/2020  3:10 PM   Dressing Status Clean;Dry; Intact 2/27/2020  3:58 PM   Dressing Changed Changed/New 2/27/2020  3:58 PM   Wound Cleansed Rinsed/Irrigated with saline 2/27/2020  3:10 PM   Wound Length (cm) 0.3 cm 2/27/2020  3:10 PM   Wound Width (cm) 0.2 cm 2/27/2020  3:10 PM   Wound Depth (cm) 0.1 cm 2/27/2020  3:10 PM   Wound Surface Area (cm^2) 0.06 cm^2 2/27/2020  3:10 PM   Change in Wound Size % (l*w) 85 2/27/2020  3:10 PM   Wound Volume (cm^3) 0.01 cm^3 2/27/2020  3:10 PM   Wound Healing % 75 2/27/2020  3:10 PM   Post-Procedure Length (cm) 0.5 cm 2/20/2020  3:30 PM   Post-Procedure Width (cm) 0.2 cm 2/20/2020  3:30 PM   Post-Procedure Depth (cm) 0.1 cm 2/20/2020  3:30 PM   Post-Procedure Surface Area (cm^2) 0.1 cm^2 2/20/2020  3:30 PM   Post-Procedure Volume (cm^3) 0.01 cm^3 2/20/2020  3:30 PM   Distance Tunneling (cm) 0 cm 2/27/2020  3:10 PM   Tunneling Position ___ O'Clock 0 2/27/2020  3:10 PM   Undermining Starts ___ O'Clock 0 2/27/2020  3:10 PM   Undermining Ends___ O'Clock 0 2/27/2020  3:10 PM   Undermining Maxium Distance (cm) 0 2/27/2020  3:10 PM   Wound Assessment Pink;Yellow 2/27/2020  3:10 PM   Drainage Amount Scant 2/27/2020  3:10 PM   Drainage Description Serosanguinous 2/27/2020  3:10 PM   Odor None 2/27/2020  3:10 PM   Margins Defined edges 2/27/2020  3:10 PM   Ann-Marie-wound Assessment Pink 2/27/2020  3:10 PM   Non-staged Wound Description Full thickness 2/27/2020  3:10 PM   [place]%Wound Bed 70 2/27/2020  3:10 PM   Red%Wound Bed 0 2/27/2020  3:10 PM   Yellow%Wound Bed 20 2/27/2020  3:10 PM   Black%Wound Bed 0 2/27/2020  3:10 PM   Purple%Wound Bed 0 2/27/2020  3:10 PM Other%Wound Bed 0 2/27/2020  3:10 PM   Number of days: 37       Assessment:       Problem List Items Addressed This Visit     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 Plan:     Discharge Instructions       PHYSICIAN ORDERS AND DISCHARGE INSTRUCTIONS     NOTE: Upon discharge from the [address], you will receive a patient experience survey. We would be grateful if you would take the time to fill this survey out.     Wound care order history:                 Studies completed no arterial or venous issues at this time 1/28/20              Cultures:  1/21/20              Grafts:                Antibiotics:           Continuing wound care orders and information:                 Residence:  Home              Continue home health care with:               Your wound-care supplies will be provided by:  [facility]     Wound cleansing:               ND not scrub or use excessive force.              Wash hands with soap and water before and after dressing changes.             NTLJO to applying a clean dressing, cleanse wound with normal saline, wound cleanser, or mild soap and water.               Ask the physician or nurse before getting the wound(s) wet in a shower     Daily Wound management:              Keep weight off wounds and reposition every 2 hours.              Avoid standing for long periods of time.              Apply wraps/stockings in AM and remove at bedtime.              If swelling is present, elevate legs to the level of the heart or above for 30 minutes 4-5 times a day and/or when sitting.                                      When taking antibiotics take entire prescription as ordered by physician do not stop taking until medicine is all gone.                                                           Orders for this week:  2/27/20     Left Lower Leg--Clean with soap and water, pat dry. History of ankle surgery    Status post ORIF of fracture of ankle  Right, 10/2019

## 2020-03-05 ENCOUNTER — APPOINTMENT (OUTPATIENT)
Dept: ORTHOPEDIC SURGERY | Facility: CLINIC | Age: 85
End: 2020-03-05
Payer: MEDICARE

## 2020-03-05 PROCEDURE — 99213 OFFICE O/P EST LOW 20 MIN: CPT

## 2020-03-05 PROCEDURE — 73610 X-RAY EXAM OF ANKLE: CPT | Mod: RT

## 2020-03-05 NOTE — HISTORY OF PRESENT ILLNESS
[FreeTextEntry1] : 84 year old male presenting with right ankle pain. The patient’s pain is noted to be a 0/10. The pain and swelling are noted to be improved compared to the previous visit. The patient has been attending physical therapy for this issue. The patient was recently in the hospital for pneumonia, and had PICC line antibiotics. He is currently taking no pain medication. No other complaints at this time.

## 2020-03-05 NOTE — ADDENDUM
[FreeTextEntry1] : I, Brad Johnson, acted solely as a scribe for Dr. Daniel Bustillos on this date 03/05/2020 .\par All medical record entries made by the Scribe were at my, Dr. Daniel Bustillos, direction and personally dictated by me on 03/05/2020 . I have reviewed the chart and agree that the record accurately reflects my personal performance of the history, physical exam, assessment and plan. I have also personally directed, reviewed, and agreed with the chart.

## 2020-03-05 NOTE — DISCUSSION/SUMMARY
[de-identified] : Today I had a lengthy discussion with the patient regarding their right ankle pain.I have addressed all the patient's concerns surrounding the pathology of their condition. I would like to see the patient back in the office PRN to reassess their condition. The patient understood and verbally agreed to the treatment plan. All of their questions were answered and they were satisfied with the visit. The patient should call the office if they have any questions or experience worsening symptoms.

## 2020-03-05 NOTE — PHYSICAL EXAM
[de-identified] : General: Alert and oriented x3. In no acute distress. Pleasant in nature with a normal affect. No apparent respiratory distress.\par \par R Ankle Exam\par Skin: Clean, dry, intact\par Inspection: No obvious malalignment, no swelling, no effusion; no lymphadenopathy\par Pulses: 2+ DP/PT pulses\par ROM: R Ankle 10 degrees of dorsiflexion, 40 degrees of plantarflexion, 10 degrees of subtalar motion\par Tenderness: No tenderness over the lateral malleolus, no CFL/ATFL/PTFL pain. No medial malleolus pain, no deltoid ligament pain. No proximal fibular pain. No heel pain.\par Stability: Negative anterior/posterior drawer.\par Strength: 5/5 TA/GS/EHL\par Neuro: In tact to light touch throughout\par Additional tests: Negative Mortons test, Negative syndesmosis squeeze test.  [de-identified] : 3V of the right ankle were ordered obtained and reviewed by me today, 03/05/2020 , revealed: Hardware in good position.

## 2020-05-06 ENCOUNTER — APPOINTMENT (OUTPATIENT)
Dept: ORTHOPEDIC SURGERY | Facility: CLINIC | Age: 85
End: 2020-05-06
Payer: MEDICARE

## 2020-05-06 PROCEDURE — 99214 OFFICE O/P EST MOD 30 MIN: CPT

## 2020-05-06 PROCEDURE — 73610 X-RAY EXAM OF ANKLE: CPT | Mod: TC,RT

## 2020-05-06 NOTE — DISCUSSION/SUMMARY
[de-identified] : Today I had a lengthy discussion with the patient regarding their right ankle pain. I have addressed all the patient's concerns surrounding the pathology of their condition. I recommend that the patient utilize Bactrim antibiotics. The prescription for Bactrim was provided today. I would like to see the patient back in the office in 1 week to reassess their condition. The patient understood and verbally agreed to the treatment plan. All of their questions were answered and they were satisfied with the visit. The patient should call the office if they have any questions or experience worsening symptoms. \par \par Bactrim\par Possible IV if no improvement\par F/U 1 week.

## 2020-05-06 NOTE — ADDENDUM
[FreeTextEntry1] : I, Brad Johnson, acted solely as a scribe for Dr. Daniel Bustillos on this date 05/06/2020 .\par All medical record entries made by the Scribe were at my, Dr. Daniel Bustillos, direction and personally dictated by me on 05/06/2020 . I have reviewed the chart and agree that the record accurately reflects my personal performance of the history, physical exam, assessment and plan. I have also personally directed, reviewed, and agreed with the chart.

## 2020-05-06 NOTE — PHYSICAL EXAM
[de-identified] : General: Alert and oriented x3. In no acute distress. Pleasant in nature with a normal affect. No apparent respiratory distress.\par \par R Ankle Exam\par Skin: Clean, dry, intact\par Inspection: +Medial erythema, +fluctuance .5 cm x .5 cm. No obvious malalignment, no swelling, no effusion; no lymphadenopathy\par Pulses: 2+ DP/PT pulses\par ROM: R Ankle 10 degrees of dorsiflexion, 40 degrees of plantarflexion, 10 degrees of subtalar motion\par Tenderness: No tenderness over the lateral malleolus, no CFL/ATFL/PTFL pain. No medial malleolus pain, no deltoid ligament pain. No proximal fibular pain. No heel pain.\par Stability: Negative anterior/posterior drawer.\par Strength: 5/5 TA/GS/EHL\par Neuro: In tact to light touch throughout\par Additional tests: Negative Mortons test, Negative syndesmosis squeeze test.  [de-identified] : 3V of the right ankle were ordered obtained and reviewed by me today, 05/06/2020 , revealed: Hardware in good position.

## 2020-05-06 NOTE — HISTORY OF PRESENT ILLNESS
[FreeTextEntry1] : 84 year old male presenting with right ankle pain. The patient’s pain is noted to be a 0/10. The pain and swelling are noted to be the same compared to the previous visit. He recently noticed redness on his medial ankle. He is currently taking no pain medication. No other complaints at this time. \par \par No trauma.\par No fever/chills/NS.

## 2020-05-14 ENCOUNTER — APPOINTMENT (OUTPATIENT)
Dept: ORTHOPEDIC SURGERY | Facility: CLINIC | Age: 85
End: 2020-05-14
Payer: MEDICARE

## 2020-05-14 PROCEDURE — 99213 OFFICE O/P EST LOW 20 MIN: CPT

## 2020-05-14 NOTE — HISTORY OF PRESENT ILLNESS
[FreeTextEntry1] : Elio is an 84-year-old male who presents to the office for a followup of his right ankle and to have a look at the cellulitis that was diagnosed at his previous visit. His pain continues to be 0/10. He does have some swelling in the region compared to the last visit it does look better. He denies fevers, chills, night sweats. He has no other complaints. He continues to take Bactrim.

## 2020-05-14 NOTE — PHYSICAL EXAM
[de-identified] : General: Alert and oriented x3. In no acute distress. Pleasant in nature with a normal affect. No apparent respiratory distress.\par \par R Ankle Exam\par Skin: Clean, dry, intact\par Inspection: +Medial erythema, +fluctuance .5 cm x .5 cm. No obvious malalignment, no swelling, no effusion; no lymphadenopathy\par Pulses: 2+ DP/PT pulses\par ROM: R Ankle 10 degrees of dorsiflexion, 40 degrees of plantarflexion, 10 degrees of subtalar motion\par Tenderness: No tenderness over the lateral malleolus, no CFL/ATFL/PTFL pain. No medial malleolus pain, no deltoid ligament pain. No proximal fibular pain. No heel pain.\par Stability: Negative anterior/posterior drawer.\par Strength: 5/5 TA/GS/EHL\par Neuro: In tact to light touch throughout\par Additional tests: Negative Mortons test, Negative syndesmosis squeeze test.  [de-identified] : No x-rays performed.

## 2020-05-14 NOTE — DISCUSSION/SUMMARY
[de-identified] : Assessment: Right ankle cellulitis.\par \par Plan:\par #1. Continue Bactrim as directed. Refilled to his pharmacy today.\par #2. Continue elevating the ankle.\par #3. All of his questions answered. If he becomes symptomatic with any fevers, chills, night sweats he knows he has to go to the emergency department or call the office to come in. We will see him back in one week.

## 2020-05-22 ENCOUNTER — APPOINTMENT (OUTPATIENT)
Dept: ORTHOPEDIC SURGERY | Facility: CLINIC | Age: 85
End: 2020-05-22
Payer: MEDICARE

## 2020-05-22 PROCEDURE — 99213 OFFICE O/P EST LOW 20 MIN: CPT

## 2020-05-22 NOTE — DISCUSSION/SUMMARY
[de-identified] : Assessment: Right ankle cellulitis.\par \par Plan:\par #1. MRI of the right ankle with and without contrast order to evaluate for infection/soft tissue injury due to complications of the internal hardware.\par #2. Continue elevating the ankle.\par #3. All of his questions answered. If he becomes symptomatic with any fevers, chills, night sweats he knows he has to go to the emergency department or call the office to come in. We will see him back n the office post MRI to review.

## 2020-05-22 NOTE — HISTORY OF PRESENT ILLNESS
[FreeTextEntry1] : Elio is an 84-year-old male who presents to the office for a followup of his right ankle and to have a look at the cellulitis that was diagnosed at his previous visit. His pain continues to be 0/10. He does have some swelling in the region compared to the last visit it does look better. He denies fevers, chills, night sweats. He has no other complaints. The patient completed antibiotics.

## 2020-05-22 NOTE — PHYSICAL EXAM
[de-identified] : General: Alert and oriented x3. In no acute distress. Pleasant in nature with a normal affect. No apparent respiratory distress.\par \par R Ankle Exam\par Skin: Clean, dry, intact\par Inspection: Erythema of the medial side of the ankle has subsided. No obvious malalignment, no swelling, no effusion; no lymphadenopathy\par Pulses: 2+ DP/PT pulses\par ROM: R Ankle 10 degrees of dorsiflexion, 40 degrees of plantarflexion, 10 degrees of subtalar motion\par Tenderness: No tenderness over the lateral malleolus, no CFL/ATFL/PTFL pain. No medial malleolus pain, no deltoid ligament pain. No proximal fibular pain. No heel pain.\par Stability: Negative anterior/posterior drawer.\par Strength: 5/5 TA/GS/EHL\par Neuro: In tact to light touch throughout\par Additional tests: Negative Mortons test, Negative syndesmosis squeeze test.  [de-identified] : No x-rays performed.

## 2020-05-30 ENCOUNTER — APPOINTMENT (OUTPATIENT)
Dept: MRI IMAGING | Facility: CLINIC | Age: 85
End: 2020-05-30
Payer: MEDICARE

## 2020-05-30 ENCOUNTER — OUTPATIENT (OUTPATIENT)
Dept: OUTPATIENT SERVICES | Facility: HOSPITAL | Age: 85
LOS: 1 days | End: 2020-05-30
Payer: MEDICARE

## 2020-05-30 DIAGNOSIS — L03.115 CELLULITIS OF RIGHT LOWER LIMB: ICD-10-CM

## 2020-05-30 DIAGNOSIS — Z98.890 OTHER SPECIFIED POSTPROCEDURAL STATES: Chronic | ICD-10-CM

## 2020-05-30 PROCEDURE — A9585: CPT

## 2020-05-30 PROCEDURE — 73723 MRI JOINT LWR EXTR W/O&W/DYE: CPT | Mod: 26,RT

## 2020-05-30 PROCEDURE — 73723 MRI JOINT LWR EXTR W/O&W/DYE: CPT

## 2020-06-04 ENCOUNTER — APPOINTMENT (OUTPATIENT)
Dept: ORTHOPEDIC SURGERY | Facility: CLINIC | Age: 85
End: 2020-06-04
Payer: MEDICARE

## 2020-06-04 DIAGNOSIS — Z01.818 ENCOUNTER FOR OTHER PREPROCEDURAL EXAMINATION: ICD-10-CM

## 2020-06-04 PROCEDURE — 99214 OFFICE O/P EST MOD 30 MIN: CPT

## 2020-06-04 NOTE — DISCUSSION/SUMMARY
[de-identified] : At this point in time I do believe the best treatment option for the left ankle is to remove the hardware that I believe is causing this inflammation on the medial side of his ankle. I reviewed the MRI in great detail and answered all his questions about the ankle an MRI. Prior to the surgical procedure he does need clearance which will include a cardiac clearance. All of his questions were answered.  If he has additional questions prior to the surgical procedure he may call the office or come in.\par \par All risks, benefits and alternatives to the recommended surgical procedure were discussed which include but are not limited to bleeding, infection, nerve damage, vascular damage, failure of the wound to heal, the need for further surgery, loss of limb, DVT, PE, loss of life as well as the risks associated with general anesthesia. The patient verbalized understanding and provided informed consent to move forward with surgery.\par

## 2020-06-04 NOTE — PHYSICAL EXAM
[de-identified] : General: Alert and oriented x3. In no acute distress. Pleasant in nature with a normal affect. No apparent respiratory distress.\par \par R Ankle Exam\par Skin: Clean, dry, intact\par Inspection: Erythema of the medial side of the ankle has subsided. No obvious malalignment, no swelling, no effusion; no lymphadenopathy\par Pulses: 2+ DP/PT pulses\par ROM: R Ankle 10 degrees of dorsiflexion, 40 degrees of plantarflexion, 10 degrees of subtalar motion\par Tenderness: No tenderness over the lateral malleolus, no CFL/ATFL/PTFL pain. No medial malleolus pain, no deltoid ligament pain. No proximal fibular pain. No heel pain.\par Stability: Negative anterior/posterior drawer.\par Strength: 5/5 TA/GS/EHL\par Neuro: In tact to light touch throughout\par Additional tests: Negative Mortons test, Negative syndesmosis squeeze test.  [de-identified] : MRI of the right ankle reviewed with the patient showed status post distal fibular fixation and syndesmotic fixation. Overlying the Endobutton suture of the syndesmotic fixation along the medial malleolus there is prominent soft tissue edema with a tiny subcentimeter focal collection. This may be related to a tiny seroma/hematoma, however superimposed infection cannot be excluded. There is nonspecific edema and mild enhancement abductor syndesmotic fixation tract through the tibial metaphysis which may be related to residual postsurgical changes there is no associated T1 signal abnormality. If there is concern for underlying infection further evaluation with technetium sulfur colloid marrow scan and leukocytosis white blood cell study can be performed for more specific evaluation. Incompletely image nonspecific increased signal within the third, fourth, fifth metatarsals which may be related to underlying osseous stress reaction. Severe tibiotalar arthrosis. Mild tibiotalar synovitis. Moderate arthrosis at the medial cuneiform/navicular fourth tarsometatarsal articulations.

## 2020-06-04 NOTE — REASON FOR VISIT
start 2 days prior to surgery 1 drop 4 times daily . [Initial Visit] : an initial visit for [FreeTextEntry2] : Right ankle pain medial side of ankle

## 2020-06-04 NOTE — HISTORY OF PRESENT ILLNESS
[FreeTextEntry1] : 06/04/20: Eliopresents to the office for a followup of his right ankle. He is here to review the MRI of his right ankle. He continues to have no pain in the ankle but he does have severe redness and irritation around the medial side of the ankle. He is wearing regular sneakers today in the office. He denies fevers, chills, night sweats. He has no other issues.\par \par 05/22/20: Elio is an 84-year-old male who presents to the office for a followup of his right ankle and to have a look at the cellulitis that was diagnosed at his previous visit. His pain continues to be 0/10. He does have some swelling in the region compared to the last visit it does look better. He denies fevers, chills, night sweats. He has no other complaints. The patient completed antibiotics.

## 2020-06-08 ENCOUNTER — OUTPATIENT (OUTPATIENT)
Dept: OUTPATIENT SERVICES | Facility: HOSPITAL | Age: 85
LOS: 1 days | End: 2020-06-08
Payer: MEDICARE

## 2020-06-08 VITALS
HEIGHT: 72 IN | SYSTOLIC BLOOD PRESSURE: 166 MMHG | HEART RATE: 67 BPM | OXYGEN SATURATION: 100 % | RESPIRATION RATE: 16 BRPM | TEMPERATURE: 98 F | WEIGHT: 186.07 LBS | DIASTOLIC BLOOD PRESSURE: 67 MMHG

## 2020-06-08 DIAGNOSIS — Z01.818 ENCOUNTER FOR OTHER PREPROCEDURAL EXAMINATION: ICD-10-CM

## 2020-06-08 DIAGNOSIS — T84.9XXA UNSPECIFIED COMPLICATION OF INTERNAL ORTHOPEDIC PROSTHETIC DEVICE, IMPLANT AND GRAFT, INITIAL ENCOUNTER: ICD-10-CM

## 2020-06-08 DIAGNOSIS — Z98.890 OTHER SPECIFIED POSTPROCEDURAL STATES: Chronic | ICD-10-CM

## 2020-06-08 LAB
ANION GAP SERPL CALC-SCNC: 5 MMOL/L — SIGNIFICANT CHANGE UP (ref 5–17)
BASOPHILS # BLD AUTO: 0.03 K/UL — SIGNIFICANT CHANGE UP (ref 0–0.2)
BASOPHILS NFR BLD AUTO: 0.5 % — SIGNIFICANT CHANGE UP (ref 0–2)
BUN SERPL-MCNC: 25 MG/DL — HIGH (ref 7–23)
CALCIUM SERPL-MCNC: 9.3 MG/DL — SIGNIFICANT CHANGE UP (ref 8.5–10.1)
CHLORIDE SERPL-SCNC: 108 MMOL/L — SIGNIFICANT CHANGE UP (ref 96–108)
CO2 SERPL-SCNC: 26 MMOL/L — SIGNIFICANT CHANGE UP (ref 22–31)
CREAT SERPL-MCNC: 0.95 MG/DL — SIGNIFICANT CHANGE UP (ref 0.5–1.3)
EOSINOPHIL # BLD AUTO: 0.06 K/UL — SIGNIFICANT CHANGE UP (ref 0–0.5)
EOSINOPHIL NFR BLD AUTO: 0.9 % — SIGNIFICANT CHANGE UP (ref 0–6)
GLUCOSE SERPL-MCNC: 97 MG/DL — SIGNIFICANT CHANGE UP (ref 70–99)
HCT VFR BLD CALC: 44.5 % — SIGNIFICANT CHANGE UP (ref 39–50)
HGB BLD-MCNC: 14.5 G/DL — SIGNIFICANT CHANGE UP (ref 13–17)
IMM GRANULOCYTES NFR BLD AUTO: 0.8 % — SIGNIFICANT CHANGE UP (ref 0–1.5)
LYMPHOCYTES # BLD AUTO: 0.72 K/UL — LOW (ref 1–3.3)
LYMPHOCYTES # BLD AUTO: 11.3 % — LOW (ref 13–44)
MCHC RBC-ENTMCNC: 30.5 PG — SIGNIFICANT CHANGE UP (ref 27–34)
MCHC RBC-ENTMCNC: 32.6 GM/DL — SIGNIFICANT CHANGE UP (ref 32–36)
MCV RBC AUTO: 93.5 FL — SIGNIFICANT CHANGE UP (ref 80–100)
MONOCYTES # BLD AUTO: 0.58 K/UL — SIGNIFICANT CHANGE UP (ref 0–0.9)
MONOCYTES NFR BLD AUTO: 9.1 % — SIGNIFICANT CHANGE UP (ref 2–14)
NEUTROPHILS # BLD AUTO: 4.92 K/UL — SIGNIFICANT CHANGE UP (ref 1.8–7.4)
NEUTROPHILS NFR BLD AUTO: 77.4 % — HIGH (ref 43–77)
PLATELET # BLD AUTO: 193 K/UL — SIGNIFICANT CHANGE UP (ref 150–400)
POTASSIUM SERPL-MCNC: 4 MMOL/L — SIGNIFICANT CHANGE UP (ref 3.5–5.3)
POTASSIUM SERPL-SCNC: 4 MMOL/L — SIGNIFICANT CHANGE UP (ref 3.5–5.3)
RBC # BLD: 4.76 M/UL — SIGNIFICANT CHANGE UP (ref 4.2–5.8)
RBC # FLD: 13.2 % — SIGNIFICANT CHANGE UP (ref 10.3–14.5)
SODIUM SERPL-SCNC: 139 MMOL/L — SIGNIFICANT CHANGE UP (ref 135–145)
WBC # BLD: 6.36 K/UL — SIGNIFICANT CHANGE UP (ref 3.8–10.5)
WBC # FLD AUTO: 6.36 K/UL — SIGNIFICANT CHANGE UP (ref 3.8–10.5)

## 2020-06-08 PROCEDURE — 85025 COMPLETE CBC W/AUTO DIFF WBC: CPT

## 2020-06-08 PROCEDURE — U0003: CPT

## 2020-06-08 PROCEDURE — G0463: CPT | Mod: 25

## 2020-06-08 PROCEDURE — 36415 COLL VENOUS BLD VENIPUNCTURE: CPT

## 2020-06-08 PROCEDURE — 80048 BASIC METABOLIC PNL TOTAL CA: CPT

## 2020-06-08 NOTE — H&P PST ADULT - NSICDXPASTMEDICALHX_GEN_ALL_CORE_FT
PAST MEDICAL HISTORY:  Cellulitis right ankle    Enlarged prostate     H/O fracture of ankle right    HLD (hyperlipidemia)     HTN (hypertension)     PVC (premature ventricular contraction)     SIRS of non-infectious origin w/o acute organ dysfunction 1/2020 PAST MEDICAL HISTORY:  Cellulitis right ankle    Enlarged prostate     H/O fracture of ankle right    HLD (hyperlipidemia)     HTN (hypertension)     Mitral regurgitation moderate    Moderate aortic stenosis     PVC (premature ventricular contraction)     SIRS of non-infectious origin w/o acute organ dysfunction 1/2020

## 2020-06-08 NOTE — H&P PST ADULT - HISTORY OF PRESENT ILLNESS
84 year old male with Cellulitis of right ankle c/o redness and swelling of right ankle; denies pain;  Pt was in  in Jan 2020 for SIRs ; Had a 2 week course of IV abt. He presents to PST for planned removal of hardware right ankle

## 2020-06-08 NOTE — H&P PST ADULT - NSANTHOSAYNRD_GEN_A_CORE
No. DARIN screening performed.  STOP BANG Legend: 0-2 = LOW Risk; 3-4 = INTERMEDIATE Risk; 5-8 = HIGH Risk

## 2020-06-08 NOTE — H&P PST ADULT - ASSESSMENT
84 year old male presents to PST for planned removal of hardware right ankle     Plan:  1. PST instructions given ; NPO status instructions to be given by ASU   2. Pt instructed to take following meds with sip of water :   3. Pt instructed to take routine evening medications unless indicated   4. Stop NSAIDS ( Aspirin Alev Motrin Mobic Diclofenac), herbal supplements , MVI , Vitamin fish oil 7 days prior to surgery  unless   directed by surgeon or cardiologist;   5. Medical Optimization  with Dr Mtz and cardio Dr Tubbs   6. EZ wash instructions given & mupirocin instructions given  7. Labs EKG CXR as per surgeon request   8. Pt instructed to self quarantine   9.  Pt denies covid symptoms shortness of breath fever cough

## 2020-06-09 DIAGNOSIS — T84.9XXA UNSPECIFIED COMPLICATION OF INTERNAL ORTHOPEDIC PROSTHETIC DEVICE, IMPLANT AND GRAFT, INITIAL ENCOUNTER: ICD-10-CM

## 2020-06-09 DIAGNOSIS — Z01.818 ENCOUNTER FOR OTHER PREPROCEDURAL EXAMINATION: ICD-10-CM

## 2020-06-09 LAB — SARS-COV-2 RNA SPEC QL NAA+PROBE: SIGNIFICANT CHANGE UP

## 2020-06-10 RX ORDER — HYDROMORPHONE HYDROCHLORIDE 2 MG/ML
0.5 INJECTION INTRAMUSCULAR; INTRAVENOUS; SUBCUTANEOUS
Refills: 0 | Status: DISCONTINUED | OUTPATIENT
Start: 2020-06-11 | End: 2020-06-11

## 2020-06-10 RX ORDER — OXYCODONE HYDROCHLORIDE 5 MG/1
5 TABLET ORAL ONCE
Refills: 0 | Status: DISCONTINUED | OUTPATIENT
Start: 2020-06-11 | End: 2020-06-11

## 2020-06-10 RX ORDER — SODIUM CHLORIDE 9 MG/ML
1000 INJECTION INTRAMUSCULAR; INTRAVENOUS; SUBCUTANEOUS
Refills: 0 | Status: DISCONTINUED | OUTPATIENT
Start: 2020-06-11 | End: 2020-06-11

## 2020-06-10 RX ORDER — MEPERIDINE HYDROCHLORIDE 50 MG/ML
12.5 INJECTION INTRAMUSCULAR; INTRAVENOUS; SUBCUTANEOUS
Refills: 0 | Status: DISCONTINUED | OUTPATIENT
Start: 2020-06-11 | End: 2020-06-11

## 2020-06-10 RX ORDER — ONDANSETRON 8 MG/1
4 TABLET, FILM COATED ORAL ONCE
Refills: 0 | Status: DISCONTINUED | OUTPATIENT
Start: 2020-06-11 | End: 2020-06-11

## 2020-06-10 RX ORDER — SODIUM CHLORIDE 9 MG/ML
3 INJECTION INTRAMUSCULAR; INTRAVENOUS; SUBCUTANEOUS EVERY 8 HOURS
Refills: 0 | Status: DISCONTINUED | OUTPATIENT
Start: 2020-06-11 | End: 2020-06-12

## 2020-06-11 ENCOUNTER — RESULT REVIEW (OUTPATIENT)
Age: 85
End: 2020-06-11

## 2020-06-11 ENCOUNTER — OUTPATIENT (OUTPATIENT)
Dept: INPATIENT UNIT | Facility: HOSPITAL | Age: 85
LOS: 1 days | Discharge: HOME | End: 2020-06-11
Payer: MEDICARE

## 2020-06-11 ENCOUNTER — TRANSCRIPTION ENCOUNTER (OUTPATIENT)
Age: 85
End: 2020-06-11

## 2020-06-11 ENCOUNTER — APPOINTMENT (OUTPATIENT)
Dept: ORTHOPEDIC SURGERY | Facility: HOSPITAL | Age: 85
End: 2020-06-11

## 2020-06-11 VITALS
DIASTOLIC BLOOD PRESSURE: 78 MMHG | RESPIRATION RATE: 16 BRPM | SYSTOLIC BLOOD PRESSURE: 172 MMHG | TEMPERATURE: 98 F | WEIGHT: 185.19 LBS | HEART RATE: 64 BPM | OXYGEN SATURATION: 99 %

## 2020-06-11 DIAGNOSIS — T84.9XXA UNSPECIFIED COMPLICATION OF INTERNAL ORTHOPEDIC PROSTHETIC DEVICE, IMPLANT AND GRAFT, INITIAL ENCOUNTER: ICD-10-CM

## 2020-06-11 DIAGNOSIS — Z98.890 OTHER SPECIFIED POSTPROCEDURAL STATES: Chronic | ICD-10-CM

## 2020-06-11 DIAGNOSIS — M25.571 PAIN IN RIGHT ANKLE AND JOINTS OF RIGHT FOOT: ICD-10-CM

## 2020-06-11 PROCEDURE — 27603 DRAIN LOWER LEG LESION: CPT | Mod: RT

## 2020-06-11 PROCEDURE — 97608 NEG PRS WND THER NDME>50SQCM: CPT | Mod: 59

## 2020-06-11 PROCEDURE — 87102 FUNGUS ISOLATION CULTURE: CPT

## 2020-06-11 PROCEDURE — 87186 SC STD MICRODIL/AGAR DIL: CPT

## 2020-06-11 PROCEDURE — 20680 REMOVAL OF IMPLANT DEEP: CPT | Mod: 59,RT

## 2020-06-11 PROCEDURE — 99203 OFFICE O/P NEW LOW 30 MIN: CPT

## 2020-06-11 PROCEDURE — 87070 CULTURE OTHR SPECIMN AEROBIC: CPT

## 2020-06-11 PROCEDURE — 85027 COMPLETE CBC AUTOMATED: CPT

## 2020-06-11 PROCEDURE — 80048 BASIC METABOLIC PNL TOTAL CA: CPT

## 2020-06-11 PROCEDURE — 97530 THERAPEUTIC ACTIVITIES: CPT | Mod: GP

## 2020-06-11 PROCEDURE — 88300 SURGICAL PATH GROSS: CPT | Mod: 26

## 2020-06-11 PROCEDURE — 87075 CULTR BACTERIA EXCEPT BLOOD: CPT

## 2020-06-11 PROCEDURE — 36415 COLL VENOUS BLD VENIPUNCTURE: CPT

## 2020-06-11 PROCEDURE — 97162 PT EVAL MOD COMPLEX 30 MIN: CPT | Mod: GP

## 2020-06-11 PROCEDURE — 97116 GAIT TRAINING THERAPY: CPT | Mod: GP

## 2020-06-11 PROCEDURE — 76000 FLUOROSCOPY <1 HR PHYS/QHP: CPT

## 2020-06-11 PROCEDURE — 88300 SURGICAL PATH GROSS: CPT

## 2020-06-11 PROCEDURE — 71045 X-RAY EXAM CHEST 1 VIEW: CPT

## 2020-06-11 PROCEDURE — 99213 OFFICE O/P EST LOW 20 MIN: CPT

## 2020-06-11 RX ORDER — FUROSEMIDE 40 MG
80 TABLET ORAL DAILY
Refills: 0 | Status: DISCONTINUED | OUTPATIENT
Start: 2020-06-11 | End: 2020-06-12

## 2020-06-11 RX ORDER — ASPIRIN/CALCIUM CARB/MAGNESIUM 324 MG
1 TABLET ORAL
Qty: 30 | Refills: 0
Start: 2020-06-11 | End: 2020-07-10

## 2020-06-11 RX ORDER — OXYCODONE HYDROCHLORIDE 5 MG/1
10 TABLET ORAL ONCE
Refills: 0 | Status: DISCONTINUED | OUTPATIENT
Start: 2020-06-11 | End: 2020-06-11

## 2020-06-11 RX ORDER — TAMSULOSIN HYDROCHLORIDE 0.4 MG/1
0.4 CAPSULE ORAL AT BEDTIME
Refills: 0 | Status: DISCONTINUED | OUTPATIENT
Start: 2020-06-11 | End: 2020-06-12

## 2020-06-11 RX ORDER — AMIODARONE HYDROCHLORIDE 400 MG/1
200 TABLET ORAL DAILY
Refills: 0 | Status: DISCONTINUED | OUTPATIENT
Start: 2020-06-11 | End: 2020-06-12

## 2020-06-11 RX ORDER — HYDROMORPHONE HYDROCHLORIDE 2 MG/ML
0.5 INJECTION INTRAMUSCULAR; INTRAVENOUS; SUBCUTANEOUS EVERY 6 HOURS
Refills: 0 | Status: DISCONTINUED | OUTPATIENT
Start: 2020-06-11 | End: 2020-06-12

## 2020-06-11 RX ORDER — ACETAMINOPHEN 500 MG
650 TABLET ORAL EVERY 6 HOURS
Refills: 0 | Status: DISCONTINUED | OUTPATIENT
Start: 2020-06-11 | End: 2020-06-12

## 2020-06-11 RX ORDER — OXYCODONE HYDROCHLORIDE 5 MG/1
10 TABLET ORAL EVERY 4 HOURS
Refills: 0 | Status: DISCONTINUED | OUTPATIENT
Start: 2020-06-11 | End: 2020-06-12

## 2020-06-11 RX ORDER — CEFEPIME 1 G/1
2000 INJECTION, POWDER, FOR SOLUTION INTRAMUSCULAR; INTRAVENOUS ONCE
Refills: 0 | Status: COMPLETED | OUTPATIENT
Start: 2020-06-11 | End: 2020-06-11

## 2020-06-11 RX ORDER — FAMOTIDINE 10 MG/ML
20 INJECTION INTRAVENOUS ONCE
Refills: 0 | Status: COMPLETED | OUTPATIENT
Start: 2020-06-11 | End: 2020-06-11

## 2020-06-11 RX ORDER — CEFEPIME 1 G/1
2000 INJECTION, POWDER, FOR SOLUTION INTRAMUSCULAR; INTRAVENOUS EVERY 12 HOURS
Refills: 0 | Status: DISCONTINUED | OUTPATIENT
Start: 2020-06-12 | End: 2020-06-12

## 2020-06-11 RX ORDER — VANCOMYCIN HCL 1 G
1250 VIAL (EA) INTRAVENOUS EVERY 12 HOURS
Refills: 0 | Status: DISCONTINUED | OUTPATIENT
Start: 2020-06-11 | End: 2020-06-12

## 2020-06-11 RX ORDER — ASPIRIN/CALCIUM CARB/MAGNESIUM 324 MG
325 TABLET ORAL DAILY
Refills: 0 | Status: DISCONTINUED | OUTPATIENT
Start: 2020-06-11 | End: 2020-06-12

## 2020-06-11 RX ORDER — OXYCODONE HYDROCHLORIDE 5 MG/1
5 TABLET ORAL EVERY 4 HOURS
Refills: 0 | Status: DISCONTINUED | OUTPATIENT
Start: 2020-06-11 | End: 2020-06-12

## 2020-06-11 RX ORDER — POTASSIUM CHLORIDE 20 MEQ
40 PACKET (EA) ORAL DAILY
Refills: 0 | Status: DISCONTINUED | OUTPATIENT
Start: 2020-06-11 | End: 2020-06-12

## 2020-06-11 RX ORDER — ONDANSETRON 8 MG/1
4 TABLET, FILM COATED ORAL EVERY 8 HOURS
Refills: 0 | Status: DISCONTINUED | OUTPATIENT
Start: 2020-06-11 | End: 2020-06-12

## 2020-06-11 RX ORDER — ACETAMINOPHEN 500 MG
975 TABLET ORAL ONCE
Refills: 0 | Status: COMPLETED | OUTPATIENT
Start: 2020-06-11 | End: 2020-06-11

## 2020-06-11 RX ORDER — CELECOXIB 200 MG/1
200 CAPSULE ORAL ONCE
Refills: 0 | Status: COMPLETED | OUTPATIENT
Start: 2020-06-11 | End: 2020-06-11

## 2020-06-11 RX ORDER — CEFEPIME 1 G/1
INJECTION, POWDER, FOR SOLUTION INTRAMUSCULAR; INTRAVENOUS
Refills: 0 | Status: DISCONTINUED | OUTPATIENT
Start: 2020-06-11 | End: 2020-06-12

## 2020-06-11 RX ORDER — FINASTERIDE 5 MG/1
5 TABLET, FILM COATED ORAL DAILY
Refills: 0 | Status: DISCONTINUED | OUTPATIENT
Start: 2020-06-11 | End: 2020-06-12

## 2020-06-11 RX ADMIN — Medication 166.67 MILLIGRAM(S): at 17:38

## 2020-06-11 RX ADMIN — FAMOTIDINE 20 MILLIGRAM(S): 10 INJECTION INTRAVENOUS at 11:20

## 2020-06-11 RX ADMIN — SODIUM CHLORIDE 100 MILLILITER(S): 9 INJECTION INTRAMUSCULAR; INTRAVENOUS; SUBCUTANEOUS at 15:56

## 2020-06-11 RX ADMIN — Medication 40 MILLIEQUIVALENT(S): at 17:38

## 2020-06-11 RX ADMIN — TAMSULOSIN HYDROCHLORIDE 0.4 MILLIGRAM(S): 0.4 CAPSULE ORAL at 21:29

## 2020-06-11 RX ADMIN — CEFEPIME 100 MILLIGRAM(S): 1 INJECTION, POWDER, FOR SOLUTION INTRAMUSCULAR; INTRAVENOUS at 14:25

## 2020-06-11 RX ADMIN — FINASTERIDE 5 MILLIGRAM(S): 5 TABLET, FILM COATED ORAL at 17:40

## 2020-06-11 RX ADMIN — Medication 975 MILLIGRAM(S): at 11:21

## 2020-06-11 RX ADMIN — OXYCODONE HYDROCHLORIDE 10 MILLIGRAM(S): 5 TABLET ORAL at 11:20

## 2020-06-11 RX ADMIN — CELECOXIB 200 MILLIGRAM(S): 200 CAPSULE ORAL at 11:20

## 2020-06-11 RX ADMIN — Medication 325 MILLIGRAM(S): at 17:38

## 2020-06-11 NOTE — CONSULT NOTE ADULT - SUBJECTIVE AND OBJECTIVE BOX
HPI:      Patient is a 84y old  Male who presents with a chief complaint of inc right ankle pain, H/O ankle fx back in 2019, S/P ORIF, his ankle became more painful so is now S/P EMILY,      Consulted by Dr. Bustillos   for VTE prophylaxis, risk stratification, and anticoagulation management.    PAST MEDICAL    Mitral regurgitation: moderate  Moderate aortic stenosis  H/O fracture of ankle: right  PVC (premature ventricular contraction)  Cellulitis: right ankle  SIRS of non-infectious origin w/o acute organ dysfunction: 2020  HLD (hyperlipidemia)  Enlarged prostate  HTN (hypertension)      SURGICAL HISTORY:  status post ORIF of fracture of ankle: Right, 10/2019    Social history: non smoker, Social ETOH, denies Rec drugs      FAMILY HISTORY:  No pertinent family history in first degree relatives    Denies any personal or familial history of clotting or bleeding disorders.    Allergies    No Known Allergies    Intolerances    Lipitor (Joint Pain)      CrCl: 71    Caprini VTE Risk Score:  CAPRINI SCORE  AGE RELATED RISK FACTORS                                                       MOBILITY RELATED FACTORS  [ ] Age 41-60 years                                            (1 Point)                  [x ] Bed rest  /restricted mobility                         (1 Point)  [ ] Age: 61-74 years                                           (2 Points)                [ ] Plaster cast                                                   (2 Points)  [x ] Age= 75 years                                              (3 Points)                 [ ] Bed bound for more than 72 hours                   (2 Points)    DISEASE RELATED RISK FACTORS                                               GENDER SPECIFIC FACTORS  [ ] Edema in the lower extremities                       (1 Point)           [ ] Pregnancy                                                            (1 Point)  [ ] Varicose veins                                               (1 Point)                  [ ] Post-partum < 6 weeks                                      (1 Point)             [ ] BMI > 25 Kg/m2                                            (1 Point)                  [ ] Hormonal therapy or oral contraception       (1 Point)                 [ ] Sepsis (in the previous month)                        (1 Point)             [ ] History of pregnancy complications                (1Point)  [ ] Pneumonia or serious lung disease                                             [ ] Unexplained or recurrent  (>/= 3), premature                                 (In the previous month)                               (1 Point)                birth with toxemia or growth-restricted infant (1 Point)  [ ] Abnormal pulmonary function test            (1 Point)                                   SURGERY RELATED RISK FACTORS  [ ] Acute myocardial infarction                       (1 Point)                  [ ]  Section                                         (1 Point)  [ ] Congestive heart failure (in the previous month) (1 Point)   [ ] Minor surgery   lasting <45 minutes       (1 Point)   [ ] Inflammatory bowel disease                             (1 Point)          [ ] Arthroscopic surgery                                  (2 Points)  [ ] Central venous access                                    (2 Points)            [x ] General surgery lasting >45 minutes      (2 Points)       [ ] Stroke (in the previous month)                  (5 Points)            [ ] Elective major lower extremity arthroplasty (5 Points)                                   [  ] Malignancy (present or past include skin melanoma                                          but exclude  basal skin cell)    (2 points)                                      TRAUMA RELATED RISK FACTORS                HEMATOLOGY RELATED FACTORS                                  [ ] Fracture of the hip, pelvis, or leg                       (5 Points)  [ ] Prior episodes of VTE                                     (3 Points)          [ ] Acute spinal cord injury (in the previous month)  (5 Points)  [ ] Positive family history for VTE                         (3 Points)       [ ] Paralysis (less than 1 month)                          (5 Points)  [ ] Prothrombin 15466 A                                      (3 Points)         [ ] Multiple Trauma (within 1month)                 (5Points)                                                                                                                                                                [ ] Factor V Leiden                                          (3 Points)                                OTHER RISK FACTORS                          [ ] Lupus anticoagulants                                     (3 Points)                       [ ] BMI > 40                          (1 Point)                                                         [ ] Anticardiolipin antibodies                                (3 Points)                   [ ] Smoking                              (1Point)                                                [ ] High homocysteine in the blood                      (3 Points)                [  ] Diabetes requiring insulin (1point)                         [ ] Other congenital or acquired thrombophilia       (3 Points)          [  ] Chemotherapy                   (1 Point)  [ ] Heparin induced thrombocytopenia                  (3 Points)             [  ] Blood Transfusion                (1 point)                                                                                                             Total Score [ 6         ]                                                                                                                                                                                                                                                                                                                                                                                                                                         IMPROVE Bleeding Risk Score 2.5      Time In:   Time Out:     Falls Risk:   High (  )  Mod ( x )  Low (  )          REVIEW OF SYSTEMS    (  )Fever	     (  )Constipation	(  )SOB				(  )Headache	(  )Dysuria  (  )Chills	     (  )Melena	(  )Dyspnea present on exertion	                    (  )Dizziness                    (  )Polyuria  (  )Nausea	     (  )Hematochezia	(  )Cough			                    (  )Syncope   	(  )Hematuria  (  )Vomiting    (  )Chest Pain	(  )Wheezing			(  )Weakness  (  )Diarrhea     (  )Palpitations	(  )Anorexia			( x )joint pain    All  other review of systems negative: Yes      Vital Signs Last 24 Hrs  T(C): 36.2 (20 @ 14:30), Max: 36.7 (20 @ 11:27)  T(F): 97.2 (20 @ 14:30), Max: 98 (20 @ 11:27)  HR: 56 (20 @ :30) (55 - 74)  BP: 153/62 (20 @ 14:30) (135/43 - 172/78)  BP(mean): --  RR: 14 (20 @ 14:30) (12 - 19)  SpO2: 99% (20 @ 14:30) (96% - 100%)      PHYSICAL EXAM:    Constitutional: Appears Well    Neurological: A& O x 3    Skin: Warm    Respiratory and Thorax: normal effort; Breath sounds: normal; No rales/wheezing/rhonchi  	  Cardiovascular: S1, S2, regular, NMBR	HILL 3/6    Gastrointestinal: BS + x 4Q, nontender	    Genitourinary:  Bladder nondistended, nontender    Musculoskeletal:   General Right:   + muscle/joint tenderness,   normal tone, no joint swelling,   ROM: limited	    General Left:   no muscle/joint tenderness,   normal tone, no joint swelling,   ROM: full    ankle  Rt: Drsing CDI; S Cap refill good;+; Sensation intact, pedals +                       Lower extrems:   Right: no calf tenderness              negative lavon's sign               + pedal pulses    Left:   no calf tenderness              negative lavon's sign               + pedal pulses                				    MEDICATIONS  (STANDING):  aMIOdarone    Tablet 200 milliGRAM(s) Oral daily  aspirin 325 milliGRAM(s) Oral daily  cefepime   IVPB      finasteride 5 milliGRAM(s) Oral daily  furosemide    Tablet 80 milliGRAM(s) Oral daily  ondansetron Injectable 4 milliGRAM(s) IV Push every 8 hours  potassium chloride    Tablet ER 40 milliEquivalent(s) Oral daily  sodium chloride 0.9%. 1000 milliLiter(s) IV Continuous <Continuous>  tamsulosin 0.4 milliGRAM(s) Oral at bedtime  vancomycin  IVPB 1250 milliGRAM(s) IV Intermittent every 12 hours          DVT Prophylaxis:  LMWH                   (  )  Heparin SQ           (  )  Coumadin             (  )  Xarelto                  (  )  Eliquis                   (  )  Venodynes           ( x )  Ambulation          (  )  UFH                       (  )  Contraindicated  (  )  EC ASPIRIN       ( x )

## 2020-06-11 NOTE — CONSULT NOTE ADULT - ASSESSMENT
A: 83 yo male with h/o of right fx ankle in October 2019, S/P ORIF, now adm for removal of hardware due to INC pain in ankle, mod VTE risk due to age, surgery, PWB on right ankle, pt to be admitted Overnight for IVAB's  consulted by Dr. Bustillos for DVT/PE prophylaxis, risk stratification and Anticoagulation Management      P: EC  mg po daily until FWB on Right ankle  venodynes BLE  INC mobility per Ortho and PT    Thank you for the consult, will sign off, please reconsult if needed

## 2020-06-11 NOTE — DISCHARGE NOTE PROVIDER - CARE PROVIDERS DIRECT ADDRESSES
,[email or web address],DirectAddress_Unknown,DirectAddress_Unknown

## 2020-06-11 NOTE — PROGRESS NOTE ADULT - SUBJECTIVE AND OBJECTIVE BOX
Orthopedics Post-op Check    POD 0 Right ankle removal of hardware and I&D  Pt seen and examined bedside. Pain is controlled. Pt feeling well. No nausea or vomiting.     Vital Signs Last 24 Hrs  T(C): 36.4 (06-11-20 @ 16:30), Max: 36.7 (06-11-20 @ 11:27)  T(F): 97.6 (06-11-20 @ 16:30), Max: 98 (06-11-20 @ 11:27)  HR: 58 (06-11-20 @ 16:30) (55 - 74)  BP: 155/51 (06-11-20 @ 16:30) (135/43 - 172/78)  BP(mean): --  RR: 16 (06-11-20 @ 16:30) (12 - 19)  SpO2: 99% (06-11-20 @ 16:30) (96% - 100%)      Exam:  NAD AAOx3  Splint clean and dry   +EHL FHL   SILT toes   Brisk capillary refill all toes   Calf Soft NT    A/P:  Stable POD 0 Right ankle removal of hardware and I&D  -Analgesia  -Ppx ABX  -DVT PE ppx  -OOB PT  -ID eval appreciated  -Plastics eval appreciated

## 2020-06-11 NOTE — BRIEF OPERATIVE NOTE - SPECIMENS
Right ankle superficial medial wound culture; Right ankle culture of purulent drainage from device ; Right ankle hardware

## 2020-06-11 NOTE — DISCHARGE NOTE PROVIDER - PROVIDER TOKENS
PROVIDER:[TOKEN:[[id number]]],PROVIDER:[TOKEN:[[id number]]],PROVIDER:[TOKEN:[[id number]]]

## 2020-06-11 NOTE — CONSULT NOTE ADULT - SUBJECTIVE AND OBJECTIVE BOX
PCP- DR Ac Mtz, Cardio- DR Tubbs    CC- removal of hardware RT ankle    HPI:  83yo/M with PMH severe AS, HTN, hyperlipidemia, BPH, RT ankle fracture s/p ORIF 10/2019 with subsequent OM s/p IV abx x6 weeks 02/2020 presented for removal of painful hardware from Rt Ankle. Medical consult called for postop medical management.     PMH- as above  PSH- RT ankle ORIF  Soc hx- ex-smoker, alcohol- socially  Fam hx- +HTN in the family    6/11/20- seen in PACU, no acute complaints    Review of system- All 10 systems reviewed and is as per HPI otherwise negative.     T(C): 36.2 (06-11-20 @ 14:30), Max: 36.7 (06-11-20 @ 11:27)  HR: 56 (06-11-20 @ 14:30) (55 - 74)  BP: 153/62 (06-11-20 @ 14:30) (135/43 - 172/78)  RR: 14 (06-11-20 @ 14:30) (12 - 19)  SpO2: 99% (06-11-20 @ 14:30) (96% - 100%)  Wt(kg): --    LABS:    RECENT CULTURES:      RADIOLOGY & ADDITIONAL TESTS:      PHYSICAL EXAM:  GENERAL: NAD, well-groomed, well-developed  HEAD:  Atraumatic, Normocephalic  EYES: EOMI, PERRLA, conjunctiva and sclera clear  HEENT: Moist mucous membranes  NECK: Supple, No JVD  NERVOUS SYSTEM:  Alert & Oriented X3, Motor Strength 5/5 B/L upper and lower extremities; DTRs 2+ intact and symmetric  CHEST/LUNG: Clear to auscultation bilaterally; No rales, rhonchi, wheezing, or rubs  HEART: Regular rate and rhythm; +systolic murmur  ABDOMEN: Soft, Nontender, Nondistended; Bowel sounds present  GENITOURINARY- Voiding, no palpable bladder  EXTREMITIES:  2+ Peripheral Pulses, No clubbing, cyanosis, or edema  MUSCULOSKELTAL- RLE dressing dry  SKIN-no rash, no lesion  CNS- alert, oriented X3, non focal     MEDICATIONS  (STANDING):  aMIOdarone    Tablet 200 milliGRAM(s) Oral daily  aspirin 325 milliGRAM(s) Oral daily  cefepime   IVPB      finasteride 5 milliGRAM(s) Oral daily  furosemide    Tablet 80 milliGRAM(s) Oral daily  ondansetron Injectable 4 milliGRAM(s) IV Push every 8 hours  potassium chloride    Tablet ER 40 milliEquivalent(s) Oral daily  sodium chloride 0.9%. 1000 milliLiter(s) (100 mL/Hr) IV Continuous <Continuous>  tamsulosin 0.4 milliGRAM(s) Oral at bedtime  vancomycin  IVPB 1250 milliGRAM(s) IV Intermittent every 12 hours    MEDICATIONS  (STANDING):  aMIOdarone    Tablet 200 milliGRAM(s) Oral daily  aspirin 325 milliGRAM(s) Oral daily  cefepime   IVPB      finasteride 5 milliGRAM(s) Oral daily  furosemide    Tablet 80 milliGRAM(s) Oral daily  ondansetron Injectable 4 milliGRAM(s) IV Push every 8 hours  potassium chloride    Tablet ER 40 milliEquivalent(s) Oral daily  sodium chloride 0.9%. 1000 milliLiter(s) (100 mL/Hr) IV Continuous <Continuous>  tamsulosin 0.4 milliGRAM(s) Oral at bedtime  vancomycin  IVPB 1250 milliGRAM(s) IV Intermittent every 12 hours    MEDICATIONS  (PRN):  acetaminophen   Tablet .. 650 milliGRAM(s) Oral every 6 hours PRN Temp greater or equal to 38C (100.4F)  HYDROmorphone  Injectable 0.5 milliGRAM(s) IV Push every 6 hours PRN Severe Pain (7 - 10)  HYDROmorphone  Injectable 0.5 milliGRAM(s) IV Push every 10 minutes PRN Moderate Pain (4 - 6)  meperidine     Injectable 12.5 milliGRAM(s) IV Push every 10 minutes PRN Shivering  ondansetron Injectable 4 milliGRAM(s) IV Push once PRN Nausea and/or Vomiting  oxyCODONE    IR 10 milliGRAM(s) Oral every 4 hours PRN Moderate Pain (4 - 6)  oxyCODONE    IR 5 milliGRAM(s) Oral every 4 hours PRN Mild Pain (1 - 3)  oxyCODONE    IR 5 milliGRAM(s) Oral once PRN Moderate Pain (4 - 6)    Assessment/Plan  #RT ankle hardware removal  Ortho f/u appreciated  Wound cultures send from OR  ID kj Mcqueen  NWB to RLE  Pain meds prn  Monitor HH  Incentive spirometry  AC by Aspirin daily    #Severe AS  Patient needs AVR once infectious issues are resolved  Outpatient f/u with Dr Tubbs    #HTN/hyperlipidemia  Cont current meds    #BPH  Cont flomax  Monitor for voiding difficulties    #DVT proph- Aspirin    #Dispo- thank you for consult, will follow with you. D/w pt and ortho team

## 2020-06-11 NOTE — BRIEF OPERATIVE NOTE - NSICDXBRIEFPREOP_GEN_ALL_CORE_FT
PRE-OP DIAGNOSIS:  Painful orthopaedic hardware 11-Jun-2020 13:23:10 Right ankle joint Ashleigh Hawk

## 2020-06-11 NOTE — BRIEF OPERATIVE NOTE - NSICDXBRIEFPROCEDURE_GEN_ALL_CORE_FT
PROCEDURES:  Removal of hardware from extremity 11-Jun-2020 13:23:39 Right ankle joint Ashleigh Hawk

## 2020-06-11 NOTE — DISCHARGE NOTE PROVIDER - NSDCFUADDINST_GEN_ALL_CORE_FT
I&D/[facility] DC Instructions:    1.	Analgesia  2.	Partial Weight Bearing Right Lower Extremity, with assistive device/rolling walker  3.	Continue DVT/PE Prophylaxis. EC ASpirin 325 x 2 weeks OR Lovenox x 2-4 weeks See Med Rec***  4.	Out of Bed Daily, no sitting around.  5.	Follow up with Orthopedic Surgeon Dr. Bustillos in 14 Days. Call Office For Appointment. Repeat x-rays in office.  6.	RN can Remove Staples/Sutures Post-Op Day 14 (6/25/20) if they are accessible ie. not covered by cast or splint.   7.	Elevate the extremity as much as possible  8.	Keep bandage Clean and dry.   9. FU outpatient with plastic surgery, Dr. Farmer  10. Continue antibiotic treatment per infections disease recommendations. FU outpatient with Infectious disease, Dr. Mcqueen I&D/[facility] DC Instructions:    1.	Analgesia  2.	Partial Weight Bearing Right Lower Extremity, in CAM boot, with assistive device/rolling walker  3.	Continue DVT/PE Prophylaxis. EC ASpirin 325mg daily *  4.	Out of Bed Daily, no sitting around.  5.	Follow up with Orthopedic Surgeon Dr. Bustillos at POD 14.  Call Office For Appointment. Repeat x-rays in office.  6.Staples/Sutures to be removed By Dr Bustillos in the office on Post-Op Day 14 (6/25/20) if they are accessible ie. not covered by cast or splint.   7.	Elevate the extremity as much as possible  8.	Dressing changes per Dr Noyola who discussed with patients wife; wife will preform twice daily dressing changes with 1/2 inch packing.  9. FU outpatient with plastic surgery, Dr. Noyola Wednesday 6/17/20.  10. Continue antibiotic treatment per infectious disease recommendations. FU outpatient with Infectious disease, Dr. Mcqueen

## 2020-06-11 NOTE — DISCHARGE NOTE PROVIDER - NSDCMRMEDTOKEN_GEN_ALL_CORE_FT
amiodarone 200 mg oral tablet: 1 tab(s) orally once a day  cholecalciferol 2000 intl units (50 mcg) oral tablet: 1 tab(s) orally once a day  finasteride 5 mg oral tablet: 1 tab(s) orally once a day  Flomax 0.4 mg oral capsule: 1 cap(s) orally 2 times a day  Lasix 80 mg oral tablet: 1 tab(s) orally once a day  potassium chloride 20 mEq oral tablet, extended release: 2 tab(s) orally once a day  pravastatin 20 mg oral tablet: 1 tab(s) orally once a day amiodarone 200 mg oral tablet: 1 tab(s) orally once a day  cefepime: 2 gram(s) intravenous every 12 hours; dosing per ID who is setting up home infusion   cholecalciferol 2000 intl units (50 mcg) oral tablet: 1 tab(s) orally once a day  Ecotrin 325 mg oral delayed release tablet: 1 tab(s) orally once a day for blood clot prevention  finasteride 5 mg oral tablet: 1 tab(s) orally once a day  Flomax 0.4 mg oral capsule: 1 cap(s) orally 2 times a day  Lasix 80 mg oral tablet: 1 tab(s) orally once a day  oxyCODONE-acetaminophen 5 mg-325 mg oral tablet: 1 tab(s) orally every 4 hours x 5 days, As Needed  -for severe pain MDD:6   potassium chloride 20 mEq oral tablet, extended release: 2 tab(s) orally once a day  pravastatin 20 mg oral tablet: 1 tab(s) orally once a day  vancomycin 750 mg intravenous injection: 750 milligram(s) intravenous every 12 hours. dosing per ID who is setting up home infusion

## 2020-06-11 NOTE — DISCHARGE NOTE PROVIDER - NSDCCPTREATMENT_GEN_ALL_CORE_FT
PRINCIPAL PROCEDURE  Procedure: Removal of hardware from extremity  Findings and Treatment: Right ankle joint

## 2020-06-11 NOTE — BRIEF OPERATIVE NOTE - NSICDXBRIEFPOSTOP_GEN_ALL_CORE_FT
POST-OP DIAGNOSIS:  Painful orthopaedic hardware 11-Jun-2020 13:23:21 Right ankle joint Ashleigh Hawk

## 2020-06-11 NOTE — DISCHARGE NOTE PROVIDER - CARE PROVIDER_API CALL
Daniel Bustillos  ORTHOPAEDIC SURGERY  [address] [place] [address]  Phone: [phone]  Fax: [phone]  Follow Up Time:     Mikey Mcqueen  INFECTIOUS DISEASE  [address]  [place] [address]  Phone: [phone]  Fax: [phone]  Follow Up Time:     Jordana Farmer  PLASTIC SURGERY  [address]  [address]  [place] [address]  Phone: [phone]  Fax: [phone]  Follow Up Time:

## 2020-06-11 NOTE — DISCHARGE NOTE PROVIDER - HOSPITAL COURSE
Orthopedic Summary        H&P:    Pt is a84y Male PAST MEDICAL & SURGICAL HISTORY:    Mitral regurgitation: moderate    Moderate aortic stenosis    H/O fracture of ankle: right    PVC (premature ventricular contraction)    Cellulitis: right ankle    SIRS of non-infectious origin w/o acute organ dysfunction: 1/2020    HLD (hyperlipidemia)    Enlarged prostate    HTN (hypertension)    Status post ORIF of fracture of ankle: Right, 10/2019             Now s/p I&D of R Ankle. Pt is afebrile with stable vital signs. Pain is controlled. Alert and Oriented. Exam reveals intact EHL FHL TA GS, +DP. Dressing is clean and dry with a new clean bandage on.        Vital Signs Last 24 Hrs    T(C): 36.6 (06-11-20 @ 12:55), Max: 36.7 (06-11-20 @ 11:27)    T(F): 97.8 (06-11-20 @ 12:55), Max: 98 (06-11-20 @ 11:27)    HR: 57 (06-11-20 @ 13:45) (57 - 74)    BP: 151/48 (06-11-20 @ 13:45) (135/43 - 172/78)    BP(mean): --    RR: 14 (06-11-20 @ 13:45) (12 - 19)    SpO2: 96% (06-11-20 @ 13:45) (96% - 100%)            Hospital Course:    Patient presented to [facility] for removal of hardware s/p R ankle ORIF in October 2019.  Pt was admitted to the hospital after undergoing the surgical procedure. ID was consulted and empiric antibiotics were started after the procedure and refined when OR cultures resulted. Pt was placed on DVT PE ppx which was held before surgery and then restarted after surgery. SCDs were in place while in bed throughout the stay. There were no complications during the hospital stay. All home medications were continued. **Pt received **U PRBC post op for Acute Blood loss Anemia.        Routine consults were obtained from the Anticoagulation Team for DVT/PE prophylaxis and from Physical Therapy. Patient was placed on ECASA 325 for anticoagulation.  Pertinent home medications were continued.  Daily labs were followed.          On POD 0 the pt was OOB with PT and there were no overnight events. The pt is ready today for DC to home or rehab** with PICC line and IV abx. Please see Med Rec for dosing and duration of antibiotics. The orthopedic Attending is aware and agrees. Orthopedic Summary        H&P:    Pt is a84y Male PAST MEDICAL & SURGICAL HISTORY:    Mitral regurgitation: moderate    Moderate aortic stenosis    H/O fracture of ankle: right    PVC (premature ventricular contraction)    Cellulitis: right ankle    SIRS of non-infectious origin w/o acute organ dysfunction: 1/2020    HLD (hyperlipidemia)    Enlarged prostate    HTN (hypertension)    Status post ORIF of fracture of ankle: Right, 10/2019             Now s/p I&D of R Ankle. Pt is afebrile with stable vital signs. Pain is controlled. Alert and Oriented. Exam reveals intact EHL FHL TA GS, +DP. Dressing is clean and dry with a new clean bandage on.        Vital Signs Last 24 Hrs    T(C): 36.6 (06-11-20 @ 12:55), Max: 36.7 (06-11-20 @ 11:27)    T(F): 97.8 (06-11-20 @ 12:55), Max: 98 (06-11-20 @ 11:27)    HR: 57 (06-11-20 @ 13:45) (57 - 74)    BP: 151/48 (06-11-20 @ 13:45) (135/43 - 172/78)    BP(mean): --    RR: 14 (06-11-20 @ 13:45) (12 - 19)    SpO2: 96% (06-11-20 @ 13:45) (96% - 100%)            Hospital Course:    Patient presented to [facility] for removal of hardware s/p R ankle ORIF in October 2019.  Pt was admitted to the hospital after undergoing the surgical procedure. ID was consulted and empiric antibiotics were started after the procedure and refined when OR cultures resulted. Pt was placed on DVT PE ppx which was held before surgery and then restarted after surgery. SCDs were in place while in bed throughout the stay. There were no complications during the hospital stay. All home medications were continued.         Routine consults were obtained from the Anticoagulation Team for DVT/PE prophylaxis and from Physical Therapy. Patient was placed on ECASA 325 for anticoagulation.  Pertinent home medications were continued.  Daily labs were followed.          On POD 0 the pt was OOB with PT and there were no overnight events. The pt is ready today for DC to home with PICC line and IV abx. Please see Med Rec for dosing and duration of antibiotics. The orthopedic Attending is aware and agrees.

## 2020-06-11 NOTE — DISCHARGE NOTE PROVIDER - NSDCFUSCHEDAPPT_GEN_ALL_CORE_FT
JOSE APONTE ; 06/25/2020 ; NPP [facility] [address] [facility] JOSE APONTE ; 06/25/2020 ; NPP Orth[facility] [address] [facility]

## 2020-06-12 ENCOUNTER — TRANSCRIPTION ENCOUNTER (OUTPATIENT)
Age: 85
End: 2020-06-12

## 2020-06-12 VITALS
HEART RATE: 58 BPM | SYSTOLIC BLOOD PRESSURE: 134 MMHG | RESPIRATION RATE: 16 BRPM | DIASTOLIC BLOOD PRESSURE: 48 MMHG | TEMPERATURE: 99 F | OXYGEN SATURATION: 100 %

## 2020-06-12 LAB
ANION GAP SERPL CALC-SCNC: 3 MMOL/L — LOW (ref 5–17)
BUN SERPL-MCNC: 20 MG/DL — SIGNIFICANT CHANGE UP (ref 7–23)
CALCIUM SERPL-MCNC: 8.6 MG/DL — SIGNIFICANT CHANGE UP (ref 8.5–10.1)
CHLORIDE SERPL-SCNC: 110 MMOL/L — HIGH (ref 96–108)
CO2 SERPL-SCNC: 26 MMOL/L — SIGNIFICANT CHANGE UP (ref 22–31)
CREAT SERPL-MCNC: 0.84 MG/DL — SIGNIFICANT CHANGE UP (ref 0.5–1.3)
GLUCOSE SERPL-MCNC: 139 MG/DL — HIGH (ref 70–99)
HCT VFR BLD CALC: 35.4 % — LOW (ref 39–50)
HGB BLD-MCNC: 11.4 G/DL — LOW (ref 13–17)
MCHC RBC-ENTMCNC: 30.7 PG — SIGNIFICANT CHANGE UP (ref 27–34)
MCHC RBC-ENTMCNC: 32.2 GM/DL — SIGNIFICANT CHANGE UP (ref 32–36)
MCV RBC AUTO: 95.4 FL — SIGNIFICANT CHANGE UP (ref 80–100)
PLATELET # BLD AUTO: 154 K/UL — SIGNIFICANT CHANGE UP (ref 150–400)
POTASSIUM SERPL-MCNC: 4.1 MMOL/L — SIGNIFICANT CHANGE UP (ref 3.5–5.3)
POTASSIUM SERPL-SCNC: 4.1 MMOL/L — SIGNIFICANT CHANGE UP (ref 3.5–5.3)
RBC # BLD: 3.71 M/UL — LOW (ref 4.2–5.8)
RBC # FLD: 13.5 % — SIGNIFICANT CHANGE UP (ref 10.3–14.5)
SODIUM SERPL-SCNC: 139 MMOL/L — SIGNIFICANT CHANGE UP (ref 135–145)
WBC # BLD: 6.79 K/UL — SIGNIFICANT CHANGE UP (ref 3.8–10.5)
WBC # FLD AUTO: 6.79 K/UL — SIGNIFICANT CHANGE UP (ref 3.8–10.5)

## 2020-06-12 PROCEDURE — 99213 OFFICE O/P EST LOW 20 MIN: CPT

## 2020-06-12 PROCEDURE — 71045 X-RAY EXAM CHEST 1 VIEW: CPT | Mod: 26

## 2020-06-12 RX ORDER — CEFEPIME 1 G/1
2 INJECTION, POWDER, FOR SOLUTION INTRAMUSCULAR; INTRAVENOUS
Qty: 0 | Refills: 0 | DISCHARGE
Start: 2020-06-12

## 2020-06-12 RX ORDER — VANCOMYCIN HCL 1 G
750 VIAL (EA) INTRAVENOUS
Qty: 0 | Refills: 0 | DISCHARGE
Start: 2020-06-12

## 2020-06-12 RX ORDER — VANCOMYCIN HCL 1 G
750 VIAL (EA) INTRAVENOUS EVERY 12 HOURS
Refills: 0 | Status: DISCONTINUED | OUTPATIENT
Start: 2020-06-12 | End: 2020-06-12

## 2020-06-12 RX ADMIN — CEFEPIME 100 MILLIGRAM(S): 1 INJECTION, POWDER, FOR SOLUTION INTRAMUSCULAR; INTRAVENOUS at 17:30

## 2020-06-12 RX ADMIN — SODIUM CHLORIDE 3 MILLILITER(S): 9 INJECTION INTRAMUSCULAR; INTRAVENOUS; SUBCUTANEOUS at 14:39

## 2020-06-12 RX ADMIN — SODIUM CHLORIDE 3 MILLILITER(S): 9 INJECTION INTRAMUSCULAR; INTRAVENOUS; SUBCUTANEOUS at 05:07

## 2020-06-12 RX ADMIN — Medication 250 MILLIGRAM(S): at 16:25

## 2020-06-12 RX ADMIN — AMIODARONE HYDROCHLORIDE 200 MILLIGRAM(S): 400 TABLET ORAL at 05:38

## 2020-06-12 RX ADMIN — Medication 80 MILLIGRAM(S): at 14:38

## 2020-06-12 RX ADMIN — Medication 40 MILLIEQUIVALENT(S): at 14:37

## 2020-06-12 RX ADMIN — CEFEPIME 100 MILLIGRAM(S): 1 INJECTION, POWDER, FOR SOLUTION INTRAMUSCULAR; INTRAVENOUS at 05:23

## 2020-06-12 RX ADMIN — FINASTERIDE 5 MILLIGRAM(S): 5 TABLET, FILM COATED ORAL at 14:38

## 2020-06-12 RX ADMIN — Medication 166.67 MILLIGRAM(S): at 06:20

## 2020-06-12 RX ADMIN — Medication 325 MILLIGRAM(S): at 14:38

## 2020-06-12 NOTE — PHYSICAL THERAPY INITIAL EVALUATION ADULT - ADDITIONAL COMMENTS
The pt reports that he has a few steps at home but is very familiar with negotiating the steps and did not want to practice at present. The pt agreed to sit OOB and in a chair for a little while.

## 2020-06-12 NOTE — PHYSICAL THERAPY INITIAL EVALUATION ADULT - GENERAL OBSERVATIONS, REHAB EVAL
The pt was pleasant and cooperative, received on 2N, supine, impulsive, eager to get OOB with PT. The pt hopes to go home ASAP. The pt was educated on PWB 50% and the use of the CAM boot.

## 2020-06-12 NOTE — CONSULT NOTE ADULT - SUBJECTIVE AND OBJECTIVE BOX
Patient is a 84y old  Male who presents with a chief complaint of Right ankle removal of hardware and I&D     HPI:  85 y/o Male with h/o severe AS, HTN, hyperlipidemia, BPH, recent right ankle fracture s/p ORIF 10/2019, suspected for possible underlying OM (Jan 2020) treated empirically with IV vancomycin and ceftriaxone x6 weeks 02/2020 was admitted on 6/11 for removal of painful hardware from rightt Ankle.    He was admitted on 1/10/2020 for RLE pain and swelling. He thinks the cellulitis started a couple of weeks ago, when he noted pain, swelling, and some flakiness. He received 7 days of Bactrim and has taken 10 out of 15 days of Keflex. He thinks it improved initially before during the last 3-4 days, then his swelling and pain have worsened. He is able to walk, although much more gingerly. He has had skin flakiness peeling off the front side, and he noted it was more red. He spoke to his orthopedic surgeon, who recommended he come for IV antibiotics and was treated with Vancomycin and Ceftriaxone for 6 weeks via PICC Line. No ankle cultures were obtained and all other cultures including BC were negative.    PMH: as above  PSH: as above  Meds: per reconciliation sheet, noted below  MEDICATIONS  (STANDING):  aMIOdarone    Tablet 200 milliGRAM(s) Oral daily  aspirin 325 milliGRAM(s) Oral daily  cefepime   IVPB      cefepime   IVPB 2000 milliGRAM(s) IV Intermittent every 12 hours  finasteride 5 milliGRAM(s) Oral daily  furosemide    Tablet 80 milliGRAM(s) Oral daily  ondansetron Injectable 4 milliGRAM(s) IV Push every 8 hours  potassium chloride    Tablet ER 40 milliEquivalent(s) Oral daily  sodium chloride 0.9% lock flush 3 milliLiter(s) IV Push every 8 hours  tamsulosin 0.4 milliGRAM(s) Oral at bedtime  vancomycin  IVPB 1250 milliGRAM(s) IV Intermittent every 12 hours    MEDICATIONS  (PRN):  acetaminophen   Tablet .. 650 milliGRAM(s) Oral every 6 hours PRN Temp greater or equal to 38C (100.4F)  HYDROmorphone  Injectable 0.5 milliGRAM(s) IV Push every 6 hours PRN Severe Pain (7 - 10)  oxyCODONE    IR 10 milliGRAM(s) Oral every 4 hours PRN Moderate Pain (4 - 6)  oxyCODONE    IR 5 milliGRAM(s) Oral every 4 hours PRN Mild Pain (1 - 3)    Allergies    No Known Allergies    Intolerances    Lipitor (Joint Pain)    Social: no smoking, no alcohol, no illegal drugs; no recent travel, no exposure to TB  FAMILY HISTORY:  No pertinent family history in first degree relatives    no history of premature cardiovascular disease in first degree relatives    ROS: the patient denies fever, no chills, no HA, no seizures, no dizziness, no sore throat, no nasal congestion, no blurry vision, no CP, no palpitations, no SOB, no cough, no abdominal pain, no diarrhea, no N/V, no dysuria, has right ankle pain, no claudication, no rash, no joint aches, no rectal pain or bleeding, no night sweats  All other systems reviewed and are negative    Vital Signs Last 24 Hrs  T(C): 36.6 (12 Jun 2020 05:37), Max: 36.7 (11 Jun 2020 11:27)  T(F): 97.8 (12 Jun 2020 05:37), Max: 98 (11 Jun 2020 11:27)  HR: 51 (12 Jun 2020 05:37) (51 - 74)  BP: 130/41 (12 Jun 2020 05:37) (130/41 - 172/78)  BP(mean): --  RR: 16 (12 Jun 2020 05:37) (12 - 19)  SpO2: 99% (12 Jun 2020 05:37) (96% - 100%)  Daily     Daily     PE:    Constitutional:  No acute distress  HEENT: NC/AT, EOMI, PERRLA, conjunctivae clear; ears and nose atraumatic; pharynx benign  Neck: supple; thyroid not palpable  Back: no tenderness  Respiratory: respiratory effort normal; clear to auscultation  Cardiovascular: S1S2 regular, no murmurs  Abdomen: soft, not tender, not distended, positive BS; no liver or spleen organomegaly  Genitourinary: no suprapubic tenderness  Lymphatic: no LN palpable  Musculoskeletal: no muscle tenderness, no joint swelling or tenderness  Extremities: no pedal edema  Right ankle s/p hardware removal  Neurological/ Psychiatric: AxOx3, judgement and insight normal; moving all extremities  Skin: no rashes; no palpable lesions    Labs: all available labs reviewed                        11.4   6.79  )-----------( 154      ( 12 Jun 2020 06:46 )             35.4     06-12    139  |  110<H>  |  20  ----------------------------<  139<H>  4.1   |  26  |  0.84    Ca    8.6      12 Jun 2020 06:46      Culture - Fungal, Other (collected 11 Jun 2020 11:32)  Source: .Other RIGHT ANKLE IMPLANT  Preliminary Report (12 Jun 2020 07:45):    Testing in progress    Culture - Fungal, Other (collected 11 Jun 2020 11:31)  Source: .Other LATERAL RIGHT ANKLE  Preliminary Report (12 Jun 2020 07:40):    Testing in progress    Culture - Fungal, Other (collected 11 Jun 2020 11:30)  Source: .Other RIGHT ANKLE  Preliminary Report (12 Jun 2020 07:45):    Testing in progress    COVID-19 PCR: NotDetec (06-08-20 @ 10:56)    Radiology: all available radiological tests reviewed    < from: MR Ankle w/wo IV Cont, Right (05.30.20 @ 10:34) >  Status post distal fibular fixation and syndesmotic fixation. Overlying the Endobutton suture of the syndesmotic fixation along the medial malleolus there is prominent softtissue edema with a tiny subcentimeter focal collection. This may be related to a tiny seroma/hematoma, however superimposed infection cannot be excluded. There is nonspecific edema and mild enhancement about the syndesmotic fixation tract through the tibial metaphysis which may be related to residual postsurgical changes there is no associated T1 signal abnormality. If there is concern for underlying infection further evaluation with technetium sulfur colloid marrow scan and leukocyte labeled white blood cell study can be performed for more specific evaluation.  Incompletely imaged nonspecific increased signal within the third, fourth, and fifth metatarsals which may related to underlying osseous stress reaction.  Severe tibiotalar arthrosis. Mild tibiotalar synovitis.  Moderate arthrosis at the medial cuneiform/navicular and fourth tarsometatarsal articulations.    < end of copied text >      Advanced directives addressed: full resuscitation Patient is a 84y old  Male who presents with a chief complaint of Right ankle removal of hardware and I&D     HPI:  85 y/o Male with h/o severe AS, HTN, hyperlipidemia, BPH, recent right ankle fracture s/p ORIF 10/2019, suspected for possible underlying OM (Jan 2020) treated empirically with IV vancomycin and ceftriaxone x6 weeks 02/2020 was admitted on 6/11 for removal of painful hardware from rightt Ankle.  He was previously admitted on 1/10/2020 for RLE pain and swelling. He thinks the cellulitis started a couple of weeks ago, when he noted pain, swelling, and some flakiness. He received 7 days of Bactrim and has taken 10 out of 15 days of Keflex. He thinks it improved initially before during the last 3-4 days, then his swelling and pain have worsened. He is able to walk, although much more gingerly. He has had skin flakiness peeling off the front side, and he noted it was more red. He spoke to his orthopedic surgeon, who recommended he come for IV antibiotics and was treated with Vancomycin and Ceftriaxone for 6 weeks via PICC Line. No ankle cultures were obtained and all other cultures including BC were negative.    PMH: as above  PSH: as above  Meds: per reconciliation sheet, noted below  MEDICATIONS  (STANDING):  aMIOdarone    Tablet 200 milliGRAM(s) Oral daily  aspirin 325 milliGRAM(s) Oral daily  cefepime   IVPB      cefepime   IVPB 2000 milliGRAM(s) IV Intermittent every 12 hours  finasteride 5 milliGRAM(s) Oral daily  furosemide    Tablet 80 milliGRAM(s) Oral daily  ondansetron Injectable 4 milliGRAM(s) IV Push every 8 hours  potassium chloride    Tablet ER 40 milliEquivalent(s) Oral daily  sodium chloride 0.9% lock flush 3 milliLiter(s) IV Push every 8 hours  tamsulosin 0.4 milliGRAM(s) Oral at bedtime  vancomycin  IVPB 1250 milliGRAM(s) IV Intermittent every 12 hours    MEDICATIONS  (PRN):  acetaminophen   Tablet .. 650 milliGRAM(s) Oral every 6 hours PRN Temp greater or equal to 38C (100.4F)  HYDROmorphone  Injectable 0.5 milliGRAM(s) IV Push every 6 hours PRN Severe Pain (7 - 10)  oxyCODONE    IR 10 milliGRAM(s) Oral every 4 hours PRN Moderate Pain (4 - 6)  oxyCODONE    IR 5 milliGRAM(s) Oral every 4 hours PRN Mild Pain (1 - 3)    Allergies    No Known Allergies    Intolerances    Lipitor (Joint Pain)    Social: no smoking, no alcohol, no illegal drugs; no recent travel, no exposure to TB  FAMILY HISTORY:  No pertinent family history in first degree relatives    no history of premature cardiovascular disease in first degree relatives    ROS: the patient denies fever, no chills, no HA, no seizures, no dizziness, no sore throat, no nasal congestion, no blurry vision, no CP, no palpitations, no SOB, no cough, no abdominal pain, no diarrhea, no N/V, no dysuria, has right ankle pain, no claudication, no rash, no joint aches, no rectal pain or bleeding, no night sweats  All other systems reviewed and are negative    Vital Signs Last 24 Hrs  T(C): 36.6 (12 Jun 2020 05:37), Max: 36.7 (11 Jun 2020 11:27)  T(F): 97.8 (12 Jun 2020 05:37), Max: 98 (11 Jun 2020 11:27)  HR: 51 (12 Jun 2020 05:37) (51 - 74)  BP: 130/41 (12 Jun 2020 05:37) (130/41 - 172/78)  BP(mean): --  RR: 16 (12 Jun 2020 05:37) (12 - 19)  SpO2: 99% (12 Jun 2020 05:37) (96% - 100%)  Daily     Daily     PE:    Constitutional:  No acute distress  HEENT: NC/AT, EOMI, PERRLA, conjunctivae clear; ears and nose atraumatic; pharynx benign  Neck: supple; thyroid not palpable  Back: no tenderness  Respiratory: respiratory effort normal; clear to auscultation  Cardiovascular: S1S2 regular, no murmurs  Abdomen: soft, not tender, not distended, positive BS; no liver or spleen organomegaly  Genitourinary: no suprapubic tenderness  Lymphatic: no LN palpable  Musculoskeletal: no muscle tenderness, no joint swelling or tenderness  Extremities: no pedal edema  Right ankle s/p hardware removal  Neurological/ Psychiatric: AxOx3, judgement and insight normal; moving all extremities  Skin: no rashes; no palpable lesions    Labs: all available labs reviewed                        11.4   6.79  )-----------( 154      ( 12 Jun 2020 06:46 )             35.4     06-12    139  |  110<H>  |  20  ----------------------------<  139<H>  4.1   |  26  |  0.84    Ca    8.6      12 Jun 2020 06:46      Culture - Fungal, Other (collected 11 Jun 2020 11:32)  Source: .Other RIGHT ANKLE IMPLANT  Preliminary Report (12 Jun 2020 07:45):    Testing in progress    Culture - Fungal, Other (collected 11 Jun 2020 11:31)  Source: .Other LATERAL RIGHT ANKLE  Preliminary Report (12 Jun 2020 07:40):    Testing in progress    Culture - Fungal, Other (collected 11 Jun 2020 11:30)  Source: .Other RIGHT ANKLE  Preliminary Report (12 Jun 2020 07:45):    Testing in progress    COVID-19 PCR: NotDetec (06-08-20 @ 10:56)    Radiology: all available radiological tests reviewed    < from: MR Ankle w/wo IV Cont, Right (05.30.20 @ 10:34) >  Status post distal fibular fixation and syndesmotic fixation. Overlying the Endobutton suture of the syndesmotic fixation along the medial malleolus there is prominent softtissue edema with a tiny subcentimeter focal collection. This may be related to a tiny seroma/hematoma, however superimposed infection cannot be excluded. There is nonspecific edema and mild enhancement about the syndesmotic fixation tract through the tibial metaphysis which may be related to residual postsurgical changes there is no associated T1 signal abnormality. If there is concern for underlying infection further evaluation with technetium sulfur colloid marrow scan and leukocyte labeled white blood cell study can be performed for more specific evaluation.  Incompletely imaged nonspecific increased signal within the third, fourth, and fifth metatarsals which may related to underlying osseous stress reaction.  Severe tibiotalar arthrosis. Mild tibiotalar synovitis.  Moderate arthrosis at the medial cuneiform/navicular and fourth tarsometatarsal articulations.    < end of copied text >      Advanced directives addressed: full resuscitation

## 2020-06-12 NOTE — CONSULT NOTE ADULT - ASSESSMENT
85 y/o Male with h/o severe AS, HTN, hyperlipidemia, BPH, recent right ankle fracture s/p ORIF 10/2019, suspected for possible underlying OM (Jan 2020) treated empirically with IV vancomycin and ceftriaxone x6 weeks 02/2020 was admitted on 6/11 for removal of painful hardware from right ankle.    1. Right ankle possible OM s/p recent fracture s/p ORIF s/p removal of hardware. BPH with chronic chowdary.   -MRI right ankle reviewed; noted with focal collection and concern for underlying infection is raised  -surgical cultures collected  -start vancomycin 750 mg IV q12h and cefepime 2 gm IV q12h  -reason for abx use and side effects reviewed with patient; monitor BMP and vancomycin trough levels   -local wound care per surgery  -favor longer term IV abx therapy in [place] of clinical presentation and MRI findings  -f/u cultures  -old chart reviewed to assess prior cultures  -monitor temps  -f/u CBC  -supportive care  2. Other issues:   -care per medicine 85 y/o Male with h/o severe AS, HTN, hyperlipidemia, BPH, recent right ankle fracture s/p ORIF 10/2019, suspected for possible underlying OM (Jan 2020) treated empirically with IV vancomycin and ceftriaxone x6 weeks 02/2020 was admitted on 6/11 for removal of painful hardware from right ankle.    1. Right ankle possible OM s/p recent fracture s/p ORIF s/p removal of hardware. 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 BPH with chronic chowdary.   -recently treated with prolonged IV abx therapy  -concerned about more resistant organisms  -MRI right ankle reviewed; noted with focal collection and concern for underlying infection is raised  -surgical cultures collected  -start vancomycin 750 mg IV q12h and cefepime 2 gm IV q12h  -reason for abx use and side effects reviewed with patient; monitor BMP and vancomycin trough levels   -local wound care per surgery  -favor longer term IV abx therapy in [place] of clinical presentation and MRI findings  -monitor wound closely  -f/u cultures  -old chart reviewed to assess prior cultures  -monitor temps  -f/u CBC  -supportive care  2. Other issues:   -care per medicine 83 y/o Male with h/o severe AS, HTN, hyperlipidemia, BPH, recent right ankle fracture s/p ORIF 10/2019, suspected for possible underlying OM (Jan 2020) treated empirically with IV vancomycin and ceftriaxone x6 weeks 02/2020 was admitted on 6/11 for removal of painful hardware from right ankle.    1. Right ankle possible OM s/p recent fracture s/p ORIF s/p removal of hardware. 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 BPH with chronic chowdary.   -recently treated with prolonged IV abx therapy  -concerned about more resistant organisms  -MRI right ankle reviewed; noted with focal collection and concern for underlying infection is raised  -surgical cultures collected  -start vancomycin 750 mg IV q12h and cefepime 2 gm IV q12h  -reason for abx use and side effects reviewed with patient; monitor BMP and vancomycin trough levels   -local wound care per surgery  -favor longer term IV abx therapy in [place] of clinical presentation and MRI findings  -home IV abx setup in progress; case reviewed with case management; orders reviewed and called in to pharmacist with infusion company; awaiting insurance approval  -monitor wound closely  -f/u cultures  -old chart reviewed to assess prior cultures  -monitor temps  -f/u CBC  -supportive care  2. Other issues:   -care per medicine

## 2020-06-12 NOTE — CONSULT NOTE ADULT - SUBJECTIVE AND OBJECTIVE BOX
This is an 84yo man who is POD 1 s/p hardware removal on his right ankle from a fracture that was fixated in October 2019. C/b OM. He developed cellultis at the hardware site and due to concerns for ongoing infection, it was removed.   The access point could not be closed primarily so I was consulted for wound care.     PMH- AS, HTN, BPH, HLD  PSH- RT ankle ORIF  Soc hx- ex-smoker, alcohol- socially  Fam hx- +HTN in the family      Review of system- All 10 systems reviewed and is as per HPI otherwise negative.         PHYSICAL EXAM:  GENERAL: NAD, well-groomed, well-developed  HEENT: Moist mucous membranes  NECK: , No JVD  NERVOUS SYSTEM:  Alert & Oriented X3, Motor Strength 5/5 B/L upper and lower extremities; DTRs 2+ intact and symmetric  CHEST/LUNG: non labored breathing  ABDOMEN: Nondistended  EXTREMITIES:  2+ Peripheral Pulses, No clubbing, cyanosis, or edema  MUSCULOSKELTAL- RLE wound: xeroform on  lateral suture line.  medial wound 1cm x2cm deep area just proximal to medial mal associated with erythema. No purulence noted.    CNS- alert, oriented X3, non focal     MEDICATIONS  (STANDING):  aMIOdarone    Tablet 200 milliGRAM(s) Oral daily  aspirin 325 milliGRAM(s) Oral daily  cefepime   IVPB      finasteride 5 milliGRAM(s) Oral daily  furosemide    Tablet 80 milliGRAM(s) Oral daily  ondansetron Injectable 4 milliGRAM(s) IV Push every 8 hours  potassium chloride    Tablet ER 40 milliEquivalent(s) Oral daily  sodium chloride 0.9%. 1000 milliLiter(s) (100 mL/Hr) IV Continuous <Continuous>  tamsulosin 0.4 milliGRAM(s) Oral at bedtime  vancomycin  IVPB 1250 milliGRAM(s) IV Intermittent every 12 hours    MEDICATIONS  (STANDING):  aMIOdarone    Tablet 200 milliGRAM(s) Oral daily  aspirin 325 milliGRAM(s) Oral daily  cefepime   IVPB      finasteride 5 milliGRAM(s) Oral daily  furosemide    Tablet 80 milliGRAM(s) Oral daily  ondansetron Injectable 4 milliGRAM(s) IV Push every 8 hours  potassium chloride    Tablet ER 40 milliEquivalent(s) Oral daily  sodium chloride 0.9%. 1000 milliLiter(s) (100 mL/Hr) IV Continuous <Continuous>  tamsulosin 0.4 milliGRAM(s) Oral at bedtime  vancomycin  IVPB 1250 milliGRAM(s) IV Intermittent every 12 hours    MEDICATIONS  (PRN):  acetaminophen   Tablet .. 650 milliGRAM(s) Oral every 6 hours PRN Temp greater or equal to 38C (100.4F)  HYDROmorphone  Injectable 0.5 milliGRAM(s) IV Push every 6 hours PRN Severe Pain (7 - 10)  HYDROmorphone  Injectable 0.5 milliGRAM(s) IV Push every 10 minutes PRN Moderate Pain (4 - 6)  meperidine     Injectable 12.5 milliGRAM(s) IV Push every 10 minutes PRN Shivering  ondansetron Injectable 4 milliGRAM(s) IV Push once PRN Nausea and/or Vomiting  oxyCODONE    IR 10 milliGRAM(s) Oral every 4 hours PRN Moderate Pain (4 - 6)  oxyCODONE    IR 5 milliGRAM(s) Oral every 4 hours PRN Mild Pain (1 - 3)  oxyCODONE    IR 5 milliGRAM(s) Oral once PRN Moderate Pain (4 - 6)    Impression: 83M s/p ORIF for a right ankle fracture, POD 1 s/p hardware removal for infection.    Medial access point with small defect associated with cellullitis.  --ID eval for abx  --twice daily dressing change with 1/2inch packing.    --weight bearing status per ortho  --Pt to follow up with me next wednesday.  please call with any questions [phone]

## 2020-06-12 NOTE — ADVANCED PRACTICE NURSE CONSULT - ASSESSMENT
Oncology Follow Up Visit: May 7, 2018      Medical Oncologist: Dr Rogelio Hidalgo  ENT Surgeon: Dr Kaiser  Radiation Oncologist : Dr. 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 With CT she was found to have a4.4 x 2.3 x 2.3 cm soft mass with disease spread to bony area as well as muscle and lymph.   Treatment:   4/11/2017 Tracheostomy. Composite resection of tumor of the left buccal mucosa, retromolar trigone, oral commissure and facial skin and lips including left segmental mandibulectomy.Modified radical neck dissection of left levels 1A, 1B, 2, 3 and 4. Left osteocutaneous scapula free flap with microvascular anastomosis  Left neck vessel exploration and prep Local advancement flap for closure of scapula defect Reconstruction of lip, cheek, and oral cavity.  6/1/2017 Began chemoradiation with cisplatin- day 22 delay x 1 week- last XRT-7/19/2017 and day 43 infusion given 7/20/2017    Interval History: Ms. Rocha comes to clinic today for symptom review post chemoradiation for her head and neck cancer.       She is off all pain medications.  She continues to try to eat orally. She denies any problems with swallowing. She has bulky tissue in her graft and would like to debulk it. She has seen Dr. Kaiser and will have procedures planned for June.     She denies SOB, fevers, weakness depression or trouble sleeping. She is no longer smoking.     Rest of comprehensive and complete ROS is reviewed and is negative.     Wt Readings from Last 4 Encounters:   05/07/18 56.2 kg (124 lb)   05/04/18 57.6 kg (127 lb)   01/08/18 47.2 kg (104 lb)   11/03/17 46.8 kg (103 lb 2.8 oz)         Past Medical History:   Diagnosis Date     Squamous cell  Patient is awake and alert. PICC insertion along with risks, benefits, possible complications and infection prevention explained to pt who verbalized understanding. All questions addressed and written signed consent placed on chart. Time out along with hand washing completed by all caregivers. Right arm cleansed with CHG. Patient draped with sterile sheet. Lidocaine 1%, 2ml given intradermally to right arm marked site. Using strict sterile technique, under ultrasound guidance, placed Navilyst Xcela PICC with PASV technology, 4F single lumen (lot# [id number]) cut to 46cm into right basilic vein. Brisk blood return and flushed with 20ml NS. Minimal blood loss and pt tolerated procedure well. End cap and dressing in place. Xray done and report given to district RN. "carcinoma of oral cavity (H) 03/15/2017     Tobacco abuse      Current Outpatient Prescriptions   Medication     Artificial Saliva (SALIVA SUBSTITUTE MT)     docusate sodium (COLACE) 100 MG capsule     hydrOXYzine (VISTARIL) 25 MG capsule     melatonin 5 MG tablet     Menthol 1.1 MG LOZG     No current facility-administered medications for this visit.      No Known Allergies    Physical Exam:/82  Pulse 94  Temp 97.7  F (36.5  C)  Resp 15  Ht 1.68 m (5' 6.14\")  Wt 56.2 kg (124 lb)  SpO2 98%  BMI 19.93 kg/m2   ECOG PS- 0  Constitutional: Alert, moving well, cooperative. Noted weight loss again today- has lost approximately 6 lbs since start of plan  And remains underweight.   ENT: Eyes bright  But left eye noted to be more red and watery and nose is dripping- related to radiation treatment field. Left ear with redness as it is in radiation field- TM intact. Redness to left palate with mucositis from radiation is noted.Left cheek and jaw With redness but not peeling today. No drooling and speaking in clear voice.  Neck: Supple, No adenopathy.Thyroid symmetric. Old trach site with good hygiene.  Cardiac: Heart rate and rhythm is regular and strong without murmur  Respiratory: Breathing easy. Lung sounds clear to auscultation- occasional loose cough  GI: Abdomen is soft, non-tender, BS normal. No masses or organomegaly  Gtube site without redness of discharge.  MS: Muscle tone normal, extremities normal with no edema.   Skin: skin at cheek surgical site is dark but peeling skin to nice pink color- no sign of infection. Encouraged 2-3 x daily moisturizing.   Neuro: Sensory grossly WNL, gait normal.   Lymph: Normal ant/post cervical, axillary, supraclavicular nodes  Psych: Mentation appears normal and affect normal with good conversation.    Laboratory Results:   Recent Labs   Lab Test  05/07/18   1313  10/16/17   0841  08/14/17   1618  07/27/17   0923  07/20/17   0730   NA  142  137  137  137  141   POTASSIUM "  Unsatisfactory specimen - hemolyzed  3.8  4.0  3.5  4.1   CHLORIDE  109  102  105  101  106   CO2  27  27  26  30  27   ANIONGAP  6  8  6  6  8   BUN  15  13  14  19  15   CR  0.74  0.62  0.62  0.61  0.63   GLC  87  70  76  88  83   TONIO  9.4  8.5  8.8  8.3*  9.0     Recent Labs   Lab Test  07/20/17   0730  06/28/17   1135  06/22/17   0825  06/01/17   0825  04/17/17   0906  04/16/17   0801  04/15/17   0720  04/14/17   0530  04/13/17   1126   MAG  1.8  1.8  1.9  2.0  2.3  2.1  2.1  2.1  2.0   PHOS   --    --    --    --   4.2  4.6*  4.8*  3.2  3.4     Recent Labs   Lab Test  05/07/18   1313  10/16/17   0841  08/14/17   1618  07/27/17   0923  07/20/17   0730   WBC  6.3  5.3  3.0*  3.5*  2.3*   HGB  14.5  13.4  10.5*  11.7  11.9   PLT  290  203  312  205  378   MCV  92  100  87  85  87   NEUTROPHIL  67.5  69.2  49.2  69.2  47.7     Recent Labs   Lab Test  05/07/18   1313  10/16/17   0841  08/14/17   1618   BILITOTAL  0.4  0.4  0.2   ALKPHOS  61  73  66   ALT  19  20  20   AST  22  23  10   ALBUMIN  3.7  3.6  3.3*     TSH   Date Value Ref Range Status   05/07/2018 10.02 (H) 0.40 - 4.00 mU/L Final   10/16/2017 4.16 (H) 0.40 - 4.00 mU/L Final   04/13/2017 3.92 0.40 - 4.00 mU/L Final     CT SOFT TISSUE NECK W CONTRAST 5/4/2018 2:02 PM     History:  hx of buccal mucosa cancer s/p resection and chemoRT;  Squamous cell carcinoma of oral cavity (H)  ICD-10: Squamous cell carcinoma of oral cavity (H)      Comparison:  Neck CT 10/11/2017, 3/29/2017, 3/20/2017.     Technique: Following intravenous administration of nonionic iodinated  contrast medium, thin section helical CT images were obtained from the  skull base down to the level of the aortic arch.  Axial, coronal and  sagittal reformations were performed with 2-3 mm slice thickness  reconstruction. Images were reviewed in soft tissue, lung and bone  windows.     Contrast: Isovue 370 51cc     Findings:   Postoperative changes of left oral cavity tumor  resection,  tila-mandibulectomy, and left dissection with flap reconstruction.  Plate and screw fixation over the reconstructed left hemimandible. No  substantial ankylosis across the fragments. No new soft tissue  thickening or enhancement at the surgical resection site. No new or  enlarging cervical lymph nodes.     No mucosal abnormality in the oropharynx, hypopharynx, larynx.  Effacement of fat planes in the left neck and platysmal thickening  consistent with changes of prior radiation therapy. Left submandibular  gland is surgically absent.     Atherosclerotic plaque including calcifications at the carotid bulbs,  innominate artery origin, and the left common carotid artery origin.  No suspicious osseous lesions. No significant change in multilevel  cervical spondylosis with straightening of the expected cervical  lordosis, and disc height loss and disc osteophyte complexes at C5-6  and C6-7 with associated right neuroforaminal narrowing at these  levels. Visualized paranasal sinuses and mastoid air cells are clear.  Visualized intracranial contents and orbits are unremarkable. Complete  edentulism.     Mild biapical centrilobular and paraseptal emphysema.         Impression:  Postoperative changes of left oral cavity squamous cell carcinoma  resection and free flap reconstruction. No evidence of tumor  recurrence or cervical josias metastasis.     I have personally reviewed the examination and initial interpretation  and I agree with the findings.     KAL AMEZQUITA MD           Results for orders placed or performed in visit on 05/04/18   CT Chest w Contrast    Narrative    CT chest without contrast,  5/4/2018 2:02 PM     History: 5/11/2017.    Comparison: Squamous cell carcinoma of oral cavity. Left axillary  lymph node.    Technique: Helical acquisition of the chest was obtained without  intravenous contrast and images are displayed at 1 and 5 mm intervals.  Images reviewed in lung, soft tissue, and bone  windows.    Findings:    LUNGS: Centrilobular and paraseptal emphysematous changes. Mild  bilateral lower lobes dependent atelectasis. No acute airspace  opacities. Right upper lobe lung cyst. No suspicious pulmonary  nodules.    Chest: Partially visualized thyroid gland is unremarkable. Central  tracheobronchial tree is patent. Mild patulous esophagus. The  ascending thoracic aorta measures 3.3 cm in greatest transverse  diameter. Main pulmonary artery with normal diameter. No central  pulmonary embolism. Cardiac size within normal limits. No pericardial  effusions. No pleural effusions. No pneumothorax. Subcentimeter  mediastinal and hilar lymph nodes, not enlarged by size criteria. No  axillary lymphadenopathy. No supraclavicular lymphadenopathy.    Upper abdomen: Partially visualized, unremarkable.    Bones: Degenerative changes of the spine. Scattered Schmorl's nodes.  Scattered intradiscal gas. No aggressive bone lesions.      Impression    IMPRESSION:  1. No evidence for metastatic disease within the chest.  2. Centrilobular and paraseptal emphysematous changes. No acute airspace opacities.    I have personally reviewed the examination and initial interpretation and I agree with the findings.    TSERING NUNEZ MD       Assessment and Plan:   Stage Jarad Squamous cell carcinoma of the oral cavity- Pt began chemoradiation on 6/1 and Completed both radiation and final infusion of cisplatin by 7/20/2017.      She is doing well and has no new complains. I have reviewed actual images from her restaging scans and there is nothing to suggest persistent recurrent disease. This is great news.     We will plan to get annual scans from here on.     I will have her return in 6 months to see Pilar in this clinic and I will see her in a year with restaging scans.     Hypothyroidism: Elevation in her TSH. Her TSH is now at 10 and results came in after he had gone. I will follow this in 6 months when she returns to see Pilar. I  will start her on levothyroxine 75 mcg supplementation.     History of Tobacco  Use-Pt quit smoking after surgery. I again congratulated her for this and will keep following at every visit.   She will need lung cancer screening infuture due to >30 Pack year history.     Over 25 min of direct face to face time spent with patient with more than 50% time spent in counseling and coordinating care.

## 2020-06-12 NOTE — DISCHARGE NOTE NURSING/CASE MANAGEMENT/SOCIAL WORK - PATIENT PORTAL LINK FT
You can access the FollowMyHealth Patient Portal offered by [facility] by registering at the following website: [email or web address]. By joining [facility]’s FollowMyHealth portal, you will also be able to view your health information using other applications (apps) compatible with our system.

## 2020-06-12 NOTE — PROGRESS NOTE ADULT - SUBJECTIVE AND OBJECTIVE BOX
PCP- DR Ac Mtz, Cardio- DR Tubbs    CC- removal of hardware RT ankle    HPI:  85yo/M with PMH severe AS, HTN, hyperlipidemia, BPH, RT ankle fracture s/p ORIF 10/2019 with subsequent OM s/p IV abx x6 weeks 02/2020 presented for removal of painful hardware from Rt Ankle. Medical consult called for postop medical management.     PMH- as above  PSH- RT ankle ORIF  Soc hx- ex-smoker, alcohol- socially  Fam hx- +HTN in the family    6/11/20- seen in PACU, no acute complaints  6/12/20- up and ambulating in a boot    Review of system- All 10 systems reviewed and is as per HPI otherwise negative.     Vital Signs Last 24 Hrs  T(C): 36.8 (12 Jun 2020 11:00), Max: 36.8 (12 Jun 2020 11:00)  T(F): 98.2 (12 Jun 2020 11:00), Max: 98.2 (12 Jun 2020 11:00)  HR: 68 (12 Jun 2020 11:00) (51 - 68)  BP: 114/41 (12 Jun 2020 11:00) (114/41 - 155/51)  BP(mean): --  RR: 16 (12 Jun 2020 11:00) (14 - 17)  SpO2: 98% (12 Jun 2020 11:00) (97% - 99%)    LABS:                       11.4   6.79  )-----------( 154      ( 12 Jun 2020 06:46 )             35.4     12 Jun 2020 06:46    139    |  110    |  20     ----------------------------<  139    4.1     |  26     |  0.84     Ca    8.6        12 Jun 2020 06:46    RECENT CULTURES:      RADIOLOGY & ADDITIONAL TESTS:      PHYSICAL EXAM:  GENERAL: NAD, well-groomed, well-developed  HEAD:  Atraumatic, Normocephalic  EYES: EOMI, PERRLA, conjunctiva and sclera clear  HEENT: Moist mucous membranes  NECK: Supple, No JVD  NERVOUS SYSTEM:  Alert & Oriented X3, Motor Strength 5/5 B/L upper and lower extremities; DTRs 2+ intact and symmetric  CHEST/LUNG: Clear to auscultation bilaterally; No rales, rhonchi, wheezing, or rubs  HEART: Regular rate and rhythm; +systolic murmur  ABDOMEN: Soft, Nontender, Nondistended; Bowel sounds present  GENITOURINARY- Voiding, no palpable bladder  EXTREMITIES:  2+ Peripheral Pulses, No clubbing, cyanosis, or edema  MUSCULOSKELTAL- RLE in CAM boot  SKIN-no rash, no lesion  CNS- alert, oriented X3, non focal     MEDICATIONS  (STANDING):  aMIOdarone    Tablet 200 milliGRAM(s) Oral daily  aspirin 325 milliGRAM(s) Oral daily  cefepime   IVPB      cefepime   IVPB 2000 milliGRAM(s) IV Intermittent every 12 hours  finasteride 5 milliGRAM(s) Oral daily  furosemide    Tablet 80 milliGRAM(s) Oral daily  ondansetron Injectable 4 milliGRAM(s) IV Push every 8 hours  potassium chloride    Tablet ER 40 milliEquivalent(s) Oral daily  sodium chloride 0.9% lock flush 3 milliLiter(s) IV Push every 8 hours  tamsulosin 0.4 milliGRAM(s) Oral at bedtime  vancomycin  IVPB 750 milliGRAM(s) IV Intermittent every 12 hours    MEDICATIONS  (PRN):  acetaminophen   Tablet .. 650 milliGRAM(s) Oral every 6 hours PRN Temp greater or equal to 38C (100.4F)  HYDROmorphone  Injectable 0.5 milliGRAM(s) IV Push every 6 hours PRN Severe Pain (7 - 10)  oxyCODONE    IR 10 milliGRAM(s) Oral every 4 hours PRN Moderate Pain (4 - 6)  oxyCODONE    IR 5 milliGRAM(s) Oral every 4 hours PRN Mild Pain (1 - 3)    Assessment/Plan  #RT ankle hardware removal  #Concern for possible underlying OM at hardware site  Ortho f/u appreciated  Wound cultures send from OR  ID eval DR Mcqueen appreciated  Needs PICC line and IV abx on discharge  Pain meds prn  Monitor HH  Ambulate in CAM boot  Incentive spirometry  AC by Aspirin daily    #Severe AS  Patient needs AVR once infectious issues are resolved  Outpatient f/u with Dr Tubbs    #HTN/hyperlipidemia  Cont current meds    #BPH  Cont flomax  Monitor for voiding difficulties    #DVT proph- Aspirin    #Dispo- ambulate, needs PICC line and IV abx on discharge. D/w pt, DR Mcqueen and ortho team

## 2020-06-12 NOTE — PROGRESS NOTE ADULT - SUBJECTIVE AND OBJECTIVE BOX
Patient seen and examined at bedside. Reports no acute complaints at this time. Pain is well controlled. No acute events overnight.    PHYSICAL EXAM:  Vital Signs Last 24 Hrs  T(C): 36.6 (12 Jun 2020 05:37), Max: 36.7 (11 Jun 2020 11:27)  T(F): 97.8 (12 Jun 2020 05:37), Max: 98 (11 Jun 2020 11:27)  HR: 51 (12 Jun 2020 05:37) (51 - 74)  BP: 130/41 (12 Jun 2020 05:37) (130/41 - 172/78)  BP(mean): --  RR: 16 (12 Jun 2020 05:37) (12 - 19)  SpO2: 99% (12 Jun 2020 05:37) (96% - 100%)      Gen: NAD, AAOx3    Right Lower Extremity:  Dressing clean dry intact  +EHL/FHL/TA/GS  SILT L3-S1  +Cap Refill < 2 sec  Compartments soft  No calf TTP B/L

## 2020-06-12 NOTE — PROGRESS NOTE ADULT - ASSESSMENT
A/P: 84M s/p R Ankle EMILY/I&D POD 1  Analgesia  DVT ppx  PWB in CAM Boot  FU OR Cx  FU ID C/s; recs appreciated  FU PRSx C/s; recs appreciated  C/w antibiotics per infectious disease recommendations  PT/OT  Encourage incentive spirometry  DC planning  Will discuss with attending and advise if plan changes

## 2020-06-12 NOTE — PHYSICAL THERAPY INITIAL EVALUATION ADULT - PERTINENT HX OF CURRENT PROBLEM, REHAB EVAL
84yo man with AS, HTN, BPH, HLD who is POD 1 s/p hardware removal on his right ankle from a fracture that was fixated in October 2019. C/b OM. He developed cellulitis at the hardware site and due to concerns for ongoing infection, it was removed. Note: Pt was in HH in Jan 2020 for SIRs ; Had a 2 week course of IV abt.

## 2020-06-13 ENCOUNTER — EMERGENCY (EMERGENCY)
Facility: HOSPITAL | Age: 85
LOS: 0 days | Discharge: HOME | End: 2020-06-13
Attending: EMERGENCY MEDICINE
Payer: MEDICARE

## 2020-06-13 VITALS
HEART RATE: 72 BPM | DIASTOLIC BLOOD PRESSURE: 64 MMHG | RESPIRATION RATE: 17 BRPM | TEMPERATURE: 98 F | SYSTOLIC BLOOD PRESSURE: 162 MMHG | OXYGEN SATURATION: 100 %

## 2020-06-13 VITALS
SYSTOLIC BLOOD PRESSURE: 179 MMHG | HEART RATE: 69 BPM | TEMPERATURE: 98 F | OXYGEN SATURATION: 99 % | RESPIRATION RATE: 16 BRPM | DIASTOLIC BLOOD PRESSURE: 68 MMHG

## 2020-06-13 DIAGNOSIS — Z88.8 ALLERGY STATUS TO OTHER DRUGS, MEDICAMENTS AND BIOLOGICAL SUBSTANCES STATUS: ICD-10-CM

## 2020-06-13 DIAGNOSIS — I10 ESSENTIAL (PRIMARY) HYPERTENSION: ICD-10-CM

## 2020-06-13 DIAGNOSIS — Z79.899 OTHER LONG TERM (CURRENT) DRUG THERAPY: ICD-10-CM

## 2020-06-13 DIAGNOSIS — Z87.81 PERSONAL HISTORY OF (HEALED) TRAUMATIC FRACTURE: ICD-10-CM
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
## 2020-06-13 DIAGNOSIS — T82.524A DISPLACEMENT OF INFUSION CATHETER, INITIAL ENCOUNTER: ICD-10-CM

## 2020-06-13 DIAGNOSIS — I49.3 VENTRICULAR PREMATURE DEPOLARIZATION: ICD-10-CM

## 2020-06-13 DIAGNOSIS — E78.5 HYPERLIPIDEMIA, UNSPECIFIED: ICD-10-CM

## 2020-06-13 DIAGNOSIS — N40.0 BENIGN PROSTATIC HYPERPLASIA WITHOUT LOWER URINARY TRACT SYMPTOMS: ICD-10-CM

## 2020-06-13 DIAGNOSIS — I08.0 RHEUMATIC DISORDERS OF BOTH MITRAL AND AORTIC VALVES: ICD-10-CM

## 2020-06-13 DIAGNOSIS — L03.115 CELLULITIS OF RIGHT LOWER LIMB: ICD-10-CM

## 2020-06-13 DIAGNOSIS — Z98.890 OTHER SPECIFIED POSTPROCEDURAL STATES: Chronic | ICD-10-CM

## 2020-06-13 DIAGNOSIS — Z86.19 PERSONAL HISTORY OF OTHER INFECTIOUS AND PARASITIC DISEASES: ICD-10-CM

## 2020-06-13 PROBLEM — I35.0 NONRHEUMATIC AORTIC (VALVE) STENOSIS: Chronic | Status: ACTIVE | Noted: 2020-06-08

## 2020-06-13 PROBLEM — I34.0 NONRHEUMATIC MITRAL (VALVE) INSUFFICIENCY: Chronic | Status: ACTIVE | Noted: 2020-06-08

## 2020-06-13 PROBLEM — R65.10 SYSTEMIC INFLAMMATORY RESPONSE SYNDROME (SIRS) OF NON-INFECTIOUS ORIGIN WITHOUT ACUTE ORGAN DYSFUNCTION: Chronic | Status: ACTIVE | Noted: 2020-06-08

## 2020-06-13 PROCEDURE — 99285 EMERGENCY DEPT VISIT HI MDM: CPT

## 2020-06-13 PROCEDURE — 71045 X-RAY EXAM CHEST 1 VIEW: CPT

## 2020-06-13 PROCEDURE — 99284 EMERGENCY DEPT VISIT MOD MDM: CPT | Mod: 25

## 2020-06-13 PROCEDURE — 96365 THER/PROPH/DIAG IV INF INIT: CPT

## 2020-06-13 PROCEDURE — 96375 TX/PRO/DX INJ NEW DRUG ADDON: CPT

## 2020-06-13 PROCEDURE — 71045 X-RAY EXAM CHEST 1 VIEW: CPT | Mod: 26

## 2020-06-13 RX ORDER — CEFEPIME 1 G/1
2000 INJECTION, POWDER, FOR SOLUTION INTRAMUSCULAR; INTRAVENOUS ONCE
Refills: 0 | Status: COMPLETED | OUTPATIENT
Start: 2020-06-13 | End: 2020-06-13

## 2020-06-13 RX ORDER — VANCOMYCIN HCL 1 G
750 VIAL (EA) INTRAVENOUS ONCE
Refills: 0 | Status: COMPLETED | OUTPATIENT
Start: 2020-06-13 | End: 2020-06-13

## 2020-06-13 RX ADMIN — CEFEPIME 2000 MILLIGRAM(S): 1 INJECTION, POWDER, FOR SOLUTION INTRAMUSCULAR; INTRAVENOUS at 17:21

## 2020-06-13 RX ADMIN — CEFEPIME 100 MILLIGRAM(S): 1 INJECTION, POWDER, FOR SOLUTION INTRAMUSCULAR; INTRAVENOUS at 17:03

## 2020-06-13 RX ADMIN — Medication 250 MILLIGRAM(S): at 17:21

## 2020-06-13 NOTE — ED STATDOCS - PMH
Cellulitis  right ankle  Enlarged prostate    H/O fracture of ankle  right  HLD (hyperlipidemia)    HTN (hypertension)    Mitral regurgitation  moderate  Moderate aortic stenosis    PVC (premature ventricular contraction)    SIRS of non-infectious origin w/o acute organ dysfunction  1/2020

## 2020-06-13 NOTE — ED STATDOCS - OBJECTIVE STATEMENT
Pertinent HPI/PMH/PSH/FHx/SHx and Review of Systems contained within  HPI: 85 y/o male p/w dislodged PICC line from right arm. Pt had sepsis from right ankle cellulitis in January 2020.  Pt had right ankle hardware removed 2 days ago with Dr. Bustillos. PICC line was placed for Cefepime and Vanco. Pt notes he saw his PICC line on the floor this morning and did not receive his abx today.   PMH/PSH relevant for: CHF on Lasix, HLD, HTN,   ROS negative for: fever, Chest pain, SOB, Nausea, vomiting, diarrhea, abdominal pain, dysuria    FamilyHx and SocialHx not otherwise contributory

## 2020-06-13 NOTE — ED STATDOCS - PROGRESS NOTE DETAILS
84 yr. old male PMH: Cellulitis of Right ankle presents to ED with report of dislodged PICC line from right antecubital this am upon awakening.  No fever or chills. Seen and examined by attending in intake. Plan: Consult IV team for re insertion of PICC line. Will F/U with patient. Leni BOOTH ciara: SW consulted, pt & wife don't want to send pt to rehab or nursing home. wife even didn't want visiting nurse. pt doesn't need admission. slight concerns regarding pts ability to care for himself but wife will help ciara: abx done, wife will pick pt up

## 2020-06-13 NOTE — ED STATDOCS - PATIENT PORTAL LINK FT
You can access the FollowMyHealth Patient Portal offered by [facility] by registering at the following website: [email or web address]. By joining [facility]’s FollowMyHealth portal, you will also be able to view your health information using other applications (apps) compatible with our system.

## 2020-06-13 NOTE — ED STATDOCS - PHYSICAL EXAMINATION
*GEN: No acute distress, well appearing   *HEAD: Normocephalic, Atraumatic  *EYES/NOSE: b/l Pupils symmetric & Reactive to ligth, EOMI b/l  *THROAT: airway patent, moist mucous membranes  *NECK: Neck supple  *PULMONARY: No Respiratory distress, symmetric b/l chest rise  *CARDIAC: s1s2, regular rhythm   *ABDOMEN:  Non Tender, Non Distended, soft, no guarding, no rebound, no masses   *BACK: no CVA tenderness, No midline vertebral tenderness to palpation   *EXTREMITIES: symmetric pulses, 2+ DP & radial pulses, no cyanosis, no edema. Post surgical changes right ankle.   *SKIN: no rash. Contusions at PICC site.   *NEUROLOGIC: alert,  full active & passive ROM in all 4 extremities, Normal baseline gait.   *PSYCH: appropriate concern about symptoms, pleasant

## 2020-06-13 NOTE — ED STATDOCS - NSFOLLOWUPINSTRUCTIONS_ED_ALL_ED_FT
FOLLOW UP WITH PMD  WITHIN 1-2DAYS, CALL TO MAKE APPOINTMENT  COME BACK TO ED IF YOUR CONDITION WORSENS OR IF YOU DEVELOP FEVER GREATER THAN 100.4F, CHEST PAIN,  SHORTNESS OF BREATH OR ANY OTHER SYMPTOMS CONCERNING TO YOU  TAKE TYLENOL (ACETAMINOPHEN) 650 MG EVERY 6 HOURS AS NEEDED FOR PAIN    IF YOU WERE PRESRCIBED ANY MEDICATIONS FROM TODAY'S VISIT, TAKE THEM AS DIRECTED

## 2020-06-13 NOTE — ED ADULT NURSE NOTE - OBJECTIVE STATEMENT
Patient presents to ED complaining of PICC line placement. Patient states he had a picc line placed yesterday for home ABX. Patient had R ankle cellulitis. Patient states he woke up this morning, PICC line access was out o R arm and on floor. Patient was unable to receive ABX. Patient was seen by visiting nurse, sent to ED for PICC line placement.

## 2020-06-13 NOTE — ADVANCED PRACTICE NURSE CONSULT - ASSESSMENT
Alert and can be vague at times, consent on chart, pt had picc placed yesterday, however it was found on his bedroom floor this AM, pt stated that when he saw his wife this morning she notice the line missing from his arm, spoke with DR Roberts regarding concerns sending this pt home again with a central line,  stated the pt insist on going home for his treatments, spoke with pt regarding the risk of a PE from lines being dislodged like that, pt stated he understood, A Navilyst Xcela PICC with PASV placed in the LUE in the basilic vein with good blood return and flushes well with 30mL/NS, vein access using the sonosite US, A 4Fr SL cath with length 51cm placed, minimal blood loss, procedure done using MST with sterile and full barrier precautions, pt tolerated procedure well, all sharps account for, report given to district RN, LOT#[id number], pt resting quietly on stretcher offering no complaints and no distress observed,

## 2020-06-13 NOTE — ED STATDOCS - CARE PLAN
Principal Discharge DX:	PICC (peripherally inserted central catheter) removal  Secondary Diagnosis:	Cellulitis of ankle

## 2020-06-16 DIAGNOSIS — I35.0 NONRHEUMATIC AORTIC (VALVE) STENOSIS: ICD-10-CM
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
## 2020-06-16 DIAGNOSIS — L92.8 OTHER GRANULOMATOUS DISORDERS OF THE SKIN AND SUBCUTANEOUS TISSUE: ICD-10-CM

## 2020-06-16 DIAGNOSIS — E78.5 HYPERLIPIDEMIA, UNSPECIFIED: ICD-10-CM

## 2020-06-16 DIAGNOSIS — I10 ESSENTIAL (PRIMARY) HYPERTENSION: ICD-10-CM

## 2020-06-16 DIAGNOSIS — T84.622A INFECTION AND INFLAMMATORY REACTION DUE TO INTERNAL FIXATION DEVICE OF RIGHT TIBIA, INITIAL ENCOUNTER: ICD-10-CM

## 2020-06-16 DIAGNOSIS — H91.90 UNSPECIFIED HEARING LOSS, UNSPECIFIED EAR: ICD-10-CM
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
## 2020-06-18 ENCOUNTER — APPOINTMENT (OUTPATIENT)
Dept: ORTHOPEDIC SURGERY | Facility: CLINIC | Age: 85
End: 2020-06-18
Payer: MEDICARE

## 2020-06-18 DIAGNOSIS — S82.891D OTHER FRACTURE OF RIGHT LOWER LEG, SUBSEQUENT ENCOUNTER FOR CLOSED FRACTURE WITH ROUTINE HEALING: ICD-10-CM

## 2020-06-18 PROCEDURE — 99024 POSTOP FOLLOW-UP VISIT: CPT

## 2020-06-18 NOTE — HISTORY OF PRESENT ILLNESS
[de-identified] : Removal of hardware, right ankle.\par DOS 6/11/2020 [de-identified] : Right Ankle Physical Examination:\par \par General: Alert and oriented x3.  In no acute distress.  Pleasant in nature with a normal affect.  No apparent respiratory distress. \par Erythema, Warmth, Rubor: Negative\par Swelling: Negative\par \par *Lateral incision is clean, dry, intact. Nylon sutures intact. Medial wound is packed with sterile dressing and nylon sutures intact medial ankle. \par \par ROM:\par 1. Dorsiflexion: 10 degrees\par 2. Plantarflexion: 40 degrees\par 3. Inversion: 10 degrees\par 4. Eversion: 10 degrees\par \par Tenderness to Palpation: \par 1. Lateral Malleolus: Negative\par 2. Medial Malleolus: Negative\par 3. Proximal Fibular Pain: Negative\par 4. Heel Pain: Negative\par 5. Cuboid: Negative\par 6. Navicular: Negative\par 7. Tibiotalar Joint: Negative\par 8. Subtalar Joint: Negative\par 9. Posterior Recess: Negative\par \par Tendon Pain:\par 1. Achilles: Negative\par 2. Peroneals: Negative\par 3. Posterior Tibialis: Negative\par 4. Tibialis Anterior: Negative\par \par Ligament Pain:\par 1. ATFL: Negative\par 2. CFL: Negative \par 3. PTFL: Negative\par 4. Deltoid Ligaments: Negative\par 5. Lis Franc Ligament: Negative\par \par Stability: \par 1. Anterior Drawer: Negative\par 2. Posterior Drawer: Negative\par \par Strength: 5/5 TA/GS/EHL\par \par Pulses: 2+ DP/PT Pulses\par \par Neuro: Intact motor and sensory\par \par Additional Test:\par 1. Calcaneal Squeeze Test: Negative\par 2. Syndesmosis Squeeze Test: Negative\par  [de-identified] : Elio is a 84  y.o. male who presents for a follow-up of his right ankle/right ankle wound.  He presents in office with the medial ankle wound packed with sterile dressing.  He denies fevers, chills, night sweats.  The patient has no complaints and no pain.  No other complaints.  Patient on Abx.  [de-identified] : No images performed. [de-identified] : The patient will continue to monitor all wounds ankle and continue with sterile packing of the medial wound.  He will continue with his Boot, WBAT.  The patient will continue with ABX and  mg for DVT prophylaxis.  He will FU in one week for wound check and suture removals.  Any problems, he may come into office sooner. All questions answered.  [de-identified] : Removal of hardware, right ankle.\par DOS 6/11/2020

## 2020-06-25 ENCOUNTER — APPOINTMENT (OUTPATIENT)
Dept: ORTHOPEDIC SURGERY | Facility: CLINIC | Age: 85
End: 2020-06-25
Payer: MEDICARE

## 2020-06-25 VITALS
SYSTOLIC BLOOD PRESSURE: 187 MMHG | HEIGHT: 72 IN | DIASTOLIC BLOOD PRESSURE: 75 MMHG | BODY MASS INDEX: 27.63 KG/M2 | HEART RATE: 69 BPM | WEIGHT: 204 LBS

## 2020-06-25 PROCEDURE — 99024 POSTOP FOLLOW-UP VISIT: CPT

## 2020-06-25 NOTE — HISTORY OF PRESENT ILLNESS
[de-identified] : Removal of hardware, right ankle.\par DOS 6/11/2020 [de-identified] : Right Ankle Physical Examination:\par \par General: Alert and oriented x3.  In no acute distress.  Pleasant in nature with a normal affect.  No apparent respiratory distress. \par Erythema, Warmth, Rubor: Negative\par Swelling: Negative\par \par *Lateral incision is clean, dry, intact. Nylon sutures removed on both the medial and lateral sides. Medial wound is packed with sterile dressing. \par \par ROM:\par 1. Dorsiflexion: 10 degrees\par 2. Plantarflexion: 40 degrees\par 3. Inversion: 10 degrees\par 4. Eversion: 10 degrees\par \par Tenderness to Palpation: \par 1. Lateral Malleolus: Negative\par 2. Medial Malleolus: Negative\par 3. Proximal Fibular Pain: Negative\par 4. Heel Pain: Negative\par 5. Cuboid: Negative\par 6. Navicular: Negative\par 7. Tibiotalar Joint: Negative\par 8. Subtalar Joint: Negative\par 9. Posterior Recess: Negative\par \par Tendon Pain:\par 1. Achilles: Negative\par 2. Peroneals: Negative\par 3. Posterior Tibialis: Negative\par 4. Tibialis Anterior: Negative\par \par Ligament Pain:\par 1. ATFL: Negative\par 2. CFL: Negative \par 3. PTFL: Negative\par 4. Deltoid Ligaments: Negative\par 5. Lis Franc Ligament: Negative\par \par Stability: \par 1. Anterior Drawer: Negative\par 2. Posterior Drawer: Negative\par \par Strength: 5/5 TA/GS/EHL\par \par Pulses: 2+ DP/PT Pulses\par \par Neuro: Intact motor and sensory\par \par Additional Test:\par 1. Calcaneal Squeeze Test: Negative\par 2. Syndesmosis Squeeze Test: Negative\par  [de-identified] : Elio is a 84  y.o. male who presents for a follow-up of his right ankle/right ankle wound.  He presents in office with the medial ankle wound packed with sterile dressing.  He denies fevers, chills, night sweats.  The patient has no complaints and no pain.  No other complaints.  Patient on Abx. He presents in the office for suture removal. [de-identified] : Removal of hardware, right ankle.\par DOS 6/11/2020 [de-identified] : The patient will continue to monitor all wounds ankle and continue with sterile packing of the medial wound.  He will continue with his ASO brace, WBAT.  The patient will continue with ABX and  mg for DVT prophylaxis.  He will FU in 2 weeks for wound check. Any problems, he may come into office sooner. All questions answered.  [de-identified] : No images performed.

## 2020-06-25 NOTE — HISTORY OF PRESENT ILLNESS
[de-identified] : Right Ankle Physical Examination:\par \par General: Alert and oriented x3.  In no acute distress.  Pleasant in nature with a normal affect.  No apparent respiratory distress. \par Erythema, Warmth, Rubor: Negative\par Swelling: Negative\par \par *Lateral incision is clean, dry, intact. Nylon sutures removed on both the medial and lateral sides. Medial wound is packed with sterile dressing. \par \par ROM:\par 1. Dorsiflexion: 10 degrees\par 2. Plantarflexion: 40 degrees\par 3. Inversion: 10 degrees\par 4. Eversion: 10 degrees\par \par Tenderness to Palpation: \par 1. Lateral Malleolus: Negative\par 2. Medial Malleolus: Negative\par 3. Proximal Fibular Pain: Negative\par 4. Heel Pain: Negative\par 5. Cuboid: Negative\par 6. Navicular: Negative\par 7. Tibiotalar Joint: Negative\par 8. Subtalar Joint: Negative\par 9. Posterior Recess: Negative\par \par Tendon Pain:\par 1. Achilles: Negative\par 2. Peroneals: Negative\par 3. Posterior Tibialis: Negative\par 4. Tibialis Anterior: Negative\par \par Ligament Pain:\par 1. ATFL: Negative\par 2. CFL: Negative \par 3. PTFL: Negative\par 4. Deltoid Ligaments: Negative\par 5. Lis Franc Ligament: Negative\par \par Stability: \par 1. Anterior Drawer: Negative\par 2. Posterior Drawer: Negative\par \par Strength: 5/5 TA/GS/EHL\par \par Pulses: 2+ DP/PT Pulses\par \par Neuro: Intact motor and sensory\par \par Additional Test:\par 1. Calcaneal Squeeze Test: Negative\par 2. Syndesmosis Squeeze Test: Negative\par  [de-identified] : Elio is a 84  y.o. male who presents for a follow-up of his right ankle/right ankle wound.  He presents in office with the medial ankle wound packed with sterile dressing.  He denies fevers, chills, night sweats.  The patient has no complaints and no pain.  No other complaints.  Patient on Abx. He presents in the office for suture removal. [de-identified] : Removal of hardware, right ankle.\par DOS 6/11/2020 [de-identified] : Removal of hardware, right ankle.\par DOS 6/11/2020 [de-identified] : The patient will continue to monitor all wounds ankle and continue with sterile packing of the medial wound.  He will continue with his ASO brace, WBAT.  The patient will continue with ABX and  mg for DVT prophylaxis.  He will FU in 2 weeks for wound check. Any problems, he may come into office sooner. All questions answered.  [de-identified] : No images performed.

## 2020-07-06 ENCOUNTER — APPOINTMENT (OUTPATIENT)
Dept: ORTHOPEDIC SURGERY | Facility: CLINIC | Age: 85
End: 2020-07-06
Payer: MEDICARE

## 2020-07-06 DIAGNOSIS — T84.9XXA UNSPECIFIED COMPLICATION OF INTERNAL ORTHOPEDIC PROSTHETIC DEVICE, IMPLANT AND GRAFT, INITIAL ENCOUNTER: ICD-10-CM

## 2020-07-06 PROCEDURE — 99024 POSTOP FOLLOW-UP VISIT: CPT

## 2020-07-06 NOTE — HISTORY OF PRESENT ILLNESS
[___ Weeks Post Op] : [unfilled] weeks post op [Clean/Dry/Intact] : clean, dry and intact [Healed] : healed [Neuro Intact] : an unremarkable neurological exam [Vascular Intact] : ~T peripheral vascular exam normal [Negative Chase's] : maneuvers demonstrated a negative Chase's sign [Doing Well] : is doing well [Excellent Pain Control] : has excellent pain control [No Sign of Infection] : is showing no signs of infection [Chills] : no chills [Fever] : no fever [Nausea] : no nausea [Vomiting] : no vomiting [Swelling] : not swollen [Erythema] : not erythematous [Discharge] : absent of discharge [Dehiscence] : not dehisced [de-identified] : s/p Removal of hardware, right ankle.\par DOS 6/11/2020.  [de-identified] : None new obtained.  [de-identified] : General: Alert and oriented x3. In no acute distress. Pleasant in nature with a normal affect. No apparent respiratory distress.\par The wound is intact. No evidence of any diastases or dehiscence. No surrounding erythema noted. No fluctuance. The area is warm and well perfused. The patient is able to wiggle their toes. Neurovascularly intact.  [de-identified] : 84 year old male present in the office 3.5 weeks post op from removal of hardware, right ankle. The patient's pain is noted to be a 0/10. The patient presents wearing an ankle brace. He is not currently taking any pain medication. No other complaints at this time.  [de-identified] : Patient is a 84 year old male present in the office today 3.5 weeks s/p removal of hardware, right ankle. I advised the patient to continue with the PICC line, and keep the wound open to air. I would like to see the patient back in the office in 2-3 weeks to reassess their condition. I have addressed all the patient's concerns surrounding the pathology of their condition. The patient understood and verbally agreed to the treatment plan. All of their questions were answered and they were satisfied with the visit. The patient should call the office if they have any questions or experience worsening symptoms. \par \par DVT ppx\par Wean out of boot\par WBAT\par Abx\par F/u 2 weeks. \par No sign of calf pain or DVT.  Dorsal Nasal Flap Text: The defect edges were debeveled with a #15 scalpel blade.  Given the location of the defect and the proximity to free margins a dorsal nasal flap was deemed most appropriate.  Using a sterile surgical marker, an appropriate dorsal nasal flap was drawn around the defect.    The area thus outlined was incised deep to adipose tissue with a #15 scalpel blade.  The skin margins were undermined to an appropriate distance in all directions utilizing iris scissors.

## 2020-07-20 ENCOUNTER — APPOINTMENT (OUTPATIENT)
Dept: ORTHOPEDIC SURGERY | Facility: CLINIC | Age: 85
End: 2020-07-20
Payer: MEDICARE

## 2020-07-20 DIAGNOSIS — L03.115 CELLULITIS OF RIGHT LOWER LIMB: ICD-10-CM

## 2020-07-20 PROCEDURE — 99024 POSTOP FOLLOW-UP VISIT: CPT

## 2020-07-20 NOTE — HISTORY OF PRESENT ILLNESS
[___ Weeks Post Op] : [unfilled] weeks post op [Chills] : no chills [Fever] : no fever [Nausea] : no nausea [Vomiting] : no vomiting [Healed] : healed [Clean/Dry/Intact] : clean, dry and intact [Erythema] : not erythematous [Discharge] : absent of discharge [Swelling] : not swollen [Dehiscence] : not dehisced [Neuro Intact] : an unremarkable neurological exam [Vascular Intact] : ~T peripheral vascular exam normal [Negative Chase's] : maneuvers demonstrated a negative Chase's sign [Doing Well] : is doing well [Excellent Pain Control] : has excellent pain control [No Sign of Infection] : is showing no signs of infection [de-identified] : s/p Removal of hardware, right ankle.\par DOS 6/11/2020.  [de-identified] : 84 year old male present in the office for his right ankle and to have the medial wound evaluated. The patient's pain is noted to be a 0/10. He is not currently taking any pain medication. PICC line intact but scheduled to be removed on Saturday.  He denies fever, chills, night sweats.  No other complaints at this time.  [de-identified] : General: Alert and oriented x3. In no acute distress. Pleasant in nature with a normal affect. No apparent respiratory distress.\par The wound is intact. No evidence of any diastases or dehiscence. No surrounding erythema noted. No fluctuance. Scab formation present.  The patient is able to wiggle their toes. Neurovascularly intact.  [de-identified] : None new obtained.  [de-identified] : s/p Removal of hardware, right ankle.\par DOS 6/11/2020.  [de-identified] : At this point the patient's wound is completely healed. He is scheduled to have the PICC line removed this weekend. I do not want him to pick the scab of  the wound.  All of his questions were answered.  He can follow-up in office as needed.

## 2020-08-06 ENCOUNTER — APPOINTMENT (OUTPATIENT)
Dept: CARDIOTHORACIC SURGERY | Facility: CLINIC | Age: 85
End: 2020-08-06
Payer: MEDICARE

## 2020-08-06 ENCOUNTER — NON-APPOINTMENT (OUTPATIENT)
Age: 85
End: 2020-08-06

## 2020-08-06 ENCOUNTER — OUTPATIENT (OUTPATIENT)
Dept: OUTPATIENT SERVICES | Facility: HOSPITAL | Age: 85
LOS: 1 days | End: 2020-08-06
Payer: MEDICARE

## 2020-08-06 VITALS
WEIGHT: 185 LBS | RESPIRATION RATE: 16 BRPM | BODY MASS INDEX: 25.06 KG/M2 | HEIGHT: 72 IN | DIASTOLIC BLOOD PRESSURE: 90 MMHG | SYSTOLIC BLOOD PRESSURE: 167 MMHG | OXYGEN SATURATION: 97 % | TEMPERATURE: 98.3 F | HEART RATE: 67 BPM

## 2020-08-06 DIAGNOSIS — Z98.890 OTHER SPECIFIED POSTPROCEDURAL STATES: Chronic | ICD-10-CM

## 2020-08-06 DIAGNOSIS — I25.10 ATHEROSCLEROTIC HEART DISEASE OF NATIVE CORONARY ARTERY WITHOUT ANGINA PECTORIS: ICD-10-CM

## 2020-08-06 LAB
ALBUMIN SERPL ELPH-MCNC: 4.6 G/DL
ALP BLD-CCNC: 88 U/L
ALT SERPL-CCNC: 14 U/L
ANION GAP SERPL CALC-SCNC: 18 MMOL/L
AST SERPL-CCNC: 25 U/L
BASOPHILS # BLD AUTO: 0.05 K/UL
BASOPHILS NFR BLD AUTO: 0.7 %
BILIRUB SERPL-MCNC: 0.6 MG/DL
BUN SERPL-MCNC: 30 MG/DL
CALCIUM SERPL-MCNC: 9.5 MG/DL
CHLORIDE SERPL-SCNC: 102 MMOL/L
CO2 SERPL-SCNC: 23 MMOL/L
CREAT SERPL-MCNC: 1.15 MG/DL
EOSINOPHIL # BLD AUTO: 0.23 K/UL
EOSINOPHIL NFR BLD AUTO: 3 %
GLUCOSE SERPL-MCNC: 85 MG/DL
HCT VFR BLD CALC: 41.5 %
HGB BLD-MCNC: 13.9 G/DL
IMM GRANULOCYTES NFR BLD AUTO: 0.5 %
LYMPHOCYTES # BLD AUTO: 1.16 K/UL
LYMPHOCYTES NFR BLD AUTO: 15.1 %
MAN DIFF?: NORMAL
MCHC RBC-ENTMCNC: 31.9 PG
MCHC RBC-ENTMCNC: 33.5 GM/DL
MCV RBC AUTO: 95.2 FL
MONOCYTES # BLD AUTO: 0.85 K/UL
MONOCYTES NFR BLD AUTO: 11.1 %
NEUTROPHILS # BLD AUTO: 5.35 K/UL
NEUTROPHILS NFR BLD AUTO: 69.6 %
NT-PROBNP SERPL-MCNC: 205 PG/ML
PLATELET # BLD AUTO: 236 K/UL
POTASSIUM SERPL-SCNC: 3.6 MMOL/L
PROT SERPL-MCNC: 7 G/DL
RBC # BLD: 4.36 M/UL
RBC # FLD: 13.9 %
SODIUM SERPL-SCNC: 143 MMOL/L
WBC # FLD AUTO: 7.68 K/UL

## 2020-08-06 PROCEDURE — 99204 OFFICE O/P NEW MOD 45 MIN: CPT

## 2020-08-06 PROCEDURE — 93306 TTE W/DOPPLER COMPLETE: CPT | Mod: 26

## 2020-08-06 PROCEDURE — 93306 TTE W/DOPPLER COMPLETE: CPT

## 2020-08-06 RX ORDER — SULFAMETHOXAZOLE AND TRIMETHOPRIM 800; 160 MG/1; MG/1
800-160 TABLET ORAL TWICE DAILY
Qty: 14 | Refills: 0 | Status: COMPLETED | COMMUNITY
Start: 2020-05-06 | End: 2020-08-06

## 2020-08-06 RX ORDER — BETAMETHASONE DIPROPIONATE 0.5 MG/G
0.05 OINTMENT TOPICAL TWICE DAILY
Qty: 1 | Refills: 3 | Status: COMPLETED | COMMUNITY
Start: 2019-12-03 | End: 2020-08-06

## 2020-08-06 RX ORDER — CEPHALEXIN 500 MG/1
500 CAPSULE ORAL 3 TIMES DAILY
Qty: 21 | Refills: 0 | Status: COMPLETED | COMMUNITY
Start: 2020-01-09 | End: 2020-08-06

## 2020-08-06 RX ORDER — VANCOMYCIN HYDROCHLORIDE 10 G/100ML
10 INJECTION, POWDER, LYOPHILIZED, FOR SOLUTION INTRAVENOUS
Qty: 1 | Refills: 0 | Status: COMPLETED | COMMUNITY
Start: 2020-01-21 | End: 2020-08-06

## 2020-08-06 RX ORDER — CEFTRIAXONE 10 G/1
10 INJECTION, POWDER, FOR SOLUTION INTRAVENOUS
Qty: 1 | Refills: 0 | Status: COMPLETED | COMMUNITY
Start: 2020-01-16 | End: 2020-08-06

## 2020-08-06 RX ORDER — DIPHENHYDRAMINE HYDROCHLORIDE 50 MG/ML
50 INJECTION, SOLUTION INTRAMUSCULAR; INTRAVENOUS
Qty: 1 | Refills: 0 | Status: COMPLETED | COMMUNITY
Start: 2020-06-15 | End: 2020-08-06

## 2020-08-06 RX ORDER — CIPROFLOXACIN HYDROCHLORIDE 500 MG/1
500 TABLET, FILM COATED ORAL TWICE DAILY
Qty: 14 | Refills: 3 | Status: COMPLETED | COMMUNITY
Start: 2019-09-20 | End: 2020-08-06

## 2020-08-06 RX ORDER — GABAPENTIN 300 MG/1
300 CAPSULE ORAL
Qty: 7 | Refills: 0 | Status: COMPLETED | COMMUNITY
Start: 2020-03-26 | End: 2020-08-06

## 2020-08-06 RX ORDER — PRAVASTATIN SODIUM 20 MG/1
20 TABLET ORAL
Qty: 90 | Refills: 0 | Status: COMPLETED | COMMUNITY
Start: 2020-03-26 | End: 2020-08-06

## 2020-08-06 RX ORDER — OXYCODONE AND ACETAMINOPHEN 5; 325 MG/1; MG/1
5-325 TABLET ORAL
Qty: 30 | Refills: 0 | Status: COMPLETED | COMMUNITY
Start: 2020-06-11 | End: 2020-08-06

## 2020-08-06 RX ORDER — TAMSULOSIN HYDROCHLORIDE 0.4 MG/1
CAPSULE ORAL
Refills: 0 | Status: COMPLETED | COMMUNITY
End: 2020-08-06

## 2020-08-06 RX ORDER — POTASSIUM CHLORIDE 750 MG/1
10 TABLET, EXTENDED RELEASE ORAL
Refills: 0 | Status: COMPLETED | COMMUNITY
Start: 2020-03-26 | End: 2020-08-06

## 2020-08-06 RX ORDER — FUROSEMIDE 40 MG/1
40 TABLET ORAL
Qty: 60 | Refills: 0 | Status: COMPLETED | COMMUNITY
Start: 2020-02-21 | End: 2020-08-06

## 2020-08-06 RX ORDER — CEFEPIME HYDROCHLORIDE 1 G/1
1 INJECTION, POWDER, FOR SOLUTION INTRAMUSCULAR; INTRAVENOUS
Qty: 24 | Refills: 0 | Status: COMPLETED | COMMUNITY
Start: 2020-06-12 | End: 2020-08-06

## 2020-08-06 RX ORDER — CEFAZOLIN 10 G/1
10 INJECTION, POWDER, FOR SOLUTION INTRAVENOUS
Qty: 4 | Refills: 0 | Status: COMPLETED | COMMUNITY
Start: 2020-06-12 | End: 2020-08-06

## 2020-08-06 NOTE — END OF VISIT
[FreeTextEntry3] : Written by Shay Young NP, acting as a scribe for Dr. Cantu.\par “The documentation recorded by the scribe accurately reflects the service I personally performed and the decisions made by me.” Signature Harish Cantu MD.

## 2020-08-06 NOTE — CONSULT LETTER
[Dear  ___] : Dear  [unfilled], [Courtesy Letter:] : I had the pleasure of seeing your patient, [unfilled], in my office today. [Sincerely,] : Sincerely, [Please see my note below.] : Please see my note below. [FreeTextEntry2] : Ac Tubbs M.D.\[address]\[place] [address]\[facility] [phone] [FreeTextEntry3] : Harish Cantu MD\par Attending Surgeon\par Cardiovascular & Thoracic Surgery\par [facility]

## 2020-08-06 NOTE — DATA REVIEWED
[FreeTextEntry1] : Cardiac Cath 2/5/2020: Brought disc in today from 2/5/2020 cath. Normal coronary arteries as per report\par \par Echo: 1/30/2020: Fibrocalcific changes noted to the mitral valve leaflets with preserved leaflet excursion. Mild to Moderate mitral regurgitation is present. EA reversal of the mitral inflow consistent with reduced compliance of the left ventricle. Significant fibrocalcific changes noted to the aortic valve leaflets with\par restriction in leaflet excursion. Peak and mean transaortic gradients are 58 and 36 mmHg respectively; this finding is consistent with moderate aortic stenosis. JERILYN by continuity is 1.04 cm2 and the dimensionless index is .28 suggesting moderate to severe stenosis. Moderate (2+) aortic regurgitation is present.  The tricuspid valve leaflets are thin and pliable; valve motion is normal. Mild (1+) tricuspid valve regurgitation is present. Borderline moderate pulmonary hypertension. Trace pulmonic valvular regurgitation is present.\par The left atrium is mildly dilated. The left ventricle is normal in size, wall thickness, wall motion and\par contractility. Estimated left ventricular ejection fraction is 55 %. The right atrium appears mildly dilated.\par Normal appearing right ventricle structure and function. The IVC is dilated. No evidence of pericardial effusion.\par

## 2020-08-06 NOTE — HISTORY OF PRESENT ILLNESS
[FreeTextEntry1] : Mr Rodas is an 84 year old male who presents today for evaluation of aortic stenosis referred by Dr. Tubbs for TAVR evaluation.  PMH includes HTN, BPH, on flomax, HLD, mitral regurgitation, aortic stenosis, fracture of right ankle s/p ORIF 10/2019 c/b right ankle cellulitis, infection and ultimate removal of hardware on 6/11/2020. He was DC home on June 11 with PICC and treated with IV Cefepime and Vanco. Completed on 7/24/2020. Follows Dr. Ac Tubbs for cardiology. He presents today and reports he has been feeling and doing fine. He reports he walks during the day but does have to take it easy. Able to care for ADL's at home but does report having to slow down at times as he doesn't have the energy he used to. Lost 30 lbs during hospitalization last fall and has not gained any weight on 80mg lasix daily. Denies chest pain, palpitations, SOB, lightheadedness, swelling, fever or chills. Echo from January 2020 showed peak and mean transaortic gradients are 58 and 36 mmHg respectively; this finding is consistent with moderate aortic stenosis. JERILYN by continuity is 1.04 cm2. He is currently  and retired from [employer]. He brought his cardiac cath disc from 2/5/2020

## 2020-08-06 NOTE — REVIEW OF SYSTEMS
[As Noted in HPI] : as noted in HPI [Easy Bleeding] : a tendency for easy bleeding [Easy Bruising] : a tendency for easy bruising [Negative] : Endocrine [de-identified] : Has recently stopped 325 ASA

## 2020-08-06 NOTE — PHYSICAL EXAM
[General Appearance - Alert] : alert [General Appearance - Well Nourished] : well nourished [General Appearance - In No Acute Distress] : in no acute distress [General Appearance - Well Developed] : well developed [Sclera] : the sclera and conjunctiva were normal [PERRL With Normal Accommodation] : pupils were equal in size, round, and reactive to light [Outer Ear] : the ears and nose were normal in appearance [Neck Appearance] : the appearance of the neck was normal [Jugular Venous Distention Increased] : there was no jugular-venous distention [] : no respiratory distress [Respiration, Rhythm And Depth] : normal respiratory rhythm and effort [Exaggerated Use Of Accessory Muscles For Inspiration] : no accessory muscle use [Chest Palpation] : palpation of the chest revealed no abnormalities [Apical Impulse] : the apical impulse was normal [Examination Of The Chest] : the chest was normal in appearance [Bowel Sounds] : normal bowel sounds [Abdomen Soft] : soft [Abdomen Tenderness] : non-tender [No CVA Tenderness] : no ~M costovertebral angle tenderness [Involuntary Movements] : no involuntary movements were seen [Skin Color & Pigmentation] : normal skin color and pigmentation [Skin Turgor] : normal skin turgor [No Focal Deficits] : no focal deficits [Oriented To Time, Place, And Person] : oriented to person, place, and time [Affect] : the affect was normal [Impaired Insight] : insight and judgment were intact [Mood] : the mood was normal [Heart Rate And Rhythm] : heart rate was normal and rhythm regular [Right Carotid Bruit] : no bruit heard over the right carotid [Left Carotid Bruit] : no bruit heard over the left carotid [FreeTextEntry1] : Deferred

## 2020-08-14 ENCOUNTER — RESULT REVIEW (OUTPATIENT)
Age: 85
End: 2020-08-14

## 2020-08-14 ENCOUNTER — OUTPATIENT (OUTPATIENT)
Dept: OUTPATIENT SERVICES | Facility: HOSPITAL | Age: 85
LOS: 1 days | End: 2020-08-14
Payer: MEDICARE

## 2020-08-14 ENCOUNTER — APPOINTMENT (OUTPATIENT)
Dept: ULTRASOUND IMAGING | Facility: HOSPITAL | Age: 85
End: 2020-08-14

## 2020-08-14 ENCOUNTER — APPOINTMENT (OUTPATIENT)
Dept: CARDIOLOGY | Facility: HOSPITAL | Age: 85
End: 2020-08-14

## 2020-08-14 ENCOUNTER — NON-APPOINTMENT (OUTPATIENT)
Age: 85
End: 2020-08-14

## 2020-08-14 ENCOUNTER — APPOINTMENT (OUTPATIENT)
Dept: CARDIOTHORACIC SURGERY | Facility: CLINIC | Age: 85
End: 2020-08-14
Payer: MEDICARE

## 2020-08-14 VITALS
TEMPERATURE: 98.3 F | BODY MASS INDEX: 26.41 KG/M2 | HEART RATE: 54 BPM | RESPIRATION RATE: 14 BRPM | SYSTOLIC BLOOD PRESSURE: 169 MMHG | DIASTOLIC BLOOD PRESSURE: 69 MMHG | OXYGEN SATURATION: 96 % | WEIGHT: 195 LBS | HEIGHT: 72 IN

## 2020-08-14 VITALS — DIASTOLIC BLOOD PRESSURE: 70 MMHG | SYSTOLIC BLOOD PRESSURE: 174 MMHG

## 2020-08-14 DIAGNOSIS — Z80.9 FAMILY HISTORY OF MALIGNANT NEOPLASM, UNSPECIFIED: ICD-10-CM

## 2020-08-14 DIAGNOSIS — I10 ESSENTIAL (PRIMARY) HYPERTENSION: ICD-10-CM

## 2020-08-14 DIAGNOSIS — Z98.890 OTHER SPECIFIED POSTPROCEDURAL STATES: Chronic | ICD-10-CM

## 2020-08-14 DIAGNOSIS — I35.0 NONRHEUMATIC AORTIC (VALVE) STENOSIS: ICD-10-CM

## 2020-08-14 DIAGNOSIS — Z87.891 PERSONAL HISTORY OF NICOTINE DEPENDENCE: ICD-10-CM

## 2020-08-14 DIAGNOSIS — R07.9 CHEST PAIN, UNSPECIFIED: ICD-10-CM

## 2020-08-14 DIAGNOSIS — R53.83 OTHER FATIGUE: ICD-10-CM

## 2020-08-14 PROCEDURE — 93880 EXTRACRANIAL BILAT STUDY: CPT

## 2020-08-14 PROCEDURE — 99204 OFFICE O/P NEW MOD 45 MIN: CPT

## 2020-08-14 PROCEDURE — 75572 CT HRT W/3D IMAGE: CPT | Mod: 26

## 2020-08-14 PROCEDURE — 93000 ELECTROCARDIOGRAM COMPLETE: CPT

## 2020-08-14 PROCEDURE — 75572 CT HRT W/3D IMAGE: CPT

## 2020-08-14 PROCEDURE — 93880 EXTRACRANIAL BILAT STUDY: CPT | Mod: 26

## 2020-08-14 NOTE — PHYSICAL EXAM
[General Appearance - Well Developed] : well developed [Normal Appearance] : normal appearance [Well Groomed] : well groomed [General Appearance - Well Nourished] : well nourished [No Deformities] : no deformities [Normal Conjunctiva] : the conjunctiva exhibited no abnormalities [General Appearance - In No Acute Distress] : no acute distress [Eyelids - No Xanthelasma] : the eyelids demonstrated no xanthelasmas [Normal Oral Mucosa] : normal oral mucosa [No Oral Pallor] : no oral pallor [No Oral Cyanosis] : no oral cyanosis [Normal Jugular Venous A Waves Present] : normal jugular venous A waves present [No Jugular Venous Alan A Waves] : no jugular venous alan A waves [Normal Jugular Venous V Waves Present] : normal jugular venous V waves present [Normal Rate] : normal [Heart Rate ___] : [unfilled] bpm [Premature Beats] : regular with premature beats [Normal S1] : normal S1 [Normal S2] : normal S2 [No Gallop] : no gallop heard [III] : a grade 3 [2+] : left 2+ [No Abnormalities] : the abdominal aorta was not enlarged and no bruit was heard [Respiration, Rhythm And Depth] : normal respiratory rhythm and effort [Exaggerated Use Of Accessory Muscles For Inspiration] : no accessory muscle use [Auscultation Breath Sounds / Voice Sounds] : lungs were clear to auscultation bilaterally [Bowel Sounds] : normal bowel sounds [Abdomen Soft] : soft [Abdomen Tenderness] : non-tender [Abnormal Walk] : normal gait [Cyanosis, Localized] : no localized cyanosis [Nail Clubbing] : no clubbing of the fingernails [Petechial Hemorrhages (___cm)] : no petechial hemorrhages [Skin Color & Pigmentation] : normal skin color and pigmentation [] : no rash [Skin Turgor] : normal skin turgor [No Venous Stasis] : no venous stasis [Oriented To Time, Place, And Person] : oriented to person, place, and time [Impaired Insight] : insight and judgment were intact [Mood] : the mood was normal [Affect] : the affect was normal [Memory Recent] : recent memory was not impaired [No Anxiety] : not feeling anxious [Memory Remote] : remote memory was not impaired [___ +] : bilateral [unfilled]U+ pretibial pitting edema [Click] : no click [Distant] : the heart sounds were ~L not distant [Pericardial Rub] : no pericardial rub [Right Carotid Bruit] : no bruit heard over the right carotid [Left Carotid Bruit] : no bruit heard over the left carotid [Bruit] : no bruit heard [Rt] : no varicose veins of the right leg [Lt] : no varicose veins of the left leg [FreeTextEntry1] : scattered bruising and scabs noted

## 2020-08-14 NOTE — REASON FOR VISIT
[Initial Evaluation] : an initial evaluation of [Aortic Stenosis] : aortic stenosis [Medication Management] : Medication management [Spouse] : spouse

## 2020-08-14 NOTE — HISTORY OF PRESENT ILLNESS
[FreeTextEntry1] : JOSE APONTE is a 84 year old male here for evaluation aortic stenosis and candidacy of TAVR procedure. To review, he has a PMH of HTN, BPH on Flomax, HLD, mitral regurgitation, aortic stenosis, fracture of right ankle s/p ORIF 10/2019 c/b right ankle cellulitis, infection and ultimate removal of hardware on 6/11/2020. He was DC home on June 11 with PICC and treated with IV Cefepime and Vancomycin which was completed on 7/24/2020. PICC line was removed on 7/26/2020.\par \par He comes to the office today after having cardiac CT and carotid ultrasound prior to this appointment reporting feeling "good" overall. He states nothing bothers him at all. He states he has known about the valve problem for a few years and has been monitored by his cardiologist, Dr. Ac Tubbs.  When he was in the ICU after his ankle surgery and when he had cellulitis, his ECG was "off" and an angiogram was done which showed narrowing of the valve.  He currently denies fever, chills, palpitations, angina, dyspnea, dizziness, lightheadedness, syncope, or peripheral edema. His energy level is somewhat low and will take a nap during the day at times. He gets tired and he is active usually and it annoys him when he has to rest.  His appetite is good. Denies bowel or bladder problems.  He is active around the house but doesn't do structured exercise.  Was never in the hospital for a heart problem.\par \par He initially reports no symptoms and admits to feeling fatigued. His wife has noted a slowing down over the prior few months--he was prior "very active" before this. He reports no angina, dyspnea, or syncope. His wife notices "him taking deep breaths now and then" but he states "that's boredom". He notes not feeling as vigorous and energetic as prior, noting "I do feel weaker as you do less, by far".\par 8/6/2020: BNP - 205, BUN/Creat - 30/1.15, K - 3.6\par PCP: Dr. Ac Mtz\par CARD: Dr. Ac Tubbs

## 2020-08-14 NOTE — REVIEW OF SYSTEMS
[Feeling Fatigued] : feeling fatigued [Negative] : Heme/Lymph [Fever] : no fever [Chills] : no chills [Shortness Of Breath] : no shortness of breath [Dyspnea on exertion] : not dyspnea during exertion [Chest  Pressure] : no chest pressure [Chest Pain] : no chest pain [Lower Ext Edema] : no extremity edema [Palpitations] : no palpitations [Cough] : no cough

## 2020-08-14 NOTE — DISCUSSION/SUMMARY
[Severe Aortic Stenosis] : severe aortic stenosis [Aortic Valve Replacement] : aortic valve replacement [Patient] : the patient [None] : none [Chronic Diastolic Heart Failure] : chronic diastolic congestive heart failure [Compensated] : compensated [Family] : the patient's family [FreeTextEntry1] : Elio has severe aortic stenosis and symptoms of fatigue and functional decline over the prior months (NYHA II). Angiography with normal coronaries. I reviewed his TTE with Dr Shetty which reveals an apparent bicuspid aortic valve with an JERILYN 1, peak and mean gradients of 73/42mmHg, an AoV of 4.3m/s, and a DVI of 0.21--severe AS warranting replacement. He had his cardiac CT this morning, which will be reviewed by the Heart Team and an optimal treatment strategy recommended. In anticipation of TAVR, he will get a procedure date today. I discussed with him and his wife the potential risks and benefits of TAVR, including but not limited to the need for a PPM, and answered all questions in detail.

## 2020-09-08 ENCOUNTER — OUTPATIENT (OUTPATIENT)
Dept: OUTPATIENT SERVICES | Facility: HOSPITAL | Age: 85
LOS: 1 days | End: 2020-09-08
Payer: MEDICARE

## 2020-09-08 VITALS
TEMPERATURE: 98 F | RESPIRATION RATE: 16 BRPM | DIASTOLIC BLOOD PRESSURE: 74 MMHG | HEIGHT: 72 IN | WEIGHT: 197.98 LBS | HEART RATE: 55 BPM | SYSTOLIC BLOOD PRESSURE: 173 MMHG | OXYGEN SATURATION: 99 %

## 2020-09-08 DIAGNOSIS — Z01.818 ENCOUNTER FOR OTHER PREPROCEDURAL EXAMINATION: ICD-10-CM

## 2020-09-08 DIAGNOSIS — I35.0 NONRHEUMATIC AORTIC (VALVE) STENOSIS: ICD-10-CM

## 2020-09-08 DIAGNOSIS — Z98.890 OTHER SPECIFIED POSTPROCEDURAL STATES: Chronic | ICD-10-CM

## 2020-09-08 DIAGNOSIS — Z29.9 ENCOUNTER FOR PROPHYLACTIC MEASURES, UNSPECIFIED: ICD-10-CM

## 2020-09-08 LAB
ALBUMIN SERPL ELPH-MCNC: 4.2 G/DL — SIGNIFICANT CHANGE UP (ref 3.3–5)
ALP SERPL-CCNC: 90 U/L — SIGNIFICANT CHANGE UP (ref 40–120)
ALT FLD-CCNC: 15 U/L — SIGNIFICANT CHANGE UP (ref 10–45)
ANION GAP SERPL CALC-SCNC: 14 MMOL/L — SIGNIFICANT CHANGE UP (ref 5–17)
AST SERPL-CCNC: 20 U/L — SIGNIFICANT CHANGE UP (ref 10–40)
BILIRUB SERPL-MCNC: 0.6 MG/DL — SIGNIFICANT CHANGE UP (ref 0.2–1.2)
BLD GP AB SCN SERPL QL: NEGATIVE — SIGNIFICANT CHANGE UP
BUN SERPL-MCNC: 22 MG/DL — SIGNIFICANT CHANGE UP (ref 7–23)
CALCIUM SERPL-MCNC: 9.6 MG/DL — SIGNIFICANT CHANGE UP (ref 8.4–10.5)
CHLORIDE SERPL-SCNC: 106 MMOL/L — SIGNIFICANT CHANGE UP (ref 96–108)
CO2 SERPL-SCNC: 22 MMOL/L — SIGNIFICANT CHANGE UP (ref 22–31)
CREAT SERPL-MCNC: 0.95 MG/DL — SIGNIFICANT CHANGE UP (ref 0.5–1.3)
GLUCOSE SERPL-MCNC: 75 MG/DL — SIGNIFICANT CHANGE UP (ref 70–99)
HCT VFR BLD CALC: 42.2 % — SIGNIFICANT CHANGE UP (ref 39–50)
HGB BLD-MCNC: 13.5 G/DL — SIGNIFICANT CHANGE UP (ref 13–17)
MCHC RBC-ENTMCNC: 31 PG — SIGNIFICANT CHANGE UP (ref 27–34)
MCHC RBC-ENTMCNC: 32 GM/DL — SIGNIFICANT CHANGE UP (ref 32–36)
MCV RBC AUTO: 96.8 FL — SIGNIFICANT CHANGE UP (ref 80–100)
NRBC # BLD: 0 /100 WBCS — SIGNIFICANT CHANGE UP (ref 0–0)
PLATELET # BLD AUTO: 192 K/UL — SIGNIFICANT CHANGE UP (ref 150–400)
POTASSIUM SERPL-MCNC: 4.1 MMOL/L — SIGNIFICANT CHANGE UP (ref 3.5–5.3)
POTASSIUM SERPL-SCNC: 4.1 MMOL/L — SIGNIFICANT CHANGE UP (ref 3.5–5.3)
PROT SERPL-MCNC: 6.8 G/DL — SIGNIFICANT CHANGE UP (ref 6–8.3)
RBC # BLD: 4.36 M/UL — SIGNIFICANT CHANGE UP (ref 4.2–5.8)
RBC # FLD: 12.8 % — SIGNIFICANT CHANGE UP (ref 10.3–14.5)
RH IG SCN BLD-IMP: NEGATIVE — SIGNIFICANT CHANGE UP
SARS-COV-2 RNA SPEC QL NAA+PROBE: SIGNIFICANT CHANGE UP
SODIUM SERPL-SCNC: 142 MMOL/L — SIGNIFICANT CHANGE UP (ref 135–145)
WBC # BLD: 5.82 K/UL — SIGNIFICANT CHANGE UP (ref 3.8–10.5)
WBC # FLD AUTO: 5.82 K/UL — SIGNIFICANT CHANGE UP (ref 3.8–10.5)

## 2020-09-08 PROCEDURE — 86900 BLOOD TYPING SEROLOGIC ABO: CPT

## 2020-09-08 PROCEDURE — 80053 COMPREHEN METABOLIC PANEL: CPT

## 2020-09-08 PROCEDURE — 71046 X-RAY EXAM CHEST 2 VIEWS: CPT | Mod: 26

## 2020-09-08 PROCEDURE — 86901 BLOOD TYPING SEROLOGIC RH(D): CPT

## 2020-09-08 PROCEDURE — 86850 RBC ANTIBODY SCREEN: CPT

## 2020-09-08 PROCEDURE — 85027 COMPLETE CBC AUTOMATED: CPT

## 2020-09-08 PROCEDURE — 87641 MR-STAPH DNA AMP PROBE: CPT

## 2020-09-08 PROCEDURE — 83036 HEMOGLOBIN GLYCOSYLATED A1C: CPT

## 2020-09-08 PROCEDURE — G0463: CPT

## 2020-09-08 PROCEDURE — 87640 STAPH A DNA AMP PROBE: CPT

## 2020-09-08 PROCEDURE — 71046 X-RAY EXAM CHEST 2 VIEWS: CPT

## 2020-09-08 PROCEDURE — U0003: CPT

## 2020-09-08 RX ORDER — CEFUROXIME AXETIL 250 MG
1500 TABLET ORAL ONCE
Refills: 0 | Status: DISCONTINUED | OUTPATIENT
Start: 2020-09-10 | End: 2020-09-12

## 2020-09-08 NOTE — H&P PST ADULT - SKIN
detailed exam warm and dry/multiple bruising both arms, hands - s/p extensive blood work over previous 3 months as per patient

## 2020-09-08 NOTE — H&P PST ADULT - BP NONINVASIVE DIASTOLIC (MM HG)
[Clear Rhinorrhea] : clear rhinorrhea [Erythematous Oropharynx] : erythematous oropharynx [Capillary Refill <2s] : capillary refill < 2s [NL] : warm 74

## 2020-09-08 NOTE — H&P PST ADULT - NSICDXPASTMEDICALHX_GEN_ALL_CORE_FT
PAST MEDICAL HISTORY:  Cellulitis right ankle 01/2020 - s/p ORIF surgery, treated with IV abx    Enlarged prostate     H/O fracture of ankle right    HLD (hyperlipidemia)     HTN (hypertension)     Mitral regurgitation moderate    Moderate aortic stenosis     PVC (premature ventricular contraction)     SIRS of non-infectious origin w/o acute organ dysfunction 1/2020 PAST MEDICAL HISTORY:  Cellulitis right ankle  - 2020 - s/p ORIF surgery, treated with IV abx    Enlarged prostate     H/O fracture of ankle right    Hearing loss b/l    HLD (hyperlipidemia)     HTN (hypertension)     Mitral regurgitation moderate    Moderate aortic stenosis     PVC (premature ventricular contraction)     SIRS of non-infectious origin w/o acute organ dysfunction 1/2020

## 2020-09-08 NOTE — H&P PST ADULT - ASSESSMENT
TERELLI VTE 2.0 SCORE [CLOT updated 2019]    AGE RELATED RISK FACTORS                                                       MOBILITY RELATED FACTORS  [ ] Age 41-60 years                                            (1 Point)                    [ ] Bed rest                                                        (1 Point)  [ ] Age: 61-74 years                                           (2 Points)                  [ ] Plaster cast                                                   (2 Points)  [x] Age= 75 years                                              (3 Points)                    [ ] Bed bound for more than 72 hours                 (2 Points)    DISEASE RELATED RISK FACTORS                                               GENDER SPECIFIC FACTORS  [ ] Edema in the lower extremities                       (1 Point)              [ ] Pregnancy                                                     (1 Point)  [ ] Varicose veins                                               (1 Point)                     [ ] Post-partum < 6 weeks                                   (1 Point)             [ ] BMI > 25 Kg/m2                                            (1 Point)                     [ ] Hormonal therapy  or oral contraception          (1 Point)                 [ ] Sepsis (in the previous month)                        (1 Point)               [ ] History of pregnancy complications                 (1 point)  [ ] Pneumonia or serious lung disease                                               [ ] Unexplained or recurrent                     (1 Point)           (in the previous month)                               (1 Point)  [ ] Abnormal pulmonary function test                     (1 Point)                 SURGERY RELATED RISK FACTORS  [ ] Acute myocardial infarction                              (1 Point)               [ ]  Section                                             (1 Point)  [ ] Congestive heart failure (in the previous month)  (1 Point)      [ ] Minor surgery                                                  (1 Point)   [ ] Inflammatory bowel disease                             (1 Point)               [ ] Arthroscopic surgery                                        (2 Points)  [ ] Central venous access                                      (2 Points)                [x] General surgery lasting more than 45 minutes (2 points)  [ ] Malignancy- Present or previous                   (2 Points)                [ ] Elective arthroplasty                                         (5 points)    [ ] Stroke (in the previous month)                          (5 Points)                                                                                                                                                           HEMATOLOGY RELATED FACTORS                                                 TRAUMA RELATED RISK FACTORS  [ ] Prior episodes of VTE                                     (3 Points)                [ ] Fracture of the hip, pelvis, or leg                       (5 Points)  [ ] Positive family history for VTE                         (3 Points)             [ ] Acute spinal cord injury (in the previous month)  (5 Points)  [ ] Prothrombin 28015 A                                     (3 Points)               [ ] Paralysis  (less than 1 month)                             (5 Points)  [ ] Factor V Leiden                                             (3 Points)                  [ ] Multiple Trauma within 1 month                        (5 Points)  [ ] Lupus anticoagulants                                     (3 Points)                                                           [ ] Anticardiolipin antibodies                               (3 Points)                                                       [ ] High homocysteine in the blood                      (3 Points)                                             [ ] Other congenital or acquired thrombophilia      (3 Points)                                                [ ] Heparin induced thrombocytopenia                  (3 Points)                                     Total Score [    5     ]

## 2020-09-08 NOTE — H&P PST ADULT - HISTORY OF PRESENT ILLNESS
84 year old male with  h/o Cellulitis of right ankle c/o redness and swelling of right ankle; denies pain;  Pt was in  in Jan 2020 for SIRs ; Had a 2 week course of IV abt. He presents to PST for planned removal of hardware right ankle 84 year old male with  PMH of HTN, BPH on Flomax, HLD, mitral regurgitation, aortic stenosis, fracture of right ankle s/p ORIF 10/2019 c/b right ankle cellulitis, infection and ultimate removal of hardware on 6/11/2020. He was DC home on June 11 with PICC and treated with IV Cefepime and Vancomycin which was completed on 7/24/2020. PICC line was removed on 7/26/2020.  Echo on 08/06/20 revealed moderate AS, CT heart on 08/14/20 - severely calcified aortic valve. Patient is scheduled for TAVR.

## 2020-09-08 NOTE — H&P PST ADULT - NSICDXPASTSURGICALHX_GEN_ALL_CORE_FT
PAST SURGICAL HISTORY:  Status post ORIF of fracture of ankle Right, 10/2019, removal of hardware 06/13/20

## 2020-09-08 NOTE — H&P PST ADULT - LAST ECHOCARDIOGRAM
08/06/20 - moderate AS, CT heart 08/14/20 - severely calcified aortic valve 08/06/20 - moderate AS, normal LV systolic function; CT heart 08/14/20 - severely calcified aortic valve

## 2020-09-08 NOTE — H&P PST ADULT - NSICDXPROBLEM_GEN_ALL_CORE_FT
PROBLEM DIAGNOSES  Problem: Aortic stenosis  Assessment and Plan: TAVR.    Problem: Need for prophylactic measure  Assessment and Plan: The Caprini score indicates this patient is at risk for a VTE event (score 3-5).  Most surgical patients in this group would benefit from pharmacologic prophylaxis.  The surgical team will determine the balance between VTE risk and bleeding risk

## 2020-09-09 ENCOUNTER — TRANSCRIPTION ENCOUNTER (OUTPATIENT)
Age: 85
End: 2020-09-09

## 2020-09-09 LAB
A1C WITH ESTIMATED AVERAGE GLUCOSE RESULT: 5 % — SIGNIFICANT CHANGE UP (ref 4–5.6)
ESTIMATED AVERAGE GLUCOSE: 97 MG/DL — SIGNIFICANT CHANGE UP (ref 68–114)
MRSA PCR RESULT.: SIGNIFICANT CHANGE UP
S AUREUS DNA NOSE QL NAA+PROBE: SIGNIFICANT CHANGE UP

## 2020-09-10 ENCOUNTER — INPATIENT (INPATIENT)
Facility: HOSPITAL | Age: 85
LOS: 1 days | Discharge: HOME | DRG: 267 | End: 2020-09-12
Attending: THORACIC SURGERY (CARDIOTHORACIC VASCULAR SURGERY) | Admitting: THORACIC SURGERY (CARDIOTHORACIC VASCULAR SURGERY)
Payer: MEDICARE

## 2020-09-10 ENCOUNTER — APPOINTMENT (OUTPATIENT)
Dept: CARDIOTHORACIC SURGERY | Facility: HOSPITAL | Age: 85
End: 2020-09-10

## 2020-09-10 VITALS
HEART RATE: 65 BPM | OXYGEN SATURATION: 98 % | WEIGHT: 198.42 LBS | SYSTOLIC BLOOD PRESSURE: 197 MMHG | RESPIRATION RATE: 16 BRPM | TEMPERATURE: 98 F | HEIGHT: 72 IN | DIASTOLIC BLOOD PRESSURE: 81 MMHG

## 2020-09-10 DIAGNOSIS — Z87.81 PERSONAL HISTORY OF (HEALED) TRAUMATIC FRACTURE: ICD-10-CM

## 2020-09-10 DIAGNOSIS — I35.0 NONRHEUMATIC AORTIC (VALVE) STENOSIS: ICD-10-CM

## 2020-09-10 DIAGNOSIS — Z98.890 OTHER SPECIFIED POSTPROCEDURAL STATES: Chronic | ICD-10-CM

## 2020-09-10 PROBLEM — L03.90 CELLULITIS, UNSPECIFIED: Chronic | Status: ACTIVE | Noted: 2020-06-08

## 2020-09-10 PROBLEM — H91.90 UNSPECIFIED HEARING LOSS, UNSPECIFIED EAR: Chronic | Status: ACTIVE | Noted: 2020-09-08

## 2020-09-10 LAB
ALBUMIN SERPL ELPH-MCNC: 3.6 G/DL — SIGNIFICANT CHANGE UP (ref 3.3–5)
ALP SERPL-CCNC: 76 U/L — SIGNIFICANT CHANGE UP (ref 40–120)
ALT FLD-CCNC: 12 U/L — SIGNIFICANT CHANGE UP (ref 10–45)
ANION GAP SERPL CALC-SCNC: 13 MMOL/L — SIGNIFICANT CHANGE UP (ref 5–17)
APTT BLD: 31.7 SEC — SIGNIFICANT CHANGE UP (ref 27.5–35.5)
AST SERPL-CCNC: 17 U/L — SIGNIFICANT CHANGE UP (ref 10–40)
BASOPHILS # BLD AUTO: 0.02 K/UL — SIGNIFICANT CHANGE UP (ref 0–0.2)
BASOPHILS NFR BLD AUTO: 0.4 % — SIGNIFICANT CHANGE UP (ref 0–2)
BILIRUB SERPL-MCNC: 0.7 MG/DL — SIGNIFICANT CHANGE UP (ref 0.2–1.2)
BUN SERPL-MCNC: 19 MG/DL — SIGNIFICANT CHANGE UP (ref 7–23)
CALCIUM SERPL-MCNC: 8.6 MG/DL — SIGNIFICANT CHANGE UP (ref 8.4–10.5)
CHLORIDE SERPL-SCNC: 104 MMOL/L — SIGNIFICANT CHANGE UP (ref 96–108)
CO2 SERPL-SCNC: 24 MMOL/L — SIGNIFICANT CHANGE UP (ref 22–31)
CREAT SERPL-MCNC: 0.87 MG/DL — SIGNIFICANT CHANGE UP (ref 0.5–1.3)
EOSINOPHIL # BLD AUTO: 0.16 K/UL — SIGNIFICANT CHANGE UP (ref 0–0.5)
EOSINOPHIL NFR BLD AUTO: 2.9 % — SIGNIFICANT CHANGE UP (ref 0–6)
GLUCOSE BLDC GLUCOMTR-MCNC: 100 MG/DL — HIGH (ref 70–99)
GLUCOSE BLDC GLUCOMTR-MCNC: 107 MG/DL — HIGH (ref 70–99)
GLUCOSE SERPL-MCNC: 100 MG/DL — HIGH (ref 70–99)
HCT VFR BLD CALC: 36.3 % — LOW (ref 39–50)
HGB BLD-MCNC: 11.8 G/DL — LOW (ref 13–17)
IMM GRANULOCYTES NFR BLD AUTO: 0.7 % — SIGNIFICANT CHANGE UP (ref 0–1.5)
INR BLD: 1.05 RATIO — SIGNIFICANT CHANGE UP (ref 0.88–1.16)
LYMPHOCYTES # BLD AUTO: 0.91 K/UL — LOW (ref 1–3.3)
LYMPHOCYTES # BLD AUTO: 16.5 % — SIGNIFICANT CHANGE UP (ref 13–44)
MCHC RBC-ENTMCNC: 31.1 PG — SIGNIFICANT CHANGE UP (ref 27–34)
MCHC RBC-ENTMCNC: 32.5 GM/DL — SIGNIFICANT CHANGE UP (ref 32–36)
MCV RBC AUTO: 95.8 FL — SIGNIFICANT CHANGE UP (ref 80–100)
MONOCYTES # BLD AUTO: 0.52 K/UL — SIGNIFICANT CHANGE UP (ref 0–0.9)
MONOCYTES NFR BLD AUTO: 9.4 % — SIGNIFICANT CHANGE UP (ref 2–14)
NEUTROPHILS # BLD AUTO: 3.87 K/UL — SIGNIFICANT CHANGE UP (ref 1.8–7.4)
NEUTROPHILS NFR BLD AUTO: 70.1 % — SIGNIFICANT CHANGE UP (ref 43–77)
NRBC # BLD: 0 /100 WBCS — SIGNIFICANT CHANGE UP (ref 0–0)
PLATELET # BLD AUTO: 157 K/UL — SIGNIFICANT CHANGE UP (ref 150–400)
POTASSIUM SERPL-MCNC: 3.7 MMOL/L — SIGNIFICANT CHANGE UP (ref 3.5–5.3)
POTASSIUM SERPL-SCNC: 3.7 MMOL/L — SIGNIFICANT CHANGE UP (ref 3.5–5.3)
PROT SERPL-MCNC: 5.6 G/DL — LOW (ref 6–8.3)
PROTHROM AB SERPL-ACNC: 12.5 SEC — SIGNIFICANT CHANGE UP (ref 10.6–13.6)
RBC # BLD: 3.79 M/UL — LOW (ref 4.2–5.8)
RBC # FLD: 12.7 % — SIGNIFICANT CHANGE UP (ref 10.3–14.5)
RH IG SCN BLD-IMP: NEGATIVE — SIGNIFICANT CHANGE UP
SODIUM SERPL-SCNC: 141 MMOL/L — SIGNIFICANT CHANGE UP (ref 135–145)
WBC # BLD: 5.52 K/UL — SIGNIFICANT CHANGE UP (ref 3.8–10.5)
WBC # FLD AUTO: 5.52 K/UL — SIGNIFICANT CHANGE UP (ref 3.8–10.5)

## 2020-09-10 PROCEDURE — 93306 TTE W/DOPPLER COMPLETE: CPT | Mod: 26

## 2020-09-10 PROCEDURE — 33361 REPLACE AORTIC VALVE PERQ: CPT | Mod: 62,Q0

## 2020-09-10 PROCEDURE — 33361 REPLACE AORTIC VALVE PERQ: CPT | Mod: Q0,62

## 2020-09-10 PROCEDURE — 93010 ELECTROCARDIOGRAM REPORT: CPT | Mod: 76

## 2020-09-10 RX ORDER — LIDOCAINE HCL 20 MG/ML
0.2 VIAL (ML) INJECTION ONCE
Refills: 0 | Status: DISCONTINUED | OUTPATIENT
Start: 2020-09-10 | End: 2020-09-10

## 2020-09-10 RX ORDER — POTASSIUM CHLORIDE 20 MEQ
20 PACKET (EA) ORAL ONCE
Refills: 0 | Status: COMPLETED | OUTPATIENT
Start: 2020-09-10 | End: 2020-09-10

## 2020-09-10 RX ORDER — CEFUROXIME AXETIL 250 MG
1500 TABLET ORAL EVERY 8 HOURS
Refills: 0 | Status: COMPLETED | OUTPATIENT
Start: 2020-09-10 | End: 2020-09-11

## 2020-09-10 RX ORDER — ASPIRIN/CALCIUM CARB/MAGNESIUM 324 MG
81 TABLET ORAL DAILY
Refills: 0 | Status: DISCONTINUED | OUTPATIENT
Start: 2020-09-10 | End: 2020-09-12

## 2020-09-10 RX ORDER — CLOPIDOGREL BISULFATE 75 MG/1
75 TABLET, FILM COATED ORAL DAILY
Refills: 0 | Status: DISCONTINUED | OUTPATIENT
Start: 2020-09-10 | End: 2020-09-12

## 2020-09-10 RX ORDER — SODIUM CHLORIDE 9 MG/ML
3 INJECTION INTRAMUSCULAR; INTRAVENOUS; SUBCUTANEOUS EVERY 8 HOURS
Refills: 0 | Status: DISCONTINUED | OUTPATIENT
Start: 2020-09-10 | End: 2020-09-10

## 2020-09-10 RX ORDER — CHLORHEXIDINE GLUCONATE 213 G/1000ML
1 SOLUTION TOPICAL ONCE
Refills: 0 | Status: DISCONTINUED | OUTPATIENT
Start: 2020-09-10 | End: 2020-09-10

## 2020-09-10 RX ADMIN — Medication 81 MILLIGRAM(S): at 20:32

## 2020-09-10 RX ADMIN — CLOPIDOGREL BISULFATE 75 MILLIGRAM(S): 75 TABLET, FILM COATED ORAL at 20:32

## 2020-09-10 RX ADMIN — Medication 100 MILLIGRAM(S): at 20:32

## 2020-09-10 RX ADMIN — Medication 20 MILLIEQUIVALENT(S): at 20:32

## 2020-09-10 NOTE — PROGRESS NOTE ADULT - ASSESSMENT
84 year old male with  PMH of HTN, BPH on Flomax, HLD, mitral regurgitation, aortic stenosis, fracture of right ankle s/p ORIF 10/2019 c/b right ankle cellulitis, infection and ultimate removal of hardware on 6/11/2020. He was DC home on June 11 with PICC and treated with IV Cefepime and Vancomycin which was completed on 7/24/2020. PICC line was removed on 7/26/2020.  Echo on 08/06/20 revealed moderate AS, CT heart on 08/14/20 - severely calcified aortic valve. Patient worked up for TAVR.     Hospital course:    9/10 - S/P TAVR. Bilateral groin c/d/i no hematoma no bleeding.

## 2020-09-10 NOTE — PROGRESS NOTE ADULT - PROBLEM SELECTOR PLAN 1
S/P TAVR 9/10   Continue with ASA/Plavix  Bed rest till 20:30   TTE ordered   HOME with [facility] when medically cleared   Monitor groin sites

## 2020-09-10 NOTE — PROGRESS NOTE ADULT - SUBJECTIVE AND OBJECTIVE BOX
This patient has been implanted with a Transcatheter Aortic Valve Implant under the following NCT/PHILLIP:    TAVR:    Commercial Implant NCT [id number], PHILLIP N/A and will be placed into the TVT Registry.      MARKEL Kendall  [id number]

## 2020-09-10 NOTE — PROGRESS NOTE ADULT - PROBLEM SELECTOR PLAN 2
Hx of R ankle fracture s/p ORIF 10/19  Complicated by cellulitis - completed Antibiotic course 7/24/20

## 2020-09-10 NOTE — PROGRESS NOTE ADULT - SUBJECTIVE AND OBJECTIVE BOX
Patient seen lying supine in bed s.p TAVR today. Bilateral groins are c/d/i no bleeding no hematoma noted. Patient will lying flat till 8:30 pm. Denies CP, SOB, Dizziness, Palpitations.     VITAL SIGNS    Telemetry:  SB 40-50  Vital Signs Last 24 Hrs  T(C): 36.7 (09-10-20 @ 17:49), Max: 36.7 (09-10-20 @ 09:43)  T(F): 98.1 (09-10-20 @ 17:49), Max: 98.1 (09-10-20 @ 09:43)  HR: 57 (09-10-20 @ 17:49) (42 - 65)  BP: 153/64 (09-10-20 @ 17:49) (106/76 - 197/81)  RR: 16 (09-10-20 @ 17:49) (14 - 22)  SpO2: 96% (09-10-20 @ 17:49) (96% - 100%)            09-10 @ 07:01  -  09-10 @ 19:09  --------------------------------------------------------  IN: 0 mL / OUT: 125 mL / NET: -125 mL       Daily Height in cm: 182.88 (10 Sep 2020 12:49)    Daily   Admit Wt: Drug Dosing Weight  Height (cm): 182.88 (10 Sep 2020 12:49)  Weight (kg): 90 (10 Sep 2020 12:49)  BMI (kg/m2): 26.9 (10 Sep 2020 12:49)  BSA (m2): 2.12 (10 Sep 2020 12:49)    Bilirubin Total, Serum: 0.7 mg/dL (09-10 @ 15:17)    CAPILLARY BLOOD GLUCOSE      POCT Blood Glucose.: 100 mg/dL (10 Sep 2020 15:01)  POCT Blood Glucose.: 107 mg/dL (10 Sep 2020 09:23)          MEDICATIONS  aspirin enteric coated 81 milliGRAM(s) Oral daily  cefuroxime  IVPB 1500 milliGRAM(s) IV Intermittent every 8 hours  cefuroxime  IVPB 1500 milliGRAM(s) IV Intermittent once  clopidogrel Tablet 75 milliGRAM(s) Oral daily    >>> <<<  PHYSICAL EXAM  Subjective: " hi, im good."  Neurology: alert and oriented x 3, nonfocal, no gross deficits  CV : RRR S1S2, no murmurs, rubs or gallops   Lungs: CTA B/L, No wheezing, rales, rhonchi. Non labored respirations   Abdomen: soft, NT, ND, (- )BM  :  voiding  Extremities: No LE edema bilaterally, +DP, No calf tenderness   Bilat groin site c/d/i no hematoma no bleeding noted     LABS  09-10    141  |  104  |  19  ----------------------------<  100<H>  3.7   |  24  |  0.87    Ca    8.6      10 Sep 2020 15:17    TPro  5.6<L>  /  Alb  3.6  /  TBili  0.7  /  DBili  x   /  AST  17  /  ALT  12  /  AlkPhos  76  09-10                                 11.8   5.52  )-----------( 157      ( 10 Sep 2020 15:17 )             36.3          PT/INR - ( 10 Sep 2020 15:21 )   PT: 12.5 sec;   INR: 1.05 ratio         PTT - ( 10 Sep 2020 15:21 )  PTT:31.7 sec       PAST MEDICAL & SURGICAL HISTORY:  Hearing loss: b/l  Mitral regurgitation: moderate  Moderate aortic stenosis  H/O fracture of ankle: right  PVC (premature ventricular contraction)  Cellulitis: right ankle  - 2020 - s/p ORIF surgery, treated with IV abx  SIRS of non-infectious origin w/o acute organ dysfunction: 1/2020  HLD (hyperlipidemia)  Enlarged prostate  HTN (hypertension)  Status post ORIF of fracture of ankle: Right, 10/2019, removal of hardware 06/13/20

## 2020-09-10 NOTE — CHART NOTE - NSCHARTNOTEFT_GEN_A_CORE
HPI:  84 year old male with  PMH of HTN, BPH on Flomax, HLD, mitral regurgitation, aortic stenosis, fracture of right ankle s/p ORIF 10/2019 c/b right ankle cellulitis, infection and ultimate removal of hardware on 6/11/2020. He was DC home on June 11 with PICC and treated with IV Cefepime and Vancomycin which was completed on 7/24/2020. PICC line was removed on 7/26/2020.  Echo on 08/06/20 revealed moderate AS, CT heart on 08/14/20 - severely calcified aortic valve. Patient is scheduled for TAVR. (08 Sep 2020 11:58)    Pt. s/p 34 Medtronic TAVR via RFA s/p Perclose x 2 RFA; LFA Angioseal; LFV manual compression.  Zinacef x 2 doses post. ASA/Plavix at 2030. SB, VSS Sites WDL HPI:  84 year old male with  PMH of HTN, BPH on Flomax, HLD, mitral regurgitation, aortic stenosis, fracture of right ankle s/p ORIF 10/2019 c/b right ankle cellulitis, infection and ultimate removal of hardware on 6/11/2020. He was DC home on June 11 with PICC and treated with IV Cefepime and Vancomycin which was completed on 7/24/2020. PICC line was removed on 7/26/2020.  Echo on 08/06/20 revealed moderate AS, CT heart on 08/14/20 - severely calcified aortic valve. Patient is scheduled for TAVR. (08 Sep 2020 11:58)    Pt. s/p 34 [employer]tronic TAVR via RFA s/p Perclose x 2 RFA; LFA Angioseal; LFV manual compression.  Zinacef x 2 doses post. ASA/Plavix at 2030. SB, VSS Sites WDL  Report called to RFANCK Adan.

## 2020-09-11 ENCOUNTER — NON-APPOINTMENT (OUTPATIENT)
Age: 85
End: 2020-09-11

## 2020-09-11 ENCOUNTER — FORM ENCOUNTER (OUTPATIENT)
Age: 85
End: 2020-09-11

## 2020-09-11 LAB
ALBUMIN SERPL ELPH-MCNC: 3.5 G/DL — SIGNIFICANT CHANGE UP (ref 3.3–5)
ALP SERPL-CCNC: 77 U/L — SIGNIFICANT CHANGE UP (ref 40–120)
ALT FLD-CCNC: 11 U/L — SIGNIFICANT CHANGE UP (ref 10–45)
ANION GAP SERPL CALC-SCNC: 10 MMOL/L — SIGNIFICANT CHANGE UP (ref 5–17)
AST SERPL-CCNC: 20 U/L — SIGNIFICANT CHANGE UP (ref 10–40)
BASOPHILS # BLD AUTO: 0.03 K/UL — SIGNIFICANT CHANGE UP (ref 0–0.2)
BASOPHILS NFR BLD AUTO: 0.5 % — SIGNIFICANT CHANGE UP (ref 0–2)
BILIRUB SERPL-MCNC: 0.7 MG/DL — SIGNIFICANT CHANGE UP (ref 0.2–1.2)
BUN SERPL-MCNC: 16 MG/DL — SIGNIFICANT CHANGE UP (ref 7–23)
CALCIUM SERPL-MCNC: 8.9 MG/DL — SIGNIFICANT CHANGE UP (ref 8.4–10.5)
CHLORIDE SERPL-SCNC: 106 MMOL/L — SIGNIFICANT CHANGE UP (ref 96–108)
CO2 SERPL-SCNC: 24 MMOL/L — SIGNIFICANT CHANGE UP (ref 22–31)
CREAT SERPL-MCNC: 0.95 MG/DL — SIGNIFICANT CHANGE UP (ref 0.5–1.3)
EOSINOPHIL # BLD AUTO: 0.25 K/UL — SIGNIFICANT CHANGE UP (ref 0–0.5)
EOSINOPHIL NFR BLD AUTO: 4.4 % — SIGNIFICANT CHANGE UP (ref 0–6)
GLUCOSE BLDC GLUCOMTR-MCNC: 118 MG/DL — HIGH (ref 70–99)
GLUCOSE SERPL-MCNC: 96 MG/DL — SIGNIFICANT CHANGE UP (ref 70–99)
HCT VFR BLD CALC: 36.5 % — LOW (ref 39–50)
HGB BLD-MCNC: 12.1 G/DL — LOW (ref 13–17)
IMM GRANULOCYTES NFR BLD AUTO: 0.4 % — SIGNIFICANT CHANGE UP (ref 0–1.5)
LYMPHOCYTES # BLD AUTO: 0.81 K/UL — LOW (ref 1–3.3)
LYMPHOCYTES # BLD AUTO: 14.2 % — SIGNIFICANT CHANGE UP (ref 13–44)
MCHC RBC-ENTMCNC: 31.8 PG — SIGNIFICANT CHANGE UP (ref 27–34)
MCHC RBC-ENTMCNC: 33.2 GM/DL — SIGNIFICANT CHANGE UP (ref 32–36)
MCV RBC AUTO: 95.8 FL — SIGNIFICANT CHANGE UP (ref 80–100)
MONOCYTES # BLD AUTO: 0.71 K/UL — SIGNIFICANT CHANGE UP (ref 0–0.9)
MONOCYTES NFR BLD AUTO: 12.4 % — SIGNIFICANT CHANGE UP (ref 2–14)
NEUTROPHILS # BLD AUTO: 3.89 K/UL — SIGNIFICANT CHANGE UP (ref 1.8–7.4)
NEUTROPHILS NFR BLD AUTO: 68.1 % — SIGNIFICANT CHANGE UP (ref 43–77)
NRBC # BLD: 0 /100 WBCS — SIGNIFICANT CHANGE UP (ref 0–0)
PLATELET # BLD AUTO: 152 K/UL — SIGNIFICANT CHANGE UP (ref 150–400)
POTASSIUM SERPL-MCNC: 4 MMOL/L — SIGNIFICANT CHANGE UP (ref 3.5–5.3)
POTASSIUM SERPL-SCNC: 4 MMOL/L — SIGNIFICANT CHANGE UP (ref 3.5–5.3)
PROT SERPL-MCNC: 5.7 G/DL — LOW (ref 6–8.3)
RBC # BLD: 3.81 M/UL — LOW (ref 4.2–5.8)
RBC # FLD: 12.8 % — SIGNIFICANT CHANGE UP (ref 10.3–14.5)
SODIUM SERPL-SCNC: 140 MMOL/L — SIGNIFICANT CHANGE UP (ref 135–145)
WBC # BLD: 5.71 K/UL — SIGNIFICANT CHANGE UP (ref 3.8–10.5)
WBC # FLD AUTO: 5.71 K/UL — SIGNIFICANT CHANGE UP (ref 3.8–10.5)

## 2020-09-11 PROCEDURE — 99233 SBSQ HOSP IP/OBS HIGH 50: CPT

## 2020-09-11 PROCEDURE — 93306 TTE W/DOPPLER COMPLETE: CPT | Mod: 26

## 2020-09-11 PROCEDURE — 99232 SBSQ HOSP IP/OBS MODERATE 35: CPT

## 2020-09-11 PROCEDURE — 93010 ELECTROCARDIOGRAM REPORT: CPT

## 2020-09-11 RX ADMIN — CLOPIDOGREL BISULFATE 75 MILLIGRAM(S): 75 TABLET, FILM COATED ORAL at 11:30

## 2020-09-11 RX ADMIN — Medication 100 MILLIGRAM(S): at 05:31

## 2020-09-11 RX ADMIN — Medication 81 MILLIGRAM(S): at 11:30

## 2020-09-11 NOTE — CONSULT NOTE ADULT - ASSESSMENT
84 year old male with  PMH of HTN, BPH on Flomax, HLD, mitral regurgitation, aortic stenosis, fracture of right ankle s/p ORIF 10/2019 c/b right ankle cellulitis, infection and ultimate removal of hardware on 6/11/2020. He was DC home on June 11 with PICC and treated with IV Cefepime and Vancomycin which was completed on 7/24/2020. PICC line was removed on 7/26/2020.  Echo on 08/06/20 revealed moderate AS, CT heart on 08/14/20 - severely calcified aortic valve.  pt sp TAVR 9/10      sp TAVR  mgmt per TAVR team  asa and plavix   TTE  bradycardia-asymptomatic    #HLD   resume statin    #BPH  resume home flomax and finasteride when appropriate    dvt ppx    [facility]  [phone]

## 2020-09-11 NOTE — CONSULT NOTE ADULT - SUBJECTIVE AND OBJECTIVE BOX
Patient is a 84y old  Male who presents with a chief complaint of severe AS/ TAVR (11 Sep 2020 10:29)      HPI:  84 year old male with  PMH of HTN, BPH on Flomax, HLD, mitral regurgitation, aortic stenosis, fracture of right ankle s/p ORIF 10/2019 c/b right ankle cellulitis, infection and ultimate removal of hardware on 6/11/2020. He was DC home on June 11 with PICC and treated with IV Cefepime and Vancomycin which was completed on 7/24/2020. PICC line was removed on 7/26/2020.  Echo on 08/06/20 revealed moderate AS, CT heart on 08/14/20 - severely calcified aortic valve.  pt sp TAVR 9/10  this am feels well, no cp/sob/dizziness  tele sinus fitz    PAST MEDICAL & SURGICAL HISTORY:  Hearing loss: b/l  Mitral regurgitation: moderate  Moderate aortic stenosis  H/O fracture of ankle: right  PVC (premature ventricular contraction)  Cellulitis: right ankle  - 2020 - s/p ORIF surgery, treated with IV abx  SIRS of non-infectious origin w/o acute organ dysfunction: 1/2020  HLD (hyperlipidemia)  Enlarged prostate  HTN (hypertension)  Status post ORIF of fracture of ankle: Right, 10/2019, removal of hardware 06/13/20      FAMILY HISTORY:  No pertinent family history in first degree relatives      SOCIAL HISTORY:    Allergies    No Known Allergies    Intolerances    Lipitor (Joint Pain)        REVIEW OF SYSEMS: per hpi  General: no weakness, no fever/chills, no weight loss/gain  Skin/Breast: no rash, no jaundice  Ophthalmologic: no vision changes, no dry eyes   Respiratory and Thorax: no cough, no wheezing, no hemoptysis, no dyspnea  Cardiovascular: no chest pain, no shortness of breath, no orthopnea  Gastrointestinal: no n/v/d, no abdominal pain, no dysphagia   Genitourinary: no dysuria, no frequency, no nocturia, no hematuria  Musculoskeletal: no trauma, no sprain/strain, no myalgias, no arthralgias, no fracture  Neurological: no HA, no dizziness, no weakness, no numbness  Psychiatric: no depression, no SI/HI  Hematology/Lymphatics: no easy bruising  Endocrine: no heat or cold intolerance. no weight gain or loss  Allergic/Immunologic: no allergy or recent reaction       Vital Signs Last 24 Hrs  T(C): 36.7 (11 Sep 2020 05:00), Max: 36.7 (10 Sep 2020 12:49)  T(F): 98 (11 Sep 2020 05:00), Max: 98.1 (10 Sep 2020 17:49)  HR: 53 (11 Sep 2020 05:00) (42 - 65)  BP: 144/65 (11 Sep 2020 05:00) (106/76 - 197/81)  BP(mean): --  RR: 18 (11 Sep 2020 05:00) (14 - 22)  SpO2: 97% (11 Sep 2020 05:00) (96% - 100%)  I&O's Summary    10 Sep 2020 07:01  -  11 Sep 2020 07:00  --------------------------------------------------------  IN: 295 mL / OUT: 825 mL / NET: -530 mL        PHYSICAL EXAM:  GENERAL: NAD, Comfortable  HEAD:  Atraumatic, Normocephalic  EYES: EOMI, PERRLA, conjunctiva and sclera clear  NECK: Supple, No JVD  CHEST/LUNG: Clear to auscultation bilaterally; No wheeze  HEART: Regular rate and rhythm; No murmurs, rubs, or gallops  ABDOMEN: Soft, Nontender, Nondistended; Bowel sounds present  Neuro: AAOx3, no focal deficit, 5/5 b/l extremities  EXTREMITIES:  2+ Peripheral Pulses, No clubbing, cyanosis, or edema  SKIN: No rashes or lesions    LABS:                        12.1   5.71  )-----------( 152      ( 11 Sep 2020 06:38 )             36.5     09-11    140  |  106  |  16  ----------------------------<  96  4.0   |  24  |  0.95    Ca    8.9      11 Sep 2020 06:38    TPro  5.7<L>  /  Alb  3.5  /  TBili  0.7  /  DBili  x   /  AST  20  /  ALT  11  /  AlkPhos  77  09-11    PT/INR - ( 10 Sep 2020 15:21 )   PT: 12.5 sec;   INR: 1.05 ratio         PTT - ( 10 Sep 2020 15:21 )  PTT:31.7 sec  CAPILLARY BLOOD GLUCOSE      POCT Blood Glucose.: 100 mg/dL (10 Sep 2020 15:01)            RADIOLOGY & ADDITIONAL TESTS:    Imaging Personally Reviewed:  [x] YES  [ ] NO    Consultant(s) Notes Reviewed:  [x] YES  [ ] NO      MEDICATIONS  (STANDING):  aspirin enteric coated 81 milliGRAM(s) Oral daily  cefuroxime  IVPB 1500 milliGRAM(s) IV Intermittent once  clopidogrel Tablet 75 milliGRAM(s) Oral daily  pantoprazole    Tablet 40 milliGRAM(s) Oral before breakfast    MEDICATIONS  (PRN):      Care Discussed with Consultants/Other Providers [x] YES  [ ] NO    HEALTH ISSUES - PROBLEM Dx:  H/O fracture of ankle: H/O fracture of ankle  Aortic stenosis  Need for prophylactic measure

## 2020-09-11 NOTE — PROGRESS NOTE ADULT - PROBLEM SELECTOR PLAN 1
S/P TAVR 9/10   Continue with ASA/Plavix  TTE ordered   HOME with [facility] Saturday  Monitor groin sites

## 2020-09-11 NOTE — PROGRESS NOTE ADULT - ASSESSMENT
84 year old male with  PMH of HTN, BPH on Flomax, HLD, mitral regurgitation, aortic stenosis, fracture of right ankle s/p ORIF 10/2019 c/b right ankle cellulitis, infection and ultimate removal of hardware on 6/11/2020. He was DC home on June 11 with PICC and treated with IV Cefepime and Vancomycin which was completed on 7/24/2020. PICC line was removed on 7/26/2020.  Echo on 08/06/20 revealed moderate AS, CT heart on 08/14/20 - severely calcified aortic valve. Patient worked up for TAVR.     Hospital course:  9/10 - S/P TAVR. Bilateral groin c/d/i no hematoma no bleeding.   9/11 Pending echo, Continue with Asa and plavix. EKG sinus bradycardia 49 WI interval 228. Hold AV paul blockers

## 2020-09-11 NOTE — PROGRESS NOTE ADULT - SUBJECTIVE AND OBJECTIVE BOX
VITAL SIGNS    Telemetry:  SR 70's  Vital Signs Last 24 Hrs  T(C): 36.7 (20 @ 05:00), Max: 36.7 (09-10-20 @ 12:49)  T(F): 98 (20 @ 05:00), Max: 98.1 (09-10-20 @ 17:49)  HR: 53 (20 @ 05:00) (42 - 65)  BP: 144/65 (20 @ 05:00) (106/76 - 197/81)  RR: 18 (20 @ 05:00) (14 - 22)  SpO2: 97% (20 @ 05:00) (96% - 100%)            09-10 @ 07:01  -   07:00  --------------------------------------------------------  IN: 295 mL / OUT: 825 mL / NET: -530 mL     07:01  -   @ 12:08  --------------------------------------------------------  IN: 0 mL / OUT: 250 mL / NET: -250 mL       Daily Height in cm: 182.88 (10 Sep 2020 12:49)    Daily Weight in k.7 (11 Sep 2020 08:13)  Admit Wt: Drug Dosing Weight  Height (cm): 182.88 (10 Sep 2020 12:49)  Weight (kg): 90 (10 Sep 2020 12:49)  BMI (kg/m2): 26.9 (10 Sep 2020 12:49)  BSA (m2): 2.12 (10 Sep 2020 12:49)    Bilirubin Total, Serum: 0.7 mg/dL ( @ 06:38)  Bilirubin Total, Serum: 0.7 mg/dL (09-10 @ 15:17)    CAPILLARY BLOOD GLUCOSE      POCT Blood Glucose.: 100 mg/dL (10 Sep 2020 15:01)          MEDICATIONS  aspirin enteric coated 81 milliGRAM(s) Oral daily  cefuroxime  IVPB 1500 milliGRAM(s) IV Intermittent once  clopidogrel Tablet 75 milliGRAM(s) Oral daily  pantoprazole    Tablet 40 milliGRAM(s) Oral before breakfast      >>> <<<  PHYSICAL EXAM  Subjective: NAD  Neurology: alert and oriented x 3, nonfocal, no gross deficits  CV :s1s2  Lungs: NAD  Abdomen: soft, NT,ND, ( +)BM  :  voiding  Extremities: -c/c/e no hematoma to b/l groin      LABS      140  |  106  |  16  ----------------------------<  96  4.0   |  24  |  0.95    Ca    8.9      11 Sep 2020 06:38    TPro  5.7<L>  /  Alb  3.5  /  TBili  0.7  /  DBili  x   /  AST  20  /  ALT  11  /  AlkPhos  77                                   12.1   5.71  )-----------( 152      ( 11 Sep 2020 06:38 )             36.5          PT/INR - ( 10 Sep 2020 15:21 )   PT: 12.5 sec;   INR: 1.05 ratio         PTT - ( 10 Sep 2020 15:21 )  PTT:31.7 sec       PAST MEDICAL & SURGICAL HISTORY:  Hearing loss: b/l  Mitral regurgitation: moderate  Moderate aortic stenosis  H/O fracture of ankle: right  PVC (premature ventricular contraction)  Cellulitis: right ankle  -  - s/p ORIF surgery, treated with IV abx  SIRS of non-infectious origin w/o acute organ dysfunction: 2020  HLD (hyperlipidemia)  Enlarged prostate  HTN (hypertension)  Status post ORIF of fracture of ankle: Right, 10/2019, removal of hardware 20

## 2020-09-11 NOTE — PROGRESS NOTE ADULT - SUBJECTIVE AND OBJECTIVE BOX
Structural Heart Team    Mr Andrade is lying in bed comfortably and without complaint.  He says he feels good and denies groin pain, sob, chest pain/pressure and dizziness.  He says he has ambulated without difficulty.  There were no acute events overnight.        REVIEW OF SYSTEMS:    CONSTITUTIONAL: No weakness, fevers or chills  EYES/ENT: No visual changes;  No vertigo or throat pain   NECK: No pain or stiffness  RESPIRATORY: No cough, wheezing, hemoptysis; No shortness of breath  CARDIOVASCULAR: No chest pain or palpitations  GASTROINTESTINAL: No abdominal or epigastric pain. No nausea, vomiting, or hematemesis; No diarrhea or constipation. No melena or hematochezia.  GENITOURINARY: No dysuria, frequency or hematuria  NEUROLOGICAL: No numbness or weakness  SKIN: No itching, rashes      Allergies    No Known Allergies    Intolerances    Lipitor (Joint Pain)    Vital Signs Last 24 Hrs  T(C): 36.7 (11 Sep 2020 05:00), Max: 36.7 (10 Sep 2020 12:49)  T(F): 98 (11 Sep 2020 05:00), Max: 98.1 (10 Sep 2020 17:49)  HR: 53 (11 Sep 2020 05:00) (42 - 65)  BP: 144/65 (11 Sep 2020 05:00) (106/76 - 197/81)  BP(mean): --  RR: 18 (11 Sep 2020 05:00) (14 - 22)  SpO2: 97% (11 Sep 2020 05:00) (96% - 100%)    MEDICATIONS  (STANDING):  aspirin enteric coated 81 milliGRAM(s) Oral daily  cefuroxime  IVPB 1500 milliGRAM(s) IV Intermittent once  clopidogrel Tablet 75 milliGRAM(s) Oral daily  pantoprazole    Tablet 40 milliGRAM(s) Oral before breakfast      Exam-  General: NAD  Cor: s1s2, RRR, no murmurs  EKG: SB w/ 1'AVB  Pulm: CTA b/l, no w/r/r  Gastointestinal: soft, nontender, nondistended, +bowel sounds  Extremities: no edema, warm, 2+ DP/PT pulses  Groin: dressings clean and dry, soft, nontender, no hematoma b/l  Neuro: A&Ox3, nonfocal                          12.1   5.71  )-----------( 152      ( 11 Sep 2020 06:38 )             36.5   09-11    140  |  106  |  16  ----------------------------<  96  4.0   |  24  |  0.95    Ca    8.9      11 Sep 2020 06:38    TPro  5.7<L>  /  Alb  3.5  /  TBili  0.7  /  DBili  x   /  AST  20  /  ALT  11  /  AlkPhos  77  09-11  PT/INR - ( 10 Sep 2020 15:21 )   PT: 12.5 sec;   INR: 1.05 ratio         PTT - ( 10 Sep 2020 15:21 )  PTT:31.7 sec  I&O's Summary    10 Sep 2020 07:01  -  11 Sep 2020 07:00  --------------------------------------------------------  IN: 295 mL / OUT: 825 mL / NET: -530 mL              Assessment/Plan:  Mr Andrade is POD 1 s/p TF TAVR (#34 CV) for severe symptomatic AS  - daily EKG's  - on asa/Plavix  - resume preop meds  - ambulate as tolerated  - he will leave the hospital with an [facility] for 30 days to monitor for arrhythmia and conduction delay  - post TAVR TTE not done yet  - likely d/c home tomorrow    - Discharge plan: follow up with Dr. Cantu in one week and follow up with Structural Heart Team in one month.  Echo will be done at 1 month follow up visit.  Plan discussed with patient     Ervin Jessica, PA  [phone]

## 2020-09-12 ENCOUNTER — TRANSCRIPTION ENCOUNTER (OUTPATIENT)
Age: 85
End: 2020-09-12

## 2020-09-12 VITALS
TEMPERATURE: 98 F | DIASTOLIC BLOOD PRESSURE: 62 MMHG | RESPIRATION RATE: 18 BRPM | OXYGEN SATURATION: 97 % | SYSTOLIC BLOOD PRESSURE: 138 MMHG | HEART RATE: 62 BPM

## 2020-09-12 LAB
ANION GAP SERPL CALC-SCNC: 9 MMOL/L — SIGNIFICANT CHANGE UP (ref 5–17)
BUN SERPL-MCNC: 21 MG/DL — SIGNIFICANT CHANGE UP (ref 7–23)
CALCIUM SERPL-MCNC: 8.8 MG/DL — SIGNIFICANT CHANGE UP (ref 8.4–10.5)
CHLORIDE SERPL-SCNC: 107 MMOL/L — SIGNIFICANT CHANGE UP (ref 96–108)
CO2 SERPL-SCNC: 23 MMOL/L — SIGNIFICANT CHANGE UP (ref 22–31)
CREAT SERPL-MCNC: 1.06 MG/DL — SIGNIFICANT CHANGE UP (ref 0.5–1.3)
GLUCOSE SERPL-MCNC: 99 MG/DL — SIGNIFICANT CHANGE UP (ref 70–99)
HCT VFR BLD CALC: 34.9 % — LOW (ref 39–50)
HGB BLD-MCNC: 11.5 G/DL — LOW (ref 13–17)
MAGNESIUM SERPL-MCNC: 2.2 MG/DL — SIGNIFICANT CHANGE UP (ref 1.6–2.6)
MCHC RBC-ENTMCNC: 31.9 PG — SIGNIFICANT CHANGE UP (ref 27–34)
MCHC RBC-ENTMCNC: 33 GM/DL — SIGNIFICANT CHANGE UP (ref 32–36)
MCV RBC AUTO: 96.7 FL — SIGNIFICANT CHANGE UP (ref 80–100)
NRBC # BLD: 0 /100 WBCS — SIGNIFICANT CHANGE UP (ref 0–0)
PHOSPHATE SERPL-MCNC: 2.7 MG/DL — SIGNIFICANT CHANGE UP (ref 2.5–4.5)
PLATELET # BLD AUTO: 148 K/UL — LOW (ref 150–400)
POTASSIUM SERPL-MCNC: 3.9 MMOL/L — SIGNIFICANT CHANGE UP (ref 3.5–5.3)
POTASSIUM SERPL-SCNC: 3.9 MMOL/L — SIGNIFICANT CHANGE UP (ref 3.5–5.3)
RBC # BLD: 3.61 M/UL — LOW (ref 4.2–5.8)
RBC # FLD: 12.9 % — SIGNIFICANT CHANGE UP (ref 10.3–14.5)
SODIUM SERPL-SCNC: 139 MMOL/L — SIGNIFICANT CHANGE UP (ref 135–145)
WBC # BLD: 6.76 K/UL — SIGNIFICANT CHANGE UP (ref 3.8–10.5)
WBC # FLD AUTO: 6.76 K/UL — SIGNIFICANT CHANGE UP (ref 3.8–10.5)

## 2020-09-12 PROCEDURE — 86901 BLOOD TYPING SEROLOGIC RH(D): CPT

## 2020-09-12 PROCEDURE — C1760: CPT

## 2020-09-12 PROCEDURE — P9047: CPT

## 2020-09-12 PROCEDURE — 99232 SBSQ HOSP IP/OBS MODERATE 35: CPT

## 2020-09-12 PROCEDURE — C1884: CPT

## 2020-09-12 PROCEDURE — 80048 BASIC METABOLIC PNL TOTAL CA: CPT

## 2020-09-12 PROCEDURE — 85610 PROTHROMBIN TIME: CPT

## 2020-09-12 PROCEDURE — C1887: CPT

## 2020-09-12 PROCEDURE — 86923 COMPATIBILITY TEST ELECTRIC: CPT

## 2020-09-12 PROCEDURE — 93306 TTE W/DOPPLER COMPLETE: CPT

## 2020-09-12 PROCEDURE — 85730 THROMBOPLASTIN TIME PARTIAL: CPT

## 2020-09-12 PROCEDURE — C1769: CPT

## 2020-09-12 PROCEDURE — 85027 COMPLETE CBC AUTOMATED: CPT

## 2020-09-12 PROCEDURE — C1889: CPT

## 2020-09-12 PROCEDURE — 76000 FLUOROSCOPY <1 HR PHYS/QHP: CPT

## 2020-09-12 PROCEDURE — 93005 ELECTROCARDIOGRAM TRACING: CPT

## 2020-09-12 PROCEDURE — 93010 ELECTROCARDIOGRAM REPORT: CPT

## 2020-09-12 PROCEDURE — 99238 HOSP IP/OBS DSCHRG MGMT 30/<: CPT

## 2020-09-12 PROCEDURE — C1894: CPT

## 2020-09-12 PROCEDURE — P9016: CPT

## 2020-09-12 PROCEDURE — 86900 BLOOD TYPING SEROLOGIC ABO: CPT

## 2020-09-12 PROCEDURE — 80053 COMPREHEN METABOLIC PANEL: CPT

## 2020-09-12 PROCEDURE — 83735 ASSAY OF MAGNESIUM: CPT

## 2020-09-12 PROCEDURE — 84100 ASSAY OF PHOSPHORUS: CPT

## 2020-09-12 PROCEDURE — 85025 COMPLETE CBC W/AUTO DIFF WBC: CPT

## 2020-09-12 PROCEDURE — 82962 GLUCOSE BLOOD TEST: CPT

## 2020-09-12 RX ORDER — AMIODARONE HYDROCHLORIDE 400 MG/1
0 TABLET ORAL
Qty: 0 | Refills: 0 | DISCHARGE

## 2020-09-12 RX ORDER — CLOPIDOGREL BISULFATE 75 MG/1
1 TABLET, FILM COATED ORAL
Qty: 30 | Refills: 0
Start: 2020-09-12

## 2020-09-12 RX ORDER — CHOLECALCIFEROL (VITAMIN D3) 125 MCG
1 CAPSULE ORAL
Qty: 0 | Refills: 0 | DISCHARGE

## 2020-09-12 RX ORDER — ASPIRIN/CALCIUM CARB/MAGNESIUM 324 MG
1 TABLET ORAL
Qty: 30 | Refills: 0
Start: 2020-09-12

## 2020-09-12 RX ADMIN — CLOPIDOGREL BISULFATE 75 MILLIGRAM(S): 75 TABLET, FILM COATED ORAL at 10:29

## 2020-09-12 RX ADMIN — Medication 81 MILLIGRAM(S): at 10:29

## 2020-09-12 NOTE — DISCHARGE NOTE PROVIDER - HOSPITAL COURSE
84 year old male with  PMH of HTN, BPH on Flomax, HLD, mitral regurgitation, aortic stenosis, fracture of right ankle s/p ORIF 10/2019 c/b right ankle cellulitis, infection and ultimate removal of hardware on 6/11/2020. He was DC home on June 11 with PICC and treated with IV Cefepime and Vancomycin which was completed on 7/24/2020. PICC line was removed on 7/26/2020.  Echo on 08/06/20 revealed moderate AS, CT heart on 08/14/20 - severely calcified aortic valve. Patient worked up for TAVR.     Hospital course:  9/10 - S/P TAVR. Bilateral groin c/d/i no hematoma no bleeding.   9/11 Pending echo, Continue with Asa and plavix. EKG sinus bradycardia 49 MS interval 228. Hold AV paul blockers  912.  HD stable.  SR 50-60.  DC home with [facility].  EKG  SR 1st degree

## 2020-09-12 NOTE — DISCHARGE NOTE PROVIDER - NSDCACTIVITY_GEN_ALL_CORE
Walking - Outdoors allowed/Showering allowed/No heavy lifting/straining/Stairs allowed/Walking - Indoors allowed

## 2020-09-12 NOTE — DISCHARGE NOTE PROVIDER - CARE PROVIDER_API CALL
Harish Cantu)  Surgery; Thoracic and Cardiac Surgery  [address] [place] [address]  Phone: [phone]  Fax: [phone]  Follow Up Time:

## 2020-09-12 NOTE — DISCHARGE NOTE NURSING/CASE MANAGEMENT/SOCIAL WORK - PATIENT PORTAL LINK FT
You can access the FollowMyHealth Patient Portal offered by [facility] by registering at the following website: [email or web address]. By joining [employer]’s FollowMyHealth portal, you will also be able to view your health information using other applications (apps) compatible with our system.

## 2020-09-12 NOTE — DISCHARGE NOTE PROVIDER - NSDCCPCAREPLAN_GEN_ALL_CORE_FT
PRINCIPAL DISCHARGE DIAGNOSIS  Diagnosis: S/P TAVR (transcatheter aortic valve replacement)  Assessment and Plan of Treatment: take meds as prescribed  ambulate at home at least 4x daily  weigh yourself daily and report weight gain >2.5 lbs in 24 hrs  shower daily  check groins daily for increased swelling or pain

## 2020-09-12 NOTE — PROGRESS NOTE ADULT - SUBJECTIVE AND OBJECTIVE BOX
Subjective  No acute events reported overnight.  The patient is clinically doing well.  He denies any chest pain/tightness/discomfort, fevers, chills, sweats, light-headed sensation, dizziness or palpitations.  He is ambulating without any cardiopulmonary complaints.    Review of Systems  14 point review of systems is negative except what is described above in the history of present illness    Telemetry  Sinus rhythm with no heart block  EKG is unchanged compared to prior    Physical Examination  General: NAD, appropriate affect  MMM, no e/e/th  Cor: s1s2, RRR, no murmurs, rub or gallops  EKG: SB w/ 1'AVB  Pulm: CTA b/l, no w/r/r  Gastointestinal: soft, nontender, nondistended, +bowel sounds  Extremities: no edema, warm, 2+ DP/PT pulses  Groin: dressings clean and dry, soft, nontender, no hematoma b/l  Neuro: A&Ox3, nonfocal  T(C): 36.7 (09-12-20 @ 12:00), Max: 36.7 (09-12-20 @ 12:00)  HR: 62 (09-12-20 @ 12:00) (62 - 62)  BP: 138/62 (09-12-20 @ 12:00) (138/62 - 138/62)  RR: 18 (09-12-20 @ 12:00) (18 - 18)  SpO2: 97% (09-12-20 @ 12:00) (97% - 97%)  Wt(kg): --  09-11 @ 07:01  -  09-12 @ 07:00  --------------------------------------------------------  IN: 620 mL / OUT: 900 mL / NET: -280 mL    09-12 @ 07:01  -  09-12 @ 20:29  --------------------------------------------------------  IN: 240 mL / OUT: 500 mL / NET: -260 mL                 11.5   6.76  )-----------( 148      ( 12 Sep 2020 05:29 )             34.9   09-12    139  |  107  |  21  ----------------------------<  99  3.9   |  23  |  1.06    Ca    8.8      12 Sep 2020 05:29  Phos  2.7     09-12  Mg     2.2     09-12    TPro  5.7<L>  /  Alb  3.5  /  TBili  0.7  /  DBili  x   /  AST  20  /  ALT  11  /  AlkPhos  77  09-11    MEDICATIONS  (STANDING):  aspirin enteric coated 81 milliGRAM(s) Oral daily  cefuroxime  IVPB 1500 milliGRAM(s) IV Intermittent once  clopidogrel Tablet 75 milliGRAM(s) Oral daily  pantoprazole    Tablet 40 milliGRAM(s) Oral before breakfast    Home Medications:  amiodarone 200 mg oral tablet: 1 tab(s) orally once a day (10 Sep 2020 09:37)  finasteride 5 mg oral tablet: 1 tab(s) orally once a day (10 Sep 2020 09:37)  Flomax 0.4 mg oral capsule: 1 cap(s) orally 2 times a day (10 Sep 2020 09:37)     Assessment/Plan:  Mr Andrade is s/p TF TAVR (#34 CV) for severe symptomatic AS  - on asa/Plavix.  Signs/symptoms of bleeding were reviewed with the patient.  - resume preop meds including flomax and finasteride  - ambulate as tolerated  - he will leave the hospital with an MCOT for 30 days to monitor for arrhythmia and conduction delay  - repeat TTE was reviewed, device in appropriate position with mild paravalvular leak with no pericardial effusion and appropriate gradients  - MCOT applied.  Indications for MCOT was reviewed with the patient.  - EKG was reviewed and unchanged compared to prior.  No events noted on telemetry    The patient is cleared from a structural standpoint to be discharged later today.    All questions and concerns of the patient were addressed.    Findings and plan reviewed with cardiac surgery service.    Plan for patient to follow-up with Dr. Cantu in one week and follow up with Structural Heart Team in one month.  Echo will be done at 1 month

## 2020-09-12 NOTE — PROGRESS NOTE ADULT - SUBJECTIVE AND OBJECTIVE BOX
Patient is a 84y old  Male who presents with a chief complaint of AS (11 Sep 2020 12:08)      INTERVAL HPI/OVERNIGHT EVENTS: noted  pt seen and examined  feels well  nocp/sob/dizziness      Vital Signs Last 24 Hrs  T(C): 36.7 (12 Sep 2020 12:00), Max: 36.8 (11 Sep 2020 19:56)  T(F): 98 (12 Sep 2020 12:00), Max: 98.2 (11 Sep 2020 19:56)  HR: 62 (12 Sep 2020 12:00) (62 - 63)  BP: 138/62 (12 Sep 2020 12:00) (138/62 - 156/65)  BP(mean): --  RR: 18 (12 Sep 2020 12:00) (18 - 18)  SpO2: 97% (12 Sep 2020 12:00) (97% - 97%)    aspirin enteric coated 81 milliGRAM(s) Oral daily  cefuroxime  IVPB 1500 milliGRAM(s) IV Intermittent once  clopidogrel Tablet 75 milliGRAM(s) Oral daily  pantoprazole    Tablet 40 milliGRAM(s) Oral before breakfast      PHYSICAL EXAM:  GENERAL: NAD,   EYES: conjunctiva and sclera clear  ENMT: Moist mucous membranes  NECK: Supple, No JVD, Normal thyroid  CHEST/LUNG: non labored, cta b/l  HEART: Regular rate and rhythm; No murmurs, rubs, or gallops  ABDOMEN: Soft, Nontender, Nondistended; Bowel sounds present  EXTREMITIES:  2+ Peripheral Pulses, No clubbing, cyanosis, or edema  LYMPH: No lymphadenopathy noted  SKIN: No rashes or lesions    Consultant(s) Notes Reviewed:  [x ] YES  [ ] NO  Care Discussed with Consultants/Other Providers [ x] YES  [ ] NO    LABS:                        11.5   6.76  )-----------( 148      ( 12 Sep 2020 05:29 )             34.9     09-12    139  |  107  |  21  ----------------------------<  99  3.9   |  23  |  1.06    Ca    8.8      12 Sep 2020 05:29  Phos  2.7     09-12  Mg     2.2     09-12    TPro  5.7<L>  /  Alb  3.5  /  TBili  0.7  /  DBili  x   /  AST  20  /  ALT  11  /  AlkPhos  77  09-11        CAPILLARY BLOOD GLUCOSE                  RADIOLOGY & ADDITIONAL TESTS:    Imaging Personally Reviewed:  [x ] YES  [ ] NO

## 2020-09-12 NOTE — DISCHARGE NOTE PROVIDER - NSDCMRMEDTOKEN_GEN_ALL_CORE_FT
amiodarone 200 mg oral tablet: 1 tab(s) orally once a day  aspirin 81 mg oral delayed release tablet: 1 tab(s) orally once a day  clopidogrel 75 mg oral tablet: 1 tab(s) orally once a day  finasteride 5 mg oral tablet: 1 tab(s) orally once a day  Flomax 0.4 mg oral capsule: 1 cap(s) orally 2 times a day

## 2020-09-12 NOTE — DISCHARGE NOTE PROVIDER - NSDCPNSUBOBJ_GEN_ALL_CORE
Vital Signs Last 24 Hrs  T(C): 36.8 (11 Sep 2020 19:56), Max: 37.1 (11 Sep 2020 12:46)  T(F): 98.2 (11 Sep 2020 19:56), Max: 98.8 (11 Sep 2020 12:46)  HR: 63 (11 Sep 2020 19:56) (62 - 63)  BP: 156/65 (11 Sep 2020 19:56) (144/72 - 156/65)  BP(mean): --  RR: 18 (11 Sep 2020 19:56) (18 - 18)  SpO2: 97% (11 Sep 2020 19:56) (97% - 97%)    General: WN/WD NAD  Neurology: A&Ox3, nonfocal, TERAN x 4  Respiratory: CTA B/L  CV: RRR, S1S2, no murmur  Abdominal: Soft, NT, ND no palpable mass  MSK: No edema, + peripheral pulses, FROM all 4 extremity  b/l groins.  No hematoma   Vital Signs Last 24 Hrs  T(C): 36.8 (11 Sep 2020 19:56), Max: 37.1 (11 Sep 2020 12:46)  T(F): 98.2 (11 Sep 2020 19:56), Max: 98.8 (11 Sep 2020 12:46)  HR: 63 (11 Sep 2020 19:56) (62 - 63)  BP: 156/65 (11 Sep 2020 19:56) (144/72 - 156/65)  BP(mean): --  RR: 18 (11 Sep 2020 19:56) (18 - 18)  SpO2: 97% (11 Sep 2020 19:56) (97% - 97%)    General: WN/WD NAD  Neurology: A&Ox3, nonfocal, TERAN x 4  Respiratory: CTA B/L  CV: RRR, S1S2, no murmur  Abdominal: Soft, NT, ND no palpable mass  MSK: No edema, + peripheral pulses, FROM all 4 extremity  b/l groins.  No hematoma.   + ecchymosis left groin

## 2020-09-12 NOTE — PROGRESS NOTE ADULT - ASSESSMENT
84 year old male with  PMH of HTN, BPH on Flomax, HLD, mitral regurgitation, aortic stenosis, fracture of right ankle s/p ORIF 10/2019 c/b right ankle cellulitis, infection and ultimate removal of hardware on 6/11/2020. He was DC home on June 11 with PICC and treated with IV Cefepime and Vancomycin which was completed on 7/24/2020. PICC line was removed on 7/26/2020.  Echo on 08/06/20 revealed moderate AS, CT heart on 08/14/20 - severely calcified aortic valve.  pt sp TAVR 9/10      sp TAVR  mgmt per TAVR team  asa and plavix   TTE  bradycardia-asymptomatic    #HLD   resume statin    #BPH  resume home flomax and finasteride when appropriate    dvt ppx    dc planning  [facility]  [phone]

## 2020-09-13 ENCOUNTER — TRANSCRIPTION ENCOUNTER (OUTPATIENT)
Age: 85
End: 2020-09-13

## 2020-09-14 ENCOUNTER — TRANSCRIPTION ENCOUNTER (OUTPATIENT)
Age: 85
End: 2020-09-14

## 2020-09-15 ENCOUNTER — TRANSCRIPTION ENCOUNTER (OUTPATIENT)
Age: 85
End: 2020-09-15

## 2020-09-15 RX ORDER — ASPIRIN ENTERIC COATED TABLETS 81 MG 81 MG/1
81 TABLET, DELAYED RELEASE ORAL DAILY
Qty: 100 | Refills: 2 | Status: ACTIVE | COMMUNITY
Start: 2020-09-15

## 2020-09-16 ENCOUNTER — TRANSCRIPTION ENCOUNTER (OUTPATIENT)
Age: 85
End: 2020-09-16

## 2020-09-17 ENCOUNTER — NON-APPOINTMENT (OUTPATIENT)
Age: 85
End: 2020-09-17

## 2020-09-17 ENCOUNTER — APPOINTMENT (OUTPATIENT)
Dept: CARDIOTHORACIC SURGERY | Facility: CLINIC | Age: 85
End: 2020-09-17
Payer: MEDICARE

## 2020-09-17 VITALS
WEIGHT: 196 LBS | OXYGEN SATURATION: 97 % | RESPIRATION RATE: 14 BRPM | HEIGHT: 72 IN | HEART RATE: 66 BPM | TEMPERATURE: 98.3 F | SYSTOLIC BLOOD PRESSURE: 144 MMHG | DIASTOLIC BLOOD PRESSURE: 72 MMHG | BODY MASS INDEX: 26.55 KG/M2

## 2020-09-17 DIAGNOSIS — Z95.2 PRESENCE OF PROSTHETIC HEART VALVE: ICD-10-CM

## 2020-09-17 PROCEDURE — 99214 OFFICE O/P EST MOD 30 MIN: CPT

## 2020-09-17 NOTE — HISTORY OF PRESENT ILLNESS
[FreeTextEntry1] : Mr. Rodas is an 84 year old male s/p TAVR (#34 Core Valve) on 9/10/2020. Post op course noted for sinus bradycardia, Hold AV paul blockers, DC home on 9/12/2020 with MCOT, EKG SR with 1st degree HB. Post TAVR echo showed mild paravalvular AR and no intravalvular AR. Follows Dr. Tbubs for cardiology.  He presents today and reports that he has been feeling well since procedure. Denies chest pain, SOB, palpitations, swelling, weight gain, dizziness, fever or chills.  He has stayed pretty active at home cleaning his yard and landscaping as he is a retired . He saw Dr. Tubbs for cardiology prior to TAVR but plans to follow Dr. Simon for cardiology in the future.

## 2020-09-17 NOTE — ASSESSMENT
[FreeTextEntry1] : Mr. Rodas is an 84 year old male s/p TAVR (#34 Core Valve) on 9/10/2020. Post op course noted for sinus bradycardia, Hold AV paul blockers, DC home on 9/12/2020 with MCOT, EKG SR with 1st degree HB. Post TAVR echo showed mild paravalvular AR and no intravalvular AR. Follows Dr. Tubbs for cardiology.  He presents today and reports that he has been feeling well since procedure. Denies chest pain, SOB, palpitations, swelling, weight gain, dizziness, fever or chills.  He has stayed pretty active at home cleaning his yard and landscaping as he is a retired . He saw Dr. Tubbs for cardiology prior to TAVR but plans to follow Dr. Simon for cardiology in the future.\par \par Today on exam, patient's lungs are clear bilaterally, normal sinus rhythm and bilateral groins are clean, dry and intact. There is no hematoma. No peripheral edema noted on exam. EKG performed and reviewed. SBE antibiotic prophylaxis discussed at length.  Patient instructed to continue current medication regimen and followup with cardiology.\par \par Plan:\par 1) Follow up with cardiologist (Dr. Simon or Dr. Tubbs)\par 2) Follow up with structural heart team with TTE in one month

## 2020-09-17 NOTE — PHYSICAL EXAM
[Sclera] : the sclera and conjunctiva were normal [Neck Appearance] : the appearance of the neck was normal [Jugular Venous Distention Increased] : there was no jugular-venous distention [] : no respiratory distress [Respiration, Rhythm And Depth] : normal respiratory rhythm and effort [Exaggerated Use Of Accessory Muscles For Inspiration] : no accessory muscle use [Auscultation Breath Sounds / Voice Sounds] : lungs were clear to auscultation bilaterally [Apical Impulse] : the apical impulse was normal [Heart Rate And Rhythm] : heart rate was normal and rhythm regular [Heart Sounds] : normal S1 and S2 [Murmurs] : no murmurs [Examination Of The Chest] : the chest was normal in appearance [Bowel Sounds] : normal bowel sounds [Abdomen Soft] : soft [Abdomen Tenderness] : non-tender [No CVA Tenderness] : no ~M costovertebral angle tenderness [Involuntary Movements] : no involuntary movements were seen [Skin Color & Pigmentation] : normal skin color and pigmentation [Skin Turgor] : normal skin turgor [No Focal Deficits] : no focal deficits [Oriented To Time, Place, And Person] : oriented to person, place, and time [Impaired Insight] : insight and judgment were intact [Affect] : the affect was normal [Mood] : the mood was normal [Right Carotid Bruit] : no bruit heard over the right carotid [Left Carotid Bruit] : no bruit heard over the left carotid [FreeTextEntry1] : Deferred

## 2020-09-17 NOTE — CONSULT LETTER
[Dear  ___] : Dear  [unfilled], [Courtesy Letter:] : I had the pleasure of seeing your patient, [unfilled], in my office today. [Please see my note below.] : Please see my note below. [Sincerely,] : Sincerely, [FreeTextEntry2] : Dr. Tubbs [FreeTextEntry3] : Harish Cantu MD\par Attending Surgeon\par Cardiovascular & Thoracic Surgery\par [facility]

## 2020-09-19 ENCOUNTER — TRANSCRIPTION ENCOUNTER (OUTPATIENT)
Age: 85
End: 2020-09-19

## 2020-09-22 PROCEDURE — 93228 REMOTE 30 DAY ECG REV/REPORT: CPT

## 2020-09-24 ENCOUNTER — NON-APPOINTMENT (OUTPATIENT)
Age: 85
End: 2020-09-24

## 2020-09-26 ENCOUNTER — TRANSCRIPTION ENCOUNTER (OUTPATIENT)
Age: 85
End: 2020-09-26

## 2020-09-28 ENCOUNTER — EMERGENCY (EMERGENCY)
Facility: HOSPITAL | Age: 85
LOS: 0 days | Discharge: HOME | End: 2020-09-28
Attending: EMERGENCY MEDICINE
Payer: MEDICARE

## 2020-09-28 ENCOUNTER — TRANSCRIPTION ENCOUNTER (OUTPATIENT)
Age: 85
End: 2020-09-28

## 2020-09-28 VITALS
RESPIRATION RATE: 18 BRPM | DIASTOLIC BLOOD PRESSURE: 58 MMHG | SYSTOLIC BLOOD PRESSURE: 190 MMHG | OXYGEN SATURATION: 97 % | TEMPERATURE: 99 F | HEART RATE: 58 BPM

## 2020-09-28 VITALS — HEIGHT: 72 IN | WEIGHT: 199.96 LBS

## 2020-09-28 DIAGNOSIS — H91.93 UNSPECIFIED HEARING LOSS, BILATERAL: ICD-10-CM

## 2020-09-28 DIAGNOSIS — I49.3 VENTRICULAR PREMATURE DEPOLARIZATION: ICD-10-CM
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
## 2020-09-28 DIAGNOSIS — I34.0 NONRHEUMATIC MITRAL (VALVE) INSUFFICIENCY: ICD-10-CM

## 2020-09-28 DIAGNOSIS — E78.5 HYPERLIPIDEMIA, UNSPECIFIED: ICD-10-CM
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
## 2020-09-28 DIAGNOSIS — S51.811A LACERATION WITHOUT FOREIGN BODY OF RIGHT FOREARM, INITIAL ENCOUNTER: ICD-10-CM
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
## 2020-09-28 DIAGNOSIS — Z98.890 OTHER SPECIFIED POSTPROCEDURAL STATES: Chronic | ICD-10-CM

## 2020-09-28 DIAGNOSIS — Z79.02 LONG TERM (CURRENT) USE OF ANTITHROMBOTICS/ANTIPLATELETS: ICD-10-CM

## 2020-09-28 DIAGNOSIS — Z48.00 ENCOUNTER FOR CHANGE OR REMOVAL OF NONSURGICAL WOUND DRESSING: ICD-10-CM

## 2020-09-28 DIAGNOSIS — Z79.82 LONG TERM (CURRENT) USE OF ASPIRIN: ICD-10-CM

## 2020-09-28 DIAGNOSIS — I10 ESSENTIAL (PRIMARY) HYPERTENSION: ICD-10-CM

## 2020-09-28 DIAGNOSIS — Q25.1 COARCTATION OF AORTA: ICD-10-CM

## 2020-09-28 DIAGNOSIS — Z23 ENCOUNTER FOR IMMUNIZATION: ICD-10-CM
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
## 2020-09-28 DIAGNOSIS — N40.0 BENIGN PROSTATIC HYPERPLASIA WITHOUT LOWER URINARY TRACT SYMPTOMS: ICD-10-CM

## 2020-09-28 PROCEDURE — 99283 EMERGENCY DEPT VISIT LOW MDM: CPT | Mod: 25

## 2020-09-28 PROCEDURE — 99283 EMERGENCY DEPT VISIT LOW MDM: CPT

## 2020-09-28 PROCEDURE — 90471 IMMUNIZATION ADMIN: CPT

## 2020-09-28 PROCEDURE — 90715 TDAP VACCINE 7 YRS/> IM: CPT

## 2020-09-28 RX ORDER — TETANUS TOXOID, REDUCED DIPHTHERIA TOXOID AND ACELLULAR PERTUSSIS VACCINE, ADSORBED 5; 2.5; 8; 8; 2.5 [IU]/.5ML; [IU]/.5ML; UG/.5ML; UG/.5ML; UG/.5ML
0.5 SUSPENSION INTRAMUSCULAR ONCE
Refills: 0 | Status: COMPLETED | OUTPATIENT
Start: 2020-09-28 | End: 2020-09-28

## 2020-09-28 RX ORDER — CEPHALEXIN 500 MG
1 CAPSULE ORAL
Qty: 15 | Refills: 0
Start: 2020-09-28 | End: 2020-10-02

## 2020-09-28 RX ADMIN — TETANUS TOXOID, REDUCED DIPHTHERIA TOXOID AND ACELLULAR PERTUSSIS VACCINE, ADSORBED 0.5 MILLILITER(S): 5; 2.5; 8; 8; 2.5 SUSPENSION INTRAMUSCULAR at 12:36

## 2020-09-28 NOTE — ED STATDOCS - PROGRESS NOTE DETAILS
85 y/o M presents with skin tear. Pt was gardening and cut his R forearm. Pt on coumadin. No other complaints at this time.  skin: multiple skin tears to dorsal aspect of R forearm. -Maxi Zelaya PA-C Skin tears repaired with steri strips, pressure dressing applied, wound care discussed with PMD, will dc with prophylactic abx. Pt to [facility] with PMD this week. -Maxi Zelaya PA-C

## 2020-09-28 NOTE — ED STATDOCS - SKIN, MLM
multiple skin tears RUE, one is as small as 1cm and one is 4.5 cm, all superficial, all has mild bleeding

## 2020-09-28 NOTE — ED ADULT NURSE NOTE - OBJECTIVE STATEMENT
pt presents to ED from home, pt alert and orientedx4, VSs afebrule, pt c/o skin tear to right arm that occurred today caused by a tree branch while gardening, pt states he has very brittle and thin skin, pt denies otjher injuries or fall, safety maintained

## 2020-09-28 NOTE — ED STATDOCS - PATIENT PORTAL LINK FT
You can access the FollowMyHealth Patient Portal offered by [facility] by registering at the following website: [email or web address]. By joining [employer]’s FollowMyHealth portal, you will also be able to view your health information using other applications (apps) compatible with our system.

## 2020-09-28 NOTE — ED STATDOCS - CLINICAL SUMMARY MEDICAL DECISION MAKING FREE TEXT BOX
local wound care, skin is superficial, not able to suture, possible use Dermabond to close wound and tetanus shot.

## 2020-09-28 NOTE — ED STATDOCS - NSFOLLOWUPINSTRUCTIONS_ED_ALL_ED_FT
Please follow up with Primary MD in 2-3 days. Return to ED immediately for any new or concerning symptoms or worsening symptoms.     STERISTRIPS - AfterCare(R) Instructions(ER/ED)           Steristrips    WHAT YOU NEED TO KNOW:    Steristrips are sterile pieces of medical tape used to close wounds and help the edges grow back together. Steristrips keep the wound clean and protected while it heals. Steristrips usually fall off on their own in about 7 to 10 days.    DISCHARGE INSTRUCTIONS:    Return to the emergency department if:   •You have a fever.       •You see red streaks on your skin starting at the wound.       •Your wound has a foul smell or is draining fluid or pus.       •Your wound is red, swollen, and warm to the touch.       •Your wound opens or comes apart.       Contact your healthcare provider if:   •The skin under and around the steristrips itches or is not comfortable.      •You have questions or concerns about your condition or care.       How to use steristrips: Always wash your hands before you care for your wound. This will help prevent infection. Clean and dry the skin around your wound before you put on new steristrips.  •Care for the wound as directed. Do not bathe for 24 hours. After 24 hours, wash around the area gently as directed. Pat the area dry with a clean towel, or let it air dry. Do not rub the area with a towel to dry it. Check the wound for signs of infection, such as swelling or pus.       •Only remove the steristrips if directed. Your healthcare provider may want you to remove the steristrips after a certain number of days. Do not remove them early, even if they are causing itching or discomfort. If you are directed to remove the steristrips, pull gently. You might cause the wound to open if you pull hard. Hold the skin down as you slowly and gently remove the strips.      •Apply new steristrips as directed. Start at the middle of the wound. Gently bring the edges of the wound together and place the middle of the steristrip on the wound. Do not stretch the steristrip. Smooth the ends of the steristrip down onto your skin. Add more steristrips as needed for the rest of the wound. Leave small gaps between strips so you do not completely cover the wound. Fluid from the wound may build under the strips if you completely cover it. The fluid may make the strips peel. Your healthcare provider may recommend that you add steristrips down the ends of the pieces you placed across the wound. This will help keep the steristrips in place as you move.      •Do not scrub or pick at the steristrips. This can cause your wound to open. Trim the edges of the strips if they start to curl. Then press the ends firmly onto your skin.      Follow up with your healthcare provider as directed: Write down your questions so you remember to ask them during your visits.

## 2020-09-28 NOTE — ED STATDOCS - NS_ATTENDINGSCRIBE_ED_ALL_ED
Registered Dietitian Follow-Up     Patient Profile Reviewed                           Yes [x]   No []     Nutrition History Previously Obtained        Yes []  No [x]       Pertinent Subjective Information: Pt is non- verbal. RD spoke to pt's daughter who reports; pt was eating very poorly for 2 months; only having soups, liquids, and 1-2 glucerna shakes in a day. UBW of 106 lbs (2 months ago); pt lost unintentional wt of 9 lbs in 2 months (that's 8.5% wt loss in 2 months using lowest of 44 kg); pt meets criteria for moderate PCM in the context of acute illness with 8.5% wt loss in 2 months and <75% of estimated energy requirement for >7 days criteria due to loss of appetite 2/2 generalized weakness and AMS. Unable to perform physical assessment due to pt's covered in sheets; will attempt to perform physical assessment upon f/u. NKFA. No cultural/Moravian food preferences. Pt consumed 1-2 Glucerna shakes PTA. Pt followed diabetic diet PTA. Per is currently NPO. RD consulted for TF assessment.      Pertinent Medical Interventions: Generalized weakness 2/2 suspected GN pneumonia; pt is severely dehydrated. Pt NPO for possible aspiration. Distended GB- DW IR not perc drain candidate for now as GB distention is minimal. Sacral ulcer seen by burn, not infected. COVID negative. BROOKS on CKD stage III with HAGMA (improved). Nephro fellowing. Hyperosm Hypernatremia likely due to dehydration. NGT placed 4/27. DM- monitor FS and start insulin regimen if FS >180.     Diet order: NPO (day 2)     Anthropometrics:  - Ht. 152.4 cm   - Wt. 45.8 kg (4/27) vs 44 kg (4/25; dosing wt)- wts appear to be trending up  - %wt change 8.5% in 2 months (using UBW of 48 kg and dosing wt of 44 kg)  - BMI 18.9  - IBW 45 kg      Pertinent Lab Data: 4/29: RBC-2.50, H/H- 7.4/23.9, Na-152, BUN-57, glucose serum- 203     Pertinent Meds: D5+ NaCl 0.45%, NaCl, mineral oil enema, dulcolax suppository, glycerin suppository, protonix, miralax, potassium chloride, MVI     Physical Findings:  - Appearance: lethargic, non verbal; +3 generalized edema  - GI function: no GI issues noted; LBM 4/28  - Tubes: NGT  - Oral/Mouth cavity: Per SLP eval 4/28; Pt is not a candidate for PO trials at this time 2' decreased arousal level and lethargy, therefore a high risk for aspiration at this time. Rec NPO w/ alternate means of nutrition/hydration.   - Skin: pressure ulcer stg II to sacrum     Nutrition Requirements  Weight Used: dosing  44kg (from initial RD assessment      Estimated Energy Needs    Continue [x]  Adjust []  921-1152kcal (MSJ x 1.2-1.5 AF) for PU, low BMI     Estimated Protein Needs    Continue [x]  Adjust []  protein 53-61g (1.2-1.4g/kg CBW) as above + CKD considered        Estimated Fluid Needs        Continue [x]  Adjust []  1 ml/kcal or per LIP     Nutrient Intake: Currently NPO < 5 days        [x] Previous Nutrition Diagnosis: Inadequate protein energy intake.             [x] Ongoing          [] Resolved       Nutrition Diagnostic #1  Problem: moderate PCM in the context of acute illness   Etiology: loss of appetite 2/2 generalized weakness and AMS  Statement: 8.5% wt loss in 2 months and <75% of estimated energy requirement for > 7 days       Nutrition Intervention EN     Goal/Expected Outcome: In 3 days pt. to advance to solid diet or TF to provide >85% est energy needs, but not exceed 105%     Indicator/Monitoring: RD to monitor diet order, energy intake, body composition, NFPF, glucose profile, GOC.    Recommendations: Consider Jevity 1.2 @120ml q6h for the first 24-48h and monitor electrolytes closely. If tolerated advance to goal rate of 240ml q6h for 1158kcal, 53g protein, 778ml free H2O (100% est calorie needs, 100% est protein needs). I personally performed the service described in the documentation recorded by the scribe in my presence, and it accurately and completely records my words and actions.

## 2020-09-28 NOTE — ED ADULT TRIAGE NOTE - CHIEF COMPLAINT QUOTE
c/o right arm skin tear while gardening 2 hours ago, pt is on coumadin, moderate amount of bleeding noted in triage, dressing applied in triage

## 2020-09-28 NOTE — ED STATDOCS - PMH
Cellulitis  right ankle  - 2020 - s/p ORIF surgery, treated with IV abx  Enlarged prostate    H/O fracture of ankle  right  Hearing loss  b/l  HLD (hyperlipidemia)    HTN (hypertension)    Mitral regurgitation  moderate  Moderate aortic stenosis    PVC (premature ventricular contraction)    SIRS of non-infectious origin w/o acute organ dysfunction  1/2020

## 2020-09-28 NOTE — ED STATDOCS - OBJECTIVE STATEMENT
85 y/o male with a PMHx of HLD, HTN, Mitral regurgitation, Moderate aortic stenosis, PVC, presents to the ED c/o R forearm skin tear while gardening 2 hours ago. On blood thinners. Tetanus not UTD.

## 2020-09-30 ENCOUNTER — TRANSCRIPTION ENCOUNTER (OUTPATIENT)
Age: 85
End: 2020-09-30

## 2020-10-02 ENCOUNTER — TRANSCRIPTION ENCOUNTER (OUTPATIENT)
Age: 85
End: 2020-10-02

## 2020-10-03 ENCOUNTER — TRANSCRIPTION ENCOUNTER (OUTPATIENT)
Age: 85
End: 2020-10-03

## 2020-10-22 ENCOUNTER — OUTPATIENT (OUTPATIENT)
Dept: OUTPATIENT SERVICES | Facility: HOSPITAL | Age: 85
LOS: 1 days | End: 2020-10-22
Payer: MEDICARE

## 2020-10-22 ENCOUNTER — APPOINTMENT (OUTPATIENT)
Dept: CARDIOTHORACIC SURGERY | Facility: CLINIC | Age: 85
End: 2020-10-22
Payer: MEDICARE

## 2020-10-22 ENCOUNTER — NON-APPOINTMENT (OUTPATIENT)
Age: 85
End: 2020-10-22

## 2020-10-22 VITALS
SYSTOLIC BLOOD PRESSURE: 177 MMHG | RESPIRATION RATE: 18 BRPM | WEIGHT: 198 LBS | BODY MASS INDEX: 26.82 KG/M2 | HEART RATE: 52 BPM | HEIGHT: 72 IN | OXYGEN SATURATION: 96 % | DIASTOLIC BLOOD PRESSURE: 60 MMHG

## 2020-10-22 VITALS — SYSTOLIC BLOOD PRESSURE: 169 MMHG | DIASTOLIC BLOOD PRESSURE: 68 MMHG

## 2020-10-22 DIAGNOSIS — Z98.890 OTHER SPECIFIED POSTPROCEDURAL STATES: Chronic | ICD-10-CM

## 2020-10-22 DIAGNOSIS — I35.0 NONRHEUMATIC AORTIC (VALVE) STENOSIS: ICD-10-CM

## 2020-10-22 PROCEDURE — 93306 TTE W/DOPPLER COMPLETE: CPT

## 2020-10-22 PROCEDURE — 93000 ELECTROCARDIOGRAM COMPLETE: CPT

## 2020-10-22 PROCEDURE — 93306 TTE W/DOPPLER COMPLETE: CPT | Mod: 26

## 2020-10-22 PROCEDURE — 99215 OFFICE O/P EST HI 40 MIN: CPT

## 2020-10-22 RX ORDER — CLOPIDOGREL 75 MG/1
75 TABLET, FILM COATED ORAL DAILY
Qty: 90 | Refills: 3 | Status: DISCONTINUED | COMMUNITY
Start: 2020-09-15 | End: 2020-10-22

## 2020-10-22 RX ORDER — LOSARTAN POTASSIUM 50 MG/1
50 TABLET, FILM COATED ORAL DAILY
Qty: 30 | Refills: 1 | Status: ACTIVE | COMMUNITY
Start: 2020-10-22 | End: 1900-01-01

## 2020-10-22 RX ORDER — CLOPIDOGREL BISULFATE 75 MG/1
75 TABLET, FILM COATED ORAL
Refills: 0 | Status: DISCONTINUED | COMMUNITY
Start: 2020-09-17 | End: 2020-10-22

## 2020-10-22 NOTE — ASSESSMENT
[FreeTextEntry1] : Mr. Rodas is recovering well from his TAVR, slowly getting back to his usual activities.  I explained how he has no limitations, and can increase his activity as he wishes. I stopped his Plavix, and his wife had asked about stopping his Amiodarone. I looked back through his records, I could not see a reason why he was on it, but I explained he should stay on it until he follows up with Dr. Simon in November, whom they are switching care over to. I was concerned about his BP which was elevated, with his BP being 206/70 and 207/75 in the echo lab.  Retaken here it was still high. I placed him on Losartan 50 mg po daily, and told him he should keep a log of his BP's to show Dr. Simon. He is also seeing his PCP on this coming Monday, so he will show the results to him also. He was on no prior antihypertensives, though his wife states a while back he was on losartan. He will continue to follow up with Dr. Simon now, and he will see us in one year. I did explain that if anything should change, he should reach out to us or Dr. Simon, and we will see him sooner.

## 2020-10-22 NOTE — HISTORY OF PRESENT ILLNESS
[Dyslipidemia] : Dyslipidemia [Hypertension] : Hypertension [Prior Heart Failure] : Prior Heart Failure [Arrhythmia] : Arrhythmia [FreeTextEntry1] : JOSE APONTE 84 year year old M, presenting today for his  30-day follow up after his TAVR procedure on 9/10/2020 with a 34 CoreValve Evolut via Transfemoramal route. He had an uncomplicated course, and was discharged on POD # 2 with an MCOT device.\par \par Today he comes in stating he feels fine. He denies any SOB, CP, Dizziness or pedal edema. He is back to his usual activities, though he is doing it slower and taking breaks intermittently.\par \par 5 meter Gait:  5.1/5.6/5.2         Seconds\par Current NYHA Class: I\par

## 2020-10-22 NOTE — CONSULT LETTER
[Dear  ___] : Dear  [unfilled], [Courtesy Letter:] : I had the pleasure of seeing your patient, [unfilled], in my office today. [Please see my note below.] : Please see my note below. [Consult Closing:] : Thank you very much for allowing me to participate in the care of this patient.  If you have any questions, please do not hesitate to contact me. [Sincerely,] : Sincerely, [FreeTextEntry2] : Dr. Tamar Simon M.D.\par [address] [place] [address] [address]\[place] [address]\par  [FreeTextEntry3] : MARKEL Mena\par Structural Heart \par Dept. Of Cardiovascular and Thoracic Surgery\par [facility]\par [phone]\par

## 2020-10-22 NOTE — REVIEW OF SYSTEMS
[Feeling Poorly] : feeling poorly [Feeling Tired] : feeling tired [Negative] : Psychiatric [Fever] : no fever [Chills] : no chills [Heart Rate Is Slow] : the heart rate was not slow [Heart Rate Is Fast] : the heart rate was not fast [Chest Pain] : no chest pain [Palpitations] : no palpitations [Leg Claudication] : no intermittent leg claudication [Lower Ext Edema] : no extremity edema

## 2020-10-22 NOTE — PHYSICAL EXAM
[Sclera] : the sclera and conjunctiva were normal [PERRL With Normal Accommodation] : pupils were equal in size, round, and reactive to light [Neck Appearance] : the appearance of the neck was normal [Neck Cervical Mass (___cm)] : no neck mass was observed [Jugular Venous Distention Increased] : there was no jugular-venous distention [Exaggerated Use Of Accessory Muscles For Inspiration] : no accessory muscle use [Auscultation Breath Sounds / Voice Sounds] : lungs were clear to auscultation bilaterally [Apical Impulse] : the apical impulse was normal [Heart Rate And Rhythm] : heart rate was normal and rhythm regular [Heart Sounds] : normal S1 and S2 [Heart Sounds Gallop] : no gallops [Murmurs] : no murmurs [Arterial Pulses Carotid] : carotid pulses were normal with no bruits [Abdominal Aorta] : the abdominal aorta was normal [Arterial Pulses Femoral] : femoral pulses were normal without bruits [Full Pulse] : the pedal pulses are present [Edema] : there was no peripheral edema [Bowel Sounds] : normal bowel sounds [Abdomen Soft] : soft [Abdomen Tenderness] : non-tender [Abdomen Mass (___ Cm)] : no abdominal mass palpated [Abnormal Walk] : normal gait [Nail Clubbing] : no clubbing  or cyanosis of the fingernails [Musculoskeletal - Swelling] : no joint swelling seen [Motor Tone] : muscle strength and tone were normal [Skin Color & Pigmentation] : normal skin color and pigmentation [Skin Turgor] : normal skin turgor [] : no rash [Sensation] : the sensory exam was normal to light touch and pinprick [Motor Exam] : the motor exam was normal [No Focal Deficits] : no focal deficits [Oriented To Time, Place, And Person] : oriented to person, place, and time [Impaired Insight] : insight and judgment were intact [Affect] : the affect was normal [Mood] : the mood was normal

## 2020-11-05 LAB
ANION GAP SERPL CALC-SCNC: 15 MMOL/L
BASOPHILS # BLD AUTO: 0.05 K/UL
BASOPHILS NFR BLD AUTO: 0.9 %
BUN SERPL-MCNC: 21 MG/DL
CALCIUM SERPL-MCNC: 9.8 MG/DL
CHLORIDE SERPL-SCNC: 106 MMOL/L
CO2 SERPL-SCNC: 23 MMOL/L
CREAT SERPL-MCNC: 0.92 MG/DL
EOSINOPHIL # BLD AUTO: 0.2 K/UL
EOSINOPHIL NFR BLD AUTO: 3.6 %
GLUCOSE SERPL-MCNC: 82 MG/DL
HCT VFR BLD CALC: 44.7 %
HGB BLD-MCNC: 14.8 G/DL
IMM GRANULOCYTES NFR BLD AUTO: 0.7 %
LYMPHOCYTES # BLD AUTO: 0.93 K/UL
LYMPHOCYTES NFR BLD AUTO: 16.7 %
MAN DIFF?: NORMAL
MCHC RBC-ENTMCNC: 31.4 PG
MCHC RBC-ENTMCNC: 33.1 GM/DL
MCV RBC AUTO: 94.9 FL
MONOCYTES # BLD AUTO: 0.53 K/UL
MONOCYTES NFR BLD AUTO: 9.5 %
NEUTROPHILS # BLD AUTO: 3.82 K/UL
NEUTROPHILS NFR BLD AUTO: 68.6 %
PLATELET # BLD AUTO: 187 K/UL
POTASSIUM SERPL-SCNC: 5 MMOL/L
RBC # BLD: 4.71 M/UL
RBC # FLD: 12.6 %
SODIUM SERPL-SCNC: 144 MMOL/L
WBC # FLD AUTO: 5.57 K/UL

## 2020-12-08 ENCOUNTER — APPOINTMENT (OUTPATIENT)
Dept: UROLOGY | Facility: CLINIC | Age: 85
End: 2020-12-08
Payer: MEDICARE

## 2020-12-08 VITALS
BODY MASS INDEX: 27.09 KG/M2 | DIASTOLIC BLOOD PRESSURE: 72 MMHG | WEIGHT: 200 LBS | HEART RATE: 63 BPM | OXYGEN SATURATION: 98 % | TEMPERATURE: 97.3 F | SYSTOLIC BLOOD PRESSURE: 182 MMHG | HEIGHT: 72 IN

## 2020-12-08 PROCEDURE — 99072 ADDL SUPL MATRL&STAF TM PHE: CPT

## 2020-12-08 PROCEDURE — 99213 OFFICE O/P EST LOW 20 MIN: CPT

## 2020-12-08 NOTE — REVIEW OF SYSTEMS
[see HPI] : see HPI [Dysuria] : no dysuria [Incontinence] : no incontinence [Nocturia] : nocturia [Genital Lesion] : no genital lesions [Testicular Pain] : no testicular pain [Negative] : Heme/Lymph

## 2020-12-08 NOTE — PHYSICAL EXAM
[General Appearance - Well Developed] : well developed [General Appearance - Well Nourished] : well nourished [Normal Appearance] : normal appearance [Well Groomed] : well groomed [General Appearance - In No Acute Distress] : no acute distress [Abdomen Soft] : soft [Abdomen Tenderness] : non-tender [Costovertebral Angle Tenderness] : no ~M costovertebral angle tenderness [Urethral Meatus] : meatus normal [Urinary Bladder Findings] : the bladder was normal on palpation [Scrotum] : the scrotum was normal [Testes Mass (___cm)] : there were no testicular masses [No Prostate Nodules] : no prostate nodules [Prostate Size ___ gm] : prostate size [unfilled] gm [Prostate Nodule] : did not have a nodule [Prostate Enlarged] : was enlarged [Prostate Tenderness] : was not tender [Prostate Fluctuant] : was not fluctuant [Prostate Size___ (Scale 0-4)] : prostate size was [unfilled] on a scale of 0-4 [Edema] : no peripheral edema [] : no respiratory distress [Respiration, Rhythm And Depth] : normal respiratory rhythm and effort [Exaggerated Use Of Accessory Muscles For Inspiration] : no accessory muscle use [Oriented To Time, Place, And Person] : oriented to person, place, and time [Affect] : the affect was normal [Mood] : the mood was normal [Not Anxious] : not anxious [Normal Station and Gait] : the gait and station were normal for the patient's age [No Focal Deficits] : no focal deficits [No Palpable Adenopathy] : no palpable adenopathy

## 2020-12-08 NOTE — HISTORY OF PRESENT ILLNESS
[FreeTextEntry1] : 83 year old man seen 09/17/2019 with complaint of urinary frequency. This began few years ago. \par It is moderate in severity. Nothing makes the symptoms better, nothing makes sx worse. \par It is associated with nothing. Patient has hx of BPH and is currently taking Flomax 0.8 mg QD. Patient states his frequency is somewhat bothersome, reports he voids every 2-3 hrs and has nocturia x 2. Patient reports he has hx of going into acute urinary retention few times in the past requiring chowdary placement in ER. He denies any current feeling of incomplete bladder emptying and reports he has a good stream.  No hematuria, no dysuria, no urgency, no straining. No incontinence. No fevers, no chills, no nausea, no vomiting, no flank pain. No family history contributory to BPH with LUTS.\par \par \par 12/08/2020: Patient presents for follow up. He is tolerating finasteride well. Urinary frequency improved. He denies new  symptoms and other medical  issues remain unchanged from above. No hematuria, no dysuria, no frequency, no urgency, no hesitancy, no straining. No incontinence. No fevers, no chills, no nausea, no vomiting, no flank pain. PVR 1 mL. \par Pt was recently admitted to hospital for ankle fracture and complications, later TAVR placement. CT showed 1.3 cm LEFT renal stone, no hydro.

## 2020-12-08 NOTE — ASSESSMENT
[FreeTextEntry1] : 83 year old man seen 09/17/2019 with complaint of urinary frequency, most likely secondary to BPH with LUTS. PVR of 1 down from 356 cc. Recommend continuing finasteride. \par \par With regards to renal calculi, we discussed several treatment options. We discussed medical expulsive therapy with alpha blocker to aid passage with pain medication and antiemetics for comfort. We also discussed the risks and benefits of ESWL. Benefits include noninvasive treatment and moderate anesthesia requirement, but risks include ineffective lithotripsy, requirement for subsequent procedures, hematoma, and steinstrasse. Finally, we discussed ureteroscopy with benefits including higher clearance rate of stones, direct visualization, concurrent stent placement, and possible stone extraction allowing for stone analysis. Risks include trauma to urethra, bladder, ureter, or kidney, hematoma, infection, ureteral stricture. Pt declines intervention at this time. He understands risks of obstructive uropathy and pyelonephritis. Will call with any concerning symptoms. WIll repeat renal US and KUB in 6 months.

## 2020-12-09 LAB
APPEARANCE: ABNORMAL
BACTERIA: NEGATIVE
BILIRUBIN URINE: NEGATIVE
BLOOD URINE: ABNORMAL
COLOR: NORMAL
GLUCOSE QUALITATIVE U: NEGATIVE
HYALINE CASTS: 1 /LPF
KETONES URINE: NEGATIVE
LEUKOCYTE ESTERASE URINE: ABNORMAL
MICROSCOPIC-UA: NORMAL
NITRITE URINE: POSITIVE
PH URINE: 6
PROTEIN URINE: NORMAL
RED BLOOD CELLS URINE: 50 /HPF
SPECIFIC GRAVITY URINE: 1.02
SQUAMOUS EPITHELIAL CELLS: 0 /HPF
URIC ACID CRYSTALS: ABNORMAL
UROBILINOGEN URINE: NORMAL
WHITE BLOOD CELLS URINE: 17 /HPF

## 2020-12-11 LAB — BACTERIA UR CULT: ABNORMAL

## 2020-12-21 PROBLEM — N39.0 BACTERIAL UTI: Status: RESOLVED | Noted: 2019-09-20 | Resolved: 2020-12-21

## 2021-01-05 NOTE — ED ADULT NURSE NOTE - NS ED PATIENT SAFETY CONCERN
Patient returned call to schedule, and declines the infusion at this time.  Noted in chart.  
Referral for neilimab received. On review of chart, it appears patient's significant other called somewhere in our system yesterday to report increasing fever, cough not letting up. They were advised to seek emergent care. No current admissions in our system, but they potentially could have gone elsewhere. Call to patient listed number, brief message left re infusion orders and time sensitive nature, and requesting return call as soon as possible.   
No

## 2021-03-03 ENCOUNTER — RX RENEWAL (OUTPATIENT)
Age: 86
End: 2021-03-03

## 2021-03-24 ENCOUNTER — APPOINTMENT (OUTPATIENT)
Dept: ORTHOPEDIC SURGERY | Facility: CLINIC | Age: 86
End: 2021-03-24
Payer: MEDICARE

## 2021-03-24 DIAGNOSIS — S90.31XA CONTUSION OF RIGHT FOOT, INITIAL ENCOUNTER: ICD-10-CM

## 2021-03-24 DIAGNOSIS — S99.921A UNSPECIFIED INJURY OF RIGHT FOOT, INITIAL ENCOUNTER: ICD-10-CM

## 2021-03-24 PROCEDURE — 99072 ADDL SUPL MATRL&STAF TM PHE: CPT

## 2021-03-24 PROCEDURE — 73630 X-RAY EXAM OF FOOT: CPT | Mod: RT

## 2021-03-24 PROCEDURE — 99214 OFFICE O/P EST MOD 30 MIN: CPT

## 2021-03-24 NOTE — ADDENDUM
[FreeTextEntry1] : I, Brad Johnson, acted solely as a scribe for Dr. Daniel Bustillos on this date 03/24/2021 .\par All medical record entries made by the Scribe were at my, Dr. Daniel Bustillos, direction and personally dictated by me on 03/24/2021 . I have reviewed the chart and agree that the record accurately reflects my personal performance of the history, physical exam, assessment and plan. I have also personally directed, reviewed, and agreed with the chart.

## 2021-03-24 NOTE — DISCUSSION/SUMMARY
[de-identified] : Today I had a lengthy discussion with the patient regarding their right foot pain. I have addressed all the patient's concerns surrounding the pathology of their condition. At this time the patient has no restrictions. I would like to see the patient back in the office PRN to reassess their condition. The patient understood and verbally agreed to the treatment plan. All of their questions were answered and they were satisfied with the visit. The patient should call the office if they have any questions or experience worsening symptoms.

## 2021-03-24 NOTE — PHYSICAL EXAM
[de-identified] : Right foot Physical Examination:\par \par General: Alert and oriented x3.  In no acute distress.  Pleasant in nature with a normal affect.  No apparent respiratory distress. \par Erythema, Warmth, Rubor: Negative\par Swelling: Positive\par \par ROM Ankle:\par 1. Dorsiflexion: 10 degrees\par 2. Plantarflexion: 40 degrees\par 3. Inversion: 20 degrees\par 4. Eversion: 10 degrees\par \par ROM of digits: Normal\par \par Pes Planus: Negative\par Pes Cavus: Negative\par \par Bunion: Negative\par Richelle's Bunion (Bunionette): Negative\par Hammer Toe Deformity/Deformities: Negative\par \par Tenderness to Palpation: \par 1. Heel Pain: Negative\par 2. Midfoot Pain: Negative\par 3. First MTP Joint: Negative\par 4. Lis Franc Joint: Negative\par \par Tenderness Metatarsals:\par 1st MT: Negative\par 2nd MT: Negative\par 3rd MT: Negative\par 4th MT: Negative\par 5th MT: Negative\par Base of the 5th MT: Negative\par \par Ligament Pain:\par 1. Lis Franc Ligament: Negative\par 2. Plantar Fascia Ligament: Negative\par \par Strength: \par 5/5 TA/GS/EHL/FHL/EDL/ADD/ABD\par \par Pulses: 2+ DP/PT Pulses\par \par Capillary Refill Toes: <2 seconds\par \par Neuro: Intact motor and sensory throughout\par \par Additional Test:\par 1. Velasquez's Squeeze Test: Negative\par 2. Calcaneal Squeeze Test: Negative [de-identified] : X-rays of the right foot reviewed, 3/24/2021: No acute fracture. Previous injuries to metatarsals 2 through 4 noted.

## 2021-03-24 NOTE — HISTORY OF PRESENT ILLNESS
[FreeTextEntry1] : Elio is an 85-year-old male who presents with a new right foot injury which he sustained yesterday after he injured his foot in his garden.  He fell while rearranging the  hoses.  He hit his foot against a hard surface while falling.  Initially he had severe pain but today he has 1 out of 10 pain.  The patient presents walking with regular shoes.  He has no other complaints.

## 2021-05-23 ENCOUNTER — APPOINTMENT (OUTPATIENT)
Dept: DISASTER EMERGENCY | Facility: CLINIC | Age: 86
End: 2021-05-23

## 2021-05-23 DIAGNOSIS — Z01.818 ENCOUNTER FOR OTHER PREPROCEDURAL EXAMINATION: ICD-10-CM

## 2021-05-24 LAB — SARS-COV-2 N GENE NPH QL NAA+PROBE: NOT DETECTED

## 2021-05-25 NOTE — ASU PATIENT PROFILE, ADULT - PMH
Cellulitis  right ankle  - 2020 - s/p ORIF surgery, treated with IV abx  Enlarged prostate    H/O fracture of ankle  right  Hearing loss  b/l  HLD (hyperlipidemia)    HTN (hypertension)    Mitral regurgitation  moderate  Moderate aortic stenosis    PAF (paroxysmal atrial fibrillation)    PVC (premature ventricular contraction)    SIRS of non-infectious origin w/o acute organ dysfunction  1/2020

## 2021-05-25 NOTE — ASU PATIENT PROFILE, ADULT - PSH
History of transcatheter aortic valve replacement (TAVR)    Status post ORIF of fracture of ankle  Right, 10/2019, removal of hardware 06/13/20   History of transcatheter aortic valve replacement (TAVR)  9/2020  Status post ORIF of fracture of ankle  Right, 10/2019, removal of hardware 06/13/20

## 2021-05-26 ENCOUNTER — OUTPATIENT (OUTPATIENT)
Dept: INPATIENT UNIT | Facility: HOSPITAL | Age: 86
LOS: 1 days | Discharge: HOME | End: 2021-05-26
Payer: MEDICARE

## 2021-05-26 VITALS
DIASTOLIC BLOOD PRESSURE: 75 MMHG | RESPIRATION RATE: 16 BRPM | TEMPERATURE: 98 F | WEIGHT: 214.07 LBS | SYSTOLIC BLOOD PRESSURE: 189 MMHG | OXYGEN SATURATION: 98 % | HEART RATE: 60 BPM | HEIGHT: 70 IN

## 2021-05-26 VITALS
SYSTOLIC BLOOD PRESSURE: 153 MMHG | OXYGEN SATURATION: 99 % | RESPIRATION RATE: 16 BRPM | HEART RATE: 57 BPM | DIASTOLIC BLOOD PRESSURE: 62 MMHG

## 2021-05-26 DIAGNOSIS — H35.3132 NONEXUDATIVE AGE-RELATED MACULAR DEGENERATION, BILATERAL, INTERMEDIATE DRY STAGE: ICD-10-CM

## 2021-05-26 DIAGNOSIS — Z95.2 PRESENCE OF PROSTHETIC HEART VALVE: Chronic | ICD-10-CM

## 2021-05-26 DIAGNOSIS — Z98.890 OTHER SPECIFIED POSTPROCEDURAL STATES: Chronic | ICD-10-CM

## 2021-05-26 DIAGNOSIS — N40.0 BENIGN PROSTATIC HYPERPLASIA WITHOUT LOWER URINARY TRACT SYMPTOMS: ICD-10-CM

## 2021-05-26 DIAGNOSIS — Z79.82 LONG TERM (CURRENT) USE OF ASPIRIN: ICD-10-CM

## 2021-05-26 DIAGNOSIS — I10 ESSENTIAL (PRIMARY) HYPERTENSION: ICD-10-CM

## 2021-05-26 DIAGNOSIS — H02.403 UNSPECIFIED PTOSIS OF BILATERAL EYELIDS: ICD-10-CM

## 2021-05-26 DIAGNOSIS — E78.5 HYPERLIPIDEMIA, UNSPECIFIED: ICD-10-CM

## 2021-05-26 DIAGNOSIS — Z95.2 PRESENCE OF PROSTHETIC HEART VALVE: ICD-10-CM

## 2021-05-26 DIAGNOSIS — I48.91 UNSPECIFIED ATRIAL FIBRILLATION: ICD-10-CM

## 2021-05-26 DIAGNOSIS — H25.12 AGE-RELATED NUCLEAR CATARACT, LEFT EYE: ICD-10-CM

## 2021-05-26 DIAGNOSIS — Z79.899 OTHER LONG TERM (CURRENT) DRUG THERAPY: ICD-10-CM

## 2021-05-26 DIAGNOSIS — H26.9 UNSPECIFIED CATARACT: ICD-10-CM

## 2021-05-26 DIAGNOSIS — H18.003 UNSPECIFIED CORNEAL DEPOSIT, BILATERAL: ICD-10-CM

## 2021-05-26 DIAGNOSIS — T44.6X5A ADVERSE EFFECT OF ALPHA-ADRENORECEPTOR ANTAGONISTS, INITIAL ENCOUNTER: ICD-10-CM

## 2021-05-26 DIAGNOSIS — H25.042 POSTERIOR SUBCAPSULAR POLAR AGE-RELATED CATARACT, LEFT EYE: ICD-10-CM

## 2021-05-26 DIAGNOSIS — H21.81 FLOPPY IRIS SYNDROME: ICD-10-CM

## 2021-05-26 PROCEDURE — V2632: CPT

## 2021-05-26 RX ORDER — TROPICAMIDE 1 %
1 DROPS OPHTHALMIC (EYE)
Refills: 0 | Status: COMPLETED | OUTPATIENT
Start: 2021-05-26 | End: 2021-05-26

## 2021-05-26 RX ORDER — OFLOXACIN 0.3 %
1 DROPS OPHTHALMIC (EYE)
Refills: 0 | Status: COMPLETED | OUTPATIENT
Start: 2021-05-26 | End: 2021-05-26

## 2021-05-26 RX ORDER — PHENYLEPHRINE HCL 2.5 %
1 DROPS OPHTHALMIC (EYE)
Refills: 0 | Status: COMPLETED | OUTPATIENT
Start: 2021-05-26 | End: 2021-05-26

## 2021-05-26 RX ORDER — ACETAMINOPHEN 500 MG
650 TABLET ORAL EVERY 6 HOURS
Refills: 0 | Status: DISCONTINUED | OUTPATIENT
Start: 2021-05-26 | End: 2021-05-26

## 2021-05-26 RX ORDER — ACETAMINOPHEN 500 MG
2 TABLET ORAL
Qty: 0 | Refills: 0 | DISCHARGE
Start: 2021-05-26

## 2021-05-26 RX ADMIN — Medication 1 DROP(S): at 07:34

## 2021-05-26 RX ADMIN — Medication 1 DROP(S): at 07:48

## 2021-05-26 RX ADMIN — Medication 1 DROP(S): at 07:41

## 2021-05-26 RX ADMIN — Medication 1 DROP(S): at 07:47

## 2021-05-26 RX ADMIN — Medication 1 DROP(S): at 07:35

## 2021-05-26 NOTE — ASU DISCHARGE PLAN (ADULT/PEDIATRIC) - CARE PROVIDER_API CALL
Audra Otto)  Ophthalmology  [address] [place] [address]  Phone: [phone]  Fax: [phone]  Follow Up Time:
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
## 2021-07-13 PROBLEM — I48.0 PAROXYSMAL ATRIAL FIBRILLATION: Chronic | Status: ACTIVE | Noted: 2021-05-25

## 2021-08-08 ENCOUNTER — NON-APPOINTMENT (OUTPATIENT)
Age: 86
End: 2021-08-08

## 2021-09-23 NOTE — ED ADULT TRIAGE NOTE - MEANS OF ARRIVAL
Detail Level: Detailed
ambulatory
Quality 130: Documentation Of Current Medications In The Medical Record: Current Medications Documented

## 2021-09-28 ENCOUNTER — APPOINTMENT (OUTPATIENT)
Dept: NEUROLOGY | Facility: CLINIC | Age: 86
End: 2021-09-28

## 2021-10-06 PROBLEM — I10 ESSENTIAL HYPERTENSION: Status: ACTIVE | Noted: 2020-08-14

## 2022-03-01 NOTE — DISCHARGE NOTE PROVIDER - NSDCCPCAREPLAN_GEN_ALL_CORE_FT
Mauri Zhang Patient Status:  Hospital Outpatient Surgery    1952 MRN [id number]   Location [id number] [place] [address] Attending Sharron Mendoza MD   Date 3/1/2022 PCP Yolanda Finch MD     PREOPERATIVE DIAGNOSIS/INDICATION: Screening  POSTOPERTATIVE DIAGNOSIS: Diverticulosis  PROCEDURE PERFORMED: COLONOSCOPY  SEDATION: MAC sedation provided by General Anesthesia    TIME OUT WAS PERFORMED    INFORMED CONSENT: Risks, benefits and alternatives to the procedure were explained to the patient including but not limited to bleeding, infection, perforation, adverse drug reactions, pancreatitis and the need for hospitalization and surgery if this occurs, the patient understands and agrees to procedure. PROCEDURE DESCRIPTION: After careful digital rectal examination a pediatric colonoscope was introduced into the patients rectum, advanced pass the recto sigmoid junction, into the descending colon, splenic flexure, transverse colon, hepatic flexure, ascending colon, cecum , confirmed by landmarks, including the appendiceal orifice and ileocecal valve. Careful examination of the above described areas was performed on withdrawal of the endoscope. Retroflexion was performed on the rectum. The patient tolerated the procedure well, there were no immediate complication immediately following the procedure, and the patient was transferred to recovery in stable condition. QUALITY OF PREPARATION: [place] Bowel Preparation Scale:            -      Right colon 3, Transverse colon 3, Left colon 3   FINDINGS/THERAPEUTICS:  - COLON: mild sigmoid colon diverticulosis    RECOMMENDATIONS:   - Post Colonoscopy precautions, watch for bleeding, infection, perforation, adverse drug reactions   - Repeat colonoscopy in 10 years.     Parker Panda MD  3/1/2022  9:16 AM PRINCIPAL DISCHARGE DIAGNOSIS  Diagnosis: Infected hardware in right leg  Assessment and Plan of Treatment:

## 2022-04-20 ENCOUNTER — APPOINTMENT (OUTPATIENT)
Dept: UROLOGY | Facility: CLINIC | Age: 87
End: 2022-04-20
Payer: MEDICARE

## 2022-04-20 VITALS
HEART RATE: 67 BPM | HEIGHT: 72 IN | OXYGEN SATURATION: 97 % | BODY MASS INDEX: 27.09 KG/M2 | SYSTOLIC BLOOD PRESSURE: 182 MMHG | DIASTOLIC BLOOD PRESSURE: 78 MMHG | WEIGHT: 200 LBS

## 2022-04-20 DIAGNOSIS — R35.1 NOCTURIA: ICD-10-CM

## 2022-04-20 DIAGNOSIS — N20.0 CALCULUS OF KIDNEY: ICD-10-CM

## 2022-04-20 PROCEDURE — 99213 OFFICE O/P EST LOW 20 MIN: CPT

## 2022-04-20 NOTE — ASSESSMENT
[FreeTextEntry1] : 85 yo M with BPH with LUTS, well controlled with dual therapy. WIll renew. For nocturia, will observe given lack of bother. For renal stones, will obtain RBUS and KUB to evaluate current stone burden.

## 2022-04-20 NOTE — HISTORY OF PRESENT ILLNESS
[FreeTextEntry1] : 83 year old man seen 09/17/2019 with complaint of urinary frequency. This began few years ago. \par It is moderate in severity. Nothing makes the symptoms better, nothing makes sx worse. \par It is associated with nothing. Patient has hx of BPH and is currently taking Flomax 0.8 mg QD. Patient states his frequency is somewhat bothersome, reports he voids every 2-3 hrs and has nocturia x 2. Patient reports he has hx of going into acute urinary retention few times in the past requiring chowdary placement in ER. He denies any current feeling of incomplete bladder emptying and reports he has a good stream.  No hematuria, no dysuria, no urgency, no straining. No incontinence. No fevers, no chills, no nausea, no vomiting, no flank pain. No family history contributory to BPH with LUTS.\par \par \par 12/08/2020: Patient presents for follow up. He is tolerating finasteride well. Urinary frequency improved. He denies new  symptoms and other medical  issues remain unchanged from above. No hematuria, no dysuria, no frequency, no urgency, no hesitancy, no straining. No incontinence. No fevers, no chills, no nausea, no vomiting, no flank pain. PVR 1 mL. \par Pt was recently admitted to hospital for ankle fracture and complications, later TAVR placement. CT showed 1.3 cm LEFT renal stone, no hydro.\par \par 04/20/2022: Patient presents for follow up. He reports LUTS well controlled with dual therapy. No complaints. Nocutria 0-3x/night but not bothersome. No hematuria, no dysuria, no frequency, no urgency, no hesitancy, no straining. No incontinence. No fevers, no chills, no nausea, no vomiting, no flank pain.

## 2022-06-15 NOTE — PATIENT PROFILE ADULT - FLU SEASON?
[FreeTextEntry1] : 69-year-old male with history of ASHD currently stable without any issues.  \par \par His history also includes hypertension, hyperlipidemia and type 2 diabetes.  \par \par He had a bioprosthetic aortic valve replacement May 16, 2017. Post procedure the patient has some significant issues with fluid retention with gradual adjustment of medications by cardiology.\par \par   The patient is following with endocrinology for his diabetes currently on Jardiance metformin and Trulicity.  The patient diabetes control is much improved with hemoglobin A1c March 2020 with 9.8 and most recent 3 months ago at 6.6.\par  Recent evaluation with Endocrinology with discontinuation of glimiperide\par \par The patient is a 24-hour urine for protein April 2022 was significantly higher protein increasing from 150 to 1025 mg last 24h. He was referred to nephrology and has an appointment on July 1\par \par Patient is feeling generally well without any chest pain, palpitations, shortness of breath or edema.  Since his last visit 3-4 months ago the patient has been feeling well without any complaints and no new issues.  
Yes...

## 2022-11-03 NOTE — H&P PST ADULT - BACK EXAM
Per Dr Umana, script for medrol dose pack sent to patient's requested pharmacy. Message sent to patient providing her with update.    normal shape/ROM intact

## 2022-12-07 NOTE — PRE-OP CHECKLIST -  HIBICLENS SHOWER 2 DATE
Name: Andra Elizabeth      : 1998      MRN: [id number]  Encounter Provider: Sergey Jackson MD  Encounter Date: 2022   Encounter department: [address] [place] [address]  Other depression  Assessment & Plan:  Well controlled without medication  Future referral to psychiatry or therapy if needed  Will continue to monitor in three months      2  BMI 26 0-26 9,adult  Assessment & Plan: Will continue patient on phentermine and start to wean patient off  Follow up in three months, refilled one more time  Continue exercise, low fat and low carb diet    Orders:  -     phentermine 37 5 MG capsule; Take 1 capsule (37 5 mg total) by mouth every morning    3  Dysmenorrhea, unspecified  Assessment & Plan:  Patient has increased cramping and bleeding  She is following up with OBGYN at the end of 2023  Will continue to monitor and advised heating packs along with continuing to take motrin      4  Tinnitus of right ear  Assessment & Plan:  Stable and patient should continue vitamin B complex if it persists      5  Screening for chlamydial disease  -     Chlamydia/GC amplified DNA by PCR    6  Asperger's disorder  Assessment & Plan:  Stable              Subjective     Ara Garnica is a 27year old female with PMH of depression, dysmenorrhea, and tinnitus who presents to the office for weight loss management with phentermine  Patient states that she has been on this medication since July and she started out around 214 pounds and is now 166 pounds  She states that she weighs herself daily and is monitoring her weight  She states that she is compliant and is wondering if she should continue this medication  She states that she is making lifestyle modifications with exercise and diet  She says that she is working hard to do this   She also states that her dysmenorrhea is not well controlled with the birth control and she is experiencing cramps as well for which she takes motrin for  She states that motrin only does so much so she has an appointment with OBGYN at the end of January  She also states that once she went off Prozac she is now able to lose weight easier and she has no feelings of anxiety or depression  She states that she has seasonal depression and it will subside  She admits to being sexually active in the last two months and denies alcohol, illicit drugs, or tobacco use  Review of Systems   Constitutional: Negative for chills and fever  HENT: Negative for ear pain and sore throat  Eyes: Negative for pain and visual disturbance  Respiratory: Negative for cough and shortness of breath  Cardiovascular: Negative for chest pain and palpitations  Gastrointestinal: Negative for abdominal pain and vomiting  Endocrine: Negative for cold intolerance and heat intolerance  Genitourinary: Negative for dysuria and hematuria  Musculoskeletal: Negative for arthralgias and back pain  Skin: Negative for color change and rash  Allergic/Immunologic: Negative for environmental allergies and food allergies  Neurological: Negative for seizures and syncope  Hematological: Negative for adenopathy  Does not bruise/bleed easily  Psychiatric/Behavioral: Negative for suicidal ideas  The patient is not nervous/anxious  All other systems reviewed and are negative        Past Medical History:   Diagnosis Date   • History of placement of ear tubes      Past Surgical History:   Procedure Laterality Date   • TYMPANOSTOMY TUBE PLACEMENT       Family History   Problem Relation Age of Onset   • Hypertension Maternal Grandmother    • COPD Maternal Grandmother    • Breast cancer Maternal Grandfather      Social History     Socioeconomic History   • Marital status: Single     Spouse name: None   • Number of children: None   • Years of education: None   • Highest education level: None   Occupational History   • None   Tobacco Use   • Smoking status: Never   • Smokeless tobacco: Never   Vaping Use   • Vaping Use: Never used   Substance and Sexual Activity   • Alcohol use: No   • Drug use: No   • Sexual activity: Yes     Partners: Male   Other Topics Concern   • None   Social History Narrative   • None     Social Determinants of Health     Financial Resource Strain: Not on file   Food Insecurity: Not on file   Transportation Needs: Not on file   Physical Activity: Not on file   Stress: Not on file   Social Connections: Not on file   Intimate Partner Violence: Not on file   Housing Stability: Not on file     Current Outpatient Medications on File Prior to Visit   Medication Sig   • clotrimazole-betamethasone (LOTRISONE) 1-0 05 % cream APPLY TO AFFECTED AREA TWICE A DAY (Patient taking differently: TAKEN AS NEEDED)   • ibuprofen (MOTRIN) 600 mg tablet 1 tablet 3 (three) times a day TAKEN AS NEEDED   • [DISCONTINUED] phentermine 37 5 MG capsule Take 1 capsule (37 5 mg total) by mouth every morning   • methocarbamol (ROBAXIN) 500 mg tablet Take 1 tablet (500 mg total) by mouth 2 (two) times a day as needed for muscle spasms (Patient not taking: Reported on 10/14/2021)   • norethindrone (MICRONOR) 0 35 MG tablet Take 0 35 mg by mouth daily     Allergies   Allergen Reactions   • Sulfisoxazole      Immunization History   Administered Date(s) Administered   • COVID-19 PFIZER VACCINE 0 3 ML IM 12/29/2020, 01/19/2021, 09/29/2021   • DTaP 1998, 1998, 2000, 05/16/2001, 02/23/2005   • HPV 08/08/2013, 12/20/2013   • HPV Quadrivalent 05/29/2013   • Hep A, ped/adol, 2 dose 10/31/2008   • Hep A, ped/adol, 3 dose 09/22/2007   • Hep B, Adolescent or Pediatric 2000, 02/12/2001, 05/23/2001   • INFLUENZA 12/29/2014, 10/28/2015, 10/19/2018, 10/12/2020   • IPV 2000, 2000, 2000, 02/23/2005   • Influenza, injectable, quadrivalent, preservative free 0 5 mL 11/08/2019   • MMR 02/12/2001, 02/17/2004   • Meningococcal Conjugate (MCV4O) 05/24/2012   • Meningococcal MCV4P 04/25/2017   • Pneumococcal Conjugate 13-Valent 2000, 2000, 2000, 05/16/2001   • Tdap 05/24/2012   • Varicella 02/12/2001, 09/22/2007       Objective     /72 (BP Location: Left arm, Patient Position: Sitting, Cuff Size: Standard)   Pulse (!) 113   Temp 97 7 °F (36 5 °C) (Tympanic)   Resp 14   Ht 5' 6" (1 676 m)   Wt 75 3 kg (166 lb)   SpO2 98%   BMI 26 79 kg/m²     Physical Exam  Vitals and nursing note reviewed  Constitutional:       Appearance: She is well-developed  HENT:      Head: Normocephalic and atraumatic  Eyes:      Pupils: Pupils are equal, round, and reactive to light  Cardiovascular:      Rate and Rhythm: Normal rate and regular rhythm  Heart sounds: Normal heart sounds  Pulmonary:      Effort: Pulmonary effort is normal       Breath sounds: Normal breath sounds  Abdominal:      General: Bowel sounds are normal       Palpations: Abdomen is soft  Musculoskeletal:         General: Normal range of motion  Cervical back: Normal range of motion and neck supple  Lymphadenopathy:      Cervical: No cervical adenopathy  Skin:     General: Skin is warm  Neurological:      Mental Status: She is alert and oriented to person, place, and time  Cranial Nerves: No cranial nerve deficit         Sergey Jackson MD 09-Sep-2020 09:45

## 2023-01-18 ENCOUNTER — NON-APPOINTMENT (OUTPATIENT)
Age: 88
End: 2023-01-18

## 2023-01-18 ENCOUNTER — APPOINTMENT (OUTPATIENT)
Dept: ELECTROPHYSIOLOGY | Facility: CLINIC | Age: 88
End: 2023-01-18
Payer: MEDICARE

## 2023-01-18 VITALS
BODY MASS INDEX: 27.63 KG/M2 | DIASTOLIC BLOOD PRESSURE: 83 MMHG | SYSTOLIC BLOOD PRESSURE: 164 MMHG | WEIGHT: 204 LBS | OXYGEN SATURATION: 97 % | HEART RATE: 65 BPM | HEIGHT: 72 IN

## 2023-01-18 PROCEDURE — 99205 OFFICE O/P NEW HI 60 MIN: CPT | Mod: 25

## 2023-01-18 PROCEDURE — 93000 ELECTROCARDIOGRAM COMPLETE: CPT

## 2023-01-18 RX ORDER — FUROSEMIDE 20 MG/1
20 TABLET ORAL
Refills: 0 | Status: ACTIVE | COMMUNITY

## 2023-01-18 RX ORDER — TAMSULOSIN HYDROCHLORIDE 0.4 MG/1
0.4 CAPSULE ORAL DAILY
Qty: 180 | Refills: 0 | Status: DISCONTINUED | COMMUNITY
Start: 2020-01-23 | End: 2023-01-18

## 2023-01-18 RX ORDER — AMIODARONE HYDROCHLORIDE 200 MG/1
200 TABLET ORAL
Refills: 0 | Status: DISCONTINUED | COMMUNITY
Start: 2020-06-24 | End: 2023-01-18

## 2023-02-13 ENCOUNTER — RESULT REVIEW (OUTPATIENT)
Age: 88
End: 2023-02-13

## 2023-02-13 ENCOUNTER — OUTPATIENT (OUTPATIENT)
Dept: OUTPATIENT SERVICES | Facility: HOSPITAL | Age: 88
LOS: 1 days | End: 2023-02-13
Payer: MEDICARE

## 2023-02-13 VITALS
HEIGHT: 72 IN | OXYGEN SATURATION: 96 % | DIASTOLIC BLOOD PRESSURE: 69 MMHG | RESPIRATION RATE: 16 BRPM | HEART RATE: 62 BPM | TEMPERATURE: 98 F | SYSTOLIC BLOOD PRESSURE: 158 MMHG | WEIGHT: 201.06 LBS

## 2023-02-13 DIAGNOSIS — Z95.2 PRESENCE OF PROSTHETIC HEART VALVE: Chronic | ICD-10-CM

## 2023-02-13 DIAGNOSIS — Z98.890 OTHER SPECIFIED POSTPROCEDURAL STATES: Chronic | ICD-10-CM

## 2023-02-13 DIAGNOSIS — I44.1 ATRIOVENTRICULAR BLOCK, SECOND DEGREE: ICD-10-CM

## 2023-02-13 DIAGNOSIS — Z01.818 ENCOUNTER FOR OTHER PREPROCEDURAL EXAMINATION: ICD-10-CM

## 2023-02-13 LAB
ANION GAP SERPL CALC-SCNC: 5 MMOL/L — SIGNIFICANT CHANGE UP (ref 5–17)
BASOPHILS # BLD AUTO: 0.03 K/UL — SIGNIFICANT CHANGE UP (ref 0–0.2)
BASOPHILS NFR BLD AUTO: 0.5 % — SIGNIFICANT CHANGE UP (ref 0–2)
BLD GP AB SCN SERPL QL: SIGNIFICANT CHANGE UP
BUN SERPL-MCNC: 23 MG/DL — SIGNIFICANT CHANGE UP (ref 7–23)
CALCIUM SERPL-MCNC: 9 MG/DL — SIGNIFICANT CHANGE UP (ref 8.5–10.1)
CHLORIDE SERPL-SCNC: 108 MMOL/L — SIGNIFICANT CHANGE UP (ref 96–108)
CO2 SERPL-SCNC: 25 MMOL/L — SIGNIFICANT CHANGE UP (ref 22–31)
CREAT SERPL-MCNC: 0.75 MG/DL — SIGNIFICANT CHANGE UP (ref 0.5–1.3)
EGFR: 88 ML/MIN/1.73M2 — SIGNIFICANT CHANGE UP
EOSINOPHIL # BLD AUTO: 0.19 K/UL — SIGNIFICANT CHANGE UP (ref 0–0.5)
EOSINOPHIL NFR BLD AUTO: 3.3 % — SIGNIFICANT CHANGE UP (ref 0–6)
GLUCOSE SERPL-MCNC: 99 MG/DL — SIGNIFICANT CHANGE UP (ref 70–99)
HCT VFR BLD CALC: 42.6 % — SIGNIFICANT CHANGE UP (ref 39–50)
HGB BLD-MCNC: 14.7 G/DL — SIGNIFICANT CHANGE UP (ref 13–17)
IMM GRANULOCYTES NFR BLD AUTO: 0.5 % — SIGNIFICANT CHANGE UP (ref 0–0.9)
LYMPHOCYTES # BLD AUTO: 0.94 K/UL — LOW (ref 1–3.3)
LYMPHOCYTES # BLD AUTO: 16.3 % — SIGNIFICANT CHANGE UP (ref 13–44)
MCHC RBC-ENTMCNC: 31.8 PG — SIGNIFICANT CHANGE UP (ref 27–34)
MCHC RBC-ENTMCNC: 34.5 GM/DL — SIGNIFICANT CHANGE UP (ref 32–36)
MCV RBC AUTO: 92.2 FL — SIGNIFICANT CHANGE UP (ref 80–100)
MONOCYTES # BLD AUTO: 0.64 K/UL — SIGNIFICANT CHANGE UP (ref 0–0.9)
MONOCYTES NFR BLD AUTO: 11.1 % — SIGNIFICANT CHANGE UP (ref 2–14)
MRSA PCR RESULT.: SIGNIFICANT CHANGE UP
NEUTROPHILS # BLD AUTO: 3.94 K/UL — SIGNIFICANT CHANGE UP (ref 1.8–7.4)
NEUTROPHILS NFR BLD AUTO: 68.3 % — SIGNIFICANT CHANGE UP (ref 43–77)
PLATELET # BLD AUTO: 197 K/UL — SIGNIFICANT CHANGE UP (ref 150–400)
POTASSIUM SERPL-MCNC: 3.9 MMOL/L — SIGNIFICANT CHANGE UP (ref 3.5–5.3)
POTASSIUM SERPL-SCNC: 3.9 MMOL/L — SIGNIFICANT CHANGE UP (ref 3.5–5.3)
RBC # BLD: 4.62 M/UL — SIGNIFICANT CHANGE UP (ref 4.2–5.8)
RBC # FLD: 13.1 % — SIGNIFICANT CHANGE UP (ref 10.3–14.5)
S AUREUS DNA NOSE QL NAA+PROBE: SIGNIFICANT CHANGE UP
SARS-COV-2 RNA SPEC QL NAA+PROBE: SIGNIFICANT CHANGE UP
SODIUM SERPL-SCNC: 138 MMOL/L — SIGNIFICANT CHANGE UP (ref 135–145)
WBC # BLD: 5.77 K/UL — SIGNIFICANT CHANGE UP (ref 3.8–10.5)
WBC # FLD AUTO: 5.77 K/UL — SIGNIFICANT CHANGE UP (ref 3.8–10.5)

## 2023-02-13 PROCEDURE — 99214 OFFICE O/P EST MOD 30 MIN: CPT | Mod: 25

## 2023-02-13 PROCEDURE — 86901 BLOOD TYPING SEROLOGIC RH(D): CPT

## 2023-02-13 PROCEDURE — 71046 X-RAY EXAM CHEST 2 VIEWS: CPT

## 2023-02-13 PROCEDURE — 85025 COMPLETE CBC W/AUTO DIFF WBC: CPT

## 2023-02-13 PROCEDURE — 36415 COLL VENOUS BLD VENIPUNCTURE: CPT

## 2023-02-13 PROCEDURE — 93010 ELECTROCARDIOGRAM REPORT: CPT

## 2023-02-13 PROCEDURE — 71046 X-RAY EXAM CHEST 2 VIEWS: CPT | Mod: 26

## 2023-02-13 PROCEDURE — U0003: CPT

## 2023-02-13 PROCEDURE — 93005 ELECTROCARDIOGRAM TRACING: CPT

## 2023-02-13 PROCEDURE — 80048 BASIC METABOLIC PNL TOTAL CA: CPT

## 2023-02-13 PROCEDURE — 86850 RBC ANTIBODY SCREEN: CPT

## 2023-02-13 PROCEDURE — 87640 STAPH A DNA AMP PROBE: CPT

## 2023-02-13 PROCEDURE — 86900 BLOOD TYPING SEROLOGIC ABO: CPT

## 2023-02-13 PROCEDURE — 87641 MR-STAPH DNA AMP PROBE: CPT

## 2023-02-13 PROCEDURE — U0005: CPT

## 2023-02-13 NOTE — H&P PST ADULT - NSICDXPROCEDURE_GEN_ALL_CORE_FT
PROCEDURES:  Insertion, cardiac pacemaker, [facility] 13-Feb-2023 11:22:22  Ananya Madden

## 2023-02-13 NOTE — H&P PST ADULT - HISTORY OF PRESENT ILLNESS
87 year old male diagnosed with second degree heart block during routine EKG for physical; denies SOB chest pain palpitations lightheadedness dizziness; he presents to PST for planned PPM insertion

## 2023-02-13 NOTE — H&P PST ADULT - NSICDXPASTSURGICALHX_GEN_ALL_CORE_FT
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

## 2023-02-13 NOTE — H&P PST ADULT - CARDIOVASCULAR
details… regular rate and rhythm/S1 S2 present/no murmur regular rate and rhythm/S1 S2 present/murmur

## 2023-02-13 NOTE — H&P PST ADULT - HEIGHT IN INCHES
Is This A New Presentation, Or A Follow-Up?: Skin Lesion
What Type Of Note Output Would You Prefer (Optional)?: Bullet Format
How Severe Is Your Skin Lesion?: mild
Has Your Skin Lesion Been Treated?: not been treated
0

## 2023-02-13 NOTE — H&P PST ADULT - ASSESSMENT
87 year old male diagnosed with second degree heart block during routine EKG for physical; denies SOB chest pain palpitations lightheadedness dizziness; he presents to PST for planned PPM insertion       1. EPS department to give written instructions to patient regarding medication management .  2. EPS booking will call patient the day before procedure regarding patient arrival time the day of surgery.  3. Instructed patient to have responsible adult to drive patient home if being discharged same day.  4. Covid test 72 hours prior to surgery;   5. Instrcuted patient to quarantine after covid test  6. Pt denies symtopms of Covid cought fever, loss of taste or smell   7. EZ wash and mupirocin instructions explained to patient.

## 2023-02-13 NOTE — H&P PST ADULT - NSICDXPASTMEDICALHX_GEN_ALL_CORE_FT
PAST MEDICAL HISTORY:  Bilateral dry eyes     Cellulitis right ankle  - 2020 - s/p ORIF surgery, treated with IV abx    Enlarged prostate     H/O fracture of ankle right    Hearing loss b/l    HLD (hyperlipidemia)     HTN (hypertension)     Mitral regurgitation moderate    Moderate aortic stenosis     PAF (paroxysmal atrial fibrillation)     PVC (premature ventricular contraction)     Second degree heart block     SIRS of non-infectious origin w/o acute organ dysfunction 1/2020     PAST MEDICAL HISTORY:  Bilateral dry eyes     Cellulitis right ankle  - 2020 - s/p ORIF surgery, treated with IV abx    Enlarged prostate     H/O fracture of ankle right    H/O renal calculi     Hearing loss b/l    HLD (hyperlipidemia)     HTN (hypertension)     Mitral regurgitation moderate    Moderate aortic stenosis     PAF (paroxysmal atrial fibrillation)     Poor historian     PVC (premature ventricular contraction)     Second degree heart block     SIRS of non-infectious origin w/o acute organ dysfunction 1/2020

## 2023-02-14 DIAGNOSIS — Z01.818 ENCOUNTER FOR OTHER PREPROCEDURAL EXAMINATION: ICD-10-CM

## 2023-02-14 DIAGNOSIS — I44.1 ATRIOVENTRICULAR BLOCK, SECOND DEGREE: ICD-10-CM

## 2023-02-14 NOTE — ASU PATIENT PROFILE, ADULT - NSICDXPASTMEDICALHX_GEN_ALL_CORE_FT
PAST MEDICAL HISTORY:  Bilateral dry eyes     Cellulitis right ankle  - 2020 - s/p ORIF surgery, treated with IV abx    Enlarged prostate     H/O fracture of ankle right    H/O renal calculi     Hearing loss b/l    HLD (hyperlipidemia)     HTN (hypertension)     Mitral regurgitation moderate    Moderate aortic stenosis     PAF (paroxysmal atrial fibrillation)     Poor historian     PVC (premature ventricular contraction)     Second degree heart block     SIRS of non-infectious origin w/o acute organ dysfunction 1/2020

## 2023-02-14 NOTE — ASU PATIENT PROFILE, ADULT - NSALCOHOLAMT_GEN_A_CORE_SD
pharmacy requesting refill premarin and metformin   Last office visit: 12/13/17  Pending office visit: 6/14/18  Last filled: 12/13/17    Medication refilled per protocol.       1-2 drinks

## 2023-02-14 NOTE — ASSESSMENT
Kareem Tompkins, patient's sister, came into the office asking about message that was sent through patient's MyChart. Stated that she needs some kind of order that if her  oxygen drops below 90, the day program, ready, set, go can hook her up to her oxygen. They stated that legally they can not hook her up to it without an order. What percentage of oxygen? Needs order sent to C#[phone]. ATTN: Leela Loera. Please call Kareem Tompkins with questions and to advise when the order is sent. [FreeTextEntry1] : Mr Rodas is an 84 year old male who presents today for evaluation of aortic stenosis referred by Dr. Tubbs for TAVR evaluation.  PMH includes HTN, BPH, on flomax, HLD, mitral regurgitation, aortic stenosis, fracture of right ankle s/p ORIF 10/2019 c/b right ankle cellulitis, infection and ultimate removal of hardware on 6/11/2020. He was DC home on June 11 with PICC and treated with IV Cefepime and Vanco. Completed on 7/24/2020. Follows Dr. Ac Tubbs for cardiology. He presents today and reports he has been feeling and doing fine. He reports he walks during the day but does have to take it easy. Able to care for ADL's at home but does report having to slow down at times as he doesn't have the energy he used to. Lost 30 lbs during hospitalization last fall and has not gained any weight on 80mg lasix daily. Denies chest pain, palpitations, SOB, lightheadedness, swelling, fever or chills. Echo from January 2020 showed peak and mean transaortic gradients are 58 and 36 mmHg respectively; this finding is consistent with moderate aortic stenosis. JERILYN by continuity is 1.04 cm2. He is currently  and retired from [employer]. He brought his cardiac cath disc from 2/5/2020\par \par I reviewed the cardiac imaging, medical records and reports with patient and discussed the case.  I discussed the risks , benefits and alternatives to TAVR procedure.  Risks included but not limited to  bleeding , stroke, Myocardial Infarction, kidney problems,Blood transfusion ,permanent  pacemaker implantation, infections and death. I  quoted a 1-2% procedure mortality and complication risks. I also discussed the various approaches in detail. I  feel that the patient will benefit and is a candidate for TAVR . All questions and concerns were addressed and patient agrees to proceed with TAVR.\par \par Plan:\par 1) Schedule for TAVR\par 2) TAVR CT pre procedure\par 3) TTE, CMP, BNP and CBC today\par \par \par \par \par \par

## 2023-02-14 NOTE — ASU PATIENT PROFILE, ADULT - FALL HARM RISK - HARM RISK INTERVENTIONS

## 2023-02-15 ENCOUNTER — RESULT REVIEW (OUTPATIENT)
Age: 88
End: 2023-02-15

## 2023-02-15 ENCOUNTER — OUTPATIENT (OUTPATIENT)
Dept: OUTPATIENT SERVICES | Facility: HOSPITAL | Age: 88
LOS: 1 days | Discharge: HOME | End: 2023-02-15
Payer: MEDICARE

## 2023-02-15 VITALS
DIASTOLIC BLOOD PRESSURE: 75 MMHG | SYSTOLIC BLOOD PRESSURE: 166 MMHG | HEART RATE: 54 BPM | TEMPERATURE: 98 F | HEIGHT: 72 IN | OXYGEN SATURATION: 97 % | WEIGHT: 201.06 LBS | RESPIRATION RATE: 16 BRPM

## 2023-02-15 DIAGNOSIS — I44.1 ATRIOVENTRICULAR BLOCK, SECOND DEGREE: ICD-10-CM

## 2023-02-15 DIAGNOSIS — Z98.890 OTHER SPECIFIED POSTPROCEDURAL STATES: Chronic | ICD-10-CM

## 2023-02-15 DIAGNOSIS — Z95.2 PRESENCE OF PROSTHETIC HEART VALVE: Chronic | ICD-10-CM

## 2023-02-15 PROCEDURE — 93005 ELECTROCARDIOGRAM TRACING: CPT

## 2023-02-15 PROCEDURE — C1898: CPT

## 2023-02-15 PROCEDURE — C1894: CPT

## 2023-02-15 PROCEDURE — C1887: CPT

## 2023-02-15 PROCEDURE — 80048 BASIC METABOLIC PNL TOTAL CA: CPT

## 2023-02-15 PROCEDURE — 36415 COLL VENOUS BLD VENIPUNCTURE: CPT

## 2023-02-15 PROCEDURE — C1785: CPT

## 2023-02-15 PROCEDURE — 93010 ELECTROCARDIOGRAM REPORT: CPT

## 2023-02-15 PROCEDURE — 71045 X-RAY EXAM CHEST 1 VIEW: CPT | Mod: 26

## 2023-02-15 PROCEDURE — 33208 INSRT HEART PM ATRIAL & VENT: CPT

## 2023-02-15 PROCEDURE — 85025 COMPLETE CBC W/AUTO DIFF WBC: CPT

## 2023-02-15 PROCEDURE — 33208 INSRT HEART PM ATRIAL & VENT: CPT | Mod: KX

## 2023-02-15 PROCEDURE — 71045 X-RAY EXAM CHEST 1 VIEW: CPT

## 2023-02-15 PROCEDURE — C1769: CPT

## 2023-02-15 RX ORDER — FINASTERIDE 5 MG/1
1 TABLET, FILM COATED ORAL
Qty: 0 | Refills: 0 | DISCHARGE

## 2023-02-15 RX ORDER — CEFAZOLIN SODIUM 1 G
1000 VIAL (EA) INJECTION EVERY 8 HOURS
Refills: 0 | Status: COMPLETED | OUTPATIENT
Start: 2023-02-15 | End: 2023-02-16

## 2023-02-15 RX ORDER — CEFAZOLIN SODIUM 1 G
1000 VIAL (EA) INJECTION EVERY 8 HOURS
Refills: 0 | Status: DISCONTINUED | OUTPATIENT
Start: 2023-02-15 | End: 2023-02-15

## 2023-02-15 RX ORDER — TAMSULOSIN HYDROCHLORIDE 0.4 MG/1
0.4 CAPSULE ORAL AT BEDTIME
Refills: 0 | Status: DISCONTINUED | OUTPATIENT
Start: 2023-02-15 | End: 2023-02-16

## 2023-02-15 RX ORDER — METOPROLOL TARTRATE 50 MG
25 TABLET ORAL DAILY
Refills: 0 | Status: DISCONTINUED | OUTPATIENT
Start: 2023-02-15 | End: 2023-02-16

## 2023-02-15 RX ORDER — ERGOCALCIFEROL 1.25 MG/1
0 CAPSULE ORAL
Qty: 0 | Refills: 0 | DISCHARGE

## 2023-02-15 RX ORDER — ASPIRIN/CALCIUM CARB/MAGNESIUM 324 MG
81 TABLET ORAL DAILY
Refills: 0 | Status: DISCONTINUED | OUTPATIENT
Start: 2023-02-15 | End: 2023-02-16

## 2023-02-15 RX ORDER — HYDROCHLOROTHIAZIDE 25 MG
1 TABLET ORAL
Qty: 0 | Refills: 0 | DISCHARGE

## 2023-02-15 RX ORDER — TAMSULOSIN HYDROCHLORIDE 0.4 MG/1
1 CAPSULE ORAL
Qty: 0 | Refills: 0 | DISCHARGE

## 2023-02-15 RX ORDER — FINASTERIDE 5 MG/1
5 TABLET, FILM COATED ORAL DAILY
Refills: 0 | Status: DISCONTINUED | OUTPATIENT
Start: 2023-02-15 | End: 2023-02-16

## 2023-02-15 RX ORDER — GLUCOSAMINE/CHONDROITIN/C/MANG 500-400 MG
0 CAPSULE ORAL
Qty: 0 | Refills: 0 | DISCHARGE

## 2023-02-15 RX ORDER — CEFAZOLIN SODIUM 1 G
2000 VIAL (EA) INJECTION ONCE
Refills: 0 | Status: COMPLETED | OUTPATIENT
Start: 2023-02-15 | End: 2023-02-15

## 2023-02-15 RX ORDER — MULTIVIT-MIN/FERROUS GLUCONATE 9 MG/15 ML
0 LIQUID (ML) ORAL
Qty: 0 | Refills: 0 | DISCHARGE

## 2023-02-15 RX ORDER — LOSARTAN POTASSIUM 100 MG/1
100 TABLET, FILM COATED ORAL DAILY
Refills: 0 | Status: DISCONTINUED | OUTPATIENT
Start: 2023-02-15 | End: 2023-02-16

## 2023-02-15 RX ORDER — METOPROLOL TARTRATE 50 MG
1 TABLET ORAL
Qty: 0 | Refills: 0 | DISCHARGE

## 2023-02-15 RX ORDER — LOSARTAN POTASSIUM 100 MG/1
1 TABLET, FILM COATED ORAL
Qty: 0 | Refills: 0 | DISCHARGE

## 2023-02-15 RX ADMIN — FINASTERIDE 5 MILLIGRAM(S): 5 TABLET, FILM COATED ORAL at 18:44

## 2023-02-15 RX ADMIN — LOSARTAN POTASSIUM 100 MILLIGRAM(S): 100 TABLET, FILM COATED ORAL at 18:45

## 2023-02-15 RX ADMIN — Medication 81 MILLIGRAM(S): at 18:45

## 2023-02-15 RX ADMIN — Medication 100 MILLIGRAM(S): at 13:20

## 2023-02-15 RX ADMIN — Medication 25 MILLIGRAM(S): at 18:45

## 2023-02-15 RX ADMIN — TAMSULOSIN HYDROCHLORIDE 0.4 MILLIGRAM(S): 0.4 CAPSULE ORAL at 22:20

## 2023-02-15 RX ADMIN — Medication 1000 MILLIGRAM(S): at 22:20

## 2023-02-15 NOTE — PROCEDURE NOTE - ADDITIONAL PROCEDURE DETAILS
Stat CXR/ EKG  CBC/BMP in am  pressure dressing for 24hrs  postop prophylaxis with ancef 1g IV q8hrs

## 2023-02-15 NOTE — PROCEDURE NOTE - NSICDXPROCEDURE_GEN_ALL_CORE_FT
PROCEDURES:  Implantation of intravenous dual chamber permanent cardiac pacemaker 15-Feb-2023 15:40:58  Shanda Villarreal

## 2023-02-15 NOTE — PACU DISCHARGE NOTE - COMMENTS
Report given to Osiris CONWAY. All belongings are with the patient. Transferred patient with RN. Report given to Osiris CONWAY. All belongings are with the patient. Transferred patient with RN. Home monitor device paired by Shade Lara, [employer] Rep. Instructions given to wife Lucille over the phone. Report given to Osiris CONWAY. All belongings are with the patient. Transferred patient with this RN and Jamey RN. Home monitor device paired by Shade Lara, [employer] Rep. Instructions given to wife Lucille over the phone.

## 2023-02-16 ENCOUNTER — TRANSCRIPTION ENCOUNTER (OUTPATIENT)
Age: 88
End: 2023-02-16

## 2023-02-16 VITALS
TEMPERATURE: 97 F | SYSTOLIC BLOOD PRESSURE: 118 MMHG | DIASTOLIC BLOOD PRESSURE: 61 MMHG | OXYGEN SATURATION: 97 % | RESPIRATION RATE: 18 BRPM | HEART RATE: 61 BPM

## 2023-02-16 LAB
ANION GAP SERPL CALC-SCNC: 5 MMOL/L — SIGNIFICANT CHANGE UP (ref 5–17)
BASOPHILS # BLD AUTO: 0.04 K/UL — SIGNIFICANT CHANGE UP (ref 0–0.2)
BASOPHILS NFR BLD AUTO: 0.6 % — SIGNIFICANT CHANGE UP (ref 0–2)
BUN SERPL-MCNC: 25 MG/DL — HIGH (ref 7–23)
CALCIUM SERPL-MCNC: 9.1 MG/DL — SIGNIFICANT CHANGE UP (ref 8.5–10.1)
CHLORIDE SERPL-SCNC: 107 MMOL/L — SIGNIFICANT CHANGE UP (ref 96–108)
CO2 SERPL-SCNC: 26 MMOL/L — SIGNIFICANT CHANGE UP (ref 22–31)
CREAT SERPL-MCNC: 0.79 MG/DL — SIGNIFICANT CHANGE UP (ref 0.5–1.3)
EGFR: 87 ML/MIN/1.73M2 — SIGNIFICANT CHANGE UP
EOSINOPHIL # BLD AUTO: 0.24 K/UL — SIGNIFICANT CHANGE UP (ref 0–0.5)
EOSINOPHIL NFR BLD AUTO: 3.6 % — SIGNIFICANT CHANGE UP (ref 0–6)
GLUCOSE SERPL-MCNC: 102 MG/DL — HIGH (ref 70–99)
HCT VFR BLD CALC: 43.7 % — SIGNIFICANT CHANGE UP (ref 39–50)
HGB BLD-MCNC: 14.7 G/DL — SIGNIFICANT CHANGE UP (ref 13–17)
IMM GRANULOCYTES NFR BLD AUTO: 0.6 % — SIGNIFICANT CHANGE UP (ref 0–0.9)
LYMPHOCYTES # BLD AUTO: 1.19 K/UL — SIGNIFICANT CHANGE UP (ref 1–3.3)
LYMPHOCYTES # BLD AUTO: 17.7 % — SIGNIFICANT CHANGE UP (ref 13–44)
MCHC RBC-ENTMCNC: 31.4 PG — SIGNIFICANT CHANGE UP (ref 27–34)
MCHC RBC-ENTMCNC: 33.6 GM/DL — SIGNIFICANT CHANGE UP (ref 32–36)
MCV RBC AUTO: 93.4 FL — SIGNIFICANT CHANGE UP (ref 80–100)
MONOCYTES # BLD AUTO: 0.74 K/UL — SIGNIFICANT CHANGE UP (ref 0–0.9)
MONOCYTES NFR BLD AUTO: 11 % — SIGNIFICANT CHANGE UP (ref 2–14)
NEUTROPHILS # BLD AUTO: 4.48 K/UL — SIGNIFICANT CHANGE UP (ref 1.8–7.4)
NEUTROPHILS NFR BLD AUTO: 66.5 % — SIGNIFICANT CHANGE UP (ref 43–77)
PLATELET # BLD AUTO: 195 K/UL — SIGNIFICANT CHANGE UP (ref 150–400)
POTASSIUM SERPL-MCNC: 3.7 MMOL/L — SIGNIFICANT CHANGE UP (ref 3.5–5.3)
POTASSIUM SERPL-SCNC: 3.7 MMOL/L — SIGNIFICANT CHANGE UP (ref 3.5–5.3)
RBC # BLD: 4.68 M/UL — SIGNIFICANT CHANGE UP (ref 4.2–5.8)
RBC # FLD: 13.1 % — SIGNIFICANT CHANGE UP (ref 10.3–14.5)
SODIUM SERPL-SCNC: 138 MMOL/L — SIGNIFICANT CHANGE UP (ref 135–145)
WBC # BLD: 6.73 K/UL — SIGNIFICANT CHANGE UP (ref 3.8–10.5)
WBC # FLD AUTO: 6.73 K/UL — SIGNIFICANT CHANGE UP (ref 3.8–10.5)

## 2023-02-16 PROCEDURE — 93010 ELECTROCARDIOGRAM REPORT: CPT

## 2023-02-16 RX ADMIN — LOSARTAN POTASSIUM 100 MILLIGRAM(S): 100 TABLET, FILM COATED ORAL at 10:16

## 2023-02-16 RX ADMIN — Medication 25 MILLIGRAM(S): at 10:17

## 2023-02-16 RX ADMIN — FINASTERIDE 5 MILLIGRAM(S): 5 TABLET, FILM COATED ORAL at 10:17

## 2023-02-16 RX ADMIN — Medication 1000 MILLIGRAM(S): at 06:40

## 2023-02-16 RX ADMIN — Medication 81 MILLIGRAM(S): at 10:17

## 2023-02-16 NOTE — DISCHARGE NOTE PROVIDER - NSDCMRMEDTOKEN_GEN_ALL_CORE_FT
aspirin 81 mg oral delayed release tablet: 1 tab(s) orally once a day  finasteride 5 mg oral tablet: 1 tab(s) orally once a day  Flomax 0.4 mg oral capsule: 1 cap(s) orally 2 times a day  Glucosamine Chondroitin oral capsule:   hydroCHLOROthiazide 25 mg oral tablet: 1 tab(s) orally once a day  losartan 100 mg oral tablet: 1 tab(s) orally once a day  Metoprolol Succinate ER 25 mg oral tablet, extended release: 1 tab(s) orally once a day  PreserVision AREDS 2 oral capsule:   Vitamin D2:

## 2023-02-16 NOTE — DISCHARGE NOTE PROVIDER - NSDCCPCAREPLAN_GEN_ALL_CORE_FT
PRINCIPAL DISCHARGE DIAGNOSIS  Diagnosis: Second degree heart block  Assessment and Plan of Treatment:

## 2023-02-16 NOTE — DISCHARGE NOTE NURSING/CASE MANAGEMENT/SOCIAL WORK - NSDCVIVACCINE_GEN_ALL_CORE_FT
Tdap; 10-Oct-2019 17:29; Fabiana Painter (RN); [employer]; [id number] (Exp. Date: 22-Oct-2021); IntraMuscular; Deltoid Right.; 0.5 milliLiter(s); VIS (VIS Published: 09-May-2013, VIS Presented: 10-Oct-2019);   Tdap; 28-Sep-2020 12:36; Chelsi Feldman (SHAUNNA); [employer]; [id number] (Exp. Date: 20-Jun-2022); IntraMuscular; Deltoid Left.; 0.5 milliLiter(s); VIS (VIS Published: 09-May-2013, VIS Presented: 28-Sep-2020);

## 2023-02-16 NOTE — DISCHARGE NOTE NURSING/CASE MANAGEMENT/SOCIAL WORK - PATIENT PORTAL LINK FT
You can access the FollowMyHealth Patient Portal offered by [facility] by registering at the following website: [email or web address]. By joining [employer]’s FollowMyHealth portal, you will also be able to view your health information using other applications (apps) compatible with our system.

## 2023-02-16 NOTE — DISCHARGE NOTE NURSING/CASE MANAGEMENT/SOCIAL WORK - NSDCPEFALRISK_GEN_ALL_CORE
For information on Fall & Injury Prevention, visit: [email or web address] OR  [email or web address] OR  https://www.cdc.gov/steadi/patient.html

## 2023-02-16 NOTE — DISCHARGE NOTE PROVIDER - CARE PROVIDER_API CALL
Dallas Craven (MD)  Cardiac Electrophysiology; Cardiovascular Disease  [address] [place] [address]  Phone: [phone]  Fax: [phone]  Follow Up Time:

## 2023-02-16 NOTE — DISCHARGE NOTE PROVIDER - HOSPITAL COURSE
This pt was admitted for PPM who had known Heart Block.  Pt underwent dual chamber PPM on 2/16/23 without complications

## 2023-02-16 NOTE — PROGRESS NOTE ADULT - ASSESSMENT
87 year old female with heart block and is now s/p dual chamber PPM, Post-op Day #1.  He also has hx of PAF.  Pt is stable for discharge      Pt will f/u in EP Clinic  in 2 weeks or sooner with concerns or questions  Pt will carry PPM card and hook up the  remote transmitter  Pt will return to all pre-op medications  Pt will avoid lifting > than 10 lbs with left arm  Pt will not shower for 5 days or drive for 2 weeks  Copy of discharge instructions given  This patient's questions were answered and understands the importance of following discharge instructions  87 year old female with heart block and is now s/p dual chamber PPM, Post-op Day #1.  He also has hx of PAF.  Pt is stable for discharge.  EKG and telemetry reviewed with Dr. Craven      Pt will f/u in EP Clinic  in 2 weeks or sooner with concerns or questions  Pt will carry PPM card and hook up the  remote transmitter  Pt will return to all pre-op medications  Pt will avoid lifting > than 10 lbs with left arm  Pt will not shower for 5 days or drive for 2 weeks  Copy of discharge instructions given  This patient's questions were answered and understands the importance of following discharge instructions     PPM interrogations shows   [employer] DDD 60/120  AT threshold 1 @.4ms, Vent .6 at .4 ms   P wave 3.8, R wave .90 87 year old female with heart block and is now s/p dual chamber PPM, Post-op Day #1.  He also has hx of PAF.  Pt is stable for discharge.  EKG and telemetry reviewed with Dr. Craven.  Apply bacitracin to left chest skin  wound      Pt will f/u in EP Clinic  in 2 weeks or sooner with concerns or questions  Pt will carry PPM card and hook up the  remote transmitter  Pt will return to all pre-op medications  Pt will avoid lifting > than 10 lbs with left arm  Pt will not shower for 5 days or drive for 2 weeks  Copy of discharge instructions given  This patient's questions were answered and understands the importance of following discharge instructions     PPM interrogations shows   [employer] DDD 60/120  AT threshold 1 @.4ms, Vent .6 at .4 ms   P wave 3.8, R wave .90

## 2023-02-16 NOTE — PROGRESS NOTE ADULT - SUBJECTIVE AND OBJECTIVE BOX
87 year old male who is s/p dual chamber PPm for heart Block, Post-op Day #1    EKG:  TELE:        PAST MEDICAL HISTORY:  Bilateral dry eyes   Cellulitis right ankle  - 2020 - s/p ORIF surgery, treated with IV abx  Enlarged prostate   H/O fracture of ankle right  H/O renal calculi   Hearing loss b/l  HLD (hyperlipidemia)   HTN (hypertension)   Mitral regurgitation moderate  Moderate aortic stenosis   PAF (paroxysmal atrial fibrillation)   Poor historian   PVC (premature ventricular contraction)   Second degree heart block   SIRS of non-infectious origin w/o acute organ dysfunction 1/2020.     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    MEDICATIONS  (STANDING):  aspirin enteric coated 81 milliGRAM(s) Oral daily  finasteride 5 milliGRAM(s) Oral daily  hydrochlorothiazide 25 milliGRAM(s) Oral daily  losartan 100 milliGRAM(s) Oral daily  metoprolol succinate ER 25 milliGRAM(s) Oral daily  tamsulosin 0.4 milliGRAM(s) Oral at bedtime    MEDICATIONS  (PRN):      Allergies    amlodipine (Unknown)    Intolerances    Lipitor (Joint Pain)      Vital Signs Last 24 Hrs  T(C): 36.7 (16 Feb 2023 05:19), Max: 36.7 (15 Feb 2023 21:41)  T(F): 98 (16 Feb 2023 05:19), Max: 98 (15 Feb 2023 21:41)  HR: 60 (16 Feb 2023 05:19) (54 - 61)  BP: 146/59 (16 Feb 2023 05:19) (122/65 - 166/75)  BP(mean): --  RR: 18 (16 Feb 2023 05:19) (16 - 18)  SpO2: 95% (16 Feb 2023 05:19) (95% - 98%)    Parameters below as of 16 Feb 2023 05:19  Patient On (Oxygen Delivery Method): room air        PHYSICAL EXAMINATION:    GENERAL APPEARANCE:  Pt. is not currently dyspneic, in no distress. Pt. is alert, oriented, and pleasant.  HEENT:  Pupils are normal and react normally. No icterus. Mucous membranes well colored.  NECK:  Supple. No lymphadenopathy. Jugular venous pressure not elevated. Carotids equal.   HEART:   The cardiac impulse has a normal quality. There are no murmurs, rubs or gallops noted  CHEST:  Chest is clear to auscultation. Normal respiratory effort.  ABDOMEN:  Soft and nontender.   EXTREMITIES:  There is no edema.   SKIN:  No rash or significant lesions are noted.    LABS:                    RADIOLOGY & ADDITIONAL TESTS:    < from: Xray Chest 2 Views PA/Lat (02.13.23 @ 11:16) >  ACC: [id number] EXAM:  XR CHEST PA LAT 2V   ORDERED BY: MILTON GARCIA     PROCEDURE DATE:  02/13/2023          INTERPRETATION:  Clinical history: 87-year-old male, preop.    Two views of the chest are compared to 9/8/2020.    FINDINGS: Normal cardiac silhouette and normal pulmonary vasculature with   no consolidation, effusion, pneumothorax or acute osseous finding.   Vertebral body heights are maintained.    Post TAVR.    IMPRESSION:  No acute radiographic findings and no change    --- End of Report ---    < end of copied text >   87 year old male who is s/p dual chamber PPm for heart Block, Post-op Day #1    EKG:  AV pacing at 61 BPM (2/16/23  TELE:  AV pacing at 60-70 BPM        PAST MEDICAL HISTORY:  Bilateral dry eyes   Cellulitis right ankle  - 2020 - s/p ORIF surgery, treated with IV abx  Enlarged prostate   H/O fracture of ankle right  H/O renal calculi   Hearing loss b/l  HLD (hyperlipidemia)   HTN (hypertension)   Mitral regurgitation moderate  Moderate aortic stenosis   PAF (paroxysmal atrial fibrillation)   Poor historian   PVC (premature ventricular contraction)   Second degree heart block   SIRS of non-infectious origin w/o acute organ dysfunction 1/2020.     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    MEDICATIONS  (STANDING):  aspirin enteric coated 81 milliGRAM(s) Oral daily  finasteride 5 milliGRAM(s) Oral daily  hydrochlorothiazide 25 milliGRAM(s) Oral daily  losartan 100 milliGRAM(s) Oral daily  metoprolol succinate ER 25 milliGRAM(s) Oral daily  tamsulosin 0.4 milliGRAM(s) Oral at bedtime    MEDICATIONS  (PRN):      Allergies    amlodipine (Unknown)    Intolerances    Lipitor (Joint Pain)      Vital Signs Last 24 Hrs  T(C): 36.7 (16 Feb 2023 05:19), Max: 36.7 (15 Feb 2023 21:41)  T(F): 98 (16 Feb 2023 05:19), Max: 98 (15 Feb 2023 21:41)  HR: 60 (16 Feb 2023 05:19) (54 - 61)  BP: 146/59 (16 Feb 2023 05:19) (122/65 - 166/75)  BP(mean): --  RR: 18 (16 Feb 2023 05:19) (16 - 18)  SpO2: 95% (16 Feb 2023 05:19) (95% - 98%)    Parameters below as of 16 Feb 2023 05:19  Patient On (Oxygen Delivery Method): room air        PHYSICAL EXAMINATION:    GENERAL APPEARANCE:  Pt. is not currently dyspneic, in no distress. Pt. is alert, oriented, and pleasant.  HEENT:  Pupils are normal and react normally. No icterus. Mucous membranes well colored.  NECK:  Supple. No lymphadenopathy. Jugular venous pressure not elevated. Carotids equal.   HEART:   The cardiac impulse has a normal quality. There are no murmurs, rubs or gallops noted  CHEST:  Chest is clear to auscultation. Normal respiratory effort.  ABDOMEN:  Soft and nontender.   EXTREMITIES:  There is no edema.   SKIN:  No rash or significant lesions are noted.    LABS:                    RADIOLOGY & ADDITIONAL TESTS:    < from: Xray Chest 2 Views PA/Lat (02.13.23 @ 11:16) >  ACC: [id number] EXAM:  XR CHEST PA LAT 2V   ORDERED BY: MILTON GARCIA     PROCEDURE DATE:  02/13/2023          INTERPRETATION:  Clinical history: 87-year-old male, preop.    Two views of the chest are compared to 9/8/2020.    FINDINGS: Normal cardiac silhouette and normal pulmonary vasculature with   no consolidation, effusion, pneumothorax or acute osseous finding.   Vertebral body heights are maintained.    Post TAVR.    IMPRESSION:  No acute radiographic findings and no change    --- End of Report ---    < end of copied text >   87 year old male who is s/p dual chamber PPm for heart Block, Post-op Day #1    EKG:  AV pacing at 61 BPM (2/16/23  TELE:  AV pacing at 60-70 BPM        PAST MEDICAL HISTORY:  Bilateral dry eyes   Cellulitis right ankle  - 2020 - s/p ORIF surgery, treated with IV abx  Enlarged prostate   H/O fracture of ankle right  H/O renal calculi   Hearing loss b/l  HLD (hyperlipidemia)   HTN (hypertension)   Mitral regurgitation moderate  Moderate aortic stenosis   PAF (paroxysmal atrial fibrillation)   Poor historian   PVC (premature ventricular contraction)   Second degree heart block   SIRS of non-infectious origin w/o acute organ dysfunction 1/2020.     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    MEDICATIONS  (STANDING):  aspirin enteric coated 81 milliGRAM(s) Oral daily  finasteride 5 milliGRAM(s) Oral daily  hydrochlorothiazide 25 milliGRAM(s) Oral daily  losartan 100 milliGRAM(s) Oral daily  metoprolol succinate ER 25 milliGRAM(s) Oral daily  tamsulosin 0.4 milliGRAM(s) Oral at bedtime    MEDICATIONS  (PRN):      Allergies    amlodipine (Unknown)    Intolerances    Lipitor (Joint Pain)      Vital Signs Last 24 Hrs  T(C): 36.7 (16 Feb 2023 05:19), Max: 36.7 (15 Feb 2023 21:41)  T(F): 98 (16 Feb 2023 05:19), Max: 98 (15 Feb 2023 21:41)  HR: 60 (16 Feb 2023 05:19) (54 - 61)  BP: 146/59 (16 Feb 2023 05:19) (122/65 - 166/75)  BP(mean): --  RR: 18 (16 Feb 2023 05:19) (16 - 18)  SpO2: 95% (16 Feb 2023 05:19) (95% - 98%)    Parameters below as of 16 Feb 2023 05:19  Patient On (Oxygen Delivery Method): room air        PHYSICAL EXAMINATION:    GENERAL APPEARANCE:  Pt. is not currently dyspneic, in no distress. Pt. is alert, oriented, and pleasant.  HEENT:  Pupils are normal and react normally. No icterus. Mucous membranes well colored.  NECK:  Supple. No lymphadenopathy. Jugular venous pressure not elevated. Carotids equal.   HEART:   The cardiac impulse has a normal quality. There are no murmurs, rubs or gallops noted  CHEST:  Chest is clear to auscultation. Normal respiratory effort, pt has a hemtoma at PPM wound site, and skin tear from where pressure dressing was removed at superior area of left shoulder -approx 2 inches by 1/2 inch  ABDOMEN:  Soft and nontender.   EXTREMITIES:  There is no edema.   SKIN:  No rash or significant lesions are noted.    LABS:                    RADIOLOGY & ADDITIONAL TESTS:    < from: Xray Chest 2 Views PA/Lat (02.13.23 @ 11:16) >  ACC: [id number] EXAM:  XR CHEST PA LAT 2V   ORDERED BY: MILTON GARCIA     PROCEDURE DATE:  02/13/2023          INTERPRETATION:  Clinical history: 87-year-old male, preop.    Two views of the chest are compared to 9/8/2020.    FINDINGS: Normal cardiac silhouette and normal pulmonary vasculature with   no consolidation, effusion, pneumothorax or acute osseous finding.   Vertebral body heights are maintained.    Post TAVR.    IMPRESSION:  No acute radiographic findings and no change    --- End of Report ---    < end of copied text >

## 2023-02-17 PROBLEM — Z87.442 PERSONAL HISTORY OF URINARY CALCULI: Chronic | Status: ACTIVE | Noted: 2023-02-13

## 2023-02-17 PROBLEM — H04.123 DRY EYE SYNDROME OF BILATERAL LACRIMAL GLANDS: Chronic | Status: ACTIVE | Noted: 2023-02-13

## 2023-02-17 PROBLEM — I44.1 ATRIOVENTRICULAR BLOCK, SECOND DEGREE: Chronic | Status: ACTIVE | Noted: 2023-02-13

## 2023-02-17 PROBLEM — Z78.9 OTHER SPECIFIED HEALTH STATUS: Chronic | Status: ACTIVE | Noted: 2023-02-13

## 2023-02-22 DIAGNOSIS — I44.1 ATRIOVENTRICULAR BLOCK, SECOND DEGREE: ICD-10-CM

## 2023-03-01 ENCOUNTER — APPOINTMENT (OUTPATIENT)
Dept: ELECTROPHYSIOLOGY | Facility: CLINIC | Age: 88
End: 2023-03-01
Payer: MEDICARE

## 2023-03-01 VITALS
WEIGHT: 205 LBS | HEART RATE: 75 BPM | OXYGEN SATURATION: 98 % | HEIGHT: 72 IN | BODY MASS INDEX: 27.77 KG/M2 | DIASTOLIC BLOOD PRESSURE: 79 MMHG | SYSTOLIC BLOOD PRESSURE: 139 MMHG

## 2023-03-01 PROCEDURE — 99024 POSTOP FOLLOW-UP VISIT: CPT

## 2023-03-01 RX ORDER — METOPROLOL SUCCINATE 25 MG/1
25 TABLET, EXTENDED RELEASE ORAL
Qty: 90 | Refills: 1 | Status: ACTIVE | COMMUNITY
Start: 2023-03-01

## 2023-03-01 RX ORDER — POTASSIUM CHLORIDE 750 MG/1
10 CAPSULE, EXTENDED RELEASE ORAL DAILY
Refills: 0 | Status: ACTIVE | COMMUNITY
Start: 2023-03-01

## 2023-03-03 NOTE — PHYSICAL THERAPY INITIAL EVALUATION ADULT - STANDING BALANCE: DYNAMIC, REHAB EVAL
poor plus Melolabial Interpolation Flap Text: A decision was made to reconstruct the defect utilizing an interpolation axial flap and a staged reconstruction.  A telfa template was made of the defect.  This telfa template was then used to outline the melolabial interpolation flap.  The donor area for the pedicle flap was then injected with anesthesia.  The flap was excised through the skin and subcutaneous tissue down to the layer of the underlying musculature.  The pedicle flap was carefully excised within this deep plane to maintain its blood supply.  The edges of the donor site were undermined.   The donor site was closed in a primary fashion.  The pedicle was then rotated into position and sutured.  Once the tube was sutured into place, adequate blood supply was confirmed with blanching and refill.  The pedicle was then wrapped with xeroform gauze and dressed appropriately with a telfa and gauze bandage to ensure continued blood supply and protect the attached pedicle.

## 2023-03-20 NOTE — PROGRESS NOTE ADULT - PROBLEM SELECTOR PROBLEM 1
Joycelyn Corolla Dr. Jennye Ratel Dr. Natividad Baumgarten Block/Radiofrequency  Home Going Instructions    1-Go home, rest for the remainder of the day  2-Please do not lift over 20 pounds the day of the injection  3-If you received sedation No: alcohol, driving, operating lawn mowers, plows, tractors or other dangerous equipment until next morning. Do not make important decisions or sign legal documents for 24 hours. You may experience light headedness, dizziness, nausea or sleepiness after sedation. Do not stay alone. A responsible adult must be with you for 24 hours. You could be nauseated from the medications you have received. Your IV site may be sore and bruised. 4-No dietary restrictions     5-Resume all medications the same day, blood thinners to be resumed 24 hours after injection if you were instructed to stop any. 6-Keep the surgical site clean and dry, you may shower the next morning and remove the      dressing. 7- No sitz baths, tub baths or hot tubs/swimming for 24 hours. 8- If you have any pain at the injection site(s), application of an ice pack to the area should be       helpful, 20 minutes on/20 minutes off for next 48 hours. 9- Call [facility] immediately at if you develop.   Fever greater than 100.4 F  Have bleeding or drainage from the puncture site  Have progressive Leg/arm numbness and or weakness  Loss of control of bowel and or bladder (wet/soil yourself)  Severe headache with inability to lift head  10-You may return to work the next day
Aortic stenosis
Aortic stenosis

## 2023-03-21 NOTE — CARDIOLOGY SUMMARY
[de-identified] : 1/18/23  : Sinus Rhythm First degree A-V block  72 bpm Right bundle branch block. [de-identified] : 10/27/2020 : TTE \par 1. s/p Evolut THV (?type and size).  The peak/meangradients= 12/6mmHg with a DVI= 0.68. There is moderate paravalvular regurgitation originating from the anterior aspect of the THV (1-2 O'Clock surgical view).\par *** Compared with echocardiogram of 9/11/2020, the aortic regurgitation appears similar.\par

## 2023-03-21 NOTE — ASSESSMENT
[FreeTextEntry1] : This is a 87 year old man with fatigue exercise intolerance. Who developed new RBBB first degree AV block and second degree AV block.  He has mild symptoms that were attributed to slowing down due to the winter season. \par \par Risks benefits and alternatives of permanent pacemaker implant discussed.

## 2023-03-21 NOTE — HISTORY OF PRESENT ILLNESS
[FreeTextEntry1] : Mr. Rodas is an 87-year-old gentleman who is referred for evaluation due to symptomatic bradycardia. He has some episodes of lightheadedness. He also reports  fatigue exercise intolerance.  He has developed new RBBB first degree AV block and second degree AV block.  He has mild symptoms that were attributed to slowing down due to the winter season.  ECG and monitoring have detected second degree AV Block.

## 2023-04-03 ENCOUNTER — NON-APPOINTMENT (OUTPATIENT)
Age: 88
End: 2023-04-03

## 2023-04-03 ENCOUNTER — APPOINTMENT (OUTPATIENT)
Dept: ELECTROPHYSIOLOGY | Facility: CLINIC | Age: 88
End: 2023-04-03
Payer: MEDICARE

## 2023-04-03 VITALS
DIASTOLIC BLOOD PRESSURE: 77 MMHG | OXYGEN SATURATION: 97 % | HEART RATE: 81 BPM | SYSTOLIC BLOOD PRESSURE: 155 MMHG | BODY MASS INDEX: 27.09 KG/M2 | HEIGHT: 72 IN | WEIGHT: 200 LBS

## 2023-04-03 PROCEDURE — 99024 POSTOP FOLLOW-UP VISIT: CPT

## 2023-05-26 ENCOUNTER — NON-APPOINTMENT (OUTPATIENT)
Age: 88
End: 2023-05-26

## 2023-05-26 ENCOUNTER — RX RENEWAL (OUTPATIENT)
Age: 88
End: 2023-05-26

## 2023-05-30 ENCOUNTER — APPOINTMENT (OUTPATIENT)
Dept: ELECTROPHYSIOLOGY | Facility: CLINIC | Age: 88
End: 2023-05-30
Payer: MEDICARE

## 2023-05-31 ENCOUNTER — NON-APPOINTMENT (OUTPATIENT)
Age: 88
End: 2023-05-31

## 2023-05-31 PROCEDURE — 93296 REM INTERROG EVL PM/IDS: CPT

## 2023-05-31 PROCEDURE — 93294 REM INTERROG EVL PM/LDLS PM: CPT

## 2023-06-01 ENCOUNTER — APPOINTMENT (OUTPATIENT)
Dept: UROLOGY | Facility: CLINIC | Age: 88
End: 2023-06-01
Payer: MEDICARE

## 2023-06-01 VITALS
HEIGHT: 72 IN | BODY MASS INDEX: 27.77 KG/M2 | DIASTOLIC BLOOD PRESSURE: 74 MMHG | HEART RATE: 69 BPM | OXYGEN SATURATION: 98 % | SYSTOLIC BLOOD PRESSURE: 135 MMHG | WEIGHT: 205 LBS

## 2023-06-01 DIAGNOSIS — N40.1 BENIGN PROSTATIC HYPERPLASIA WITH LOWER URINARY TRACT SYMPMS: ICD-10-CM

## 2023-06-01 DIAGNOSIS — R97.20 ELEVATED PROSTATE, SPECIFIC ANTIGEN [PSA]: ICD-10-CM

## 2023-06-01 DIAGNOSIS — N13.8 BENIGN PROSTATIC HYPERPLASIA WITH LOWER URINARY TRACT SYMPMS: ICD-10-CM

## 2023-06-01 PROCEDURE — 99213 OFFICE O/P EST LOW 20 MIN: CPT

## 2023-06-01 NOTE — PHYSICAL EXAM
[General Appearance - Well Developed] : well developed [General Appearance - Well Nourished] : well nourished [Normal Appearance] : normal appearance [Well Groomed] : well groomed [General Appearance - In No Acute Distress] : no acute distress [Abdomen Soft] : soft [Abdomen Tenderness] : non-tender [Costovertebral Angle Tenderness] : no ~M costovertebral angle tenderness [Urethral Meatus] : meatus normal [Urinary Bladder Findings] : the bladder was normal on palpation [Scrotum] : the scrotum was normal [Testes Mass (___cm)] : there were no testicular masses [No Prostate Nodules] : no prostate nodules [Prostate Size ___ gm] : prostate size [unfilled] gm [Prostate Enlarged] : was enlarged [Prostate Size___ (Scale 0-4)] : prostate size was [unfilled] on a scale of 0-4 [Edema] : no peripheral edema [] : no respiratory distress [Respiration, Rhythm And Depth] : normal respiratory rhythm and effort [Exaggerated Use Of Accessory Muscles For Inspiration] : no accessory muscle use [Oriented To Time, Place, And Person] : oriented to person, place, and time [Affect] : the affect was normal [Mood] : the mood was normal [Not Anxious] : not anxious [Normal Station and Gait] : the gait and station were normal for the patient's age [No Focal Deficits] : no focal deficits [No Palpable Adenopathy] : no palpable adenopathy [Prostate Nodule] : did not have a nodule [Prostate Tenderness] : was not tender [Prostate Fluctuant] : was not fluctuant

## 2023-06-01 NOTE — HISTORY OF PRESENT ILLNESS
[FreeTextEntry1] : 83 year old man seen 09/17/2019 with complaint of urinary frequency. This began few years ago. \par It is moderate in severity. Nothing makes the symptoms better, nothing makes sx worse. \par It is associated with nothing. Patient has hx of BPH and is currently taking Flomax 0.8 mg QD. Patient states his frequency is somewhat bothersome, reports he voids every 2-3 hrs and has nocturia x 2. Patient reports he has hx of going into acute urinary retention few times in the past requiring chowdary placement in ER. He denies any current feeling of incomplete bladder emptying and reports he has a good stream.  No hematuria, no dysuria, no urgency, no straining. No incontinence. No fevers, no chills, no nausea, no vomiting, no flank pain. No family history contributory to BPH with LUTS.\par \par \par 12/08/2020: Patient presents for follow up. He is tolerating finasteride well. Urinary frequency improved. He denies new  symptoms and other medical  issues remain unchanged from above. No hematuria, no dysuria, no frequency, no urgency, no hesitancy, no straining. No incontinence. No fevers, no chills, no nausea, no vomiting, no flank pain. PVR 1 mL. \par Pt was recently admitted to hospital for ankle fracture and complications, later TAVR placement. CT showed 1.3 cm LEFT renal stone, no hydro.\par \par 04/20/2022: Patient presents for follow up. He reports LUTS well controlled with dual therapy. No complaints. Nocutria 0-3x/night but not bothersome. No hematuria, no dysuria, no frequency, no urgency, no hesitancy, no straining. No incontinence. No fevers, no chills, no nausea, no vomiting, no flank pain.\par \par 06/01/2023:Patient presents for follow up. He reports LUTS well controlled with dual therapy. No complaints. Nocutria 0-3x/night but not bothersome. No hematuria, no dysuria, no frequency, no urgency, no hesitancy, no straining. No incontinence. No fevers, no chills, no nausea, no vomiting, no flank pain. He was sent by PCP for elevated PSA. see trend below.\par \par PSA (%FREE) TREND \par 3.9   12/0222\par 2.76   12/2021\par 10/2020

## 2023-06-01 NOTE — REVIEW OF SYSTEMS
[see HPI] : see HPI [Nocturia] : nocturia [Negative] : Heme/Lymph [Dysuria] : no dysuria [Incontinence] : no incontinence [Genital Lesion] : no genital lesions [Testicular Pain] : no testicular pain

## 2023-06-01 NOTE — ASSESSMENT
[FreeTextEntry1] : 87 yo M with BPH with LUTS, now with corrected PSA to 6. Discussed that PSA screening is not recommended for patients > 70 years old unless it is thought that > 10 year life expectancy is likely. Discussed full work up would include MRI with possible fusion biopsy. Alternatives include no longer trending PSA, or trending PSA in shorter interval. Pt chooses the latter. Will repeat in 6 months.

## 2023-06-02 RX ORDER — FINASTERIDE 5 MG/1
5 TABLET, FILM COATED ORAL
Qty: 90 | Refills: 2 | Status: ACTIVE | COMMUNITY
Start: 2019-09-17 | End: 1900-01-01

## 2023-07-05 ENCOUNTER — APPOINTMENT (OUTPATIENT)
Dept: ELECTROPHYSIOLOGY | Facility: CLINIC | Age: 88
End: 2023-07-05
Payer: MEDICARE

## 2023-07-05 ENCOUNTER — NON-APPOINTMENT (OUTPATIENT)
Age: 88
End: 2023-07-05

## 2023-07-05 VITALS
WEIGHT: 197 LBS | HEIGHT: 72 IN | BODY MASS INDEX: 26.68 KG/M2 | SYSTOLIC BLOOD PRESSURE: 140 MMHG | OXYGEN SATURATION: 97 % | HEART RATE: 78 BPM | DIASTOLIC BLOOD PRESSURE: 61 MMHG

## 2023-07-05 PROCEDURE — 93280 PM DEVICE PROGR EVAL DUAL: CPT

## 2023-08-02 NOTE — ASU DISCHARGE PLAN (ADULT/PEDIATRIC) - D. IF YOU HAD ANY TYPE OF SEDATION, YOU MAY EXPERIENCE LIGHTHEADEDNESS, DIZZINESS, OR SLEEPINESS FOLLOWING YOUR PROCEDURE. A RESPONSIBLE ADULT SHOULD STAY WITH YOU FOR AT LEAST 24 HOURS FOLLOWING YOUR PROCEDURE.
Rogerio Rogers is a 89 year old female here for  Chief Complaint   Patient presents with   • Follow-up     Multiple myeloma not having achieved remission (CMD     Denies latex allergy or sensitivity.    Medication verified and med list updated.  PCP and Pharmacy verified.    Social History     Tobacco Use   Smoking Status Never   Smokeless Tobacco Never     Advance Directives Filed: Yes    ECOG:   ECOG [08/02/23 0907]   ECOG Performance Status 3       Vitals:    Visit Vitals  /63   Pulse 66   Temp 97.8 °F (36.6 °C) (Oral)   Resp 16   Wt 75.7 kg (166 lb 14.4 oz)   SpO2 98%   BMI 36.12 kg/m²       These vital signs are:  Within defined parameters (Per Reference \"Defined Limits Hospital Outpatient Department (HOD)\")    Height: No.  Ht Readings from Last 1 Encounters:   05/01/23 4' 9\" (1.448 m)     Weight:Yes, shoes on.  Wt Readings from Last 3 Encounters:   08/02/23 75.7 kg (166 lb 14.4 oz)   07/26/23 75.3 kg (166 lb)   07/12/23 75.8 kg (167 lb 1.6 oz)       BMI: Body mass index is 36.12 kg/m².    REVIEW OF SYSTEMS  GENERAL:  Patient denies headache, fevers, chills, night sweats, excessive fatigue, change in appetite, weight loss, dizziness  ALLERGIC/IMMUNOLOGIC: Verified allergies: Yes  MUSCULOSKELETAL:  Patient denies joint pain, bone pain, joint swelling, redness, decreased range of motion  SKIN:  Patient denies chronic rashes, inflammation, ulcerations, skin changes, itching  NEUROLOGIC:  Patient denies loss of balance, areas of focal weakness, abnormal gait, sensory problems, numbness, tingling  PSYCHIATRIC: Patient denies insomnia, depression, anxiety    This patient reported abnormal symptoms that needed immediate verbal communication: No     Statement Selected

## 2023-08-23 NOTE — ADDENDUM
[FreeTextEntry1] : I, Brad Johnson, acted solely as a scribe for Dr. Daniel Bustillos on this date 07/06/2020 .\par All medical record entries made by the Scribe were at my, Dr. Daniel Bustillos, direction and personally dictated by me on 07/06/2020 . I have reviewed the chart and agree that the record accurately reflects my personal performance of the history, physical exam, assessment and plan. I have also personally directed, reviewed, and agreed with the chart.  H Plasty Text: Given the location of the defect, shape of the defect and the proximity to free margins a H-plasty was deemed most appropriate for repair.  Using a sterile surgical marker, the appropriate advancement arms of the H-plasty were drawn incorporating the defect and placing the expected incisions within the relaxed skin tension lines where possible. The area thus outlined was incised deep to adipose tissue with a #15 scalpel blade. The skin margins were undermined to an appropriate distance in all directions utilizing iris scissors.  The opposing advancement arms were then advanced into place in opposite direction and anchored with interrupted buried subcutaneous sutures.

## 2023-08-29 ENCOUNTER — APPOINTMENT (OUTPATIENT)
Dept: ELECTROPHYSIOLOGY | Facility: CLINIC | Age: 88
End: 2023-08-29
Payer: MEDICARE

## 2023-08-30 ENCOUNTER — NON-APPOINTMENT (OUTPATIENT)
Age: 88
End: 2023-08-30

## 2023-08-31 PROCEDURE — 93294 REM INTERROG EVL PM/LDLS PM: CPT

## 2023-08-31 PROCEDURE — 93296 REM INTERROG EVL PM/IDS: CPT

## 2023-11-30 ENCOUNTER — APPOINTMENT (OUTPATIENT)
Dept: ELECTROPHYSIOLOGY | Facility: CLINIC | Age: 88
End: 2023-11-30

## 2024-01-01 ENCOUNTER — EMERGENCY (EMERGENCY)
Facility: HOSPITAL | Age: 89
LOS: 0 days | End: 2024-11-24
Attending: STUDENT IN AN ORGANIZED HEALTH CARE EDUCATION/TRAINING PROGRAM
Payer: MEDICARE

## 2024-01-01 DIAGNOSIS — E78.5 HYPERLIPIDEMIA, UNSPECIFIED: ICD-10-CM

## 2024-01-01 DIAGNOSIS — I46.9 CARDIAC ARREST, CAUSE UNSPECIFIED: ICD-10-CM

## 2024-01-01 DIAGNOSIS — Z95.2 PRESENCE OF PROSTHETIC HEART VALVE: Chronic | ICD-10-CM

## 2024-01-01 DIAGNOSIS — I10 ESSENTIAL (PRIMARY) HYPERTENSION: ICD-10-CM
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
## 2024-01-01 DIAGNOSIS — S00.03XA CONTUSION OF SCALP, INITIAL ENCOUNTER: ICD-10-CM
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
## 2024-01-01 DIAGNOSIS — Z88.8 ALLERGY STATUS TO OTHER DRUGS, MEDICAMENTS AND BIOLOGICAL SUBSTANCES: ICD-10-CM

## 2024-01-01 DIAGNOSIS — Z86.79 PERSONAL HISTORY OF OTHER DISEASES OF THE CIRCULATORY SYSTEM: ICD-10-CM

## 2024-01-01 DIAGNOSIS — I48.0 PAROXYSMAL ATRIAL FIBRILLATION: ICD-10-CM

## 2024-01-01 DIAGNOSIS — Z95.0 PRESENCE OF CARDIAC PACEMAKER: ICD-10-CM

## 2024-01-01 DIAGNOSIS — Z79.82 LONG TERM (CURRENT) USE OF ASPIRIN: ICD-10-CM

## 2024-01-01 DIAGNOSIS — Z98.890 OTHER SPECIFIED POSTPROCEDURAL STATES: Chronic | ICD-10-CM

## 2024-01-01 PROCEDURE — 36000 PLACE NEEDLE IN VEIN: CPT | Mod: XU

## 2024-01-01 PROCEDURE — 82962 GLUCOSE BLOOD TEST: CPT

## 2024-01-01 PROCEDURE — 92950 HEART/LUNG RESUSCITATION CPR: CPT

## 2024-01-01 PROCEDURE — 99285 EMERGENCY DEPT VISIT HI MDM: CPT | Mod: 25

## 2024-01-01 PROCEDURE — 93308 TTE F-UP OR LMTD: CPT | Mod: 26

## 2024-01-01 PROCEDURE — 76705 ECHO EXAM OF ABDOMEN: CPT | Mod: 26

## 2024-01-05 ENCOUNTER — APPOINTMENT (OUTPATIENT)
Dept: ELECTROPHYSIOLOGY | Facility: CLINIC | Age: 89
End: 2024-01-05

## 2024-02-07 ENCOUNTER — APPOINTMENT (OUTPATIENT)
Dept: ELECTROPHYSIOLOGY | Facility: CLINIC | Age: 89
End: 2024-02-07
Payer: MEDICARE

## 2024-02-07 ENCOUNTER — NON-APPOINTMENT (OUTPATIENT)
Age: 89
End: 2024-02-07

## 2024-02-07 VITALS
SYSTOLIC BLOOD PRESSURE: 156 MMHG | DIASTOLIC BLOOD PRESSURE: 95 MMHG | HEIGHT: 72 IN | OXYGEN SATURATION: 98 % | HEART RATE: 87 BPM

## 2024-02-07 PROCEDURE — 93280 PM DEVICE PROGR EVAL DUAL: CPT

## 2024-03-15 NOTE — PATIENT PROFILE ADULT - [employer]AD[employer]
Addended by: CATALINA MULLINS on: 3/15/2024 08:28 AM     Modules accepted: Orders    
(3) slightly limited

## 2024-05-07 ENCOUNTER — APPOINTMENT (OUTPATIENT)
Dept: ELECTROPHYSIOLOGY | Facility: CLINIC | Age: 89
End: 2024-05-07
Payer: MEDICARE

## 2024-05-07 ENCOUNTER — NON-APPOINTMENT (OUTPATIENT)
Age: 89
End: 2024-05-07

## 2024-05-07 VITALS
RESPIRATION RATE: 18 BRPM | WEIGHT: 197 LBS | OXYGEN SATURATION: 98 % | BODY MASS INDEX: 26.68 KG/M2 | HEART RATE: 85 BPM | DIASTOLIC BLOOD PRESSURE: 80 MMHG | SYSTOLIC BLOOD PRESSURE: 160 MMHG | HEIGHT: 72 IN

## 2024-05-07 VITALS — DIASTOLIC BLOOD PRESSURE: 70 MMHG | SYSTOLIC BLOOD PRESSURE: 140 MMHG

## 2024-05-07 DIAGNOSIS — I44.1 ATRIOVENTRICULAR BLOCK, SECOND DEGREE: ICD-10-CM

## 2024-05-07 DIAGNOSIS — Z95.0 PRESENCE OF CARDIAC PACEMAKER: ICD-10-CM

## 2024-05-07 PROCEDURE — 93280 PM DEVICE PROGR EVAL DUAL: CPT

## 2024-06-13 NOTE — ED ADULT NURSE NOTE - NS ED NURSE PATIENT LEFT UNIT TIME
Kwesi Metz MD recommends the following:    Continue CPAP nightly, including naps          American Thor[employer] Society  PATIENT EDUCATION  INFORMATION SERIES  Oral Appliances for Sleep Apnea in Adults  Oral appliances are devices that can be used to treat some people who have mild or moderate Obstructive Sleep Apnea (WILBER) and snoring. There are different types of oral applicances. Common ones include: Mandibular Advancement Devices (MAD), Mandibular Advancement Splints (MAS), Mandibular Repositioning Appliances (MRA), or Tongue Retaining Devices (TRD).  Oral appliances hold your tongue in place so that your airway stays open while you sleep. Oral appliances are placed into your mouth at night before you go to bed, and worn for the entire time you are sleeping, and taken out when you are awake.  How do oral appliances work?  Oral appliances work by pushing or pulling your lower jaw forward. By doing this, your tongue is kept in a position that does not block your airway. This reduces the risk of snoring or that your tongue may obstruct your airway during sleep. If this device is helping you, the sound of snoring should be gone entirely or lessened.   How effective are oral appliances?  As with all treatments, not everyone gets the same benefit from oral appliances. For some, the WILBER and snoring go away completely, while for other people, other forms of treatment are needed. Oral appliances are more likely to work if you have mild or moderate sleep apnea; more recent evidence supports use for some people with severe sleep apnea as well. If your sleep apnea gets better when you lie on your side (compared to sleeping on your back) and if you are not overweight, you are also more likely to benefit from this appliance. If you have central sleep apnea (a less common condition than WILBER), then oral appliances are unlikely to be helpful. Until you have been properly fitted for an oral appliance and tried it, no one  can know how well it will work for you.  How well do oral appliances work compared to CPAP?  CPAP (Continuous Positive Airway Pressure) is a reliable treatment for sleep apnea. Immediate results are typically seen with CPAP regardless of how bad the sleep apnea is. An oral appliance will usually improve your sleep apnea, but may not completely control it. If you have moderate or severe WILBER, CPAP is more likely to work to correct your sleep apnea than an oral appliance. However, an oral appliance may be a better option than no treatment at all if you cannot tolerate CPAP. Oral appliances are also not the main therapy if you have significant heart disease or are very sleepy during the day. In these cases, CPAP is the best treatment. A sleep specialist can provide guidance regarding the most appropriate therapy for you. For more information see the ATS Patient information series piece on \"Continuous Positive Airway Pressure for Adults with Obstructive Sleep Apnea\" at www.thoracic.org/patients.  Are there any side effects from using oral appliances?  If fitted well, the oral appliance should be comfortable during the night. However, because it acts to push your jaw forward, some people feel discomfort when first using the appliance. This discomfort tends to improve as you use it more. If discomfort happens, it is usually in the joint at the back of your jaw, just in front of the ear (the temporomandibular joint). This discomfort should go away when you take the appliance out in the morning. Also, oral appliances may cause increased saliva in your mouth, or make your teeth feel tender. These symptoms usually settle down quickly the more you use the device. Over time, there may be tooth movement, changes in your bite, or problems with the joint and muscles of your jaw. It is important to have regular follow-up visits with the dentist who supplied you with the appliance to detect and manage problems early.      How can I  get an oral appliance?  Your healthcare provider can refer you to a sleep specialist in order to find out how bad your sleep apnea is. This evaluation usually requires an overnight sleep study and a separate follow-up appointment with your sleep specialist to talk about the results and discuss possible treatment options. If you decide to try an oral appliance, your sleep specialist will refer you to a dentist who has extensive training in the treatment of WILBER. For more information about sleep studies see ATS Patient Information Series \"Sleep Studies in the Sleep Laboratory and at Home\" at www.thoracic.org/patients.  Does the appliance need to be specially fitted for me?  Each person has a different mouth and jaw shape, so you should have the oral appliance made to fit you. Your dentist will take an impression of your teeth and send the dental impression (dental mold) to the lab for the appliance to be made. After a few weeks, you will go back to the dentist's office, where the appliance is fitted into your mouth. It will be adjusted so that it moves your jaw forward to a position that will be effective but is still comfortable. Your dentist will help supervise the adjustment of the device over several weeks. After the appliance is fitted, follow-up visits with your dentist or sleep specialist will be needed.  There are some kinds of dental devices that you can buy over-the-counter (without a prescription). These devices are cheaper, but they usually do not work. They may take up lots of space in your mouth, pushing your tongue toward the throat and making your WILBER worse. Getting the proper fitting device is important in helping your sleep problem.   How should I care for my oral appliance?  You should brush and floss your teeth before you put your appliance in each night. Your dentist can prescribe fluoride gel to help prevent tooth decay while using your appliance. Plaque can also build up on an appliance. You must  therefore clean it daily. Make sure you dry it out each day before using it again. Also, keep your appliance safely away from children and pets.  How do I know if oral appliance is working?  When an oral appliance is working well, there should be no snoring. If you are wearing the appliance because of sleep apnea, you may see improved sleepiness, fatigue and other symptoms of sleep apnea. A good way to find out if you are getting the help you need from your oral appliance is to have a repeat overnight sleep study with the oral appliance in place. If the study shows that wearing the oral appliance has helped your WILBER, you should continue to use if every night. If it is not helping your sleep apnea, other treatments (such as CPAP) will be recommended.  What do I do if my oral appliance does not seem to be working well?  If symptoms of snoring or sleep apnea return (for example, feeling tired during the day), it is important to have a follow up appointment with your dentist or your sleep specialist. Your dentist might need to adjust the appliance. After a number of years, some people using an oral appliance find they need to consider other treatments for their WILBER, especially if they have had weight gain.  Author: Kelsey GIFFORD, FRACP, PhDReviewers: Kaley Pa RN, MS; Harrison Morocho DDS, DABDSM; Arnol Alva DDS; Renee Aburto MD, DrPH    Action Steps  Oral appliances may be more convenient than other forms of therapy, but make certain if you use one, it is fitted properly and corrects your sleeping issues.  Speak with a sleep specialist to find out if an oral appliance may help you  If your appliance is not making your symptoms better, report back to your specialist  Keep follow-up appointments with your sleep specialist &/or dentist  Clean your appliance daily    Additional Resources:  American Academy of Sleep  Medicine:  http://www.sleepeducation.com/disease-management/oral-appliance-therapy/overview  American Sleep Apnea Association:  http://www.sleepapnea.org/diagnosis-and-treatment/treatment-options.html  Sleep Health Foundation ([place]):  http://www.sleephealthfoundation.org.au/information-library/xltoqryzhlp-rm-ehwmg.html  This information is a public service of the American Thoracic Society. The content is for educational purposes only. It should not be used as a substitute for the medical advice of one's health care provider.    Online Version Updated January, 2018   ATS Patient Education Series © 2013 American Thoracic Society  www.thoracic.org         The list of dentists that we have in this area that provide services for Mandibular Advancement Devices are:    [facility]rol[facility]  [address] [place] [address]  [phone]    Rex Santiago D.D.S.  [address] [id number]  [phone]    [facility]  [address] [id number]  [phone]    [facility]  Ricardo Jeanette, DDS  [address] [place] [address]  [phone]         Inspire upper airway stimulation     Giving those who suffer from sleep apnea the freedom to sleep like everyone else.      Obstructive sleep apnea, or WILBER, affects nearly 18 million Americans. WILBER doesn’t just interrupt sleep. Left untreated, it can lead to devastating effects on heart and brain health and have lasting effects on quality of life.    Traditionally, WILBER is treated primarily with continuous positive airway pressure, or CPAP. However, research indicates that some people do not respond well to CPAP therapy and that long-term adherence to CPAP therapy is less than 50 percent.        Ideal candidates for Inspire upper airway stimulation therapy:    Have been diagnosed with moderate to severe obstructive sleep apnea, indicated by an Apnea Hypopnea Index (AHI) between and 15 and 65  Have tried and struggled  with or cannot get consistent benefit from CPAP treatment  Are not significantly overweight     Inspire at a glance      The Inspire upper airway stimulation therapy system consists of a stimulation lead and a breathing sensor powered by a small battery.         During the outpatient procedure, the system is placed under the skin in the neck and chest through three small incisions.         There’s no uncomfortable mask or hose. Patients remain in control of their own treatment.         There’s a small handheld Inspire remote used to turn on the therapy system before bed, and off after waking.                  Clinically proven  Studies have found that patients using Inspire therapy have significant and sustained:    Reductions in sleep apnea episodes  Increased activity levels  Reduced daytime sleepiness  Significant decrease in snoring    Here is a video link regarding Inspire treatment:    https://youtu.be/6COO3gwB2Kb    Resources & Insurance  In most instances, if a patient meets the criteria for Inspire, insurance will provide coverage for the device.    However, all plans are different and can change frequently.    For more information on insurance coverage call your insurance provider directly.          For more information visit [email or web address].     Please call our office at [phone] with any questions or concerns.     It was nice to see you today. Take Care.     20:15

## 2024-06-24 RX ORDER — TAMSULOSIN HYDROCHLORIDE 0.4 MG/1
0.4 CAPSULE ORAL
Qty: 180 | Refills: 0 | Status: ACTIVE | COMMUNITY
Start: 2020-01-24 | End: 1900-01-01

## 2024-07-05 ENCOUNTER — APPOINTMENT (OUTPATIENT)
Dept: ELECTROPHYSIOLOGY | Facility: CLINIC | Age: 89
End: 2024-07-05
Payer: MEDICARE

## 2024-07-05 ENCOUNTER — NON-APPOINTMENT (OUTPATIENT)
Age: 89
End: 2024-07-05

## 2024-07-05 VITALS
HEIGHT: 72 IN | SYSTOLIC BLOOD PRESSURE: 148 MMHG | DIASTOLIC BLOOD PRESSURE: 70 MMHG | WEIGHT: 197 LBS | OXYGEN SATURATION: 98 % | HEART RATE: 77 BPM | BODY MASS INDEX: 26.68 KG/M2

## 2024-07-05 PROCEDURE — 93280 PM DEVICE PROGR EVAL DUAL: CPT

## 2024-07-16 ENCOUNTER — APPOINTMENT (OUTPATIENT)
Dept: UROLOGY | Facility: CLINIC | Age: 89
End: 2024-07-16
Payer: MEDICARE

## 2024-07-16 VITALS
RESPIRATION RATE: 16 BRPM | SYSTOLIC BLOOD PRESSURE: 182 MMHG | HEIGHT: 74 IN | OXYGEN SATURATION: 96 % | WEIGHT: 197 LBS | DIASTOLIC BLOOD PRESSURE: 80 MMHG | BODY MASS INDEX: 25.28 KG/M2 | HEART RATE: 77 BPM

## 2024-07-16 DIAGNOSIS — N13.8 BENIGN PROSTATIC HYPERPLASIA WITH LOWER URINARY TRACT SYMPMS: ICD-10-CM

## 2024-07-16 DIAGNOSIS — N40.1 BENIGN PROSTATIC HYPERPLASIA WITH LOWER URINARY TRACT SYMPMS: ICD-10-CM

## 2024-07-16 PROCEDURE — 99212 OFFICE O/P EST SF 10 MIN: CPT

## 2024-08-08 ENCOUNTER — APPOINTMENT (OUTPATIENT)
Dept: ELECTROPHYSIOLOGY | Facility: CLINIC | Age: 89
End: 2024-08-08

## 2024-10-10 ENCOUNTER — APPOINTMENT (OUTPATIENT)
Dept: ELECTROPHYSIOLOGY | Facility: CLINIC | Age: 89
End: 2024-10-10

## 2024-10-10 ENCOUNTER — NON-APPOINTMENT (OUTPATIENT)
Age: 89
End: 2024-10-10

## 2024-10-10 VITALS
BODY MASS INDEX: 26.41 KG/M2 | RESPIRATION RATE: 16 BRPM | SYSTOLIC BLOOD PRESSURE: 150 MMHG | DIASTOLIC BLOOD PRESSURE: 73 MMHG | HEIGHT: 72 IN | WEIGHT: 195 LBS | HEART RATE: 76 BPM | OXYGEN SATURATION: 97 %

## 2024-10-10 PROCEDURE — 93280 PM DEVICE PROGR EVAL DUAL: CPT

## 2024-11-24 NOTE — ED ADULT NURSE NOTE - CHIEF COMPLAINT QUOTE
pt bibems s/p fall at home. wife heard a thump at home, per EMS, wife went to check on  and he was unresponsive on the ground. Left forehead hematoma noted on arrival. Downtime was 20:44 when wife called per EMS. EMS started CPR, initially found to be in Vfib. EMS administered 4 epis, 1 bicarb and 4 shocks. L leg IO inserted in the field by EMS. intubated in the field with 7.5 tube at a depth of 22 at the lip. Mao administering compressions on arrival as well as respirations being assisted with BVM. Code Trauma called at 21:24.

## 2024-11-24 NOTE — ED PROVIDER NOTE - CLINICAL SUMMARY MEDICAL DECISION MAKING FREE TEXT BOX
Pt brought in GCS3T, CPR in progress, intubated in field.   Pt found to have hematoma with dried blood to scalp - code trauma initiated, Dr Antione mckeon.   Rhythm on pulse check found to be v-fib, defibrillation given, ACLS protocol continued - see nursing documentation for details.  FAST exam negative, no cardiac activity seen on bedside US. BG within normal limits. Pt remained pulseless. Family was brought to bedside, requesting that all measures be discontinued at this time, JORDAN called [phone]. Pt brought in GCS3T, CPR in progress, intubated in field.   Pt found to have hematoma with dried blood to scalp - code trauma initiated, Dr Antione mckeon.   Rhythm on pulse check found to be v-fib, ACLS protocol continued - please refer to nursing documentation for details.  FAST exam negative, no cardiac activity seen on bedside US. BG within normal limits. Pt remained pulseless. Family was brought to bedside, requesting that all measures be discontinued at this time, TOD called [phone].

## 2024-11-24 NOTE — ED PROVIDER NOTE - OBJECTIVE STATEMENT
87 y/o M with PMHx of 2nd degree heart block, pacemaker, PAF on ASA, moderate aortic stenosis s/p TAVR, HLD, enlarged prostate, HTN presents to the ED in cardiac arrest. Per EMS, pt collapsed at home at 2044. Pt's wife heard pt fall and went to check on him and found him unresponsive. CPR initiated by wife and EMS called. EMS shocked pt 4 times, gave epi x4 and 1 bicarb en route. Pt placed on ZACK. Pt is on Metoprolol, Losartan, Potassium, Finasteride, Tamsulosin at home. 87 y/o M with PMHx of 2nd degree heart block, pacemaker, PAF on ASA, moderate aortic stenosis s/p TAVR, HLD, enlarged prostate, HTN presents to the ED in cardiac arrest. Per EMS, pt collapsed at home at 2044. Pt's wife heard pt fall and went to check on him and found him unresponsive. CPR initiated by wife and EMS called. PTA EMS shocked pt 4 times for vfib, gave epi x4 and 1 bicarb en route. Pt placed on ZACK. Pt is on Metoprolol, Losartan, Potassium, Finasteride, Tamsulosin at home.

## 2024-11-24 NOTE — ED PROVIDER NOTE - PHYSICAL EXAMINATION
Constitutional: GCS3T  Eyes: pupils 3mm, round, unreactive  Head: Normocephalic, L occipital scalp hematoma with dried blood  Mouth: intubated  Neck: no obvious hematoma  Cardiac: no cardiac sounds  Resp: b/l lung sounds  GI: Abd nd  Neuro: GCS3T  Skin: No rashes, ecchymosis to buttocks Constitutional: GCS3T  Eyes: pupils 3mm, round, unreactive  Head: Normocephalic, L occipital scalp hematoma with dried blood  Mouth: intubated  Neck: no obvious hematoma  Cardiac: no cardiac sounds  Resp: b/l lung sounds  GI: Abd nd  Neuro: GCS3T  Skin: No rashes, ecchymosis to buttocks  MSK: no obvious deformity of extremities x4

## 2024-11-25 LAB — GLUCOSE BLDC GLUCOMTR-MCNC: 149 MG/DL — HIGH (ref 70–99)

## 2025-01-16 ENCOUNTER — APPOINTMENT (OUTPATIENT)
Dept: ELECTROPHYSIOLOGY | Facility: CLINIC | Age: 89
End: 2025-01-16

## 2025-07-21 NOTE — SBIRT NOTE ADULT - NSSBIRTDRGUSEDOTHTHAN_GEN_A_CORE
LOV 7/1/25 for post op, patient has future appointment scheduled 8/4/25  Cholecalciferol (Vitamin D) 50 mcg (2,000 units) tablet last refilled 12/27/24 100# 1 refill  Component      Latest Ref Rng 12/23/2024  2:16 PM   VITAMIND, 25 HYDROXY      30.0 - 100.0 ng/mL 26.8 (L)       Legend:  (L) Low    KILTR message sent to patient     
OK to refill vitamin D?  
No

## 2025-07-31 NOTE — ED STATDOCS - CROS ED CONS ALL NEG
Refill Decision Note   Tevin Velazquez  is requesting a refill authorization.  Brief Assessment and Rationale for Refill:  Approve     Medication Therapy Plan:         Comments:     Note composed:3:07 PM 07/31/2025            
No care due was identified.  [facility] Embedded Care Due Messages. Reference number: [id number].   7/31/2025 12:17:16 PM CDT  
negative...